# Patient Record
Sex: FEMALE | Race: WHITE | Employment: OTHER | URBAN - NONMETROPOLITAN AREA
[De-identification: names, ages, dates, MRNs, and addresses within clinical notes are randomized per-mention and may not be internally consistent; named-entity substitution may affect disease eponyms.]

---

## 2018-03-21 ENCOUNTER — HOSPITAL ENCOUNTER (OUTPATIENT)
Dept: GENERAL RADIOLOGY | Age: 68
Discharge: HOME | End: 2018-03-23
Payer: MEDICARE

## 2018-03-21 ENCOUNTER — HOSPITAL ENCOUNTER (OUTPATIENT)
Dept: NON INVASIVE DIAGNOSTICS | Age: 68
Discharge: HOME | End: 2018-03-21
Payer: MEDICARE

## 2018-03-21 ENCOUNTER — OFFICE VISIT (OUTPATIENT)
Dept: FAMILY MEDICINE CLINIC | Age: 68
End: 2018-03-21
Payer: MEDICARE

## 2018-03-21 VITALS
WEIGHT: 229 LBS | HEART RATE: 79 BPM | BODY MASS INDEX: 34.71 KG/M2 | SYSTOLIC BLOOD PRESSURE: 138 MMHG | DIASTOLIC BLOOD PRESSURE: 80 MMHG | OXYGEN SATURATION: 96 % | HEIGHT: 68 IN

## 2018-03-21 DIAGNOSIS — Z01.811 PRE-OP CHEST EXAM: ICD-10-CM

## 2018-03-21 DIAGNOSIS — Z01.818 PRE-OP TESTING: ICD-10-CM

## 2018-03-21 DIAGNOSIS — Z00.00 ROUTINE HEALTH MAINTENANCE: Primary | ICD-10-CM

## 2018-03-21 DIAGNOSIS — Z72.0 TOBACCO ABUSE: ICD-10-CM

## 2018-03-21 DIAGNOSIS — R53.83 FATIGUE, UNSPECIFIED TYPE: ICD-10-CM

## 2018-03-21 LAB
EKG ATRIAL RATE: 74 BPM
EKG P AXIS: 77 DEGREES
EKG P-R INTERVAL: 128 MS
EKG Q-T INTERVAL: 386 MS
EKG QRS DURATION: 90 MS
EKG QTC CALCULATION (BAZETT): 428 MS
EKG R AXIS: 68 DEGREES
EKG T AXIS: 69 DEGREES
EKG VENTRICULAR RATE: 74 BPM

## 2018-03-21 PROCEDURE — 99204 OFFICE O/P NEW MOD 45 MIN: CPT | Performed by: NURSE PRACTITIONER

## 2018-03-21 PROCEDURE — 93005 ELECTROCARDIOGRAM TRACING: CPT

## 2018-03-21 PROCEDURE — 71046 X-RAY EXAM CHEST 2 VIEWS: CPT

## 2018-03-21 ASSESSMENT — ENCOUNTER SYMPTOMS
CONSTIPATION: 0
ALLERGIC/IMMUNOLOGIC NEGATIVE: 1
ABDOMINAL PAIN: 0
SINUS PRESSURE: 0
SHORTNESS OF BREATH: 0
EYES NEGATIVE: 1
VOMITING: 0
DIARRHEA: 0
TROUBLE SWALLOWING: 0
NAUSEA: 0
CHEST TIGHTNESS: 0
COUGH: 0
GASTROINTESTINAL NEGATIVE: 1

## 2018-03-21 ASSESSMENT — PATIENT HEALTH QUESTIONNAIRE - PHQ9
SUM OF ALL RESPONSES TO PHQ QUESTIONS 1-9: 1
2. FEELING DOWN, DEPRESSED OR HOPELESS: 1
1. LITTLE INTEREST OR PLEASURE IN DOING THINGS: 0
SUM OF ALL RESPONSES TO PHQ9 QUESTIONS 1 & 2: 1

## 2018-03-23 ENCOUNTER — HOSPITAL ENCOUNTER (OUTPATIENT)
Dept: LAB | Age: 68
Setting detail: SPECIMEN
Discharge: HOME | End: 2018-03-23
Payer: MEDICARE

## 2018-03-23 DIAGNOSIS — Z01.818 PRE-OP TESTING: ICD-10-CM

## 2018-03-23 DIAGNOSIS — Z00.00 ROUTINE HEALTH MAINTENANCE: ICD-10-CM

## 2018-03-23 DIAGNOSIS — R53.83 FATIGUE, UNSPECIFIED TYPE: ICD-10-CM

## 2018-03-23 LAB
-: ABNORMAL
ALBUMIN SERPL-MCNC: 3.8 G/DL (ref 3.5–5.2)
ALBUMIN/GLOBULIN RATIO: 1.2 (ref 1–2.5)
ALP BLD-CCNC: 104 U/L (ref 35–104)
ALT SERPL-CCNC: 11 U/L (ref 5–33)
AMORPHOUS: ABNORMAL
ANION GAP SERPL CALCULATED.3IONS-SCNC: 9 MMOL/L (ref 9–17)
AST SERPL-CCNC: 13 U/L
BACTERIA: ABNORMAL
BILIRUB SERPL-MCNC: 0.56 MG/DL (ref 0.3–1.2)
BILIRUBIN URINE: NEGATIVE
BUN BLDV-MCNC: 21 MG/DL (ref 8–23)
BUN/CREAT BLD: 34 (ref 9–20)
CALCIUM SERPL-MCNC: 9.3 MG/DL (ref 8.6–10.4)
CASTS UA: ABNORMAL /LPF (ref 0–2)
CHLORIDE BLD-SCNC: 100 MMOL/L (ref 98–107)
CHOLESTEROL/HDL RATIO: 2.9
CHOLESTEROL: 177 MG/DL
CO2: 31 MMOL/L (ref 20–31)
COLOR: NORMAL
COMMENT UA: NORMAL
CREAT SERPL-MCNC: 0.61 MG/DL (ref 0.5–0.9)
CRYSTALS, UA: ABNORMAL /HPF
EPITHELIAL CELLS UA: ABNORMAL /HPF (ref 0–5)
GFR AFRICAN AMERICAN: >60 ML/MIN
GFR NON-AFRICAN AMERICAN: >60 ML/MIN
GFR SERPL CREATININE-BSD FRML MDRD: ABNORMAL ML/MIN/{1.73_M2}
GFR SERPL CREATININE-BSD FRML MDRD: ABNORMAL ML/MIN/{1.73_M2}
GLUCOSE BLD-MCNC: 96 MG/DL (ref 70–99)
GLUCOSE URINE: NEGATIVE
HCT VFR BLD CALC: 45.7 % (ref 36–46)
HDLC SERPL-MCNC: 61 MG/DL
HEMOGLOBIN: 15.2 G/DL (ref 12–16)
KETONES, URINE: NEGATIVE
LDL CHOLESTEROL: 103 MG/DL (ref 0–130)
LEUKOCYTE ESTERASE, URINE: NEGATIVE
MCH RBC QN AUTO: 30.6 PG (ref 26–34)
MCHC RBC AUTO-ENTMCNC: 33.3 G/DL (ref 31–37)
MCV RBC AUTO: 91.8 FL (ref 80–100)
MUCUS: ABNORMAL
NITRITE, URINE: NEGATIVE
NRBC AUTOMATED: NORMAL PER 100 WBC
OTHER OBSERVATIONS UA: ABNORMAL
PDW BLD-RTO: 13.6 % (ref 11–14.5)
PH UA: 6 (ref 5–6)
PLATELET # BLD: 151 K/UL (ref 140–450)
PMV BLD AUTO: 10.3 FL (ref 6–12)
POTASSIUM SERPL-SCNC: 4 MMOL/L (ref 3.7–5.3)
PROTEIN UA: NEGATIVE
RBC # BLD: 4.98 M/UL (ref 4–5.2)
RBC UA: ABNORMAL /HPF (ref 0–4)
RENAL EPITHELIAL, UA: ABNORMAL /HPF
SODIUM BLD-SCNC: 140 MMOL/L (ref 135–144)
SPECIFIC GRAVITY UA: 1.02 (ref 1.01–1.02)
THYROXINE, FREE: 1.24 NG/DL (ref 0.93–1.7)
TOTAL PROTEIN: 6.9 G/DL (ref 6.4–8.3)
TRICHOMONAS: ABNORMAL
TRIGL SERPL-MCNC: 67 MG/DL
TSH SERPL DL<=0.05 MIU/L-ACNC: 3.9 MIU/L (ref 0.3–5)
TURBIDITY: NORMAL
URINE HGB: NEGATIVE
UROBILINOGEN, URINE: NORMAL
VLDLC SERPL CALC-MCNC: NORMAL MG/DL (ref 1–30)
WBC # BLD: 5.6 K/UL (ref 3.5–11)
WBC UA: ABNORMAL /HPF (ref 0–4)
YEAST: ABNORMAL

## 2018-03-23 PROCEDURE — 84443 ASSAY THYROID STIM HORMONE: CPT

## 2018-03-23 PROCEDURE — 80053 COMPREHEN METABOLIC PANEL: CPT

## 2018-03-23 PROCEDURE — 85027 COMPLETE CBC AUTOMATED: CPT

## 2018-03-23 PROCEDURE — 81001 URINALYSIS AUTO W/SCOPE: CPT

## 2018-03-23 PROCEDURE — 80061 LIPID PANEL: CPT

## 2018-03-23 PROCEDURE — 36415 COLL VENOUS BLD VENIPUNCTURE: CPT

## 2018-03-23 PROCEDURE — 84439 ASSAY OF FREE THYROXINE: CPT

## 2018-03-28 ENCOUNTER — OFFICE VISIT (OUTPATIENT)
Dept: FAMILY MEDICINE CLINIC | Age: 68
End: 2018-03-28
Payer: MEDICARE

## 2018-03-28 VITALS
OXYGEN SATURATION: 99 % | DIASTOLIC BLOOD PRESSURE: 79 MMHG | HEART RATE: 93 BPM | SYSTOLIC BLOOD PRESSURE: 122 MMHG | BODY MASS INDEX: 34.72 KG/M2 | TEMPERATURE: 98.3 F | HEIGHT: 68 IN | RESPIRATION RATE: 12 BRPM | WEIGHT: 229.06 LBS

## 2018-03-28 DIAGNOSIS — Z91.09 ENVIRONMENTAL ALLERGIES: ICD-10-CM

## 2018-03-28 DIAGNOSIS — H25.9 AGE-RELATED CATARACT OF BOTH EYES, UNSPECIFIED AGE-RELATED CATARACT TYPE: Primary | ICD-10-CM

## 2018-03-28 DIAGNOSIS — Z01.818 PRE-OPERATIVE EXAM: ICD-10-CM

## 2018-03-28 PROCEDURE — 4040F PNEUMOC VAC/ADMIN/RCVD: CPT | Performed by: NURSE PRACTITIONER

## 2018-03-28 PROCEDURE — 99214 OFFICE O/P EST MOD 30 MIN: CPT | Performed by: NURSE PRACTITIONER

## 2018-03-28 PROCEDURE — G8484 FLU IMMUNIZE NO ADMIN: HCPCS | Performed by: NURSE PRACTITIONER

## 2018-03-28 PROCEDURE — 3017F COLORECTAL CA SCREEN DOC REV: CPT | Performed by: NURSE PRACTITIONER

## 2018-03-28 PROCEDURE — G8417 CALC BMI ABV UP PARAM F/U: HCPCS | Performed by: NURSE PRACTITIONER

## 2018-03-28 PROCEDURE — G8427 DOCREV CUR MEDS BY ELIG CLIN: HCPCS | Performed by: NURSE PRACTITIONER

## 2018-03-28 PROCEDURE — 3014F SCREEN MAMMO DOC REV: CPT | Performed by: NURSE PRACTITIONER

## 2018-03-28 PROCEDURE — 1090F PRES/ABSN URINE INCON ASSESS: CPT | Performed by: NURSE PRACTITIONER

## 2018-03-28 RX ORDER — LOTEPREDNOL ETABONATE 5 MG/G
GEL OPHTHALMIC
COMMUNITY
Start: 2018-04-10 | End: 2018-05-08

## 2018-03-28 RX ORDER — FEXOFENADINE HCL 180 MG/1
180 TABLET ORAL DAILY
Qty: 3030 TABLET | Refills: 1 | Status: SHIPPED | OUTPATIENT
Start: 2018-03-28 | End: 2018-04-27

## 2018-03-28 RX ORDER — LORATADINE 10 MG/1
10 TABLET ORAL DAILY
COMMUNITY

## 2018-03-28 ASSESSMENT — ENCOUNTER SYMPTOMS
GASTROINTESTINAL NEGATIVE: 1
EYE DISCHARGE: 0
SHORTNESS OF BREATH: 0
SINUS PRESSURE: 0
ABDOMINAL PAIN: 0
ALLERGIC/IMMUNOLOGIC NEGATIVE: 1
DIARRHEA: 0
NAUSEA: 0
TROUBLE SWALLOWING: 0
CONSTIPATION: 0
RESPIRATORY NEGATIVE: 1
CHEST TIGHTNESS: 0
COUGH: 0
EYE PAIN: 0
VOMITING: 0
EYE ITCHING: 0

## 2018-03-28 NOTE — PROGRESS NOTES
The lungs are without acute focal process. Mild emphysematous changes. There is no effusion or pneumothorax. The cardiomediastinal silhouette is stable. The osseous structures are stable. No acute process. Mild emphysematous changes     EKG completed-3/21/2018  Normal sinus rhythm  Normal ECG       Assessment & Plan:          1. Age-related cataract of both eyes, unspecified age-related cataract type     2. Environmental allergies  fexofenadine (ALLEGRA) 180 MG tablet   3. Pre-operative exam         Claire Vasquez was evaluated in my office for pre-operative evaluation. Based on review of information available to me at this time, the patient appears to be at no increased risk for the planned procedure. Will clear pending ordered test results at this visit. The Patient  is at low cardiac risk. Patient is advised to avoid aspirin, antiplatelet, and NSAID therapy one week prior to surgical intervention.    Patient is to continue the recommendations given to them from their surgeon regarding their medical regimen. No additional changes are made at this time. Patient can follow-up with PCP in office postoperatively for continued medical management or sooner if any further problems or symptoms arise. --She is medically cleared for proposed procedure. A letter is sent to the requesting provider.          Reina Orellana NP on [unfilled] at 10:16 AM

## 2019-03-08 ENCOUNTER — OFFICE VISIT (OUTPATIENT)
Dept: PRIMARY CARE CLINIC | Age: 69
End: 2019-03-08
Payer: MEDICARE

## 2019-03-08 VITALS
SYSTOLIC BLOOD PRESSURE: 118 MMHG | HEIGHT: 68 IN | OXYGEN SATURATION: 92 % | BODY MASS INDEX: 35.4 KG/M2 | DIASTOLIC BLOOD PRESSURE: 72 MMHG | TEMPERATURE: 99.2 F | HEART RATE: 99 BPM | RESPIRATION RATE: 20 BRPM | WEIGHT: 233.6 LBS

## 2019-03-08 DIAGNOSIS — Z23 NEED FOR PNEUMOCOCCAL VACCINATION: Primary | ICD-10-CM

## 2019-03-08 DIAGNOSIS — R05.9 COUGH: ICD-10-CM

## 2019-03-08 DIAGNOSIS — J11.1 INFLUENZA-LIKE ILLNESS: ICD-10-CM

## 2019-03-08 DIAGNOSIS — J44.9 CHRONIC OBSTRUCTIVE PULMONARY DISEASE, UNSPECIFIED COPD TYPE (HCC): ICD-10-CM

## 2019-03-08 PROCEDURE — 1123F ACP DISCUSS/DSCN MKR DOCD: CPT | Performed by: PHYSICIAN ASSISTANT

## 2019-03-08 PROCEDURE — 4004F PT TOBACCO SCREEN RCVD TLK: CPT | Performed by: PHYSICIAN ASSISTANT

## 2019-03-08 PROCEDURE — 4040F PNEUMOC VAC/ADMIN/RCVD: CPT | Performed by: PHYSICIAN ASSISTANT

## 2019-03-08 PROCEDURE — 1090F PRES/ABSN URINE INCON ASSESS: CPT | Performed by: PHYSICIAN ASSISTANT

## 2019-03-08 PROCEDURE — G8926 SPIRO NO PERF OR DOC: HCPCS | Performed by: PHYSICIAN ASSISTANT

## 2019-03-08 PROCEDURE — G8427 DOCREV CUR MEDS BY ELIG CLIN: HCPCS | Performed by: PHYSICIAN ASSISTANT

## 2019-03-08 PROCEDURE — G8599 NO ASA/ANTIPLAT THER USE RNG: HCPCS | Performed by: PHYSICIAN ASSISTANT

## 2019-03-08 PROCEDURE — 3017F COLORECTAL CA SCREEN DOC REV: CPT | Performed by: PHYSICIAN ASSISTANT

## 2019-03-08 PROCEDURE — G8484 FLU IMMUNIZE NO ADMIN: HCPCS | Performed by: PHYSICIAN ASSISTANT

## 2019-03-08 PROCEDURE — G8400 PT W/DXA NO RESULTS DOC: HCPCS | Performed by: PHYSICIAN ASSISTANT

## 2019-03-08 PROCEDURE — 99213 OFFICE O/P EST LOW 20 MIN: CPT | Performed by: PHYSICIAN ASSISTANT

## 2019-03-08 PROCEDURE — G8417 CALC BMI ABV UP PARAM F/U: HCPCS | Performed by: PHYSICIAN ASSISTANT

## 2019-03-08 PROCEDURE — 1101F PT FALLS ASSESS-DOCD LE1/YR: CPT | Performed by: PHYSICIAN ASSISTANT

## 2019-03-08 PROCEDURE — 3023F SPIROM DOC REV: CPT | Performed by: PHYSICIAN ASSISTANT

## 2019-03-08 RX ORDER — PREDNISONE 10 MG/1
10 TABLET ORAL 2 TIMES DAILY
Qty: 10 TABLET | Refills: 0 | Status: SHIPPED | OUTPATIENT
Start: 2019-03-08 | End: 2019-03-13

## 2019-03-08 RX ORDER — OSELTAMIVIR PHOSPHATE 75 MG/1
75 CAPSULE ORAL 2 TIMES DAILY
Qty: 10 CAPSULE | Refills: 0 | Status: SHIPPED | OUTPATIENT
Start: 2019-03-08 | End: 2019-03-13

## 2019-03-08 RX ORDER — AZITHROMYCIN 250 MG/1
250 TABLET, FILM COATED ORAL SEE ADMIN INSTRUCTIONS
Qty: 6 TABLET | Refills: 0 | Status: SHIPPED | OUTPATIENT
Start: 2019-03-08 | End: 2019-03-13

## 2019-03-08 ASSESSMENT — ENCOUNTER SYMPTOMS
NAUSEA: 1
SORE THROAT: 0
WHEEZING: 1
RHINORRHEA: 1
BACK PAIN: 1
SHORTNESS OF BREATH: 1
DIARRHEA: 0
VOMITING: 0
COUGH: 1
CHEST TIGHTNESS: 1

## 2019-03-08 ASSESSMENT — PATIENT HEALTH QUESTIONNAIRE - PHQ9
2. FEELING DOWN, DEPRESSED OR HOPELESS: 0
SUM OF ALL RESPONSES TO PHQ QUESTIONS 1-9: 0
1. LITTLE INTEREST OR PLEASURE IN DOING THINGS: 0
SUM OF ALL RESPONSES TO PHQ QUESTIONS 1-9: 0
SUM OF ALL RESPONSES TO PHQ9 QUESTIONS 1 & 2: 0

## 2019-03-22 ENCOUNTER — HOSPITAL ENCOUNTER (OUTPATIENT)
Dept: GENERAL RADIOLOGY | Age: 69
Discharge: HOME | End: 2019-03-24
Payer: MEDICARE

## 2019-03-22 ENCOUNTER — OFFICE VISIT (OUTPATIENT)
Dept: FAMILY MEDICINE CLINIC | Age: 69
End: 2019-03-22
Payer: MEDICARE

## 2019-03-22 VITALS
DIASTOLIC BLOOD PRESSURE: 72 MMHG | HEIGHT: 68 IN | WEIGHT: 233 LBS | SYSTOLIC BLOOD PRESSURE: 110 MMHG | HEART RATE: 56 BPM | RESPIRATION RATE: 16 BRPM | BODY MASS INDEX: 35.31 KG/M2

## 2019-03-22 DIAGNOSIS — Z12.11 SCREENING FOR COLON CANCER: ICD-10-CM

## 2019-03-22 DIAGNOSIS — B96.89 ACUTE BACTERIAL SINUSITIS: ICD-10-CM

## 2019-03-22 DIAGNOSIS — R06.2 WHEEZING: ICD-10-CM

## 2019-03-22 DIAGNOSIS — Z23 NEED FOR SHINGLES VACCINE: ICD-10-CM

## 2019-03-22 DIAGNOSIS — Z12.31 VISIT FOR SCREENING MAMMOGRAM: ICD-10-CM

## 2019-03-22 DIAGNOSIS — Z00.00 ROUTINE HEALTH MAINTENANCE: ICD-10-CM

## 2019-03-22 DIAGNOSIS — R06.02 SOB (SHORTNESS OF BREATH): ICD-10-CM

## 2019-03-22 DIAGNOSIS — J44.9 CHRONIC OBSTRUCTIVE PULMONARY DISEASE, UNSPECIFIED COPD TYPE (HCC): ICD-10-CM

## 2019-03-22 DIAGNOSIS — Z11.59 NEED FOR HEPATITIS C SCREENING TEST: ICD-10-CM

## 2019-03-22 DIAGNOSIS — Z72.0 TOBACCO ABUSE: Primary | ICD-10-CM

## 2019-03-22 DIAGNOSIS — J01.90 ACUTE BACTERIAL SINUSITIS: ICD-10-CM

## 2019-03-22 PROCEDURE — G8926 SPIRO NO PERF OR DOC: HCPCS | Performed by: NURSE PRACTITIONER

## 2019-03-22 PROCEDURE — G8417 CALC BMI ABV UP PARAM F/U: HCPCS | Performed by: NURSE PRACTITIONER

## 2019-03-22 PROCEDURE — G8599 NO ASA/ANTIPLAT THER USE RNG: HCPCS | Performed by: NURSE PRACTITIONER

## 2019-03-22 PROCEDURE — 4040F PNEUMOC VAC/ADMIN/RCVD: CPT | Performed by: NURSE PRACTITIONER

## 2019-03-22 PROCEDURE — 3023F SPIROM DOC REV: CPT | Performed by: NURSE PRACTITIONER

## 2019-03-22 PROCEDURE — G8400 PT W/DXA NO RESULTS DOC: HCPCS | Performed by: NURSE PRACTITIONER

## 2019-03-22 PROCEDURE — 1090F PRES/ABSN URINE INCON ASSESS: CPT | Performed by: NURSE PRACTITIONER

## 2019-03-22 PROCEDURE — 99215 OFFICE O/P EST HI 40 MIN: CPT | Performed by: NURSE PRACTITIONER

## 2019-03-22 PROCEDURE — 4004F PT TOBACCO SCREEN RCVD TLK: CPT | Performed by: NURSE PRACTITIONER

## 2019-03-22 PROCEDURE — G8510 SCR DEP NEG, NO PLAN REQD: HCPCS | Performed by: NURSE PRACTITIONER

## 2019-03-22 PROCEDURE — G8484 FLU IMMUNIZE NO ADMIN: HCPCS | Performed by: NURSE PRACTITIONER

## 2019-03-22 PROCEDURE — 71046 X-RAY EXAM CHEST 2 VIEWS: CPT

## 2019-03-22 PROCEDURE — G8427 DOCREV CUR MEDS BY ELIG CLIN: HCPCS | Performed by: NURSE PRACTITIONER

## 2019-03-22 PROCEDURE — 1101F PT FALLS ASSESS-DOCD LE1/YR: CPT | Performed by: NURSE PRACTITIONER

## 2019-03-22 PROCEDURE — 1123F ACP DISCUSS/DSCN MKR DOCD: CPT | Performed by: NURSE PRACTITIONER

## 2019-03-22 PROCEDURE — 3017F COLORECTAL CA SCREEN DOC REV: CPT | Performed by: NURSE PRACTITIONER

## 2019-03-22 RX ORDER — ALBUTEROL SULFATE 90 UG/1
2 AEROSOL, METERED RESPIRATORY (INHALATION) 4 TIMES DAILY PRN
Qty: 1 INHALER | Refills: 0 | Status: SHIPPED | OUTPATIENT
Start: 2019-03-22 | End: 2019-07-11

## 2019-03-22 RX ORDER — LEVOFLOXACIN 500 MG/1
500 TABLET, FILM COATED ORAL DAILY
Qty: 7 TABLET | Refills: 0 | Status: SHIPPED | OUTPATIENT
Start: 2019-03-22 | End: 2019-04-04 | Stop reason: ALTCHOICE

## 2019-03-22 ASSESSMENT — ENCOUNTER SYMPTOMS
WHEEZING: 1
NAUSEA: 0
SORE THROAT: 0
COUGH: 1
EYE ITCHING: 0
DIARRHEA: 1
CHEST TIGHTNESS: 1
SHORTNESS OF BREATH: 1
CONSTIPATION: 0
SINUS PRESSURE: 1
SINUS PAIN: 1
VOMITING: 0
RHINORRHEA: 1
EYE DISCHARGE: 0

## 2019-03-22 ASSESSMENT — PATIENT HEALTH QUESTIONNAIRE - PHQ9
SUM OF ALL RESPONSES TO PHQ QUESTIONS 1-9: 0
1. LITTLE INTEREST OR PLEASURE IN DOING THINGS: 0
SUM OF ALL RESPONSES TO PHQ QUESTIONS 1-9: 0
2. FEELING DOWN, DEPRESSED OR HOPELESS: 0
SUM OF ALL RESPONSES TO PHQ9 QUESTIONS 1 & 2: 0

## 2019-04-04 ENCOUNTER — OFFICE VISIT (OUTPATIENT)
Dept: PRIMARY CARE CLINIC | Age: 69
End: 2019-04-04
Payer: MEDICARE

## 2019-04-04 VITALS
DIASTOLIC BLOOD PRESSURE: 80 MMHG | OXYGEN SATURATION: 94 % | WEIGHT: 225 LBS | BODY MASS INDEX: 34.21 KG/M2 | HEART RATE: 92 BPM | TEMPERATURE: 98.6 F | SYSTOLIC BLOOD PRESSURE: 120 MMHG

## 2019-04-04 DIAGNOSIS — J01.40 ACUTE PANSINUSITIS, RECURRENCE NOT SPECIFIED: Primary | ICD-10-CM

## 2019-04-04 PROCEDURE — G8599 NO ASA/ANTIPLAT THER USE RNG: HCPCS | Performed by: FAMILY MEDICINE

## 2019-04-04 PROCEDURE — 1090F PRES/ABSN URINE INCON ASSESS: CPT | Performed by: FAMILY MEDICINE

## 2019-04-04 PROCEDURE — 4040F PNEUMOC VAC/ADMIN/RCVD: CPT | Performed by: FAMILY MEDICINE

## 2019-04-04 PROCEDURE — 3017F COLORECTAL CA SCREEN DOC REV: CPT | Performed by: FAMILY MEDICINE

## 2019-04-04 PROCEDURE — G8417 CALC BMI ABV UP PARAM F/U: HCPCS | Performed by: FAMILY MEDICINE

## 2019-04-04 PROCEDURE — 99214 OFFICE O/P EST MOD 30 MIN: CPT | Performed by: FAMILY MEDICINE

## 2019-04-04 PROCEDURE — G8400 PT W/DXA NO RESULTS DOC: HCPCS | Performed by: FAMILY MEDICINE

## 2019-04-04 PROCEDURE — 1123F ACP DISCUSS/DSCN MKR DOCD: CPT | Performed by: FAMILY MEDICINE

## 2019-04-04 PROCEDURE — 4004F PT TOBACCO SCREEN RCVD TLK: CPT | Performed by: FAMILY MEDICINE

## 2019-04-04 PROCEDURE — G8427 DOCREV CUR MEDS BY ELIG CLIN: HCPCS | Performed by: FAMILY MEDICINE

## 2019-04-04 RX ORDER — FLUTICASONE PROPIONATE 50 MCG
1 SPRAY, SUSPENSION (ML) NASAL DAILY
Qty: 1 BOTTLE | Refills: 0 | Status: SHIPPED | OUTPATIENT
Start: 2019-04-04 | End: 2019-10-08 | Stop reason: ALTCHOICE

## 2019-04-04 RX ORDER — CEFUROXIME AXETIL 500 MG/1
500 TABLET ORAL 2 TIMES DAILY
Qty: 20 TABLET | Refills: 0 | Status: SHIPPED | OUTPATIENT
Start: 2019-04-04 | End: 2019-04-14

## 2019-04-08 ENCOUNTER — TELEPHONE (OUTPATIENT)
Dept: PRIMARY CARE CLINIC | Age: 69
End: 2019-04-08

## 2019-04-11 ENCOUNTER — HOSPITAL ENCOUNTER (OUTPATIENT)
Dept: MAMMOGRAPHY | Age: 69
Discharge: HOME | End: 2019-04-13
Payer: MEDICARE

## 2019-04-11 DIAGNOSIS — Z12.31 VISIT FOR SCREENING MAMMOGRAM: ICD-10-CM

## 2019-04-11 PROCEDURE — 77063 BREAST TOMOSYNTHESIS BI: CPT

## 2019-04-24 ENCOUNTER — OFFICE VISIT (OUTPATIENT)
Dept: PRIMARY CARE CLINIC | Age: 69
End: 2019-04-24
Payer: MEDICARE

## 2019-04-24 VITALS
BODY MASS INDEX: 33.45 KG/M2 | HEART RATE: 92 BPM | DIASTOLIC BLOOD PRESSURE: 76 MMHG | TEMPERATURE: 99.2 F | OXYGEN SATURATION: 98 % | SYSTOLIC BLOOD PRESSURE: 120 MMHG | WEIGHT: 220 LBS

## 2019-04-24 DIAGNOSIS — R51.9 NONINTRACTABLE HEADACHE, UNSPECIFIED CHRONICITY PATTERN, UNSPECIFIED HEADACHE TYPE: ICD-10-CM

## 2019-04-24 DIAGNOSIS — J01.40 ACUTE PANSINUSITIS, RECURRENCE NOT SPECIFIED: Primary | ICD-10-CM

## 2019-04-24 PROCEDURE — G8599 NO ASA/ANTIPLAT THER USE RNG: HCPCS | Performed by: FAMILY MEDICINE

## 2019-04-24 PROCEDURE — 4004F PT TOBACCO SCREEN RCVD TLK: CPT | Performed by: FAMILY MEDICINE

## 2019-04-24 PROCEDURE — G8400 PT W/DXA NO RESULTS DOC: HCPCS | Performed by: FAMILY MEDICINE

## 2019-04-24 PROCEDURE — G8417 CALC BMI ABV UP PARAM F/U: HCPCS | Performed by: FAMILY MEDICINE

## 2019-04-24 PROCEDURE — 4040F PNEUMOC VAC/ADMIN/RCVD: CPT | Performed by: FAMILY MEDICINE

## 2019-04-24 PROCEDURE — 1123F ACP DISCUSS/DSCN MKR DOCD: CPT | Performed by: FAMILY MEDICINE

## 2019-04-24 PROCEDURE — 3017F COLORECTAL CA SCREEN DOC REV: CPT | Performed by: FAMILY MEDICINE

## 2019-04-24 PROCEDURE — 1090F PRES/ABSN URINE INCON ASSESS: CPT | Performed by: FAMILY MEDICINE

## 2019-04-24 PROCEDURE — G8427 DOCREV CUR MEDS BY ELIG CLIN: HCPCS | Performed by: FAMILY MEDICINE

## 2019-04-24 PROCEDURE — 99213 OFFICE O/P EST LOW 20 MIN: CPT | Performed by: FAMILY MEDICINE

## 2019-04-24 RX ORDER — TIZANIDINE 2 MG/1
2 TABLET ORAL 3 TIMES DAILY PRN
Qty: 30 TABLET | Refills: 0 | Status: SHIPPED | OUTPATIENT
Start: 2019-04-24 | End: 2020-05-14

## 2019-04-24 RX ORDER — CYCLOBENZAPRINE HCL 5 MG
5 TABLET ORAL 3 TIMES DAILY PRN
Qty: 30 TABLET | Refills: 0 | Status: SHIPPED | OUTPATIENT
Start: 2019-04-24 | End: 2019-04-24 | Stop reason: CLARIF

## 2019-04-24 RX ORDER — PREDNISONE 20 MG/1
20 TABLET ORAL 2 TIMES DAILY
Qty: 10 TABLET | Refills: 0 | Status: SHIPPED | OUTPATIENT
Start: 2019-04-24 | End: 2019-04-29

## 2019-04-24 RX ORDER — LEVOFLOXACIN 500 MG/1
500 TABLET, FILM COATED ORAL DAILY
Qty: 10 TABLET | Refills: 0 | Status: SHIPPED | OUTPATIENT
Start: 2019-04-24 | End: 2019-05-04

## 2019-04-24 NOTE — PROGRESS NOTES
[facility]             [address] [facility], [address]                        Telephone [phone]             Fax [phone]       Bernardino Bo  1950  MRN:  [id number]  Date of visit:  4/24/2019     Subjective:    Bernardino Bo is a 79 y.o.  female who presents to [facility] today (4/24/2019) for evaluation of:  Sinus Problem (ongoing despite tx. HA. dizziness. N/V)      She was seen at Urgent Care on 4/4/2019. She was treated for sinusitis with Ceftin and Flonase. She felt that she was improving for a few days, but then her symptoms worsened. She states that she has had a headache that is worse with coughing, sneezing, or bending over. She states that she has not had a fever. She admits to nausea. Current medications are:  Current Outpatient Medications   Medication Sig Dispense Refill    fluticasone (FLONASE) 50 MCG/ACT nasal spray 1 spray by Nasal route daily 1 Bottle 0    Pseudoephedrine HCl (SUDAFED 12 HOUR PO) Take 1 tablet by mouth 2 times daily      albuterol sulfate  (90 Base) MCG/ACT inhaler Inhale 2 puffs into the lungs 4 times daily as needed for Wheezing 1 Inhaler 0    Pseudoephedrine-APAP-DM (DAYQUIL PO) Take by mouth      loratadine (CLARITIN) 10 MG tablet Take 10 mg by mouth daily       No current facility-administered medications for this visit. She is allergic to vicodin [hydrocodone-acetaminophen]. She has the following problem list:  Patient Active Problem List   Diagnosis    CAD (coronary artery disease)    Tobacco abuse    Environmental allergies        She  reports that she has been smoking. She started smoking about 49 years ago. She has a 50.00 pack-year smoking history.  She has never used smokeless tobacco.      Objective:    Vitals:    04/24/19 1843   BP: 120/76   Site: Left Upper Arm   Position: Sitting   Cuff Size: Medium Adult   Pulse: 92   Temp: 99.2 °F (37.3 °C)   TempSrc: Tympanic   SpO2: 98%   Weight: 220 lb (99.8 kg)      SpO2: 98 %       Body mass index is 33.45 kg/m². Obese  female alert and cooperative. Cranial nerves II-XII are intact. The tympanic membranes are clear bilaterally. Oropharynx has no erythema. There is no exudate. There is moderate tenderness over the frontal and maxillary sinuses bilaterally. Neck is supple, with no lymphadenopathy. Chest:  Normal expansion. Clear to auscultation. No rales, rhonchi, or wheezing. Respirations are not labored. Heart sounds are normal.  Regular rate and rhythm without murmur, gallop or rub. Assessment & Plan:    Acute pansinusitis, recurrence not specified  - levofloxacin (LEVAQUIN) 500 MG tablet; Take 1 tablet by mouth daily for 10 days  Dispense: 10 tablet; Refill: 0  - predniSONE (DELTASONE) 20 MG tablet; Take 1 tablet by mouth 2 times daily for 5 days Take with food. Dispense: 10 tablet; Refill: 0    She was advised to follow up if symptoms worsen or do not resolve. 2. Nonintractable headache, unspecified chronicity pattern, unspecified headache type  - tiZANidine (ZANAFLEX) 2 MG tablet; Take 1 tablet by mouth 3 times daily as needed (muscle spasm or headach)  Dispense: 30 tablet; Refill: 0    She was advised not to drive or operate machinery while taking Tizanidine. She was also advised not to take this medication in combination with alcohol.        (Please note that portions of this note were completed with a voice-recognition program. Efforts were made to edit the dictation but occasionally words are mis-transcribed.)

## 2019-04-24 NOTE — PATIENT INSTRUCTIONS
Warning:  One or more of the medications that you have been prescribed may cause drowsiness and impair your ability to operate vehicles or machinery. Do not drive or operate machinery while taking Tizanidine. Do not use in combination with alcohol.

## 2019-05-02 ENCOUNTER — HOSPITAL ENCOUNTER (EMERGENCY)
Age: 69
Discharge: HOME | End: 2019-05-02
Attending: EMERGENCY MEDICINE
Payer: MEDICARE

## 2019-05-02 ENCOUNTER — APPOINTMENT (OUTPATIENT)
Dept: CT IMAGING | Age: 69
End: 2019-05-02
Payer: MEDICARE

## 2019-05-02 VITALS
WEIGHT: 220 LBS | TEMPERATURE: 98.2 F | BODY MASS INDEX: 33.34 KG/M2 | SYSTOLIC BLOOD PRESSURE: 146 MMHG | OXYGEN SATURATION: 95 % | DIASTOLIC BLOOD PRESSURE: 71 MMHG | HEART RATE: 85 BPM | HEIGHT: 68 IN | RESPIRATION RATE: 14 BRPM

## 2019-05-02 DIAGNOSIS — G89.29 CHRONIC NONINTRACTABLE HEADACHE, UNSPECIFIED HEADACHE TYPE: ICD-10-CM

## 2019-05-02 DIAGNOSIS — R51.9 CHRONIC NONINTRACTABLE HEADACHE, UNSPECIFIED HEADACHE TYPE: ICD-10-CM

## 2019-05-02 DIAGNOSIS — R11.2 NAUSEA AND VOMITING, INTRACTABILITY OF VOMITING NOT SPECIFIED, UNSPECIFIED VOMITING TYPE: Primary | ICD-10-CM

## 2019-05-02 LAB
ABSOLUTE EOS #: 0.1 K/UL (ref 0–0.4)
ABSOLUTE IMMATURE GRANULOCYTE: ABNORMAL K/UL (ref 0–0.3)
ABSOLUTE LYMPH #: 0.9 K/UL (ref 1–4.8)
ABSOLUTE MONO #: 0.6 K/UL (ref 0.1–1.2)
ANION GAP SERPL CALCULATED.3IONS-SCNC: 12 MMOL/L (ref 9–17)
BASOPHILS # BLD: 0 % (ref 0–1)
BASOPHILS ABSOLUTE: 0 K/UL (ref 0–0.2)
BUN BLDV-MCNC: 17 MG/DL (ref 8–23)
BUN/CREAT BLD: 25 (ref 9–20)
CALCIUM SERPL-MCNC: 9.6 MG/DL (ref 8.6–10.4)
CHLORIDE BLD-SCNC: 99 MMOL/L (ref 98–107)
CO2: 28 MMOL/L (ref 20–31)
CREAT SERPL-MCNC: 0.68 MG/DL (ref 0.5–0.9)
DIFFERENTIAL TYPE: ABNORMAL
EOSINOPHILS RELATIVE PERCENT: 2 % (ref 1–7)
GFR AFRICAN AMERICAN: >60 ML/MIN
GFR NON-AFRICAN AMERICAN: >60 ML/MIN
GFR SERPL CREATININE-BSD FRML MDRD: ABNORMAL ML/MIN/{1.73_M2}
GFR SERPL CREATININE-BSD FRML MDRD: ABNORMAL ML/MIN/{1.73_M2}
GLUCOSE BLD-MCNC: 122 MG/DL (ref 70–99)
HCT VFR BLD CALC: 49 % (ref 36–46)
HEMOGLOBIN: 16 G/DL (ref 12–16)
IMMATURE GRANULOCYTES: ABNORMAL %
LYMPHOCYTES # BLD: 12 % (ref 16–46)
MCH RBC QN AUTO: 30.3 PG (ref 26–34)
MCHC RBC AUTO-ENTMCNC: 32.6 G/DL (ref 31–37)
MCV RBC AUTO: 93.1 FL (ref 80–100)
MONOCYTES # BLD: 8 % (ref 4–11)
NRBC AUTOMATED: ABNORMAL PER 100 WBC
PDW BLD-RTO: 16 % (ref 11–14.5)
PLATELET # BLD: 232 K/UL (ref 140–450)
PLATELET ESTIMATE: ABNORMAL
PMV BLD AUTO: 8.9 FL (ref 6–12)
POTASSIUM SERPL-SCNC: 3.8 MMOL/L (ref 3.7–5.3)
RBC # BLD: 5.27 M/UL (ref 4–5.2)
RBC # BLD: ABNORMAL 10*6/UL
SEG NEUTROPHILS: 78 % (ref 43–77)
SEGMENTED NEUTROPHILS ABSOLUTE COUNT: 5.8 K/UL (ref 1.8–7.7)
SODIUM BLD-SCNC: 139 MMOL/L (ref 135–144)
WBC # BLD: 7.4 K/UL (ref 3.5–11)
WBC # BLD: ABNORMAL 10*3/UL

## 2019-05-02 PROCEDURE — 6360000002 HC RX W HCPCS: Performed by: EMERGENCY MEDICINE

## 2019-05-02 PROCEDURE — 96374 THER/PROPH/DIAG INJ IV PUSH: CPT

## 2019-05-02 PROCEDURE — 70450 CT HEAD/BRAIN W/O DYE: CPT

## 2019-05-02 PROCEDURE — 99284 EMERGENCY DEPT VISIT MOD MDM: CPT

## 2019-05-02 PROCEDURE — 85025 COMPLETE CBC W/AUTO DIFF WBC: CPT

## 2019-05-02 PROCEDURE — 96375 TX/PRO/DX INJ NEW DRUG ADDON: CPT

## 2019-05-02 PROCEDURE — 80048 BASIC METABOLIC PNL TOTAL CA: CPT

## 2019-05-02 PROCEDURE — 2580000003 HC RX 258: Performed by: EMERGENCY MEDICINE

## 2019-05-02 RX ORDER — ONDANSETRON 4 MG/1
4 TABLET, FILM COATED ORAL EVERY 8 HOURS PRN
Qty: 20 TABLET | Refills: 0 | Status: SHIPPED | OUTPATIENT
Start: 2019-05-02 | End: 2019-06-18

## 2019-05-02 RX ORDER — KETOROLAC TROMETHAMINE 30 MG/ML
15 INJECTION, SOLUTION INTRAMUSCULAR; INTRAVENOUS ONCE
Status: COMPLETED | OUTPATIENT
Start: 2019-05-02 | End: 2019-05-02

## 2019-05-02 RX ORDER — DEXAMETHASONE SODIUM PHOSPHATE 10 MG/ML
10 INJECTION INTRAMUSCULAR; INTRAVENOUS ONCE
Status: COMPLETED | OUTPATIENT
Start: 2019-05-02 | End: 2019-05-02

## 2019-05-02 RX ORDER — ONDANSETRON 2 MG/ML
4 INJECTION INTRAMUSCULAR; INTRAVENOUS ONCE
Status: COMPLETED | OUTPATIENT
Start: 2019-05-02 | End: 2019-05-02

## 2019-05-02 RX ORDER — 0.9 % SODIUM CHLORIDE 0.9 %
1000 INTRAVENOUS SOLUTION INTRAVENOUS ONCE
Status: COMPLETED | OUTPATIENT
Start: 2019-05-02 | End: 2019-05-02

## 2019-05-02 RX ADMIN — SODIUM CHLORIDE 1000 ML: 9 INJECTION, SOLUTION INTRAVENOUS at 14:27

## 2019-05-02 RX ADMIN — KETOROLAC TROMETHAMINE 15 MG: 30 INJECTION, SOLUTION INTRAMUSCULAR at 14:33

## 2019-05-02 RX ADMIN — ONDANSETRON 4 MG: 2 INJECTION INTRAMUSCULAR; INTRAVENOUS at 14:28

## 2019-05-02 RX ADMIN — DEXAMETHASONE SODIUM PHOSPHATE 10 MG: 10 INJECTION INTRAMUSCULAR; INTRAVENOUS at 14:30

## 2019-05-02 ASSESSMENT — PAIN SCALES - GENERAL
PAINLEVEL_OUTOF10: 5
PAINLEVEL_OUTOF10: 8
PAINLEVEL_OUTOF10: 3
PAINLEVEL_OUTOF10: 8

## 2019-05-02 ASSESSMENT — PAIN DESCRIPTION - ONSET: ONSET: ON-GOING

## 2019-05-02 ASSESSMENT — PAIN DESCRIPTION - DIRECTION: RADIATING_TOWARDS: TOP

## 2019-05-02 ASSESSMENT — PAIN DESCRIPTION - PAIN TYPE: TYPE: ACUTE PAIN

## 2019-05-02 ASSESSMENT — PAIN - FUNCTIONAL ASSESSMENT: PAIN_FUNCTIONAL_ASSESSMENT: 0-10

## 2019-05-02 ASSESSMENT — PAIN DESCRIPTION - LOCATION: LOCATION: HEAD

## 2019-05-02 ASSESSMENT — PAIN DESCRIPTION - FREQUENCY: FREQUENCY: CONTINUOUS

## 2019-05-02 ASSESSMENT — PAIN DESCRIPTION - DESCRIPTORS: DESCRIPTORS: SHARP;PATIENT UNABLE TO DESCRIBE

## 2019-05-02 ASSESSMENT — PAIN DESCRIPTION - PROGRESSION: CLINICAL_PROGRESSION: NOT CHANGED

## 2019-05-02 ASSESSMENT — PAIN DESCRIPTION - ORIENTATION: ORIENTATION: POSTERIOR

## 2019-05-02 NOTE — ED PROVIDER NOTES
[address] [facility] ED  [facility]  Phone: [phone]  eMERGENCY dEPARTMENT eNCOUnter      Pt Name: Candice Ureña  MRN: [id number]  [place] 1950  Date of evaluation: 5/2/2019    CHIEF COMPLAINT       Chief Complaint   Patient presents with    Headache     \"been going on for about 6 weeks they keep telling me it is sinusitis\"    Emesis     onset \"not until today\"       [address] Raul Fish is a 79 y.o. female who presents to the emergency department complaining of a 6 week history of frontal headache. She's been seen by urgent care and her doctor and has been currently treated for sinusitis on Levaquin. She has had headaches in the past the only thing that was different today was the nausea and vomiting which started when she woke up this morning 3 episodes so far. Denies blurry vision or double vision. No paresthesias or weakness. No chest pain or shortness of breath. No other symptoms. She rates her pain 8/10 describes it as sharp. Worse when she coughs. REVIEW OF SYSTEMS       Constitutional: No fevers or chills   HEENT: No sore throat, rhinorrhea, or earache   Eyes: No blurry vision or double vision no drainage   Cardiovascular: No chest pain or tachycardia   Respiratory: No wheezing or shortness of breath positive cough  Gastrointestinal: Positive nausea and vomiting no diarrhea, constipation, or abdominal pain   : No hematuria or dysuria   Musculoskeletal: No swelling or pain   Skin: No rash   Neurological: No focal neurologic complaints, paresthesias, weakness, positive headache    PAST MEDICAL HISTORY    has a past medical history of Chronic obstructive pulmonary disease ([address] Coronary artery disease, Dyslipidemia, and Obesity. SURGICAL HISTORY      has a past surgical history that includes Cardiac catheterization.     CURRENT MEDICATIONS       Previous Medications    ALBUTEROL SULFATE  (90 BASE) MCG/ACT INHALER Inhale 2 puffs into the lungs 4 times daily as needed for Wheezing    FLUTICASONE (FLONASE) 50 MCG/ACT NASAL SPRAY    1 spray by Nasal route daily    LEVOFLOXACIN (LEVAQUIN) 500 MG TABLET    Take 1 tablet by mouth daily for 10 days    LORATADINE (CLARITIN) 10 MG TABLET    Take 10 mg by mouth daily    PSEUDOEPHEDRINE HCL (SUDAFED 12 HOUR PO)    Take 1 tablet by mouth 2 times daily    PSEUDOEPHEDRINE-APAP-DM (DAYQUIL PO)    Take by mouth    TIZANIDINE (ZANAFLEX) 2 MG TABLET    Take 1 tablet by mouth 3 times daily as needed (muscle spasm or headach)       ALLERGIES     is allergic to vicodin [hydrocodone-acetaminophen]. FAMILY HISTORY     has no family status information on file. family history is not on file. SOCIAL HISTORY      reports that she has been smoking. She started smoking about 49 years ago. She has a 50.00 pack-year smoking history. She has never used smokeless tobacco. She reports that she does not drink alcohol or use drugs. PHYSICAL EXAM       ED Triage Vitals [05/02/19 1354]   BP Temp Temp Source Pulse Resp SpO2 Height Weight   (!) 167/87 98.2 °F (36.8 °C) Tympanic 90 14 94 % 5' 8\" (1.727 m) 220 lb (99.8 kg)       Constitutional: Alert, oriented x3, nontoxic, answering questions appropriately, acting properly for age, in no acute distress   HEENT: Extraocular muscles intact, mucus membranes moist, TMs clear bilaterally, no posterior pharyngeal erythema or exudates, Pupils equal, round, reactive to light, no photophobia  Neck: Trachea midline no meningismus  Cardiovascular: Regular rhythm and rate no S3, S4, or murmurs   Respiratory: Clear to auscultation bilaterally no wheezes, rhonchi, rales, no respiratory distress no tachypnea no retractions no hypoxia  Gastrointestinal: Soft, nontender, nondistended, positive bowel sounds. No rebound, rigidity, or guarding.    Musculoskeletal: No extremity pain or swelling   Neurologic: Moving all 4 extremities without difficulty there are no gross focal neurologic deficits   Skin: Warm and dry     Physical Exam  DIFFERENTIAL DIAGNOSIS/ MDM:     No gross focal neurologic deficits. IV fluids and labs. CT head.     DIAGNOSTIC RESULTS     EKG: All EKG's are interpreted by [facility] who either signs or Co-signs this chart in the absence of a cardiologist.        Not indicated unless otherwise documented above    LABS:  Results for orders placed or performed during the hospital encounter of 05/02/19   CBC Auto Differential   Result Value Ref Range    WBC 7.4 3.5 - 11.0 k/uL    RBC 5.27 (H) 4.0 - 5.2 m/uL    Hemoglobin 16.0 12.0 - 16.0 g/dL    Hematocrit 49.0 (H) 36 - 46 %    MCV 93.1 80 - 100 fL    MCH 30.3 26 - 34 pg    MCHC 32.6 31 - 37 g/dL    RDW 16.0 (H) 11.0 - 14.5 %    Platelets 922 598 - 313 k/uL    MPV 8.9 6.0 - 12.0 fL    NRBC Automated NOT REPORTED per 100 WBC    Differential Type NOT REPORTED     Immature Granulocytes NOT REPORTED 0 %    Absolute Immature Granulocyte NOT REPORTED 0.00 - 0.30 k/uL    WBC Morphology NOT REPORTED     RBC Morphology NOT REPORTED     Platelet Estimate NOT REPORTED     Seg Neutrophils 78 (H) 43 - 77 %    Lymphocytes 12 (L) 16 - 46 %    Monocytes 8 4 - 11 %    Eosinophils % 2 1 - 7 %    Basophils 0 0 - 1 %    Segs Absolute 5.80 1.8 - 7.7 k/uL    Absolute Lymph # 0.90 (L) 1.0 - 4.8 k/uL    Absolute Mono # 0.60 0.1 - 1.2 k/uL    Absolute Eos # 0.10 0.0 - 0.4 k/uL    Basophils # 0.00 0.0 - 0.2 k/uL   Basic Metabolic Panel   Result Value Ref Range    Glucose 122 (H) 70 - 99 mg/dL    BUN 17 8 - 23 mg/dL    CREATININE 0.68 0.50 - 0.90 mg/dL    Bun/Cre Ratio 25 (H) 9 - 20    Calcium 9.6 8.6 - 10.4 mg/dL    Sodium 139 135 - 144 mmol/L    Potassium 3.8 3.7 - 5.3 mmol/L    Chloride 99 98 - 107 mmol/L    CO2 28 20 - 31 mmol/L    Anion Gap 12 9 - 17 mmol/L    GFR Non-African American >60 >60 mL/min    GFR African American >60 >60 mL/min    GFR Comment          GFR Staging NOT REPORTED        Not indicated unless otherwise documented above    RADIOLOGY:   I reviewed the radiologist interpretations:    CT Head WO Contrast   Final Result   Partially visualized Chiari malformation. Ventriculomegaly is noted as well   as cerebral and cerebellar cortical atrophy with chronic appearing white   matter changes in the brain. Not indicated unless otherwise documented above    EMERGENCY DEPARTMENT COURSE:     The patient was given the following medications:  Orders Placed This Encounter   Medications    0.9 % sodium chloride bolus    ondansetron (ZOFRAN) injection 4 mg    ketorolac (TORADOL) injection 15 mg    dexamethasone (DECADRON) injection 10 mg    ondansetron (ZOFRAN) 4 MG tablet     Sig: Take 1 tablet by mouth every 8 hours as needed for Nausea     Dispense:  20 tablet     Refill:  0        Vitals:   -------------------------  BP (!) 146/71   Pulse 85   Temp 98.2 °F (36.8 °C) (Tympanic)   Resp 14   Ht 5' 8\" (1.727 m)   Wt 220 lb (99.8 kg)   LMP 08/01/2000 (Approximate)   SpO2 95%   BMI 33.45 kg/m²     3:15 PM feeling much better. CAT scan demonstrates Arnold-Chiari malformation partially visualized as well as ventriculomegaly will discuss with neurology. 3:30 PM discussed with Azam Claudio who will see the patient in the office. 6 weeks of headache. Findings less likely acute. Headache 2/10. No focal neurologic deficits. We'll discharge with Zofran. Follow up with neurology and return if worse. I have reviewed the disposition diagnosis with the patient and or their family/guardian. I have answered their questions and given discharge instructions. They voiced understanding of these instructions and did not have any furtherquestions or complaints. CRITICAL CARE:    None    CONSULTS:    None    PROCEDURES:    None      OARRS Report if indicated             FINAL IMPRESSION      1. Nausea and vomiting, intractability of vomiting not specified, unspecified vomiting type    2.  Chronic nonintractable headache, unspecified headache type          DISPOSITION/PLAN   DISPOSITION Decision To Discharge 05/02/2019 03:42:09 PM        PATIENT REFERRED TO:  Mima Gaines MD  [address] [place] [address]  [phone]    Schedule an appointment as soon as possible for a visit in 1 day        DISCHARGE MEDICATIONS:  New Prescriptions    ONDANSETRON (ZOFRAN) 4 MG TABLET    Take 1 tablet by mouth every 8 hours as needed for Nausea       (Please note that portions of thisnote were completed with a voice recognition program.  Efforts were made to edit the dictations but occasionally words are mis-transcribed.)    Pan DO  Attending Emergency Physician       Sean Bender DO  05/02/19 154

## 2019-05-07 ENCOUNTER — OFFICE VISIT (OUTPATIENT)
Dept: NEUROLOGY | Age: 69
End: 2019-05-07
Payer: MEDICARE

## 2019-05-07 VITALS
DIASTOLIC BLOOD PRESSURE: 78 MMHG | WEIGHT: 213.8 LBS | HEART RATE: 83 BPM | HEIGHT: 68 IN | BODY MASS INDEX: 32.4 KG/M2 | SYSTOLIC BLOOD PRESSURE: 130 MMHG | OXYGEN SATURATION: 98 %

## 2019-05-07 DIAGNOSIS — G93.5 CHIARI MALFORMATION TYPE I (HCC): ICD-10-CM

## 2019-05-07 DIAGNOSIS — Z91.09 ENVIRONMENTAL ALLERGIES: ICD-10-CM

## 2019-05-07 DIAGNOSIS — J44.9 CHRONIC OBSTRUCTIVE PULMONARY DISEASE, UNSPECIFIED COPD TYPE (HCC): ICD-10-CM

## 2019-05-07 DIAGNOSIS — I63.49 CEREBRAL INFARCTION DUE TO EMBOLISM OF OTHER CEREBRAL ARTERY (HCC): ICD-10-CM

## 2019-05-07 DIAGNOSIS — I67.82 CHRONIC CEREBRAL ISCHEMIA: ICD-10-CM

## 2019-05-07 DIAGNOSIS — I25.10 CORONARY ARTERY DISEASE INVOLVING NATIVE CORONARY ARTERY OF NATIVE HEART WITHOUT ANGINA PECTORIS: ICD-10-CM

## 2019-05-07 DIAGNOSIS — I63.19 CEREBRAL INFARCTION DUE TO EMBOLISM OF OTHER PRECEREBRAL ARTERY (HCC): ICD-10-CM

## 2019-05-07 DIAGNOSIS — R26.89 BALANCE PROBLEM: ICD-10-CM

## 2019-05-07 DIAGNOSIS — R42 DIZZINESS: Primary | ICD-10-CM

## 2019-05-07 DIAGNOSIS — R93.0 ABNORMAL CT OF THE HEAD: ICD-10-CM

## 2019-05-07 DIAGNOSIS — G31.9 ACQUIRED CEREBRAL ATROPHY (HCC): ICD-10-CM

## 2019-05-07 DIAGNOSIS — G45.0 VBI (VERTEBROBASILAR INSUFFICIENCY): ICD-10-CM

## 2019-05-07 DIAGNOSIS — R51.9 OCCIPITAL HEADACHE: ICD-10-CM

## 2019-05-07 DIAGNOSIS — E78.5 DYSLIPIDEMIA: ICD-10-CM

## 2019-05-07 DIAGNOSIS — E66.09 CLASS 2 OBESITY DUE TO EXCESS CALORIES IN ADULT, UNSPECIFIED BMI, UNSPECIFIED WHETHER SERIOUS COMORBIDITY PRESENT: ICD-10-CM

## 2019-05-07 DIAGNOSIS — G93.89 CEREBRAL VENTRICULOMEGALY: ICD-10-CM

## 2019-05-07 DIAGNOSIS — Z72.0 TOBACCO ABUSE: ICD-10-CM

## 2019-05-07 PROBLEM — I63.10 CEREBRAL INFARCTION DUE TO EMBOLISM OF PRECEREBRAL ARTERY (HCC): Status: ACTIVE | Noted: 2019-05-07

## 2019-05-07 PROBLEM — E66.9 OBESITY: Status: ACTIVE | Noted: 2019-05-07

## 2019-05-07 PROCEDURE — 1090F PRES/ABSN URINE INCON ASSESS: CPT | Performed by: PSYCHIATRY & NEUROLOGY

## 2019-05-07 PROCEDURE — G8427 DOCREV CUR MEDS BY ELIG CLIN: HCPCS | Performed by: PSYCHIATRY & NEUROLOGY

## 2019-05-07 PROCEDURE — G8417 CALC BMI ABV UP PARAM F/U: HCPCS | Performed by: PSYCHIATRY & NEUROLOGY

## 2019-05-07 PROCEDURE — 3023F SPIROM DOC REV: CPT | Performed by: PSYCHIATRY & NEUROLOGY

## 2019-05-07 PROCEDURE — 99205 OFFICE O/P NEW HI 60 MIN: CPT | Performed by: PSYCHIATRY & NEUROLOGY

## 2019-05-07 PROCEDURE — G8926 SPIRO NO PERF OR DOC: HCPCS | Performed by: PSYCHIATRY & NEUROLOGY

## 2019-05-07 RX ORDER — ASPIRIN 325 MG
325 TABLET ORAL DAILY
COMMUNITY
End: 2022-09-08

## 2019-05-07 ASSESSMENT — ENCOUNTER SYMPTOMS
SHORTNESS OF BREATH: 0
SINUS PRESSURE: 0
NAUSEA: 1
TROUBLE SWALLOWING: 0
PHOTOPHOBIA: 1
COLOR CHANGE: 0
ABDOMINAL PAIN: 0
CHEST TIGHTNESS: 0
CHOKING: 0
WHEEZING: 0
FACIAL SWEATING: 0
SWOLLEN GLANDS: 0
VOMITING: 0
EYE WATERING: 0
EYE REDNESS: 0
SCALP TENDERNESS: 0
BACK PAIN: 0
SORE THROAT: 0
DIARRHEA: 0
CONSTIPATION: 0
APNEA: 0
VISUAL CHANGE: 0
FACIAL SWELLING: 0
EYE PAIN: 0
COUGH: 0
CHANGE IN BOWEL HABIT: 0
VOICE CHANGE: 0
BLURRED VISION: 0
EYE ITCHING: 0
ABDOMINAL DISTENTION: 0
BLOOD IN STOOL: 0
EYE DISCHARGE: 0

## 2019-05-07 NOTE — PROGRESS NOTES
[facility]  Neurology  [address]:[phone]   Fax: [phone]    SUBJECTIVE:     PATIENT ID:  Jossue Coto is a  RIGHT   HANDED 79 y.o. female. Dizziness   This is a new problem. Episode onset: SINCE   EARLY  APRIL 2019. The problem occurs intermittently. The problem has been waxing and waning. Associated symptoms include headaches, nausea, numbness and vertigo. Pertinent negatives include no abdominal pain, anorexia, arthralgias, change in bowel habit, chest pain, chills, congestion, coughing, diaphoresis, fatigue, fever, joint swelling, myalgias, neck pain, rash, sore throat, swollen glands, urinary symptoms, visual change, vomiting or weakness. The symptoms are aggravated by bending and standing. She has tried rest and sleep for the symptoms. The treatment provided no relief. Headache    This is a new problem. Episode onset: SINCE  APRIL 2019. The problem occurs intermittently. The problem has been waxing and waning. The pain is located in the occipital and frontal region. The pain does not radiate. The pain quality is not similar to prior headaches. The quality of the pain is described as aching and throbbing. The pain is at a severity of 3/10. The pain is mild. Associated symptoms include dizziness, a loss of balance, nausea, numbness, phonophobia and photophobia. Pertinent negatives include no abdominal pain, abnormal behavior, anorexia, back pain, blurred vision, coughing, drainage, ear pain, eye pain, eye redness, eye watering, facial sweating, fever, hearing loss, insomnia, muscle aches, neck pain, scalp tenderness, seizures, sinus pressure, sore throat, swollen glands, tingling, tinnitus, visual change, vomiting, weakness or weight loss. The symptoms are aggravated by coughing, bright light and noise. She has tried acetaminophen for the symptoms. The treatment provided no relief.  Her past medical history is significant for obesity ISCHEMIA,     VERTBRO BASILAR  INSUFFICIENCY                                          CARDIAC  PATHOLOGY. 9)     IN  VIEW  OF  THE  PATIENT'S    MULTIPLE   NEUROLOGICAL                           SYMPTOMS  AND  CONCERNS    FOR  PROLONGED   DURATION,                           AND    MULTIPLE   CO MORBID  MEDICAL  CONDITIONS,                           PATIENT    NEEDS  NEURO  DIAGNOSTIC  EVALUATIONS                      FOR   ANY   NEUROLOGICAL  PATHOLOGIES    AND  OTHER                        CORRECTABLE   ETIOLOGIES;     AND                              PATIENT  REQUESTS THE  SAME.                           PRECIPITATING  FACTORS: including  fever/infection, exertion/relaxation, position change, stress, weather change, medications/alcohol, time of day/darkness/light  Are                     present                                                            MODIFYING  FACTORS:  fever/infection, exertion/relaxation, position change, stress, weather change, medications/alcohol, time of day/darkness/light                    Are  present           Patient   Indicates   The  Presence   And  The  Absence  Of  The  FollowingAssociated  And   Additional  Neurological    Symptoms:                                Balance  And coordination problems  present           Gait problems     present            Headaches      present              Migraines           present           Memory problemsabsent           Confusion        absent            Paresthesia numbness          absent           SeizuresAnd  Starring  Episodes           absent           Syncope,  Near  syncopal episodes         absent           Speech problems           absent             Swallowing  Problems      absent            Dizziness,  Light headedness           present              Vertigo        present             Generalized   Weakness    absent              focal  Weakness     absent             Tremors absent              Sleep  Problems     absent             History  Of   RecentHead  Injury     absent             History  Of   Recent  TIA     absent             History  Of   Recent    Stroke     absent             Neck  Pain and  Neck muscle  Spasms  absent             Radiating  down   And   Weakness           absent            Lower back   Pain  And     Spasms  absent            Radiating    Down   And   Weakness          absent                H/OFALLS        absent               History  Of   Visual  Symptoms    absent                Associated   Diplopia       absent                                           Also   Additional   Symptoms   Present    As  Documented    In   The detailed                  Review  Of  Systems   And    Please   Refer   To    Them for   Additional  Information. Any components  That are either  Unobtainable  Or  Limited  In   HPI, ROS  And/or PFSH   Are   Due   ToPatient's  Medical  Problems,  Clinical  Condition and/or lack of                                other    Alternate resources.           RECORDS   REVIEWED:    historical medical records     INFORMATION   REVIEWED:       MEDICAL   HISTORY,SURGICAL   HISTORY,   MEDICATIONS   LIST,   ALLERGIES AND  DRUG  INTOLERANCES,     FAMILY   HISTORY,  SOCIAL  HISTORY,    PROBLEM  LIST   FOR  PATIENT  CARE   COORDINATION    Past Medical History:   Diagnosis Date    Chronic obstructive pulmonary disease ([facility] [address]     Coronary artery disease     Dyslipidemia     Obesity          Past Surgical History:   Procedure Laterality Date    CARDIAC CATHETERIZATION           Current Outpatient Medications   Medication Sig Dispense Refill    aspirin 325 MG tablet Take 325 mg by mouth daily      ondansetron (ZOFRAN) 4 MG tablet Take 1 tablet by mouth every 8 hours as needed for Nausea 20 tablet 0    tiZANidine (ZANAFLEX) 2 MG tablet Take 1 tablet by mouth 3 times daily as needed (muscle spasm or headach) 30 tablet 0    fluticasone (FLONASE) 50 MCG/ACT nasal spray 1 spray by Nasal route daily 1 Bottle 0    Pseudoephedrine HCl (SUDAFED 12 HOUR PO) Take 1 tablet by mouth 2 times daily      albuterol sulfate  (90 Base) MCG/ACT inhaler Inhale 2 puffs into the lungs 4 times daily as needed for Wheezing 1 Inhaler 0    Pseudoephedrine-APAP-DM (DAYQUIL PO) Take by mouth      loratadine (CLARITIN) 10 MG tablet Take 10 mg by mouth daily       No current facility-administered medications for this visit. Allergies   Allergen Reactions    Vicodin [Hydrocodone-Acetaminophen]          History reviewed. No pertinent family history.       Social History     Socioeconomic History    Marital status:      Spouse name: Not on file    Number of children: Not on file    Years of education: Not on file    Highest education level: Not on file   Occupational History    Not on file   Social Needs    Financial resource strain: Not on file    Food insecurity:     Worry: Not on file     Inability: Not on file    Transportation needs:     Medical: Not on file     Non-medical: Not on file   Tobacco Use    Smoking status: Current Every Day Smoker     Packs/day: 1.00     Years: 50.00     Pack years: 50.00     Start date: 1/1/1970    Smokeless tobacco: Never Used   Substance and Sexual Activity    Alcohol use: No    Drug use: No    Sexual activity: Not on file   Lifestyle    Physical activity:     Days per week: Not on file     Minutes per session: Not on file    Stress: Not on file   Relationships    Social connections:     Talks on phone: Not on file     Gets together: Not on file     Attends [employer] service: Not on file     Active member of club or organization: Not on file     Attends meetings of clubs or organizations: Not on file     Relationship status: Not on file    Intimate partner violence:     Fear of current or ex partner: Not on file     Emotionally abused: Not on file     Physically abused: Not on file     Forced sexual activity: Not on file   Other Topics Concern    Not on file   Social History Narrative    Not on file       Vitals:    05/07/19 1126   BP: 130/78   Pulse: 83   SpO2: 98%         Wt Readings from Last 3 Encounters:   05/07/19 213 lb 12.8 oz (97 kg)   05/02/19 220 lb (99.8 kg)   04/24/19 220 lb (99.8 kg)         BP Readings from Last 3 Encounters:   05/07/19 130/78   05/02/19 (!) 146/71   04/24/19 120/76       Hematology and Coagulation  Lab Results   Component Value Date    WBC 7.4 05/02/2019    RBC 5.27 05/02/2019    HGB 16.0 05/02/2019    HCT 49.0 05/02/2019    MCV 93.1 05/02/2019    MCH 30.3 05/02/2019    MCHC 32.6 05/02/2019    RDW 16.0 05/02/2019     05/02/2019    MPV 8.9 05/02/2019       Chemistries  Lab Results   Component Value Date     05/02/2019    K 3.8 05/02/2019    CL 99 05/02/2019    CO2 28 05/02/2019    BUN 17 05/02/2019    CREATININE 0.68 05/02/2019    CALCIUM 9.6 05/02/2019    PROT 6.9 03/23/2018    LABALBU 3.8 03/23/2018    BILITOT 0.56 03/23/2018    ALKPHOS 104 03/23/2018    AST 13 03/23/2018    ALT 11 03/23/2018     Lab Results   Component Value Date    ALKPHOS 104 03/23/2018    ALT 11 03/23/2018    AST 13 03/23/2018    PROT 6.9 03/23/2018    BILITOT 0.56 03/23/2018    BILIDIR 0.12 07/02/2012    LABALBU 3.8 03/23/2018     Lab Results   Component Value Date    BUN 17 05/02/2019    CREATININE 0.68 05/02/2019     Lab Results   Component Value Date    CALCIUM 9.6 05/02/2019    MG 2.2 07/02/2012     Lab Results   Component Value Date    AST 13 03/23/2018    ALT 11 03/23/2018       Lab Results   Component Value Date    CKTOTAL 28 07/02/2012         Review of Systems   Constitutional: Negative for appetite change, chills, diaphoresis, fatigue, fever, unexpected weight change and weight loss.    HENT: Negative for congestion, dental problem, drooling, ear discharge, ear pain, facial swelling, hearing loss, mouth sores, nosebleeds, postnasal drip, sinus pressure, sore throat, tinnitus, trouble swallowing and voice change. Eyes: Positive for photophobia. Negative for blurred vision, pain, discharge, redness, itching and visual disturbance. Respiratory: Negative for apnea, cough, choking, chest tightness, shortness of breath and wheezing. Cardiovascular: Negative for chest pain, palpitations and leg swelling. Gastrointestinal: Positive for nausea. Negative for abdominal distention, abdominal pain, anorexia, blood in stool, change in bowel habit, constipation, diarrhea and vomiting. Endocrine: Negative for cold intolerance, heat intolerance, polydipsia, polyphagia and polyuria. Musculoskeletal: Positive for gait problem. Negative for arthralgias, back pain, joint swelling, myalgias, neck pain and neck stiffness. Skin: Negative for color change, pallor, rash and wound. Allergic/Immunologic: Negative for environmental allergies, food allergies and immunocompromised state. Neurological: Positive for dizziness, vertigo, numbness, headaches and loss of balance. Negative for tingling, tremors, seizures, syncope, facial asymmetry, speech difficulty, weakness and light-headedness. Hematological: Negative for adenopathy. Does not bruise/bleed easily. Psychiatric/Behavioral: Negative for agitation, behavioral problems, confusion, decreased concentration, dysphoric mood, hallucinations, self-injury, sleep disturbance and suicidal ideas. The patient is not nervous/anxious, does not have insomnia and is not hyperactive. OBJECTIVE:    Physical Exam   Constitutional: She appears well-developed and well-nourished. She is cooperative. HENT:   Head: Normocephalic and atraumatic. Head is without raccoon's eyes and without Holland's sign. Right Ear: External ear normal.   Left Ear: External ear normal.   Nose: Nose normal.   Mouth/Throat: Oropharynx is clear and moist.   Eyes: Conjunctivae are normal.   Neck: Normal range of motion. Neck supple. No muscular tenderness present.  Carotid bruit is not present. No neck rigidity. No tracheal deviation and normal range of motion present. No Brudzinski's sign and no Kernig's sign noted. No thyroid mass and no thyromegaly present. Cardiovascular: Normal rate and regular rhythm. Pulmonary/Chest: Effort normal.   Musculoskeletal: She exhibits no edema or tenderness. Skin: Skin is warm. No rash noted. No cyanosis or erythema. No pallor. Nails show no clubbing. Psychiatric: Her mood appears not anxious. Her affect is not blunt, not labile and not inappropriate. She is slowed. She is not agitated, not aggressive, not hyperactive, not withdrawn, not actively hallucinating and not combative. Thought content is not paranoid and not delusional. Cognition and memory are impaired. She does not express impulsivity or inappropriate judgment. She does not exhibit a depressed mood. She expresses no homicidal and no suicidal ideation. She expresses no suicidal plans and no homicidal plans. She exhibits normal recent memory and normal remote memory. She is attentive. Neurologic Exam      NEUROLOGICALEXAMINATION :      A) MENTAL STATUS:                   Alert and  oriented  To time, place  And  Person. No Aphasia. No  Dysarthria. Able   To  Follow   SIMPLE    Stepcommands without   Any  Difficulty. No right  To left confusion. SLOW   Speech  And language function. Insight and  Judgment ,Fund  Of  Knowledge   within normal limits                Recent  And  Remote memory    DECREASED                Attention &Concentration are    DECREASED                                                B) CRANIAL NERVES :      CN : Visual  Acuity  And  Visual fields  within normal limits               Fundi  Could  Not  Be  Could  Not  Be  Evaluated. 3,4,6 CN : Both  Pupils are   Reactive and  Equal.  Movements  Are  Intact. No  Nystagmus. No  RHONDA. No  Afferent  Pupillary  Defect noted. 5 CN :  Normal  Facial sensations and Corneal  Reflexes           7 CN:  Normal  Facial  Symmetry  And  Strength. No facial  Weakness. 8 CN :  Hearing  Appears within normal limits          9, 10 CN: Normal spontaneous, reflex palate movements         11 CN:   Normal  Shouldershrug and  strength         12 CN :   Normal  Tongue movements and  Tongue  In midline                        No tongue   Fasciculations or atrophy       C) MOTOR  EXAM:                 Strength  In upper  AndLower extremities   within normal limits               No  Drift. No  Atrophy               Rapid alternating  And  repetitions  Movements  within normal limits               Muscle  Tone  In upper  And  LowerExtremities  normal                No rigidity. No  Spasticity. Bradykinesia   absent               No  Asterixis. Sustention  Tremor , Resting  Tremor   absent                    No other  Abnormal  Movements noted           D) SENSORY :               light touch, pinprick, position  And  Vibration  within normal limits        E) REFLEXES:                   Deep  Tendon  Reflexes normal                  No  pathological  Reflexes  Bilaterally.                                   F) COORDINATION  AND  GAIT :                                Station and  Gait    SLOW  UNSTEADY                             Romberg 's test   POSITIVE                             Ataxia negative      ASSESSMENT:    Patient Active Problem List   Diagnosis    CAD (coronary artery disease)    Tobacco abuse    Environmental allergies    Obesity    Dyslipidemia    Coronary artery disease    Chronic obstructive pulmonary disease (HCC)    Occipital headache    Dizziness    Cerebral ventriculomegaly    Acquired cerebral atrophy    Chronic cerebral ischemia    Chiari malformation type I ([address]    Balance problem    VBI (vertebrobasilar insufficiency)    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 Dose modulation, iterative reconstruction, and/or weight based   adjustment of the mA/kV was utilized to reduce the radiation dose to as low   as reasonably achievable.       COMPARISON:   None.       HISTORY:   ORDERING SYSTEM PROVIDED HISTORY: headache   TECHNOLOGIST PROVIDED HISTORY:       Ordering Physician Provided Reason for Exam: C/o headache/dizziness   Acuity: Acute   Type of Exam: Initial       FINDINGS:   BRAIN/VENTRICLES: There is no acute intracranial hemorrhage, mass effect or   midline shift.  No abnormal extra-axial fluid collection.  Ventriculomegaly   is noted and cerebellar tonsillar displacement into the foramen magnum,   extending off the field of view, is noted.  There is also mild cerebral and   cerebellar atrophy and scattered white matter changes in the brain.       ORBITS: The visualized portion of the orbits demonstrate no acute abnormality.       SINUSES: The visualized paranasal sinuses and mastoid air cells demonstrate   no acute abnormality.       SOFT TISSUES/SKULL:  No acute abnormality of the visualized skull or soft   tissues.           Impression   Partially visualized Chiari malformation.  Ventriculomegaly is noted as well   as cerebral and cerebellar cortical atrophy with chronic appearing white   matter changes in the brain.                 VISITING DIAGNOSIS:      ICD-10-CM    1. Dizziness R42 EEG     MRI Brain W WO Contrast     US Transcranial Doppler Complete     US Carotid Artery Bilateral     ECHO Complete 2D W Doppler W Color     EKG 12 Lead     Holter Monitor 24 Hour     Silvano Platt MD, Cardiology, [place]   2. Tobacco abuse Z72.0    3.  Class 2 obesity due to excess calories in adult, unspecified BMI, unspecified whether serious comorbidity present E66.09    4. Environmental allergies Z91.09    5. Dyslipidemia E78.5    6. Coronary artery disease involving native coronary artery of native heart without angina pectoris I25.10    7. Chronic obstructive pulmonary disease, unspecified COPD type ([facility] [address] J44.9    8. Occipital headache R51 EEG     MRI Brain W WO Contrast     US Transcranial Doppler Complete     US Carotid Artery Bilateral     ECHO Complete 2D W Doppler W Color     EKG 12 Lead     Holter Monitor 24 Hour     Daniela Murguia MD, Cardiology, [place]   9. Cerebral ventriculomegaly G93.89    10. Acquired cerebral atrophy G31.9    11. Chronic cerebral ischemia I67.82 EEG     MRI Brain W WO Contrast     US Transcranial Doppler Complete     US Carotid Artery Bilateral     ECHO Complete 2D W Doppler W Color     EKG 12 Lead     Holter Monitor 24 Hour   12. Chiari malformation type I ([facility] [address] G93.5    13. Balance problem R26.89 EEG     MRI Brain W WO Contrast     US Transcranial Doppler Complete     US Carotid Artery Bilateral     ECHO Complete 2D W Doppler W Color     EKG 12 Lead     Holter Monitor 24 Hour   14. Cerebral infarction due to embolism of other precerebral artery (HCC)  I63.19 US Transcranial Doppler Complete   15. Cerebral infarction due to embolism of other cerebral artery (HCC)  I63.49 ECHO Complete 2D W Doppler W Color   16. VBI (vertebrobasilar insufficiency) G45.0    17. Abnormal CT of the head R93.0               CONCERNS   &   INCREASED   RISK   FOR         * TIA,  CEREBRO  VASCULAR  ISCHEMIA,STROKE     *   DIZZINESS,   VERTEBROBASILAR  INSUFFICIENCY ,         *  COMPLICATED  MIGRAINES      *     TENSION  HEADACHES        *  GAIT  DIFFICULTIES  &   BALANCE PROBLMES   AND  FALL                  VARIOUS  RISK   FACTORS   WERE  REVIEWED   AND   DISCUSSED. *  PATIENT   HAS  MULTIPLE   MEDICAL, NEUROLOGICAL                         PROBLEMS . PATIENT'S   MANAGEMENT  IS  CHALLENGING.             PLAN: Poncho Raphael  Of  The  Diagnoses,  The  Management & Treatment  Options            AND    Care  plan  Were          Reviewed and   Discussed   With  patient. * Goals  And  Expectations  Of  The  Therapy  Discussed   And  Reviewed. *   Benefits   And   Side  Effect  Profile  Of  Medication/s   Were   Discussed                * Need   For  Further   Follow up For  The  Various  Problems Were  discussed. * Results  Of  The  Previous  Diagnostic tests were reviewed and questions answered. patient  understand the same. Medical  Decision  Making  Was  Made  Based on the   Complexity  Of  Above  Mentioned  Diagnoses,    Data reviewed   & diagnostic  Tests  Reviewed,             Risk  Of  Significant   Co morbidities and   complicating   Factors. Medical  Decision  Was   High    Complexity   Due   To  The  Patient's  Multiple  Symptoms,  Advancing   Disease,  Complex  Treatment  Regimen,  Multiple medications           and   Risk  Of   Side  Effects,  Difficulty  In  Medication  Management  And  Diagnostic  Challenges   In  View  Of  The  Associated   Co  Morbid  Conditions   And  Problems. * FALL   PRECAUTIONS. THESE  REVIEWED   AND  DISCUSSED      *    SUPERVISED   CARE    *   ABSOLUTELY   NO  DRIVING      *   BE  CAREFUL  WITH  ACTIVITIES        *   ADEQUATE   FLUIDINTAKE   AND  ELECTROLYTE  BALANCE         * KEEP  DAIRY  OF   THE  NEUROLOGICAL  SYMPTOMS        RECORDING THE    DURATION  AND  FREQUENCY. *  AVOID    CONDITIONS  AND  FACTORS   THAT  MAKE                  NEUROLOGICAL  SYMPTOMS  WORSE.           *TO  MAINTAIN  REGULAR  SLEEP  WAKE  CYCLES.      *   TO  HAVE  ADEQUATE  REST  AND   SLEEP    HOURS.          *    AVOID  ANY USAGE OF                   TOBACCO,EXCESSIVE  ALCOHOL  AND   ILLEGAL   SUBSTANCES    *   Compliance   With  Medications   And  Instructions      *    MIGRAINE/ HEADACHE and have plenty of energy for both work and play. A healthy lifestyle is something you can share with your whole family.  A healthy lifestyle also can lower your risk for serious health problems, such ashigh blood pressure, heart disease, and diabetes.  You can follow a few steps listed below to improve your health and the health of your family.  Follow-up careis a key part of your treatment and safety. Be sure to make and go to all appointments, and call your doctor if you are having problems. Its also a good idea to know your test results and keep a list of the medicines you take.  How can you care for yourself at home?  Do not eat too much sugar, fat, or fast foods. You can still have dessert and treats nowand then. The goal is moderation.  Start small to improve your eating habits. Pay attention to portion sizes, drink less juice and soda pop, and eat more fruits and vegetables.  Eat a healthy amount of food. A 3-ounce serving of meat, for example, is about the size of a deck of cards. Fill the rest of your plate with vegetables and whole grains.  Limit theamount of soda and sports drinks you have every day. Drink more water when you are thirsty.  Eat at least 5 servings of fruits and vegetables every day. It may seem like a lot, but it is not hard to reach this goal. Aserving or helping is 1 piece of fruit, 1 cup of vegetables, or 2 cups of leafy, raw vegetables. Have an apple or some carrot sticks as an afternoon snack instead of a candy bar. Try to have fruits and/or vegetables at everymeal.   Make exercise part of your daily routine. You may want to start with simple activities, such as walking, bicycling, or slow swimming. Try akua active 30 to 60 minutes every day. You do not need to do all 30 to 60 minutes all at once. For example, you can exercise 3 times a day for 10 or 20 minutes.  Moderate exercise is safe for most people, but it is always agood idea to talk to your doctor before starting an exercise program.   Keep moving. [place] Palm the lawn, work in the garden, or [employer]. Take the stairs instead of the elevator at work.  If you smoke, quit. Peoplewho smoke have an increased risk for heart attack, stroke, cancer, and other lung illnesses. Quitting is hard, but there are ways to boost your chance of quitting tobacco for good.  Use nicotine gum, patches, or lozenges.  Ask your doctor about stop-smoking programs and medicines.  Keep trying.  In addition to reducing your risk of diseases in the future, you will notice some benefits soon after you stop using tobacco. If you have shortness of breath or asthma symptoms, they will likely getbetter within a few weeks after you quit.  Limit how much alcohol you drink. Moderate amounts of alcohol (up to 2 drinks a day for men, 1drink a day for women) are okay. But drinking too much can lead to liver problems, high blood pressure, and other health problems.  health   If you have a family, there are many things you can do together to improve your health.  Eat meals together as a family as often as possible.  Eat healthy foods. This includes fruits, vegetables, lean meats and dairy, and whole grains.  Include your family in your fitness plan. Most peoplethink of activities such as jogging or tennis as the way to fitness, but there are many ways you and your family can be more active. Anything that makes you breathe hard and gets your heart pumping is exercise. Here are sometips:   Walk to do errands or to take your child to school or the bus.  Go for a family bike ride after dinner instead of watching TV.  Where can you learn more?  Go [email or web address] org and sign in to your [employer] account. Enter T066 in the Search HealthInformation box to learn more about \"A Healthy Lifestyle: Care Instructions. \"     If you do not have anaccount, please click on the \"Sign Up Now\" link.    Current as of: July 26, 2016   Content Version: 11.2   © 2036-8411 [employer] Care instructions adapted under license by [facility] ([facility]). If you have questions about a medical condition or this instruction, always ask your healthcare professional. [employer] disclaims any warranty or liability for your use of this information.

## 2019-05-07 NOTE — PATIENT INSTRUCTIONS
[facility] NEUROLOGY    Due to the complex nature of our neurological testing, It is the policy of the Neurology Department not to release the results of your testing over the phone. Once all testing is completed and we have all the results back, Dr. Ang May will then personally go over all the results with you and answer any questions that you may have during a follow up appointment. If you are unable to keep this appointment, please notify the [employer] @ [phone], as soon as possible. Please bring your prescription bottles to all appointments. * FALL   PRECAUTIONS. *  USE   WALKING  ASSISTANCE  DEVICES     QUAD  CANE     *      WEIGHT   LOSS. *   ADEQUATE   FLUID  INTAKE   AND  ELECTROLYTE  BALANCE           * KEEP  DAIRY  OF   THE  NEUROLOGICAL  SYMPTOMS        *  TO  MAINTAIN  REGULAR  SLEEP  WAKE  CYCLES. *   TO  HAVE  ADEQUATE  REST  AND   SLEEP    HOURS.    *    AVOID  USAGE OF            TOBACCO,  EXCESSIVE  ALCOHOL  AND   ILLEGAL   SUBSTANCES,  IF  ANY        *  Maintain   Healthy  Life Style    With   Periodic  Monitoring  Of    Any  Medical  Conditions  Including   Elevated  Blood  Pressure,  Lipid  Profile,   Blood  Sugar levels  And   Heart  Disease. *   Period   Screening  For  Cancers  Involving  Breast,  Colon,   lungs  And  Other  Organs  As  Applicable,  In consultation   With  Your  Primary Care Providers. *  If   There is  Any  Significant  Worsening   Of  Current  Symptoms  And  Or  If    Any additional  New  Neurological  Symptoms  Or  Significant  Concerns   Should  Call  911 or  Go  To  Emergency  Department  For  Further  Immediate  Evaluation. Patient Education        Transthoracic Echocardiogram: About This Test  What is it? An echocardiogram (also called an echo) uses sound waves to make an image of your heart.  A device called a transducer sends sound waves that echo off your heart and back to the transducer. These echoes are turned into moving pictures of your heart that can be seen on a video screen. In a transthoracic echocardiogram (TTE), the transducer is moved across your chest or belly. A TTE is the most common type of echocardiogram.  Why is this test done? This test is done to check your heart health. It's used for many reasons. Your doctor may do an echocardiogram to:  · Check a heart murmur. · Look for the cause of shortness of breath or unexplained chest pain or pressure. · Check how well your heart is pumping blood. · Check to see how well your heart valves are working. · Look for blood clots inside your heart. What happens during the test?  · You will remove your clothes above your waist. You may be given a cloth or paper covering to use during the test.  · You will lie on your back or on your left side on a bed or table. · You may receive medicine through a vein (intravenously, or IV). The IV can be used to give you a contrast material, which helps your doctor get good views of your heart. · Small pads or patches (electrodes) will be taped to your arms and legs to record your heart rate during the test.  · A small amount of gel will be rubbed on the side of your chest to help  the sound waves. · The transducer will be pressed firmly against your chest and moved slowly back and forth. It is usually moved to different areas on your chest or belly to get specific views of your heart. · You will be asked to do several things, such as hold very still, breathe in and out very slowly, hold your breath, or lie on your left side. This test usually takes 30 to 60 minutes. What else should you know about the test?  · You will not have any pain from an echocardiogram. You may have a brief, sharp pain if an intravenous (IV) needle is placed in a vein in your arm. · No electricity passes through your body during the test. There is no danger of getting an electrical shock.   · You taking certain medicines before the test. These include sedatives and tranquilizers, muscle relaxants, sleeping aids, and seizure medicines. · Do not eat or drink anything with caffeine in it for 12 hours before the test. This includes cola, energy drinks, and chocolate. · Shampoo your hair and rinse with clear water the evening before or the morning of the test. Do not put any hair conditioner or oil on after you wash your hair. · Your doctor may ask you not to sleep the night before the test or to sleep for only about 4 or 5 hours. This is because some types of brain activity can only be seen if you are asleep. If your doctor asks you to get less sleep than normal, plan to have someone drive you to and from the test.  What happens during the test?  · You will lie on your back on a bed or table. Or you might relax in a chair with your eyes closed. · A technologist will attach the electrodes to different places on your head. Or you might get a cap with fixed electrodes on it. · You will lie still with your eyes closed. The technologist will tell you not to talk unless you need to. · The technologist may ask you to:  ? Breathe deeply and rapidly. This is called hyperventilating. ? Look at a bright, flashing light called a strobe. ? Go to sleep. If you can't fall asleep, you may get medicine to help you. What else should you know about the test?  · There is no pain. No electrical current goes through your body. · If you have a seizure disorder such as epilepsy, the flashing lights or hyperventilation may cause a seizure. The technologist is trained to take care of you if this happens. · If electrodes are put in your nose, they may tickle. In rare cases, they can also cause some soreness or a small amount of bleeding for 1 to 2 days after the test.  How long does the test take? · The test will take about 1 to 2 hours. What happens after the test?  · You will probably be able to go home right away.  But if

## 2019-05-10 ENCOUNTER — HOSPITAL ENCOUNTER (OUTPATIENT)
Dept: INTERVENTIONAL RADIOLOGY/VASCULAR | Age: 69
Discharge: HOME | End: 2019-05-12
Payer: MEDICARE

## 2019-05-10 ENCOUNTER — HOSPITAL ENCOUNTER (OUTPATIENT)
Dept: NON INVASIVE DIAGNOSTICS | Age: 69
Discharge: HOME | End: 2019-05-10
Payer: MEDICARE

## 2019-05-10 DIAGNOSIS — R51.9 OCCIPITAL HEADACHE: ICD-10-CM

## 2019-05-10 DIAGNOSIS — I67.82 CHRONIC CEREBRAL ISCHEMIA: ICD-10-CM

## 2019-05-10 DIAGNOSIS — R42 DIZZINESS: ICD-10-CM

## 2019-05-10 DIAGNOSIS — R26.89 BALANCE PROBLEM: ICD-10-CM

## 2019-05-10 DIAGNOSIS — I63.49 CEREBRAL INFARCTION DUE TO EMBOLISM OF OTHER CEREBRAL ARTERY (HCC): ICD-10-CM

## 2019-05-10 LAB
LV EF: 60 %
LVEF MODALITY: NORMAL

## 2019-05-10 PROCEDURE — 93880 EXTRACRANIAL BILAT STUDY: CPT

## 2019-05-10 PROCEDURE — 93306 TTE W/DOPPLER COMPLETE: CPT

## 2019-05-12 ENCOUNTER — HOSPITAL ENCOUNTER (EMERGENCY)
Age: 69
Discharge: HOME | End: 2019-05-12
Attending: EMERGENCY MEDICINE
Payer: MEDICARE

## 2019-05-12 ENCOUNTER — APPOINTMENT (OUTPATIENT)
Dept: GENERAL RADIOLOGY | Age: 69
End: 2019-05-12
Payer: MEDICARE

## 2019-05-12 VITALS
HEIGHT: 68 IN | RESPIRATION RATE: 20 BRPM | HEART RATE: 78 BPM | OXYGEN SATURATION: 96 % | SYSTOLIC BLOOD PRESSURE: 160 MMHG | BODY MASS INDEX: 33.34 KG/M2 | DIASTOLIC BLOOD PRESSURE: 70 MMHG | WEIGHT: 220 LBS | TEMPERATURE: 97.4 F

## 2019-05-12 DIAGNOSIS — G89.29 CHRONIC NONINTRACTABLE HEADACHE, UNSPECIFIED HEADACHE TYPE: ICD-10-CM

## 2019-05-12 DIAGNOSIS — R42 DIZZINESS: Primary | ICD-10-CM

## 2019-05-12 DIAGNOSIS — R51.9 CHRONIC NONINTRACTABLE HEADACHE, UNSPECIFIED HEADACHE TYPE: ICD-10-CM

## 2019-05-12 LAB
ABSOLUTE EOS #: 0.2 K/UL (ref 0–0.4)
ABSOLUTE IMMATURE GRANULOCYTE: ABNORMAL K/UL (ref 0–0.3)
ABSOLUTE LYMPH #: 1.7 K/UL (ref 1–4.8)
ABSOLUTE MONO #: 0.6 K/UL (ref 0.1–1.2)
ALBUMIN SERPL-MCNC: 4.3 G/DL (ref 3.5–5.2)
ALBUMIN/GLOBULIN RATIO: 1.4 (ref 1–2.5)
ALP BLD-CCNC: 77 U/L (ref 35–104)
ALT SERPL-CCNC: 9 U/L (ref 5–33)
ANION GAP SERPL CALCULATED.3IONS-SCNC: 13 MMOL/L (ref 9–17)
AST SERPL-CCNC: 9 U/L
BASOPHILS # BLD: 1 % (ref 0–1)
BASOPHILS ABSOLUTE: 0 K/UL (ref 0–0.2)
BILIRUB SERPL-MCNC: 0.65 MG/DL (ref 0.3–1.2)
BUN BLDV-MCNC: 17 MG/DL (ref 8–23)
BUN/CREAT BLD: 30 (ref 9–20)
CALCIUM SERPL-MCNC: 9.3 MG/DL (ref 8.6–10.4)
CHLORIDE BLD-SCNC: 102 MMOL/L (ref 98–107)
CO2: 25 MMOL/L (ref 20–31)
CREAT SERPL-MCNC: 0.57 MG/DL (ref 0.5–0.9)
DIFFERENTIAL TYPE: ABNORMAL
EOSINOPHILS RELATIVE PERCENT: 3 % (ref 1–7)
GFR AFRICAN AMERICAN: >60 ML/MIN
GFR NON-AFRICAN AMERICAN: >60 ML/MIN
GFR SERPL CREATININE-BSD FRML MDRD: ABNORMAL ML/MIN/{1.73_M2}
GFR SERPL CREATININE-BSD FRML MDRD: ABNORMAL ML/MIN/{1.73_M2}
GLUCOSE BLD-MCNC: 121 MG/DL (ref 70–99)
HCT VFR BLD CALC: 47.7 % (ref 36–46)
HEMOGLOBIN: 15.9 G/DL (ref 12–16)
IMMATURE GRANULOCYTES: ABNORMAL %
LYMPHOCYTES # BLD: 27 % (ref 16–46)
MCH RBC QN AUTO: 31 PG (ref 26–34)
MCHC RBC AUTO-ENTMCNC: 33.3 G/DL (ref 31–37)
MCV RBC AUTO: 92.9 FL (ref 80–100)
MONOCYTES # BLD: 10 % (ref 4–11)
NRBC AUTOMATED: ABNORMAL PER 100 WBC
PDW BLD-RTO: 16.8 % (ref 11–14.5)
PLATELET # BLD: 161 K/UL (ref 140–450)
PLATELET ESTIMATE: ABNORMAL
PMV BLD AUTO: 8.8 FL (ref 6–12)
POTASSIUM SERPL-SCNC: 3.6 MMOL/L (ref 3.7–5.3)
RBC # BLD: 5.13 M/UL (ref 4–5.2)
RBC # BLD: ABNORMAL 10*6/UL
SEG NEUTROPHILS: 59 % (ref 43–77)
SEGMENTED NEUTROPHILS ABSOLUTE COUNT: 3.7 K/UL (ref 1.8–7.7)
SODIUM BLD-SCNC: 140 MMOL/L (ref 135–144)
TOTAL PROTEIN: 7.3 G/DL (ref 6.4–8.3)
WBC # BLD: 6.3 K/UL (ref 3.5–11)
WBC # BLD: ABNORMAL 10*3/UL

## 2019-05-12 PROCEDURE — 6370000000 HC RX 637 (ALT 250 FOR IP)

## 2019-05-12 PROCEDURE — 96374 THER/PROPH/DIAG INJ IV PUSH: CPT

## 2019-05-12 PROCEDURE — 6360000002 HC RX W HCPCS: Performed by: EMERGENCY MEDICINE

## 2019-05-12 PROCEDURE — 2580000003 HC RX 258: Performed by: EMERGENCY MEDICINE

## 2019-05-12 PROCEDURE — 80053 COMPREHEN METABOLIC PANEL: CPT

## 2019-05-12 PROCEDURE — 85025 COMPLETE CBC W/AUTO DIFF WBC: CPT

## 2019-05-12 PROCEDURE — 36415 COLL VENOUS BLD VENIPUNCTURE: CPT

## 2019-05-12 PROCEDURE — 71046 X-RAY EXAM CHEST 2 VIEWS: CPT

## 2019-05-12 PROCEDURE — 96375 TX/PRO/DX INJ NEW DRUG ADDON: CPT

## 2019-05-12 PROCEDURE — 99284 EMERGENCY DEPT VISIT MOD MDM: CPT

## 2019-05-12 RX ORDER — ONDANSETRON 4 MG/1
4 TABLET, ORALLY DISINTEGRATING ORAL EVERY 8 HOURS PRN
Qty: 20 TABLET | Refills: 0 | Status: SHIPPED | OUTPATIENT
Start: 2019-05-12 | End: 2019-07-11 | Stop reason: SDUPTHER

## 2019-05-12 RX ORDER — 0.9 % SODIUM CHLORIDE 0.9 %
1000 INTRAVENOUS SOLUTION INTRAVENOUS ONCE
Status: COMPLETED | OUTPATIENT
Start: 2019-05-12 | End: 2019-05-12

## 2019-05-12 RX ORDER — ONDANSETRON 4 MG/1
TABLET, ORALLY DISINTEGRATING ORAL
Status: DISCONTINUED
Start: 2019-05-12 | End: 2019-05-12 | Stop reason: HOSPADM

## 2019-05-12 RX ORDER — ONDANSETRON 4 MG/1
4 TABLET, ORALLY DISINTEGRATING ORAL ONCE
Status: DISCONTINUED | OUTPATIENT
Start: 2019-05-12 | End: 2019-05-12 | Stop reason: HOSPADM

## 2019-05-12 RX ORDER — KETOROLAC TROMETHAMINE 30 MG/ML
15 INJECTION, SOLUTION INTRAMUSCULAR; INTRAVENOUS ONCE
Status: COMPLETED | OUTPATIENT
Start: 2019-05-12 | End: 2019-05-12

## 2019-05-12 RX ORDER — IBUPROFEN 600 MG/1
600 TABLET ORAL EVERY 8 HOURS PRN
Qty: 20 TABLET | Refills: 0 | Status: SHIPPED | OUTPATIENT
Start: 2019-05-12 | End: 2019-07-11 | Stop reason: SDUPTHER

## 2019-05-12 RX ORDER — ONDANSETRON 2 MG/ML
4 INJECTION INTRAMUSCULAR; INTRAVENOUS ONCE
Status: COMPLETED | OUTPATIENT
Start: 2019-05-12 | End: 2019-05-12

## 2019-05-12 RX ADMIN — KETOROLAC TROMETHAMINE 15 MG: 30 INJECTION, SOLUTION INTRAMUSCULAR at 18:23

## 2019-05-12 RX ADMIN — SODIUM CHLORIDE 1000 ML: 9 INJECTION, SOLUTION INTRAVENOUS at 17:58

## 2019-05-12 RX ADMIN — ONDANSETRON 4 MG: 2 INJECTION INTRAMUSCULAR; INTRAVENOUS at 17:59

## 2019-05-12 ASSESSMENT — ENCOUNTER SYMPTOMS
DIARRHEA: 0
COUGH: 0
EYE PAIN: 0
BLOOD IN STOOL: 0
ABDOMINAL PAIN: 0
CONSTIPATION: 0
SHORTNESS OF BREATH: 0
BACK PAIN: 0
VOMITING: 1
NAUSEA: 1
CHOKING: 1

## 2019-05-12 ASSESSMENT — PAIN SCALES - GENERAL
PAINLEVEL_OUTOF10: 3
PAINLEVEL_OUTOF10: 4
PAINLEVEL_OUTOF10: 7
PAINLEVEL_OUTOF10: 7

## 2019-05-12 ASSESSMENT — PAIN DESCRIPTION - LOCATION: LOCATION: HEAD

## 2019-05-12 ASSESSMENT — PAIN DESCRIPTION - PAIN TYPE: TYPE: ACUTE PAIN

## 2019-05-12 NOTE — ED PROVIDER NOTES
[facility]  eMERGENCY dEPARTMENT eNCOUnter      Pt Name: Luis Guevara  MRN: [id number]  [place] 1950  Date of evaluation: 5/12/2019      CHIEF COMPLAINT       Chief Complaint   Patient presents with    Dizziness     couple weeks    Headache     couple weeks         HISTORY OF PRESENT ILLNESS    Luis Guevara is a 79 y.o. 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 HENT: Negative for congestion and ear pain. Eyes: Negative for pain and visual disturbance. Respiratory: Positive for choking. Negative for cough and shortness of breath. Cardiovascular: Negative for chest pain, palpitations and leg swelling. Gastrointestinal: Positive for nausea and vomiting. Negative for abdominal pain, blood in stool, constipation and diarrhea. Endocrine: Negative for polydipsia and polyuria. Genitourinary: Negative for difficulty urinating, dysuria, frequency, vaginal bleeding and vaginal discharge.    Musculoskeletal: Negative for back pain, joint swelling, myalgias, neck Constitutional: She is oriented to person, place, and time. She appears well-developed and well-nourished. Palpitations actively retching   HENT:   Head: Normocephalic and atraumatic. Right Ear: External ear normal.   Left Ear: External ear normal.   Nose: Nose normal.   Mouth/Throat: Oropharynx is clear and moist.   Eyes: Pupils are equal, round, and reactive to light. Conjunctivae and EOM are normal.   Neck: Normal range of motion. No thyromegaly present. Cardiovascular: Normal rate and regular rhythm. Pulmonary/Chest: Effort normal and breath sounds normal.   Abdominal: Soft. Bowel sounds are normal.   Musculoskeletal: Normal range of motion. She exhibits no edema or tenderness. Neurological: She is alert and oriented to person, place, and time. No cranial nerve deficit. Coordination normal.   Finger to nose is really performed bilaterally   Skin: Skin is warm and dry. Psychiatric: She has a normal mood and affect. Her behavior is normal.   Vitals reviewed.         DIFFERENTIAL DIAGNOSIS/ MDM:     Dizziness posterior headache rule out problem from Arnold-Chiari malformation or an posterior fossa stroke or insufficiency, with a cough we will do a chest x-ray    DIAGNOSTIC RESULTS     EKG: All EKG's are interpreted by the Emergency Department Physician who either signs or Co-signs this chart in the absence of a cardiologist.        Muriel Bravo:   I directly visualized the following  images and reviewed the radiologist interpretations:     EXAMINATION:   TWO XRAY VIEWS OF THE CHEST       5/12/2019 6:33 pm       COMPARISON:   03/22/2019       HISTORY:   ORDERING SYSTEM PROVIDED HISTORY: Cough   TECHNOLOGIST PROVIDED HISTORY:   Cough   Ordering Physician Provided Reason for Exam: dizziness headache and cough she   said the problem began almost 3 months ago with sinus problems that she   started developing intermittent headaches she was treated with 4 different   about her sinuses and thinks that cleared up she has dark has congestion   she's been having problems with headaches dizziness nausea vomiting feeling   unsteady on her feet walking patient stated she was too weak and unsteady to   stand for her xrays, smoker since 1970       FINDINGS:   The lungs are clear.  The cardiac and mediastinal contours are normal.  There   is no pleural effusion or pneumothorax. No acute osseous abnormality is   identified.           Impression   No acute cardiopulmonary abnormality.                 ED BEDSIDE ULTRASOUND:       LABS:  Labs Reviewed   CBC WITH AUTO DIFFERENTIAL - Abnormal; Notable for the following components:       Result Value    Hematocrit 47.7 (*)     RDW 16.8 (*)     All other components within normal limits   COMPREHENSIVE METABOLIC PANEL - Abnormal; Notable for the following components:    Glucose 121 (*)     Bun/Cre Ratio 30 (*)     Potassium 3.6 (*)     All other components within normal limits           EMERGENCY DEPARTMENT COURSE:   Vitals:    Vitals:    05/12/19 1742 05/12/19 1856   BP: (!) 141/65 (!) 160/70   Pulse: 88 78   Resp: 18 20   Temp: 97.4 °F (36.3 °C)    TempSrc: Tympanic    SpO2: 97% 96%   Weight: 220 lb (99.8 kg)    Height: 5' 8\" (1.727 m)      -------------------------  BP: (!) 160/70, Temp: 97.4 °F (36.3 °C), Pulse: 78, Resp: 20        Re-evaluation Notes  Since feeling much better headache is almost gone dizziness is better I offered her admission as she would like to continue the outpatient workup with her MRI Wednesday and follow up with a neurologist here      CRITICAL CARE:   None        CONSULTS:      PROCEDURES:  None    FINAL IMPRESSION      1. Dizziness    2.  Chronic nonintractable headache, unspecified headache type          DISPOSITION/PLAN   DISPOSITION discharged    Condition on Disposition    Stable    PATIENT REFERRED TO:  Brandon Malloy MD  [address] [place] [address]  [phone]            DISCHARGE MEDICATIONS:  New Prescriptions    IBUPROFEN

## 2019-05-12 NOTE — ED TRIAGE NOTES
Patient with continued dizzy ness and headache to base of skull. Patient is to have MRI this week and has followed up with Neurology as directed.

## 2019-05-13 DIAGNOSIS — J44.9 CHRONIC OBSTRUCTIVE PULMONARY DISEASE, UNSPECIFIED COPD TYPE (HCC): Primary | ICD-10-CM

## 2019-05-15 ENCOUNTER — HOSPITAL ENCOUNTER (INPATIENT)
Age: 69
LOS: 5 days | Discharge: NURSING, REHAB OR LONG-TERM CARE | DRG: 033 | End: 2019-05-20
Attending: INTERNAL MEDICINE | Admitting: INTERNAL MEDICINE
Payer: MEDICARE

## 2019-05-15 ENCOUNTER — HOSPITAL ENCOUNTER (EMERGENCY)
Age: 69
Discharge: ANOTHER HOSPITAL | End: 2019-05-15
Attending: EMERGENCY MEDICINE
Payer: MEDICARE

## 2019-05-15 ENCOUNTER — TELEPHONE (OUTPATIENT)
Dept: NEUROLOGY | Age: 69
End: 2019-05-15

## 2019-05-15 VITALS
HEIGHT: 65 IN | TEMPERATURE: 97.9 F | WEIGHT: 220 LBS | BODY MASS INDEX: 36.65 KG/M2 | DIASTOLIC BLOOD PRESSURE: 90 MMHG | HEART RATE: 86 BPM | OXYGEN SATURATION: 98 % | RESPIRATION RATE: 14 BRPM | SYSTOLIC BLOOD PRESSURE: 141 MMHG

## 2019-05-15 DIAGNOSIS — G91.9 HYDROCEPHALUS (HCC): Primary | ICD-10-CM

## 2019-05-15 PROCEDURE — 2060000000 HC ICU INTERMEDIATE R&B

## 2019-05-15 PROCEDURE — 82947 ASSAY GLUCOSE BLOOD QUANT: CPT

## 2019-05-15 PROCEDURE — 99284 EMERGENCY DEPT VISIT MOD MDM: CPT

## 2019-05-15 RX ORDER — ALBUTEROL SULFATE 90 UG/1
2 AEROSOL, METERED RESPIRATORY (INHALATION) 4 TIMES DAILY PRN
Status: DISCONTINUED | OUTPATIENT
Start: 2019-05-15 | End: 2019-05-20 | Stop reason: HOSPADM

## 2019-05-15 RX ORDER — ONDANSETRON 2 MG/ML
4 INJECTION INTRAMUSCULAR; INTRAVENOUS EVERY 6 HOURS PRN
Status: DISCONTINUED | OUTPATIENT
Start: 2019-05-15 | End: 2019-05-20 | Stop reason: HOSPADM

## 2019-05-15 RX ORDER — FLUTICASONE PROPIONATE 50 MCG
1 SPRAY, SUSPENSION (ML) NASAL DAILY
Status: DISCONTINUED | OUTPATIENT
Start: 2019-05-16 | End: 2019-05-20 | Stop reason: HOSPADM

## 2019-05-15 RX ORDER — NICOTINE 21 MG/24HR
1 PATCH, TRANSDERMAL 24 HOURS TRANSDERMAL DAILY
Status: DISCONTINUED | OUTPATIENT
Start: 2019-05-16 | End: 2019-05-20 | Stop reason: HOSPADM

## 2019-05-15 RX ORDER — SODIUM CHLORIDE 9 MG/ML
INJECTION, SOLUTION INTRAVENOUS CONTINUOUS
Status: DISCONTINUED | OUTPATIENT
Start: 2019-05-15 | End: 2019-05-20 | Stop reason: HOSPADM

## 2019-05-15 RX ORDER — SODIUM CHLORIDE 0.9 % (FLUSH) 0.9 %
10 SYRINGE (ML) INJECTION EVERY 12 HOURS SCHEDULED
Status: DISCONTINUED | OUTPATIENT
Start: 2019-05-15 | End: 2019-05-20 | Stop reason: HOSPADM

## 2019-05-15 RX ORDER — SODIUM CHLORIDE 0.9 % (FLUSH) 0.9 %
10 SYRINGE (ML) INJECTION PRN
Status: DISCONTINUED | OUTPATIENT
Start: 2019-05-15 | End: 2019-05-20 | Stop reason: HOSPADM

## 2019-05-15 RX ORDER — CETIRIZINE HYDROCHLORIDE 10 MG/1
5 TABLET ORAL DAILY
Status: DISCONTINUED | OUTPATIENT
Start: 2019-05-16 | End: 2019-05-20 | Stop reason: HOSPADM

## 2019-05-15 ASSESSMENT — PAIN DESCRIPTION - PAIN TYPE: TYPE: CHRONIC PAIN

## 2019-05-15 ASSESSMENT — PAIN DESCRIPTION - ORIENTATION: ORIENTATION: POSTERIOR

## 2019-05-15 ASSESSMENT — PAIN SCALES - GENERAL
PAINLEVEL_OUTOF10: 0
PAINLEVEL_OUTOF10: 0
PAINLEVEL_OUTOF10: 6

## 2019-05-15 ASSESSMENT — PAIN DESCRIPTION - LOCATION: LOCATION: HEAD

## 2019-05-15 NOTE — TELEPHONE ENCOUNTER
CHART  REVIEWED. MRI  BRAIN   WITH  AND  W/O  CONTRAST   DONE  AT  [facility]         FAX    REPORT   DATED   5 /15/ 2019  REVIEWED. RADIOLOGY  REPORT   INDICATES    HYDROCEPHALUS   OF   RECENT  TO  SUBACUTE.      ASSOCIATED   TONSILLAR  HERNIATION  AT  [address] [facility]. PATIENT    NEEDS  EMERGENCY    NEURO  SURGICAL EVALUATION      FOR    POSSIBLE       VENTRICULAR  DRAIN     AND   RECOMMENDATIONS. AVOID    LUMBAR  PUNCTURE . PLEASE  NOTIFY  PATIENT  AND  FAMILY       ;TO  GO   TO  ER     FOR    TRANSFER   TO       [facility]   FOR    EMERGENCY  NEURO  SURGICAL  EVALUATION. THANK   YOU.

## 2019-05-15 NOTE — TELEPHONE ENCOUNTER
Patient notified of results and advised to go the the nearest ER and await transfer to [facility] for Neurosurgery Evaluation . Patient states she \"will have her  take her to the [facility] Writer spoke with Zafar Meade at the [facility] to make them aware patient will be coming in.

## 2019-05-15 NOTE — TELEPHONE ENCOUNTER
Una Aguilar with [facility] called in a critical lab for patient. MRI showed acute hydrocephalus, Radiologist is recommending the patient be hospitalized to have an external ventricular drain placed.

## 2019-05-15 NOTE — ED PROVIDER NOTES
[facility]  eMERGENCY dEPARTMENT eNCOUnter      Pt Name: Rosenda Stratton  MRN: [id number]  [place] 1950  Date of evaluation: 5/15/2019      CHIEF COMPLAINT       Chief Complaint   Patient presents with    Other     pt had MRI today for recent headaches and dizzyness intermittently- results of MRI showed hydrocephalus and was sent here by ordering doctor for transfer to [facility]      The patient was sent into the ED by her neurologist to address the findings on her MRI. The patient had an MRI performed today at a different hospital.  The scanned document that is available on the chart indicates that she has subacute hydrocephalus. The radiologist recommended that a ventricular drain be placed. The patient states that her workup started with sinus congestion and headaches. She reports headaches in the back of her head. She denies fever or acute neurologic deficit. REVIEW OF SYSTEMS       All systems reviewed and negative unless noted in HPI. The patient denies fever or constitutional symptoms. Denies vision change. Denies any sore throat or rhinorrhea. Denies any neck pain or stiffness. Denies chest pain or shortness of breath. No nausea,  vomiting or diarrhea. Denies any dysuria. Denies urinary frequency or hematuria. Denies musculoskeletal injury or pain. Denies any weakness, numbness or focal neurologic deficit. Headaches as noted in HPI. Denies any skin rash or edema. No recent psychiatric issues. No easy bruising or bleeding. Denies any polyuria, polydypsia or history of immunocompromise. PAST MEDICAL HISTORY    has a past medical history of Chronic obstructive pulmonary disease ([address] Coronary artery disease, Dyslipidemia, and Obesity. SURGICAL HISTORY      has a past surgical history that includes Cardiac catheterization.     CURRENT MEDICATIONS       Previous Medications    ALBUTEROL SULFATE  (90 rebound or guarding. Musculoskeletal exam shows no evidence of trauma. Skin: no rash or edema. Neurological exam reveals cranial nerves 2 through 12 grossly intact. Patient has equal  and normal deep tendon reflexes. No pronator drift. Psychiatric: no hallucinations or suicidal ideation. Lymphatics.:  No lymphadenopathy. DIFFERENTIAL DIAGNOSIS/ MDM:     hydrocephalus    DIAGNOSTIC RESULTS       EMERGENCY DEPARTMENT COURSE:   Vitals:    Vitals:    05/15/19 1726   BP: (!) 138/113   Pulse: 100   Resp: 14   Temp: 97.9 °F (36.6 °C)   TempSrc: Tympanic   SpO2: 99%   Weight: 220 lb (99.8 kg)   Height: 5' 5\" (1.651 m)     -------------------------  BP: (!) 138/113, Temp: 97.9 °F (36.6 °C), Pulse: 100, Resp: 14      Re-evaluation Notes    The patient has been hemodynamically stable without any acute neurologic findings. I discussed the situation with the neurosurgeon and with the hospitalist.  The patient will be transferred to [facility] for further evaluation. She is transferred in stable condition. CONSULTS:    Nitin Doherty contacted. 18  Discussed with Dr. Maty Grajeda. Admit to hospitalist.  Will consult. 1842  Discussed with Dr. Flor Soria. Admit to neurostepdown unit, in-pt. FINAL IMPRESSION      1. Hydrocephalus          DISPOSITION/PLAN   DISPOSITION Decision To Transfer 05/15/2019 05:08:30 PM      Condition on Disposition    stable    PATIENT REFERRED TO:  No follow-up provider specified.     DISCHARGE MEDICATIONS:  New Prescriptions    No medications on file       (Please note that portions of this note were completed with a voice recognition program.  Efforts were made to edit the dictations but occasionally words are mis-transcribed.)    Granados MD   Attending Emergency Physician         Tiago Hanna MD  05/15/19 4660

## 2019-05-16 ENCOUNTER — ANESTHESIA EVENT (OUTPATIENT)
Dept: OPERATING ROOM | Age: 69
DRG: 033 | End: 2019-05-16
Payer: MEDICARE

## 2019-05-16 ENCOUNTER — ANESTHESIA (OUTPATIENT)
Dept: OPERATING ROOM | Age: 69
DRG: 033 | End: 2019-05-16
Payer: MEDICARE

## 2019-05-16 VITALS — SYSTOLIC BLOOD PRESSURE: 152 MMHG | OXYGEN SATURATION: 100 % | TEMPERATURE: 94.4 F | DIASTOLIC BLOOD PRESSURE: 88 MMHG

## 2019-05-16 LAB
GLUCOSE BLD-MCNC: 111 MG/DL (ref 65–105)
INR BLD: 0.9
PARTIAL THROMBOPLASTIN TIME: 21.7 SEC (ref 20.5–30.5)
PROTHROMBIN TIME: 10.1 SEC (ref 9–12)

## 2019-05-16 PROCEDURE — C1894 INTRO/SHEATH, NON-LASER: HCPCS | Performed by: NEUROLOGICAL SURGERY

## 2019-05-16 PROCEDURE — 85610 PROTHROMBIN TIME: CPT

## 2019-05-16 PROCEDURE — 2580000003 HC RX 258: Performed by: INTERNAL MEDICINE

## 2019-05-16 PROCEDURE — 7100000000 HC PACU RECOVERY - FIRST 15 MIN: Performed by: NEUROLOGICAL SURGERY

## 2019-05-16 PROCEDURE — 2500000003 HC RX 250 WO HCPCS: Performed by: NEUROLOGICAL SURGERY

## 2019-05-16 PROCEDURE — 2580000003 HC RX 258: Performed by: NEUROLOGICAL SURGERY

## 2019-05-16 PROCEDURE — 2709999900 HC NON-CHARGEABLE SUPPLY: Performed by: NEUROLOGICAL SURGERY

## 2019-05-16 PROCEDURE — 3700000000 HC ANESTHESIA ATTENDED CARE: Performed by: NEUROLOGICAL SURGERY

## 2019-05-16 PROCEDURE — 2580000003 HC RX 258: Performed by: NURSE ANESTHETIST, CERTIFIED REGISTERED

## 2019-05-16 PROCEDURE — 00160J6 BYPASS CEREBRAL VENTRICLE TO PERITONEAL CAVITY WITH SYNTHETIC SUBSTITUTE, OPEN APPROACH: ICD-10-PCS | Performed by: NEUROLOGICAL SURGERY

## 2019-05-16 PROCEDURE — 3600000014 HC SURGERY LEVEL 4 ADDTL 15MIN: Performed by: NEUROLOGICAL SURGERY

## 2019-05-16 PROCEDURE — 2500000003 HC RX 250 WO HCPCS: Performed by: NURSE ANESTHETIST, CERTIFIED REGISTERED

## 2019-05-16 PROCEDURE — 6370000000 HC RX 637 (ALT 250 FOR IP): Performed by: INTERNAL MEDICINE

## 2019-05-16 PROCEDURE — 6370000000 HC RX 637 (ALT 250 FOR IP): Performed by: NURSE PRACTITIONER

## 2019-05-16 PROCEDURE — 2000000003 HC NEURO ICU R&B

## 2019-05-16 PROCEDURE — 6370000000 HC RX 637 (ALT 250 FOR IP): Performed by: NURSE ANESTHETIST, CERTIFIED REGISTERED

## 2019-05-16 PROCEDURE — 6360000002 HC RX W HCPCS: Performed by: NEUROLOGICAL SURGERY

## 2019-05-16 PROCEDURE — 6360000002 HC RX W HCPCS: Performed by: NURSE ANESTHETIST, CERTIFIED REGISTERED

## 2019-05-16 PROCEDURE — 36415 COLL VENOUS BLD VENIPUNCTURE: CPT

## 2019-05-16 PROCEDURE — 99222 1ST HOSP IP/OBS MODERATE 55: CPT | Performed by: INTERNAL MEDICINE

## 2019-05-16 PROCEDURE — 87206 SMEAR FLUORESCENT/ACID STAI: CPT

## 2019-05-16 PROCEDURE — 2720000010 HC SURG SUPPLY STERILE: Performed by: NEUROLOGICAL SURGERY

## 2019-05-16 PROCEDURE — 6370000000 HC RX 637 (ALT 250 FOR IP): Performed by: NEUROLOGICAL SURGERY

## 2019-05-16 PROCEDURE — 6360000002 HC RX W HCPCS: Performed by: INTERNAL MEDICINE

## 2019-05-16 PROCEDURE — 7100000001 HC PACU RECOVERY - ADDTL 15 MIN: Performed by: NEUROLOGICAL SURGERY

## 2019-05-16 PROCEDURE — 87102 FUNGUS ISOLATION CULTURE: CPT

## 2019-05-16 PROCEDURE — 3600000004 HC SURGERY LEVEL 4 BASE: Performed by: NEUROLOGICAL SURGERY

## 2019-05-16 PROCEDURE — 3700000001 HC ADD 15 MINUTES (ANESTHESIA): Performed by: NEUROLOGICAL SURGERY

## 2019-05-16 PROCEDURE — 85730 THROMBOPLASTIN TIME PARTIAL: CPT

## 2019-05-16 PROCEDURE — 2780000010 HC IMPLANT OTHER: Performed by: NEUROLOGICAL SURGERY

## 2019-05-16 DEVICE — CODMAN® CERTAS® PLUS PROGRAMMABLE VALVE INLINE VALVE WITH ACCESSORIES AND UNITIZED BACTISEAL® CATHETER
Type: IMPLANTABLE DEVICE | Site: BRAIN | Status: FUNCTIONAL
Brand: CODMAN CERTAS® PLUS

## 2019-05-16 RX ORDER — SODIUM CHLORIDE, SODIUM LACTATE, POTASSIUM CHLORIDE, CALCIUM CHLORIDE 600; 310; 30; 20 MG/100ML; MG/100ML; MG/100ML; MG/100ML
INJECTION, SOLUTION INTRAVENOUS CONTINUOUS PRN
Status: DISCONTINUED | OUTPATIENT
Start: 2019-05-16 | End: 2019-05-16 | Stop reason: SDUPTHER

## 2019-05-16 RX ORDER — NEOSTIGMINE METHYLSULFATE 5 MG/5 ML
SYRINGE (ML) INTRAVENOUS PRN
Status: DISCONTINUED | OUTPATIENT
Start: 2019-05-16 | End: 2019-05-16 | Stop reason: SDUPTHER

## 2019-05-16 RX ORDER — HYDROCODONE BITARTRATE AND ACETAMINOPHEN 5; 325 MG/1; MG/1
2 TABLET ORAL EVERY 4 HOURS PRN
Status: DISCONTINUED | OUTPATIENT
Start: 2019-05-16 | End: 2019-05-17

## 2019-05-16 RX ORDER — ALBUTEROL SULFATE 90 UG/1
AEROSOL, METERED RESPIRATORY (INHALATION) PRN
Status: DISCONTINUED | OUTPATIENT
Start: 2019-05-16 | End: 2019-05-16 | Stop reason: SDUPTHER

## 2019-05-16 RX ORDER — LIDOCAINE HYDROCHLORIDE 10 MG/ML
INJECTION, SOLUTION EPIDURAL; INFILTRATION; INTRACAUDAL; PERINEURAL PRN
Status: DISCONTINUED | OUTPATIENT
Start: 2019-05-16 | End: 2019-05-16 | Stop reason: SDUPTHER

## 2019-05-16 RX ORDER — MORPHINE SULFATE 2 MG/ML
2 INJECTION, SOLUTION INTRAMUSCULAR; INTRAVENOUS EVERY 5 MIN PRN
Status: DISCONTINUED | OUTPATIENT
Start: 2019-05-16 | End: 2019-05-16 | Stop reason: HOSPADM

## 2019-05-16 RX ORDER — GLYCOPYRROLATE 1 MG/5 ML
SYRINGE (ML) INTRAVENOUS PRN
Status: DISCONTINUED | OUTPATIENT
Start: 2019-05-16 | End: 2019-05-16 | Stop reason: SDUPTHER

## 2019-05-16 RX ORDER — SODIUM CHLORIDE 0.9 % (FLUSH) 0.9 %
10 SYRINGE (ML) INJECTION PRN
Status: DISCONTINUED | OUTPATIENT
Start: 2019-05-16 | End: 2019-05-20 | Stop reason: HOSPADM

## 2019-05-16 RX ORDER — ROCURONIUM BROMIDE 10 MG/ML
INJECTION, SOLUTION INTRAVENOUS PRN
Status: DISCONTINUED | OUTPATIENT
Start: 2019-05-16 | End: 2019-05-16 | Stop reason: SDUPTHER

## 2019-05-16 RX ORDER — DIPHENHYDRAMINE HYDROCHLORIDE 50 MG/ML
12.5 INJECTION INTRAMUSCULAR; INTRAVENOUS
Status: DISCONTINUED | OUTPATIENT
Start: 2019-05-16 | End: 2019-05-16 | Stop reason: HOSPADM

## 2019-05-16 RX ORDER — ONDANSETRON 2 MG/ML
4 INJECTION INTRAMUSCULAR; INTRAVENOUS
Status: DISCONTINUED | OUTPATIENT
Start: 2019-05-16 | End: 2019-05-16 | Stop reason: HOSPADM

## 2019-05-16 RX ORDER — MORPHINE SULFATE 2 MG/ML
2 INJECTION, SOLUTION INTRAMUSCULAR; INTRAVENOUS
Status: DISCONTINUED | OUTPATIENT
Start: 2019-05-16 | End: 2019-05-17

## 2019-05-16 RX ORDER — DEXAMETHASONE SODIUM PHOSPHATE 10 MG/ML
INJECTION INTRAMUSCULAR; INTRAVENOUS PRN
Status: DISCONTINUED | OUTPATIENT
Start: 2019-05-16 | End: 2019-05-16 | Stop reason: SDUPTHER

## 2019-05-16 RX ORDER — ONDANSETRON 2 MG/ML
4 INJECTION INTRAMUSCULAR; INTRAVENOUS EVERY 6 HOURS PRN
Status: DISCONTINUED | OUTPATIENT
Start: 2019-05-16 | End: 2019-05-20 | Stop reason: HOSPADM

## 2019-05-16 RX ORDER — SODIUM CHLORIDE 0.9 % (FLUSH) 0.9 %
10 SYRINGE (ML) INJECTION EVERY 12 HOURS SCHEDULED
Status: DISCONTINUED | OUTPATIENT
Start: 2019-05-16 | End: 2019-05-20 | Stop reason: HOSPADM

## 2019-05-16 RX ORDER — HYDROCODONE BITARTRATE AND ACETAMINOPHEN 5; 325 MG/1; MG/1
1 TABLET ORAL EVERY 4 HOURS PRN
Status: DISCONTINUED | OUTPATIENT
Start: 2019-05-16 | End: 2019-05-17

## 2019-05-16 RX ORDER — HYDRALAZINE HYDROCHLORIDE 20 MG/ML
5 INJECTION INTRAMUSCULAR; INTRAVENOUS EVERY 10 MIN PRN
Status: DISCONTINUED | OUTPATIENT
Start: 2019-05-16 | End: 2019-05-16 | Stop reason: HOSPADM

## 2019-05-16 RX ORDER — LABETALOL HYDROCHLORIDE 5 MG/ML
5 INJECTION, SOLUTION INTRAVENOUS EVERY 10 MIN PRN
Status: DISCONTINUED | OUTPATIENT
Start: 2019-05-16 | End: 2019-05-16 | Stop reason: HOSPADM

## 2019-05-16 RX ORDER — FENTANYL CITRATE 50 UG/ML
50 INJECTION, SOLUTION INTRAMUSCULAR; INTRAVENOUS EVERY 5 MIN PRN
Status: DISCONTINUED | OUTPATIENT
Start: 2019-05-16 | End: 2019-05-16 | Stop reason: HOSPADM

## 2019-05-16 RX ORDER — ONDANSETRON 2 MG/ML
INJECTION INTRAMUSCULAR; INTRAVENOUS PRN
Status: DISCONTINUED | OUTPATIENT
Start: 2019-05-16 | End: 2019-05-16 | Stop reason: SDUPTHER

## 2019-05-16 RX ORDER — SODIUM CHLORIDE 450 MG/100ML
INJECTION, SOLUTION INTRAVENOUS CONTINUOUS
Status: DISCONTINUED | OUTPATIENT
Start: 2019-05-16 | End: 2019-05-19

## 2019-05-16 RX ORDER — FENTANYL CITRATE 50 UG/ML
INJECTION, SOLUTION INTRAMUSCULAR; INTRAVENOUS PRN
Status: DISCONTINUED | OUTPATIENT
Start: 2019-05-16 | End: 2019-05-16 | Stop reason: SDUPTHER

## 2019-05-16 RX ORDER — MORPHINE SULFATE 4 MG/ML
4 INJECTION, SOLUTION INTRAMUSCULAR; INTRAVENOUS
Status: DISCONTINUED | OUTPATIENT
Start: 2019-05-16 | End: 2019-05-17

## 2019-05-16 RX ORDER — MEPERIDINE HYDROCHLORIDE 50 MG/ML
12.5 INJECTION INTRAMUSCULAR; INTRAVENOUS; SUBCUTANEOUS EVERY 5 MIN PRN
Status: DISCONTINUED | OUTPATIENT
Start: 2019-05-16 | End: 2019-05-16 | Stop reason: HOSPADM

## 2019-05-16 RX ORDER — PROPOFOL 10 MG/ML
INJECTION, EMULSION INTRAVENOUS PRN
Status: DISCONTINUED | OUTPATIENT
Start: 2019-05-16 | End: 2019-05-16 | Stop reason: SDUPTHER

## 2019-05-16 RX ORDER — FENTANYL CITRATE 50 UG/ML
25 INJECTION, SOLUTION INTRAMUSCULAR; INTRAVENOUS EVERY 5 MIN PRN
Status: DISCONTINUED | OUTPATIENT
Start: 2019-05-16 | End: 2019-05-16 | Stop reason: HOSPADM

## 2019-05-16 RX ADMIN — LIDOCAINE HYDROCHLORIDE 50 MG: 10 INJECTION, SOLUTION EPIDURAL; INFILTRATION; INTRACAUDAL; PERINEURAL at 13:15

## 2019-05-16 RX ADMIN — MORPHINE SULFATE 1 MG: 2 INJECTION, SOLUTION INTRAMUSCULAR; INTRAVENOUS at 08:29

## 2019-05-16 RX ADMIN — Medication 0.6 MG: at 14:52

## 2019-05-16 RX ADMIN — Medication 3 MG: at 14:52

## 2019-05-16 RX ADMIN — FENTANYL CITRATE 50 MCG: 50 INJECTION INTRAMUSCULAR; INTRAVENOUS at 13:15

## 2019-05-16 RX ADMIN — ROCURONIUM BROMIDE 50 MG: 10 INJECTION INTRAVENOUS at 13:15

## 2019-05-16 RX ADMIN — HYDROCODONE BITARTRATE AND ACETAMINOPHEN 1 TABLET: 5; 325 TABLET ORAL at 16:44

## 2019-05-16 RX ADMIN — Medication 0.2 MG: at 13:41

## 2019-05-16 RX ADMIN — FENTANYL CITRATE 50 MCG: 50 INJECTION INTRAMUSCULAR; INTRAVENOUS at 14:15

## 2019-05-16 RX ADMIN — HYDROCODONE BITARTRATE AND ACETAMINOPHEN 2 TABLET: 5; 325 TABLET ORAL at 21:18

## 2019-05-16 RX ADMIN — FENTANYL CITRATE 50 MCG: 50 INJECTION INTRAMUSCULAR; INTRAVENOUS at 14:53

## 2019-05-16 RX ADMIN — SODIUM CHLORIDE: 4.5 INJECTION, SOLUTION INTRAVENOUS at 17:05

## 2019-05-16 RX ADMIN — ONDANSETRON 4 MG: 2 INJECTION, SOLUTION INTRAMUSCULAR; INTRAVENOUS at 14:44

## 2019-05-16 RX ADMIN — SODIUM CHLORIDE, POTASSIUM CHLORIDE, SODIUM LACTATE AND CALCIUM CHLORIDE: 600; 310; 30; 20 INJECTION, SOLUTION INTRAVENOUS at 13:18

## 2019-05-16 RX ADMIN — FENTANYL CITRATE 50 MCG: 50 INJECTION INTRAMUSCULAR; INTRAVENOUS at 13:22

## 2019-05-16 RX ADMIN — SODIUM CHLORIDE: 9 INJECTION, SOLUTION INTRAVENOUS at 00:03

## 2019-05-16 RX ADMIN — ALBUTEROL SULFATE 2 PUFF: 90 AEROSOL, METERED RESPIRATORY (INHALATION) at 13:19

## 2019-05-16 RX ADMIN — ROCURONIUM BROMIDE 20 MG: 10 INJECTION INTRAVENOUS at 13:41

## 2019-05-16 RX ADMIN — PROPOFOL 150 MG: 10 INJECTION, EMULSION INTRAVENOUS at 13:15

## 2019-05-16 RX ADMIN — Medication 2 G: at 17:05

## 2019-05-16 RX ADMIN — Medication 10 ML: at 21:18

## 2019-05-16 RX ADMIN — ALBUTEROL SULFATE 2 PUFF: 90 AEROSOL, METERED RESPIRATORY (INHALATION) at 14:57

## 2019-05-16 RX ADMIN — DEXAMETHASONE SODIUM PHOSPHATE 10 MG: 10 INJECTION INTRAMUSCULAR; INTRAVENOUS at 13:18

## 2019-05-16 RX ADMIN — Medication 2 G: at 13:45

## 2019-05-16 RX ADMIN — FLUTICASONE PROPIONATE 1 SPRAY: 50 SPRAY, METERED NASAL at 07:41

## 2019-05-16 ASSESSMENT — PAIN SCALES - GENERAL
PAINLEVEL_OUTOF10: 7
PAINLEVEL_OUTOF10: 7
PAINLEVEL_OUTOF10: 0
PAINLEVEL_OUTOF10: 6
PAINLEVEL_OUTOF10: 7
PAINLEVEL_OUTOF10: 0

## 2019-05-16 ASSESSMENT — PULMONARY FUNCTION TESTS
PIF_VALUE: 15
PIF_VALUE: 16
PIF_VALUE: 15
PIF_VALUE: 17
PIF_VALUE: 18
PIF_VALUE: 17
PIF_VALUE: 1
PIF_VALUE: 29
PIF_VALUE: 17
PIF_VALUE: 15
PIF_VALUE: 17
PIF_VALUE: 18
PIF_VALUE: 17
PIF_VALUE: 14
PIF_VALUE: 18
PIF_VALUE: 21
PIF_VALUE: 16
PIF_VALUE: 16
PIF_VALUE: 17
PIF_VALUE: 17
PIF_VALUE: 1
PIF_VALUE: 25
PIF_VALUE: 17
PIF_VALUE: 17
PIF_VALUE: 18
PIF_VALUE: 17
PIF_VALUE: 18
PIF_VALUE: 17
PIF_VALUE: 17
PIF_VALUE: 16
PIF_VALUE: 18
PIF_VALUE: 13
PIF_VALUE: 2
PIF_VALUE: 18
PIF_VALUE: 18
PIF_VALUE: 19
PIF_VALUE: 17
PIF_VALUE: 16
PIF_VALUE: 17
PIF_VALUE: 16
PIF_VALUE: 18
PIF_VALUE: 16
PIF_VALUE: 17
PIF_VALUE: 1
PIF_VALUE: 19
PIF_VALUE: 17
PIF_VALUE: 16
PIF_VALUE: 16
PIF_VALUE: 17
PIF_VALUE: 17
PIF_VALUE: 21
PIF_VALUE: 16
PIF_VALUE: 16
PIF_VALUE: 18
PIF_VALUE: 15
PIF_VALUE: 17
PIF_VALUE: 17
PIF_VALUE: 30
PIF_VALUE: 16
PIF_VALUE: 18
PIF_VALUE: 17
PIF_VALUE: 19
PIF_VALUE: 18
PIF_VALUE: 16
PIF_VALUE: 17
PIF_VALUE: 18
PIF_VALUE: 17
PIF_VALUE: 17
PIF_VALUE: 18
PIF_VALUE: 16
PIF_VALUE: 7
PIF_VALUE: 16
PIF_VALUE: 17
PIF_VALUE: 1
PIF_VALUE: 19
PIF_VALUE: 23
PIF_VALUE: 16
PIF_VALUE: 17
PIF_VALUE: 14
PIF_VALUE: 16
PIF_VALUE: 14
PIF_VALUE: 17
PIF_VALUE: 2
PIF_VALUE: 18
PIF_VALUE: 15
PIF_VALUE: 14
PIF_VALUE: 17
PIF_VALUE: 18
PIF_VALUE: 1
PIF_VALUE: 17
PIF_VALUE: 16
PIF_VALUE: 19
PIF_VALUE: 15
PIF_VALUE: 15
PIF_VALUE: 0
PIF_VALUE: 17
PIF_VALUE: 18
PIF_VALUE: 17
PIF_VALUE: 16
PIF_VALUE: 14
PIF_VALUE: 18
PIF_VALUE: 1
PIF_VALUE: 16
PIF_VALUE: 17
PIF_VALUE: 16
PIF_VALUE: 17
PIF_VALUE: 16
PIF_VALUE: 0
PIF_VALUE: 1
PIF_VALUE: 21
PIF_VALUE: 16
PIF_VALUE: 17
PIF_VALUE: 19
PIF_VALUE: 16
PIF_VALUE: 15
PIF_VALUE: 16
PIF_VALUE: 19
PIF_VALUE: 19
PIF_VALUE: 6
PIF_VALUE: 1
PIF_VALUE: 16
PIF_VALUE: 18

## 2019-05-16 ASSESSMENT — PAIN - FUNCTIONAL ASSESSMENT
PAIN_FUNCTIONAL_ASSESSMENT: ACTIVITIES ARE NOT PREVENTED
PAIN_FUNCTIONAL_ASSESSMENT: 0-10
PAIN_FUNCTIONAL_ASSESSMENT: ACTIVITIES ARE NOT PREVENTED

## 2019-05-16 ASSESSMENT — PAIN DESCRIPTION - DESCRIPTORS
DESCRIPTORS: ACHING
DESCRIPTORS: ACHING
DESCRIPTORS: ACHING;DULL

## 2019-05-16 ASSESSMENT — PAIN DESCRIPTION - LOCATION: LOCATION: HEAD

## 2019-05-16 ASSESSMENT — PAIN DESCRIPTION - FREQUENCY: FREQUENCY: CONTINUOUS

## 2019-05-16 NOTE — H&P
[facility]    HISTORY AND PHYSICAL EXAMINATION            Date:   5/16/2019  Patient name:  Rachid Green  Date of admission:  5/15/2019 10:41 PM  MRN:   [id number]  Account:  [de-identified]  YOB: 1950  PCP:    JOHNATHAN Sandoval CNP  Room:   4432/2530-15  Code Status:    Full Code    Chief Complaint:     Dizziness     History Obtained From:     patient    History of Present Illness:     Miss Bridget Caldwell is a nice 79year old lady with history of hypertension, extensive tobacco use, CAD status post stent 8 years back. Admitted from outlying facility. She has sinus like infections over past few weeks needing 3 courses of antibiotics. She developed posterior severe headache and dizziness for past week with difficulty walking. MRI showing hydrocephalus and was admitted overnight. Continued headache at my visit. No fevers, chills overnight    Past Medical History:     Past Medical History:   Diagnosis Date    Chronic obstructive pulmonary disease ([address]     Coronary artery disease     Dyslipidemia     Obesity       Past Surgical History:     Past Surgical History:   Procedure Laterality Date    CARDIAC CATHETERIZATION        Medications Prior to Admission:     Prior to Admission medications    Medication Sig Start Date End Date Taking?  Authorizing Provider   ondansetron (ZOFRAN ODT) 4 MG disintegrating tablet Take 1 tablet by mouth every 8 hours as needed for Nausea 5/12/19  Yes Britni Carroll MD   ibuprofen (ADVIL;MOTRIN) 600 MG tablet Take 1 tablet by mouth every 8 hours as needed for Pain 5/12/19  Yes Britni Carroll MD   aspirin 325 MG tablet Take 325 mg by mouth daily   Yes Historical Provider, MD   tiZANidine (ZANAFLEX) 2 MG tablet Take 1 tablet by mouth 3 times daily as needed (muscle spasm or headach) 4/24/19  Yes Emre Spaulding MD   fluticasone (FLONASE) 50 MCG/ACT nasal spray 1 spray by Nasal route daily 4/4/19  Yes Yolande Arvizu MD   Pseudoephedrine HCl (SUDAFED 12 HOUR PO) Take 1 tablet by mouth 2 times daily   Yes Historical Provider, MD   albuterol sulfate  (90 Base) MCG/ACT inhaler Inhale 2 puffs into the lungs 4 times daily as needed for Wheezing 3/22/19  Yes Radha Lane, APRN - CNP   loratadine (CLARITIN) 10 MG tablet Take 10 mg by mouth daily   Yes Historical Provider, MD   ondansetron (ZOFRAN) 4 MG tablet Take 1 tablet by mouth every 8 hours as needed for Nausea 5/2/19   Doni Tucker,    Pseudoephedrine-APAP-DM (DAYQUIL PO) Take by mouth    Historical Provider, MD      Allergies:     Vicodin [hydrocodone-acetaminophen]    Social History:     Tobacco:    reports that she has been smoking. She started smoking about 49 years ago. She has a 50.00 pack-year smoking history. She has never used smokeless tobacco.  Alcohol:      reports that she does not drink alcohol. Drug Use:  reports that she does not use drugs. Family History:     History reviewed. No pertinent family history. Review of Systems:     Positive and Negative as described in HPI.     CONSTITUTIONAL:  negative for fevers, chills, sweats, fatigue, weight loss  HEENT:  negative for vision, hearing changes, runny nose, throat pain  RESPIRATORY: chronic exertional SOB  CARDIOVASCULAR:  negative for chest pain, palpitations  GASTROINTESTINAL:  negative for nausea, vomiting, diarrhea, constipation, change in bowel habits, abdominal pain   GENITOURINARY:  negative for difficulty of urination, burning with urination, frequency   INTEGUMENT:  negative for rash, skin lesions, easy bruising   HEMATOLOGIC/LYMPHATIC:  negative for swelling/edema   ALLERGIC/IMMUNOLOGIC:  negative for urticaria , itching  ENDOCRINE:  negative increase in drinking, increase in urination, hot or cold intolerance  MUSCULOSKELETAL:  negative joint pains, muscle aches, swelling of joints  NEUROLOGICAL:  negative for headaches, dizziness, lightheadedness, numbness, pain, tingling extremities  BEHAVIOR/PSYCH:  negative for depression, anxiety    Physical Exam:   BP (!) 143/79   Pulse 79   Temp 97.9 °F (36.6 °C) (Oral)   Resp 13   Ht 5' 8\" (1.727 m)   Wt 218 lb 4.1 oz (99 kg)   LMP 2000 (Approximate)   SpO2 92%   BMI 33.19 kg/m²   Temp (24hrs), Av.1 °F (36.7 °C), Min:97.9 °F (36.6 °C), Max:98.5 °F (36.9 °C)    Recent Labs     05/15/19  2316   POCGLU 111*     No intake or output data in the 24 hours ending 19 1031    General Appearance:  alert, well appearing, and in no acute distress  Mental status: oriented to person, place, and time with normal affect  Head:  normocephalic, atraumatic  Eye: no icterus, redness, pupils equal and reactive, extraocular eye movements intact, conjunctiva clear  Ear: normal external ear, no discharge, hearing intact  Nose:  no drainage noted  Mouth: mucous membranes moist  Neck: supple, no carotid bruits, thyroid not palpable  Lungs: Bilateral equal air entry, clear to ausculation, no wheezing, rales or rhonchi, normal effort  Cardiovascular: normal rate, regular rhythm, no murmur, gallop, rub.   Abdomen: Soft, nontender, nondistended, normal bowel sounds, no hepatomegaly or splenomegaly  Neurologic: There are no new focal motor or sensory deficits, normal muscle tone and bulk, no abnormal sensation, normal speech, cranial nerves II through XII grossly intact  Skin: No gross lesions, rashes, bruising or bleeding on exposed skin area  Extremities:  peripheral pulses palpable, no pedal edema or calf pain with palpation  Psych: normal affect    Investigations:      Laboratory Testing:  Recent Results (from the past 24 hour(s))   POC Glucose Fingerstick    Collection Time: 05/15/19 11:16 PM   Result Value Ref Range    POC Glucose 111 (H) 65 - 105 mg/dL     Assessment :      Principal Problem:    Hydrocephalus  Active Problems:    CAD (coronary artery disease)    Tobacco abuse    Obesity    Chronic obstructive pulmonary disease [facility])    Plan:     Principal Problem:    Hydrocephalus, possible stenting this admission    Pre operative clearance: Has history of CAD and hypertension. No reported chest pain. Medically low risk for complications. Can proceed with out further testing  Active Problems:    CAD (coronary artery disease), statin continued    Tobacco abuse    Obesity    Chronic obstructive pulmonary disease ([address] home inhalers continued    Discussed with family at bedside    Consultations:   [address] [place] [address] TO CASE MANAGEMENT    Patient is admitted as inpatient status because of co-morbidities listed above, severity of signs and symptoms as outlined, requirement for current medical therapies and most importantly because of direct risk to patient if care not provided in a hospital setting.     Montana Valentino MD  5/16/2019  10:31 AM    Copy sent to Dr. Marni Parra, JOHNATHAN - CNP

## 2019-05-16 NOTE — PROGRESS NOTES
1615- Patient arrived on unit from PACU. Patient AAOX4 and follow commands. Patient noted with dressing to head, and abd. Dressing clean, dry, and intact to head and abd. Patient placed on portable cardiac monitor. Patient resting in bed. Will continue to monitor patient. Ever Alvarez    6004- Report given to oncoming nurse. Orders and labs reviewed. Focus assessment done. Ever Alvarez

## 2019-05-16 NOTE — ANESTHESIA POSTPROCEDURE EVALUATION
Department of Anesthesiology  Postprocedure Note    Patient: Consuelo Zavaleta  MRN: [id number]  YOB: 1950  Date of evaluation: 5/16/2019  Time:  5:14 PM     Procedure Summary     Date:  05/16/19 Room / Location:  [address] OR    Anesthesia Start:  1309 Anesthesia Stop:  2808    Procedure:  VENTRICULAR PERITONEAL SHUNT INSERTION ENDOSCOPIC (Right Head) Diagnosis:  (HYDROCEPHULUS)    Surgeon:  Alethea Leach MD Responsible Provider:  Danielle Amador MD    Anesthesia Type:  general ASA Status:  2          Anesthesia Type: general    Brooke Phase I: Brooke Score: 10    Brooke Phase II:      Last vitals: Reviewed and per EMR flowsheets.        Anesthesia Post Evaluation    Patient location during evaluation: PACU  Patient participation: complete - patient participated  Level of consciousness: awake and alert  Pain score: 2  Airway patency: patent  Nausea & Vomiting: no nausea and no vomiting  Complications: no  Cardiovascular status: hemodynamically stable  Respiratory status: acceptable  Hydration status: euvolemic

## 2019-05-16 NOTE — PROGRESS NOTES
Report received from [facility] sitting on a chair mobilized back to bed I/s started. C/o headache pain 4/10 refused morphine will wait for next dose of norco. On oxygen 2L SATS 90% up  To 94% post I/S. Call light at easy reach. SCD applied.

## 2019-05-16 NOTE — CARE COORDINATION
Case Management Initial Discharge Plan  Mario Dow,             Met with:patient daughters @ bedside to discuss discharge plans. Information verified: address, contacts, phone number, , insurance Yes  PCP: JOHNATHAN Faye CNP  Date of last visit: 1 month    Insurance Provider: medicare/elías    Discharge Planning    Living Arrangements:  Family Members daughter  Support Systems:  Family Members has two daughters lives with one other lives close    Home has 2 stories  Location of bedroom/bathroom in home 1st    Patient able to perform ADL's:Independent    Current Services (outpatient & in home) none  DME equipment: none      Pharmacy: [facility]   Potential Assistance Purchasing Medications:  No  Does patient want to participate in local refill/ meds to beds program?  Yes    Potential Assistance Needed:  F/u appts      Prior SNF/Rehab Placement and Facility: none  Agreeable to SNF/Rehab: plan is to return home  Freedom of choice provided: n/a   Evaluation: no    Expected Discharge date:  19  Patient expects to be discharged to:  home  Follow Up Appointment: Best Day/ Time:      Transportation provider: family  Transportation arrangements needed for discharge: No    Readmission Risk              Risk of Unplanned Readmission:        8             Does patient have a readmission risk score greater than 14?: No  If yes, follow-up appointment must be made within 7 days of discharge.      Discharge Plan: return home with daughter          Electronically signed by Gris Solomon RN on 19 at 10:10 AM

## 2019-05-16 NOTE — ANESTHESIA PRE PROCEDURE
Department of Anesthesiology  Preprocedure Note       Name:  Bernardino Bo   Age:  79 y.o.  :  1950                                          MRN:  [id number]         Date:  2019      Surgeon: Ed Hernandez):  Gely Valiente MD    Procedure: VENTRICULAR PERITONEAL SHUNT INSERTION ENDOSCOPIC (N/A )    Medications prior to admission:   Prior to Admission medications    Medication Sig Start Date End Date Taking?  Authorizing Provider   ondansetron (ZOFRAN ODT) 4 MG disintegrating tablet Take 1 tablet by mouth every 8 hours as needed for Nausea 19  Yes Kaley Lane MD   ibuprofen (ADVIL;MOTRIN) 600 MG tablet Take 1 tablet by mouth every 8 hours as needed for Pain 19  Yes Kaley Lane MD   aspirin 325 MG tablet Take 325 mg by mouth daily   Yes Historical Provider, MD   tiZANidine (ZANAFLEX) 2 MG tablet Take 1 tablet by mouth 3 times daily as needed (muscle spasm or headach) 19  Yes Tammy Conway MD   fluticasone (FLONASE) 50 MCG/ACT nasal spray 1 spray by Nasal route daily 19  Yes Tammy Conway MD   Pseudoephedrine HCl (SUDAFED 12 HOUR PO) Take 1 tablet by mouth 2 times daily   Yes Historical Provider, MD   albuterol sulfate  (90 Base) MCG/ACT inhaler Inhale 2 puffs into the lungs 4 times daily as needed for Wheezing 3/22/19  Yes Radha Lane, APRN - CNP   loratadine (CLARITIN) 10 MG tablet Take 10 mg by mouth daily   Yes Historical Provider, MD   ondansetron (ZOFRAN) 4 MG tablet Take 1 tablet by mouth every 8 hours as needed for Nausea 19   Doni Tucker DO   Pseudoephedrine-APAP-DM (DAYQUIL PO) Take by mouth    Historical Provider, MD       Current medications:    Current Facility-Administered Medications   Medication Dose Route Frequency Provider Last Rate Last Dose    [MAR Hold] pneumococcal 13-valent conjugate (PREVNAR) injection 0.5 mL  0.5 mL Intramuscular Once Ellie MD Bryant        [facility] Hold] morphine (PF) injection 2 mg  2 mg Intravenous Q3H PRN Adireddy Romayne Codding, MD   1 mg at 05/16/19 0829    [MAR Hold] albuterol sulfate  (90 Base) MCG/ACT inhaler 2 puff  2 puff Inhalation 4x Daily PRN Romelia Jones MD        [facility] Hold] fluticasone (FLONASE) 50 MCG/ACT nasal spray 1 spray  1 spray Nasal Daily Adireddy Romayne Codding, MD   1 spray at 05/16/19 0741    [MAR Hold] cetirizine (ZYRTEC) tablet 5 mg  5 mg Oral Daily Adireddy Romayne Codding, MD Dodie Collins [facility] Hold] sodium chloride flush 0.9 % injection 10 mL  10 mL Intravenous 2 times per day MD Evelyn Garcia [facility] Hold] sodium chloride flush 0.9 % injection 10 mL  10 mL Intravenous PRN MD Evelyn Garcia [facility] Hold] magnesium hydroxide (MILK OF MAGNESIA) 400 MG/5ML suspension 30 mL  30 mL Oral Daily PRN Adireddy Romayne Codding, MD        [facility] Hold] ondansetron [facility]) injection 4 mg  4 mg Intravenous Q6H PRN Adireddy Romayne Codding, MD        [facility] Hold] 0.9 % sodium chloride infusion   Intravenous Continuous Adireddy Romayne Codding, MD 75 mL/hr at 05/16/19 0003      [MAR Hold] nicotine (NICODERM CQ) 21 MG/24HR 1 patch  1 patch Transdermal Daily JOHNATHAN Lee CNP   1 patch at 05/16/19 4185       Allergies:     Allergies   Allergen Reactions    Vicodin [Hydrocodone-Acetaminophen]        Problem List:    Patient Active Problem List   Diagnosis Code    CAD (coronary artery disease) I25.10    Tobacco abuse Z72.0    Environmental allergies Z91.09    Obesity E66.9    Dyslipidemia E78.5    Coronary artery disease I25.10    Chronic obstructive pulmonary disease (HCC) J44.9    Occipital headache R51    Dizziness R42    Cerebral ventriculomegaly G93.89    Acquired cerebral atrophy G31.9    Chronic cerebral ischemia I67.82    Chiari malformation type I ([address] G93.5    Balance problem R26.89    VBI (vertebrobasilar 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) I63.10    Abnormal CT of the head R93.0    Hydrocephalus G91.9       Past Medical History:        Diagnosis Date    [facility] obstructive [facility] disease [facility]     Coronary artery disease     Dyslipidemia     Obesity        Past Surgical History:        Procedure Laterality Date    CARDIAC CATHETERIZATION         Social History:    Social History     Tobacco Use    Smoking status: Current Every Day Smoker     Packs/day: 1.00     Years: 50.00     Pack years: 50.00     Start date: 1/1/1970    Smokeless tobacco: Never Used   Substance Use Topics    Alcohol use: No                                Ready to quit: Not Answered  Counseling given: Not Answered      Vital Signs (Current):   Vitals:    05/16/19 0900 05/16/19 1003 05/16/19 1100 05/16/19 1211   BP: (!) 140/84 136/85 121/67 (!) 142/80   Pulse: 70 72 82 82   Resp: 14 14 12    Temp:   97.2 °F (36.2 °C)    TempSrc:   Oral Temporal   SpO2: 95% 95%  100%   Weight:       Height:                                                  BP Readings from Last 3 Encounters:   05/16/19 (!) 142/80   05/15/19 (!) 141/90   05/12/19 (!) 160/70       NPO Status: Time of last liquid consumption: 1700                        Time of last solid consumption: 1700                        Date of last liquid consumption: 05/16/19                        Date of last solid food consumption: 05/16/19    BMI:   Wt Readings from Last 3 Encounters:   05/16/19 218 lb 4.1 oz (99 kg)   05/15/19 220 lb (99.8 kg)   05/12/19 220 lb (99.8 kg)     Body mass index is 33.19 kg/m².     CBC:   Lab Results   Component Value Date    WBC 6.3 05/12/2019    RBC 5.13 05/12/2019    HGB 15.9 05/12/2019    HCT 47.7 05/12/2019    MCV 92.9 05/12/2019    RDW 16.8 05/12/2019     05/12/2019       CMP:   Lab Results   Component Value Date     05/12/2019    K 3.6 05/12/2019     05/12/2019    CO2 25 05/12/2019    BUN 17 05/12/2019    CREATININE 0.57 05/12/2019    GFRAA >60 05/12/2019    LABGLOM >60 05/12/2019    GLUCOSE 121 05/12/2019    PROT 7.3 05/12/2019 CALCIUM 9.3 05/12/2019    BILITOT 0.65 05/12/2019    ALKPHOS 77 05/12/2019    AST 9 05/12/2019    ALT 9 05/12/2019       POC Tests:   Recent Labs     05/15/19  2316   POCGLU 111*       Coags:   Lab Results   Component Value Date    PROTIME 10.1 05/16/2019    INR 0.9 05/16/2019    APTT 21.7 05/16/2019       HCG (If Applicable): No results found for: PREGTESTUR, PREGSERUM, HCG, HCGQUANT     ABGs: No results found for: PHART, PO2ART, YDD9XLP, ERZ4UPC, BEART, E5EROKPY     Type & Screen (If Applicable):  No results found for: LABABO, LABRH    Anesthesia Evaluation    Airway: Mallampati: II  TM distance: >3 FB     Mouth opening: > = 3 FB Dental:          Pulmonary: breath sounds clear to auscultation  (+) COPD:                            ROS comment: smoker   Cardiovascular:    (+) CAD:,         Rhythm: regular  Rate: normal                 ROS comment: None acute     Neuro/Psych:   (+) neuromuscular disease:, headaches:,             GI/Hepatic/Renal: Neg GI/Hepatic/Renal ROS            Endo/Other: Negative Endo/Other ROS                    Abdominal:           Vascular: negative vascular ROS. Anesthesia Plan      general     ASA 2     (Smoker)  Induction: intravenous. MIPS: Prophylactic antiemetics administered. Anesthetic plan and risks discussed with patient. Plan discussed with CRNA.                   Steve Muñoz MD   5/16/2019

## 2019-05-16 NOTE — PROGRESS NOTES
Physical Therapy  DATE: 2019    NAME: Kathe Carlson  MRN: [id number]   : 1950    Patient not seen this date for Physical Therapy due to:  [] Blood transfusion in progress  [] Hemodialysis  []  Patient Declined  [] Spine Precautions   [] Strict Bedrest  [x] Surgery/ Procedure- OR for  shunt. PT will check back 19. [] Testing      [] Other        [] PT being discontinued at this time. Patient independent. No further needs. [] PT being discontinued at this time as the patient has been transferred to palliative care. No further needs.     Adali Sams, PT

## 2019-05-16 NOTE — PLAN OF CARE
Oriented to room, call bell within reach. Demonstrated appropriate use of call bell. Will continue to monitor.

## 2019-05-16 NOTE — CONSULTS
Stress: Not on file   Relationships    Social connections:     Talks on phone: Not on file     Gets together: Not on file     Attends Jehovah's witness service: Not on file     Active member of club or organization: Not on file     Attends meetings of clubs or organizations: Not on file     Relationship status: Not on file    Intimate partner violence:     Fear of current or ex partner: Not on file     Emotionally abused: Not on file     Physically abused: Not on file     Forced sexual activity: Not on file   Other Topics Concern    Not on file   Social History Narrative    Not on file       Family History:   History reviewed. No pertinent family history. Allergies:  Vicodin [hydrocodone-acetaminophen]    Home Medications:  Prior to Admission medications    Medication Sig Start Date End Date Taking?  Authorizing Provider   ondansetron (ZOFRAN ODT) 4 MG disintegrating tablet Take 1 tablet by mouth every 8 hours as needed for Nausea 5/12/19  Yes Katharina Vanessa MD   ibuprofen (ADVIL;MOTRIN) 600 MG tablet Take 1 tablet by mouth every 8 hours as needed for Pain 5/12/19  Yes Katharina Vanessa MD   aspirin 325 MG tablet Take 325 mg by mouth daily   Yes Historical Provider, MD   tiZANidine (ZANAFLEX) 2 MG tablet Take 1 tablet by mouth 3 times daily as needed (muscle spasm or headach) 4/24/19  Yes Alexia Ahumada, MD   fluticasone (FLONASE) 50 MCG/ACT nasal spray 1 spray by Nasal route daily 4/4/19  Yes Alexia Ahumada, MD   Pseudoephedrine HCl (SUDAFED 12 HOUR PO) Take 1 tablet by mouth 2 times daily   Yes Historical Provider, MD   albuterol sulfate  (90 Base) MCG/ACT inhaler Inhale 2 puffs into the lungs 4 times daily as needed for Wheezing 3/22/19  Yes Radha Lane, APRN - CNP   loratadine (CLARITIN) 10 MG tablet Take 10 mg by mouth daily   Yes Historical Provider, MD   ondansetron (ZOFRAN) 4 MG tablet Take 1 tablet by mouth every 8 hours as needed for Nausea 5/2/19   David Tucker DO Pseudoephedrine-APAP-DM (DAYQUIL PO) Take by mouth    Historical Provider, MD       Current Medications:   Current Facility-Administered Medications: albuterol sulfate  (90 Base) MCG/ACT inhaler 2 puff, 2 puff, Inhalation, 4x Daily PRN  fluticasone (FLONASE) 50 MCG/ACT nasal spray 1 spray, 1 spray, Nasal, Daily  cetirizine (ZYRTEC) tablet 5 mg, 5 mg, Oral, Daily  sodium chloride flush 0.9 % injection 10 mL, 10 mL, Intravenous, 2 times per day  sodium chloride flush 0.9 % injection 10 mL, 10 mL, Intravenous, PRN  magnesium hydroxide (MILK OF MAGNESIA) 400 MG/5ML suspension 30 mL, 30 mL, Oral, Daily PRN  ondansetron (ZOFRAN) injection 4 mg, 4 mg, Intravenous, Q6H PRN  enoxaparin (LOVENOX) injection 40 mg, 40 mg, Subcutaneous, Daily  0.9 % sodium chloride infusion, , Intravenous, Continuous  nicotine (NICODERM CQ) 21 MG/24HR 1 patch, 1 patch, Transdermal, Daily    REVIEW OF SYSTEMS:       CONSTITUTIONAL: negative for fatigue and malaise   EYES: negative for double vision and photophobia    HEENT: negative for tinnitus and sore throat   RESPIRATORY: negative for cough, shortness of breath   CARDIOVASCULAR: negative for chest pain, palpitations   GASTROINTESTINAL: negative for nausea, vomiting   GENITOURINARY: negative for incontinence   MUSCULOSKELETAL: negative for neck or back pain   NEUROLOGICAL: negative for seizures   PSYCHIATRIC: negative for agitated     Review of systems otherwise negative. PHYSICAL EXAM:       BP (!) 144/83   Pulse 82   Temp 98.5 °F (36.9 °C) (Oral)   Resp 13   Ht 5' 8\" (1.727 m)   Wt 217 lb 2.5 oz (98.5 kg)   LMP 08/01/2000 (Approximate)   SpO2 95%   BMI 33.02 kg/m²     CONSTITUTIONAL:  Well developed, well nourished, alert and oriented x 3, in no acute distress. GCS 15, nontoxic. No dysarthria, no aphasia. EOMI.     HEAD:  normocephalic, atraumatic    EYES:  PERRLA, EOMI.   ENT:  moist mucous membranes   NECK:  supple, symmetric, no midline tenderness to palpation    BACK: without midline tenderness, step-offs or deformities    LUNGS:  Equal air entry bilaterally   CARDIOVASCULAR:  normal s1 / s2   ABDOMEN:  Soft, no rigidity   NEUROLOGIC:  EYE OPENING     Spontaneous - 4 [x]       To voice - 3 []       To pain - 2 []       None - 1 []    VERBAL RESPONSE     Appropriate, oriented - 5 [x]       Dazed or confused - 4 []       Syllables, expletives - 3 []       Grunts - 2 []       None - 1 []    MOTOR RESPONSE     Spontaneous, command - 6 [x]       Localizes pain - 5 []       Withdraws pain - 4 []       Abnormal flexion - 3 []       Abnormal extension - 2 []       None - 1 []            Total GCS: 15    Mental Status:  A & O x3, awake             Cranial Nerves:    cranial nerves II-XII are grossly intact    Motor Exam:    Drift:  absent  Tone:  normal    Motor exam is symmetrical 5 out of 5 all extremities bilaterally    Sensory:    Touch:    Right Upper Extremity:  normal  Left Upper Extremity:  normal  Right Lower Extremity:  normal  Left Lower Extremity:  normal    Deep Tendon Reflexes:    Right Bicep:  1+  Left Bicep:  1+  Right Knee:  1+  Left Knee:  1+    Plantar Response:    Right:  downgoing  Left:  downgoing    Clonus:  absent    Coordination/Dysmetria:  Heel to Shin:  Right:  normal  Left:  normal  Finger to Nose:   Right:  normal  Left:  normal     Gait:  Not tested   SKIN:  no rash      LABS AND IMAGING:     CBC with Differential:    Lab Results   Component Value Date    WBC 6.3 05/12/2019    RBC 5.13 05/12/2019    HGB 15.9 05/12/2019    HCT 47.7 05/12/2019     05/12/2019    MCV 92.9 05/12/2019    MCH 31.0 05/12/2019    MCHC 33.3 05/12/2019    RDW 16.8 05/12/2019    LYMPHOPCT 27 05/12/2019    MONOPCT 10 05/12/2019    BASOPCT 1 05/12/2019    MONOSABS 0.60 05/12/2019    LYMPHSABS 1.70 05/12/2019    EOSABS 0.20 05/12/2019    BASOSABS 0.00 05/12/2019    DIFFTYPE NOT REPORTED 05/12/2019     BMP:    Lab Results   Component Value Date     05/12/2019    K 3.6 05/12/2019

## 2019-05-17 LAB
DIRECT EXAM: NORMAL
Lab: NORMAL
SPECIMEN DESCRIPTION: NORMAL

## 2019-05-17 PROCEDURE — 6370000000 HC RX 637 (ALT 250 FOR IP): Performed by: NEUROLOGICAL SURGERY

## 2019-05-17 PROCEDURE — 97530 THERAPEUTIC ACTIVITIES: CPT

## 2019-05-17 PROCEDURE — 2580000003 HC RX 258: Performed by: NEUROLOGICAL SURGERY

## 2019-05-17 PROCEDURE — 97166 OT EVAL MOD COMPLEX 45 MIN: CPT

## 2019-05-17 PROCEDURE — 97535 SELF CARE MNGMENT TRAINING: CPT

## 2019-05-17 PROCEDURE — 94761 N-INVAS EAR/PLS OXIMETRY MLT: CPT

## 2019-05-17 PROCEDURE — G0009 ADMIN PNEUMOCOCCAL VACCINE: HCPCS | Performed by: NEUROLOGICAL SURGERY

## 2019-05-17 PROCEDURE — APPNB30 APP NON BILLABLE TIME 0-30 MINS: Performed by: REGISTERED NURSE

## 2019-05-17 PROCEDURE — 97162 PT EVAL MOD COMPLEX 30 MIN: CPT

## 2019-05-17 PROCEDURE — 99232 SBSQ HOSP IP/OBS MODERATE 35: CPT | Performed by: INTERNAL MEDICINE

## 2019-05-17 PROCEDURE — 1200000000 HC SEMI PRIVATE

## 2019-05-17 PROCEDURE — 6360000002 HC RX W HCPCS: Performed by: NEUROLOGICAL SURGERY

## 2019-05-17 PROCEDURE — 6370000000 HC RX 637 (ALT 250 FOR IP): Performed by: INTERNAL MEDICINE

## 2019-05-17 PROCEDURE — 90670 PCV13 VACCINE IM: CPT | Performed by: NEUROLOGICAL SURGERY

## 2019-05-17 PROCEDURE — 2700000000 HC OXYGEN THERAPY PER DAY

## 2019-05-17 RX ORDER — ACETAMINOPHEN 325 MG/1
650 TABLET ORAL EVERY 4 HOURS PRN
Status: DISCONTINUED | OUTPATIENT
Start: 2019-05-17 | End: 2019-05-20 | Stop reason: HOSPADM

## 2019-05-17 RX ADMIN — SODIUM CHLORIDE: 4.5 INJECTION, SOLUTION INTRAVENOUS at 19:33

## 2019-05-17 RX ADMIN — Medication 2 G: at 02:14

## 2019-05-17 RX ADMIN — CETIRIZINE HYDROCHLORIDE 5 MG: 10 TABLET ORAL at 08:03

## 2019-05-17 RX ADMIN — ACETAMINOPHEN 650 MG: 325 TABLET ORAL at 21:23

## 2019-05-17 RX ADMIN — MAGNESIUM HYDROXIDE 30 ML: 400 SUSPENSION ORAL at 16:11

## 2019-05-17 RX ADMIN — PNEUMOCOCCAL 13-VALENT CONJUGATE VACCINE 0.5 ML: 2.2; 2.2; 2.2; 2.2; 2.2; 4.4; 2.2; 2.2; 2.2; 2.2; 2.2; 2.2; 2.2 INJECTION, SUSPENSION INTRAMUSCULAR at 10:00

## 2019-05-17 RX ADMIN — Medication 10 ML: at 09:50

## 2019-05-17 RX ADMIN — ONDANSETRON 4 MG: 2 INJECTION INTRAMUSCULAR; INTRAVENOUS at 17:11

## 2019-05-17 RX ADMIN — FLUTICASONE PROPIONATE 1 SPRAY: 50 SPRAY, METERED NASAL at 09:06

## 2019-05-17 RX ADMIN — ACETAMINOPHEN 650 MG: 325 TABLET ORAL at 13:46

## 2019-05-17 RX ADMIN — HYDROCODONE BITARTRATE AND ACETAMINOPHEN 2 TABLET: 5; 325 TABLET ORAL at 00:57

## 2019-05-17 RX ADMIN — ONDANSETRON 4 MG: 2 INJECTION INTRAMUSCULAR; INTRAVENOUS at 10:03

## 2019-05-17 RX ADMIN — HYDROCODONE BITARTRATE AND ACETAMINOPHEN 2 TABLET: 5; 325 TABLET ORAL at 05:29

## 2019-05-17 ASSESSMENT — PAIN DESCRIPTION - DESCRIPTORS
DESCRIPTORS: ACHING
DESCRIPTORS: ACHING
DESCRIPTORS: SORE
DESCRIPTORS: ACHING

## 2019-05-17 ASSESSMENT — PAIN - FUNCTIONAL ASSESSMENT
PAIN_FUNCTIONAL_ASSESSMENT: 0-10
PAIN_FUNCTIONAL_ASSESSMENT: ACTIVITIES ARE NOT PREVENTED

## 2019-05-17 ASSESSMENT — PAIN SCALES - GENERAL
PAINLEVEL_OUTOF10: 2
PAINLEVEL_OUTOF10: 0
PAINLEVEL_OUTOF10: 6
PAINLEVEL_OUTOF10: 8
PAINLEVEL_OUTOF10: 0
PAINLEVEL_OUTOF10: 0
PAINLEVEL_OUTOF10: 7
PAINLEVEL_OUTOF10: 0
PAINLEVEL_OUTOF10: 7
PAINLEVEL_OUTOF10: 4

## 2019-05-17 ASSESSMENT — PAIN DESCRIPTION - LOCATION
LOCATION: HEAD
LOCATION: HEAD

## 2019-05-17 ASSESSMENT — PAIN DESCRIPTION - FREQUENCY: FREQUENCY: INTERMITTENT

## 2019-05-17 ASSESSMENT — PAIN DESCRIPTION - PAIN TYPE
TYPE: ACUTE PAIN
TYPE: ACUTE PAIN

## 2019-05-17 ASSESSMENT — PAIN DESCRIPTION - ORIENTATION: ORIENTATION: POSTERIOR;LOWER

## 2019-05-17 NOTE — PROGRESS NOTES
[facility]    Progress Note    5/17/2019    11:09 AM    Name:   Joseph Dickinson  MRN:     [id number]     Acct:      [de-identified]   Room:   [address] [place] [address] Day:  2  Admit Date:  5/15/2019 10:41 PM    PCP:   JOHNATHAN Reich CNP  Code Status:  Full Code    Subjective:     C/C: Headache    Interval History Status: not changed. Initially post operatively she felt very good, able to eat and drink well overnight  Complains of continued headache but also significant nausea and in ability to eat this morning  No fevers, chills reported  Multiple family members at bedside  No worsened SOB, coughing reported    Brief History:     Miss Mendez is a nice 79year old lady with history of hypertension, extensive tobacco use, CAD status post stent 8 years back. Admitted from outlying facility. She has sinus like infections over past few weeks needing 3 courses of antibiotics. She developed posterior severe headache and dizziness for past week with difficulty walking. MRI showing hydrocephalus and was admitted overnight. Continued headache at my visit. No fevers, chills overnight    Review of Systems:     Constitutional:  negative for chills, fevers, sweats  Respiratory:  negative for cough, dyspnea on exertion  Cardiovascular:  negative for chest pain, chest pressure/discomfort, lower extremity edema, palpitations  Gastrointestinal:  negative for abdominal pain, constipation, significant nausea reported today  Neurological:  negative for dizziness, right sided headache still present    Medications: Allergies:     Allergies   Allergen Reactions    Vicodin [Hydrocodone-Acetaminophen]        Current Meds:   Scheduled Meds:    sodium chloride flush  10 mL Intravenous 2 times per day    fluticasone  1 spray Nasal Daily    cetirizine  5 mg Oral Daily    sodium chloride flush  10 mL Intravenous 2 times per day    nicotine  1 patch Transdermal Daily     Continuous Infusions:    sodium chloride 75 mL/hr at 19 0900    sodium chloride Stopped (19 1318)     PRN Meds: sodium chloride flush, ondansetron, albuterol sulfate HFA, sodium chloride flush, magnesium hydroxide, ondansetron    Data:     Past Medical History:   has a past medical history of Chronic obstructive pulmonary disease ([address] Coronary artery disease, Dyslipidemia, and Obesity. Social History:   reports that she has been smoking. She started smoking about 49 years ago. She has a 50.00 pack-year smoking history. She has never used smokeless tobacco. She reports that she does not drink alcohol or use drugs. Family History: History reviewed. No pertinent family history. Vitals:  BP (!) 146/75   Pulse 89   Temp 98.6 °F (37 °C) (Oral)   Resp 16   Ht 5' 8\" (1.727 m)   Wt 218 lb 4.1 oz (99 kg)   LMP 2000 (Approximate)   SpO2 92%   BMI 33.19 kg/m²   Temp (24hrs), Av.5 °F (34.7 °C), Min:93.8 °F (34.3 °C), Max:98.6 °F (37 °C)    Recent Labs     05/15/19  2316   POCGLU 111*       I/O (24Hr): Intake/Output Summary (Last 24 hours) at 2019 1109  Last data filed at 2019 0900  Gross per 24 hour   Intake 3143.75 ml   Output 925 ml   Net 2218.75 ml       Labs:    [unfilled]    Lab Results   Component Value Date/Time    SPECIAL NOT REPORTED 2019 02:53 PM     No results found for: CULTURE    No results found for: POCPH, PHART, PH, POCPCO2, RJD9JKI, PCO2, POCPO2, PO2ART, PO2, POCHCO3, ZBV3TCC, HCO3, NBEA, PBEA, BEART, BE, THGBART, THB, MGK9PIZ, NFLY4YQO, A4LECAGY, O2SAT, FIO2    Radiology:    Xr Chest Standard (2 Vw)    Result Date: 2019  No acute cardiopulmonary abnormality.        Physical Examination:        General appearance:  alert, cooperative and no distress  Mental Status:  oriented to person, place and time and normal affect  Lungs:  clear to auscultation bilaterally, normal effort  Heart:  regular rate and rhythm, no murmur  Abdomen:  soft, nontender, nondistended, normal bowel sounds, no masses, hepatomegaly, splenomegaly  Extremities:  no edema, redness, tenderness in the calves  Skin:  no gross lesions, rashes, induration    Assessment:        Primary Problem  Hydrocephalus    Active Hospital Problems    Diagnosis Date Noted    Hydrocephalus [G91.9] 05/15/2019    Obesity [E66.9] 05/07/2019    Chronic obstructive pulmonary disease ([facility] [address] [J44.9] 05/07/2019    Tobacco abuse [Z72.0] 03/21/2018    CAD (coronary artery disease) [I25.10] 08/18/2013     Plan:        - Hydrocephalus, status post stent placement  - nausea and vomiting, likely related to medication, opioids. Hold all opioid medications.  Tylenol PRN if needed, she did very well immediate post op  - Tobacco use    Transfer to step down      Justa Garner MD  5/17/2019  11:09 AM

## 2019-05-17 NOTE — OP NOTE
peritoneal cavity. The pursestring suture was tied. All  incisions were then thoroughly irrigated and closed in layers. Sterile  dressing was applied. The patient was awakened in the operating room  and taken to the recovery room in good condition.     EBL less than 50cc    Adamaris Arroyo    D: 05/16/2019 16:21:55       T: 05/17/2019 1:53:41     [email or web address]  Job#: [id number]     Doc#: [id number]    CC:

## 2019-05-17 NOTE — PROGRESS NOTES
Neurosurgery JEANIE/Resident    Daily Progress Note     5/17/2019  10:09 AM    Chart reviewed. No acute events overnight. Complaining of headache over incision site and nausea. Denies vomiting. Vitals:    05/17/19 0600 05/17/19 0700 05/17/19 0800 05/17/19 0900   BP: (!) 164/84 (!) 158/83 (!) 151/111 (!) 146/75   Pulse:  84 85 89   Resp:  17 15 16   Temp:   98.6 °F (37 °C)    TempSrc:   Oral    SpO2:  93% 93% 92%   Weight:       Height:           PE:   AOx3   Abd: soft  Motor moving all extremities equally  Incision right cranial and abdominal incision sites intact      Lab Results   Component Value Date    WBC 6.3 05/12/2019    HGB 15.9 05/12/2019    HCT 47.7 (H) 05/12/2019     05/12/2019    CHOL 177 03/23/2018    TRIG 67 03/23/2018    HDL 61 03/23/2018    ALT 9 05/12/2019    AST 9 05/12/2019     05/12/2019    K 3.6 (L) 05/12/2019     05/12/2019    CREATININE 0.57 05/12/2019    BUN 17 05/12/2019    CO2 25 05/12/2019    TSH 3.90 03/23/2018    INR 0.9 05/16/2019       A/P  79 y.o. female who presents with hydrocephalus. POD#1  shunt placement, codman certas set at 7    - possible discharge later today  - transfer out of ICU      Please contact neurosurgery with any changes in patients neurologic status.        Shi Negron, CNP  NS pager [phone]  5/17/19  10:09 AM

## 2019-05-17 NOTE — PROGRESS NOTES
Physical Therapy    Facility/Department: [address]  Initial Assessment    NAME: Donald Benjamin  : 1950  MRN: [id number]    Date of Service: 2019    Discharge Recommendations:Further therapy recommended at discharge. Miss Marley Rbolero is a nice 79year old lady with history of hypertension, extensive tobacco use, CAD status post stent 8 years back. Admitted from outlying facility. She has sinus like infections over past few weeks needing 3 courses of antibiotics. She developed posterior severe headache and dizziness for past week with difficulty walking. MRI showing hydrocephalus and was admitted overnight. Underwent  shunt 19. PT Equipment Recommendations  Equipment Needed: Yes  Mobility Devices: Melda Slack: Rolling  Other: Pt would benefit from RW as well as shower chair and grab bars in shower as well as handrails on stairs. Assessment   Body structures, Functions, Activity limitations: Decreased functional mobility ; Decreased high-level IADLs;Decreased endurance;Decreased ADL status; Decreased strength;Decreased balance;Decreased safe awareness  Assessment: Pt ambulates 50ft with CGA and RW. Pt displays decreased steadiness on feet and increased nausea and dizziness with ambulation. Requires VC and tactile cues for safe and proper use of RW. Pt requires assistance to complete functional tasks and thus would benefit from continued skilled physical therapy after discharge to increase ability to independently complete functional task, increase ambulation ability, and increase balance reactions especially high level balance reactions in order to increase patient safety and decrease riskf for falls. Decision Making: Medium Complexity  Patient Education: POC; PT evaluation; Importance of mobiltity; safe use of AD.    REQUIRES PT FOLLOW UP: Yes  Activity Tolerance  Activity Tolerance: Patient Tolerated treatment well  Activity Tolerance: Pt reports increased nausea and increased dizziness with ambulation and functional mobility tasks. Pt vomited after ambulation. Patient Diagnosis(es): There were no encounter diagnoses. has a past medical history of Chronic obstructive pulmonary disease ([address] Coronary artery disease, Dyslipidemia, and Obesity. has a past surgical history that includes Cardiac catheterization. Restrictions  Restrictions/Precautions  Restrictions/Precautions: Up as Tolerated  Required Braces or Orthoses?: No  Position Activity Restriction  Other position/activity restrictions: s/p  shunt POD#1; Ambulate patient. Vision/Hearing  Vision: Within Functional Limits(Wears glasses. )  Hearing: Within functional limits     Subjective  General  Patient assessed for rehabilitation services?: Yes  Family / Caregiver Present: Yes(Pt's two daughters present during evaluation. )  Follows Commands: Within Functional Limits  Subjective  Subjective: Pt sitting in bedside chair upon PT arrival. Pt and RN agreeable to PT. Pt reports being nauseous throughout entire session and prior. Pt very cooperative throughout session. Pain Screening  Patient Currently in Pain: No  Pain Assessment  Pain Assessment: 0-10  Pain Level: 0  Vital Signs  Patient Currently in Pain: Denies  Pre Treatment Pain Screening  Comments / Details: Pt reports having no pain but reports she is nauseous. Orientation  Orientation  Overall Orientation Status: Within Normal Limits  Social/Functional History  Social/Functional History  Lives With: Daughter(Reports she lives with her daughter and 23year old granddaughter. )  Type of Home: House  Home Layout: Two level, Able to Live on Main level with bedroom/bathroom, Performs ADL's on one level  Home Access: Stairs to enter without rails  Entrance Stairs - Number of Steps: Reports having 3 or 5 steps to enter the house depending on which entry she uses.    Bathroom Shower/Tub: Tub/Shower unit(Reports no grab bars or shower chair but has been talking about getting these. )  Bathroom Toilet: Handicap height  Home Equipment: (Reports she does not have any equipment currently. )  Receives Help From: Family  ADL Assistance: [address]: Independent  Homemaking Responsibilities: Yes  Ambulation Assistance: Independent  Transfer Assistance: Independent  Active : Yes  Mode of Transportation: Car  Occupation: Part time employment(Reports working part time at [employer]. )  [address]: Reports she enjoys reading and playing on her computer. Additional Comments: Reports that her daughters both work but she has a sister that is retired that she could call to help if needed. Cognition   Cognition  Overall Cognitive Status: WFL    Objective     Observation/Palpation  Posture: Good(Pt displays slightly bilaterally protracted shoulders. )    Joint Mobility  Spine: WFL  ROM RLE: WFL  ROM LLE: WFL  ROM RUE: WFL  ROM LUE: WFL  Strength RLE  Strength RLE: WFL  Comment: Knees bilaterally 4+/5; hips bilaterally 4-/5. Strength LLE  Strength LLE: WFL  Comment: Knees bilaterally 4+/5; hips bilaterally 4-/5. Strength RUE  Comment: See OT documentation. Strength LUE  Comment: See OT documentation. Tone RLE  RLE Tone: Normotonic  Tone LLE  LLE Tone: Normotonic  Motor Control  Gross Motor?: WNL  Sensation  Overall Sensation Status: WNL  Bed mobility  Comment: Did not assess bed mobility this date secondary to pt sitting in bedside chair upon arrival and returning to bedside chair. Transfers  Sit to Stand: Stand by assistance  Stand to sit: Contact guard assistance(Pt requires VC for slow descent and to reach back for the chair prior to sitting down. )  Ambulation  Ambulation?: Yes  Ambulation 1  Surface: level tile  Device: Rolling Walker  Assistance: Contact guard assistance  Quality of Gait: Pt displays decreased romero, decreased step length bilatearlly and decreased steadiness during ambulation.    Distance: 50ft  Comments: Pt reports increased nausea and dizziness with ambulation. Pt requires VC and tactile cues to keep walker close to her with ambulation especially when making turns. Stairs/Curb  Stairs?: No  Gait Deviations  Gait Deviations: Decreased step length; Slow Misa     Balance  Posture: Good  Sitting - Static: Good  Sitting - Dynamic: Good  Standing - Static: Fair  Standing - Dynamic: Fair;-  Comments: Balance assessed with RW. Plan   Plan  Times per week: 5-6x  Current Treatment Recommendations: Strengthening, ADL/Self-care Training, Gait Training, Patient/Caregiver Education & Training, Stair training, Equipment Evaluation, Education, & procurement, Balance Training, Neuromuscular Re-education, Functional Mobility Training, Endurance Training, Home Exercise Program, Transfer Training, Safety Education & Training  Safety Devices  Type of devices: All fall risk precautions in place, Call light within reach, Nurse notified, Gait belt  Restraints  Initially in place: No    AM-PAC Score  AM-PAC Inpatient Mobility Raw Score : 15  AM-PAC Inpatient T-Scale Score : 39.45  Mobility Inpatient CMS 0-100% Score: 57.7  Mobility Inpatient CMS G-Code Modifier : CK        Goals  Short term goals  Time Frame for Short term goals: 14 visits  Short term goal 1: Pt to complete bed mobility tasks independently in order to increase pt independence with ADL's. Short term goal 2: Pt to ambulate 200ft independently with least resistive AD to allow patient to complete functional tasks with decreased difficulty. Short term goal 3: Pt to negotiate stairs with no handrails and supervision in order to allow patient to complete functional tasks with decreased difficulty. Short term goal 4: Pt to display Fair+ dynamic balance in order to increase patient safety and decrease risk for falls.         Therapy Time   Individual Concurrent Group Co-treatment   Time In 8126         Time Out 1032         Minutes 33         Timed Code Treatment Minutes: 12 Minutes       Miya Blevins, PT

## 2019-05-17 NOTE — PROGRESS NOTES
Acute pain  Pain Location: Head  Pain Orientation: Posterior; Lower  Pain Descriptors: Aching  Oxygen Therapy  SpO2: 95 %  O2 Device: Nasal cannula  O2 Flow Rate (L/min): 2 L/min     Social/Functional History  Social/Functional History  Lives With: Daughter(Reports she lives with her daughter and 23year old granddaughter. )  Type of Home: House  Home Layout: Two level, Able to Live on Main level with bedroom/bathroom, Performs ADL's on one level  Home Access: Stairs to enter without rails  Entrance Stairs - Number of Steps: Reports having 3 or 5 steps to enter the house depending on which entry she uses. Bathroom Shower/Tub: Tub/Shower unit(Reports no grab bars or shower chair but has been talking about getting these. )  Bathroom Toilet: Handicap height  Home Equipment: (Reports she does not have any equipment currently. )  Receives Help From: Family  ADL Assistance: [facility]: Independent  Homemaking Responsibilities: Yes  Ambulation Assistance: Independent  Transfer Assistance: Independent  Active : Yes  Mode of Transportation: Car  Occupation: Part time employment(Reports working part time at [employer]. )  [address]: Reports she enjoys reading and playing on her computer. Additional Comments: Reports that her daughters both work but she has a sister that is retired that she could call to help if needed.         Objective    Vision: Wears glasses at all times  Hearing: [facility]       Orientation  Overall Orientation Status: Within Functional Limits    Balance  Sitting Balance: Stand by assistance(seated at edge of recliner chair)  Standing Balance: Contact guard assistance  Standing Balance  Time: ~4 minutes  Activity: functional mobility within hospital room  Comment: CGA with use of RW; pt mildly unsteady however with no LOB, pt with complaint of increased dizziness/nausea with activity and returned to chair, pt provided basin and vomitted     ADL  Feeding: Independent  Grooming: Setup; Increased time to complete  UE Bathing: Increased time to complete;Minimal assistance  LE Bathing: Increased time to complete; Moderate assistance  UE Dressing: Increased time to complete;Minimal assistance  LE Dressing: Increased time to complete; Moderate assistance  Toileting: Increased time to complete; Moderate assistance     Tone RUE  RUE Tone: Normotonic  Tone LUE  LUE Tone: Normotonic  Coordination  Movements Are Fluid And Coordinated: Yes        Transfers  Stand Step Transfers: Contact guard assistance  Sit to stand: Contact guard assistance  Stand to sit: Contact guard assistance  Transfer Comments: with use of RW; pt required min verbal cues for RW mngt/safety awareness        Cognition  Overall Cognitive Status: WFL        Sensation  Overall Sensation Status: WFL        LUE AROM : WFL  RUE AROM : WFL  Gross LUE Strength: WFL(grossly 4/5)  Gross RUE Strength: WFL(grossly 4/5)         Plan   Plan  Times per week: 4-5x/wk  Current Treatment Recommendations: Balance Training, Functional Mobility Training, Endurance Training, Safety Education & Training, Patient/Caregiver Education & Training, Equipment Evaluation, Education, & procurement, Self-Care / ADL, Home Management Training      AM-PAC Score  [place] AM-PAC \"6-Clicks\" ADL Inpatient  Score: 17/24     Goals  Short term goals  Time Frame for Short term goals: Pt will, by discharge:  Short term goal 1: perform functional transfers/functional mobility with mod IND using least restrictive AD  Short term goal 2: perform ADL tasks with mod IND using AE/DME PRN  Short term goal 3: demo 10+ minutes standing tolerance with use of least restrictive AD for increased participation in ADLs  Short term goal 4: independently demo safety awareness during ADLs and functional transfers/functional mobility       Therapy Time   Individual Concurrent Group Co-treatment   Time In 1000         Time Out 1035         Minutes 35         Timed Code Treatment Minutes: 15 Minutes       Yelena Addison

## 2019-05-18 PROCEDURE — 2580000003 HC RX 258: Performed by: NEUROLOGICAL SURGERY

## 2019-05-18 PROCEDURE — 6360000002 HC RX W HCPCS: Performed by: NURSE PRACTITIONER

## 2019-05-18 PROCEDURE — 1200000000 HC SEMI PRIVATE

## 2019-05-18 PROCEDURE — 6370000000 HC RX 637 (ALT 250 FOR IP): Performed by: INTERNAL MEDICINE

## 2019-05-18 PROCEDURE — 6360000002 HC RX W HCPCS: Performed by: NEUROLOGICAL SURGERY

## 2019-05-18 PROCEDURE — 6370000000 HC RX 637 (ALT 250 FOR IP): Performed by: NEUROLOGICAL SURGERY

## 2019-05-18 PROCEDURE — 97110 THERAPEUTIC EXERCISES: CPT

## 2019-05-18 PROCEDURE — 99232 SBSQ HOSP IP/OBS MODERATE 35: CPT | Performed by: INTERNAL MEDICINE

## 2019-05-18 PROCEDURE — 97535 SELF CARE MNGMENT TRAINING: CPT

## 2019-05-18 RX ORDER — METOPROLOL TARTRATE 5 MG/5ML
5 INJECTION INTRAVENOUS EVERY 4 HOURS PRN
Status: DISCONTINUED | OUTPATIENT
Start: 2019-05-18 | End: 2019-05-20 | Stop reason: HOSPADM

## 2019-05-18 RX ORDER — BUTALBITAL, ACETAMINOPHEN AND CAFFEINE 50; 325; 40 MG/1; MG/1; MG/1
1 TABLET ORAL EVERY 4 HOURS PRN
Status: DISCONTINUED | OUTPATIENT
Start: 2019-05-18 | End: 2019-05-20 | Stop reason: HOSPADM

## 2019-05-18 RX ORDER — HYDRALAZINE HYDROCHLORIDE 20 MG/ML
20 INJECTION INTRAMUSCULAR; INTRAVENOUS EVERY 6 HOURS PRN
Status: DISCONTINUED | OUTPATIENT
Start: 2019-05-18 | End: 2019-05-20 | Stop reason: HOSPADM

## 2019-05-18 RX ORDER — MORPHINE SULFATE 4 MG/ML
4 INJECTION, SOLUTION INTRAMUSCULAR; INTRAVENOUS ONCE
Status: COMPLETED | OUTPATIENT
Start: 2019-05-18 | End: 2019-05-18

## 2019-05-18 RX ADMIN — SODIUM CHLORIDE: 4.5 INJECTION, SOLUTION INTRAVENOUS at 10:29

## 2019-05-18 RX ADMIN — ACETAMINOPHEN 650 MG: 325 TABLET ORAL at 02:45

## 2019-05-18 RX ADMIN — BUTALBITAL, ACETAMINOPHEN, AND CAFFEINE 1 TABLET: 50; 325; 40 TABLET ORAL at 19:38

## 2019-05-18 RX ADMIN — ACETAMINOPHEN 650 MG: 325 TABLET ORAL at 06:30

## 2019-05-18 RX ADMIN — CETIRIZINE HYDROCHLORIDE 5 MG: 10 TABLET ORAL at 10:29

## 2019-05-18 RX ADMIN — ONDANSETRON 4 MG: 2 INJECTION INTRAMUSCULAR; INTRAVENOUS at 07:51

## 2019-05-18 RX ADMIN — BUTALBITAL, ACETAMINOPHEN, AND CAFFEINE 1 TABLET: 50; 325; 40 TABLET ORAL at 13:50

## 2019-05-18 RX ADMIN — ACETAMINOPHEN 650 MG: 325 TABLET ORAL at 10:30

## 2019-05-18 RX ADMIN — MORPHINE SULFATE 4 MG: 4 INJECTION INTRAVENOUS at 23:11

## 2019-05-18 RX ADMIN — SODIUM CHLORIDE: 4.5 INJECTION, SOLUTION INTRAVENOUS at 23:12

## 2019-05-18 RX ADMIN — HYDRALAZINE HYDROCHLORIDE 20 MG: 20 INJECTION INTRAMUSCULAR; INTRAVENOUS at 20:47

## 2019-05-18 RX ADMIN — ONDANSETRON 4 MG: 2 INJECTION INTRAMUSCULAR; INTRAVENOUS at 19:44

## 2019-05-18 ASSESSMENT — PAIN SCALES - GENERAL
PAINLEVEL_OUTOF10: 0
PAINLEVEL_OUTOF10: 8
PAINLEVEL_OUTOF10: 3
PAINLEVEL_OUTOF10: 2
PAINLEVEL_OUTOF10: 4
PAINLEVEL_OUTOF10: 7
PAINLEVEL_OUTOF10: 3
PAINLEVEL_OUTOF10: 4
PAINLEVEL_OUTOF10: 6
PAINLEVEL_OUTOF10: 9
PAINLEVEL_OUTOF10: 3
PAINLEVEL_OUTOF10: 2
PAINLEVEL_OUTOF10: 9

## 2019-05-18 ASSESSMENT — PAIN DESCRIPTION - PAIN TYPE
TYPE: ACUTE PAIN

## 2019-05-18 ASSESSMENT — PAIN DESCRIPTION - LOCATION
LOCATION: HEAD

## 2019-05-18 ASSESSMENT — PAIN DESCRIPTION - FREQUENCY
FREQUENCY: CONTINUOUS

## 2019-05-18 ASSESSMENT — PAIN DESCRIPTION - DESCRIPTORS
DESCRIPTORS: HEADACHE;ACHING;SHARP
DESCRIPTORS: ACHING;HEADACHE

## 2019-05-18 ASSESSMENT — PAIN - FUNCTIONAL ASSESSMENT
PAIN_FUNCTIONAL_ASSESSMENT: ACTIVITIES ARE NOT PREVENTED
PAIN_FUNCTIONAL_ASSESSMENT: PREVENTS OR INTERFERES SOME ACTIVE ACTIVITIES AND ADLS

## 2019-05-18 ASSESSMENT — PAIN DESCRIPTION - ORIENTATION: ORIENTATION: POSTERIOR;LOWER

## 2019-05-18 NOTE — PROGRESS NOTES
Physical Therapy  DATE: 2019  NAME: Bernardino Bo  MRN: [id number]   : 1950    Discharge Recommendations: Continue to Assess (pending progress)     Subjective: Pt supine upon PT entry reports she was just up to the restroom which made her significantly dizzy and her headache increase, reports she cannot do much. Pt and RN agreeable to PT as able. Pain: 3/10 headache  Patient follows: All Commands  Is patient on ventilator: No  Is patient on sedation: No  Precautions: Up as tolerated; fall risk     Therapeutic exercises:  LE(s)  Completed bilateral ankle pumps x15 reps, bilateral ankle circles x15 reps, bilateral knee flexion x15 reps, bilateral hip abduction x15 reps and bilateral quad sets 5ct x15 reps. Goals  Short Term Goals  Short term goal 1: Pt to complete bed mobility tasks independently in order to increase pt independence with ADL's. Short term goal 2: Pt to ambulate 200ft independently with least resistive AD to allow patient to complete functional tasks with decreased difficulty. Short term goal 3: Pt to negotiate stairs with no handrails and supervision in order to allow patient to complete functional tasks with decreased difficulty. Short term goal 4: Pt to display Fair+ dynamic balance in order to increase patient safety and decrease risk for falls. Plan: Progress functional mobility as medically appropriate.    Time In: 0675  Time Out: 1500  Time Coded Minutes (treatment minutes): 13  Rehab Potential: Good  Treatments/week: 5-6x    Keena Sierra PT

## 2019-05-18 NOTE — PROGRESS NOTES
Neurosurgery JEANIE/Resident    Daily Progress Note     5/18/2019  12:20 PM    Chart reviewed. No acute events overnight. Not eating well. Denies vomiting but does have some nausea at times. Continues to have headache. Using walker to ambulate. Family concerned about patient safety after returning home. Vitals:    05/18/19 0004 05/18/19 0407 05/18/19 0800 05/18/19 1145   BP: (!) 144/75 130/67 135/72 (!) 180/98   Pulse: 82 70 72 81   Resp: 18 16 16   Temp: 98.2 °F (36.8 °C) 98.3 °F (36.8 °C) 98.4 °F (36.9 °C) 97.6 °F (36.4 °C)   TempSrc: Oral Oral Oral Oral   SpO2: 92%      Weight:       Height:         PE:   AOx3   Abd: soft  Motor moving all extremities equally  Incision right cranial and abdominal incision sites intact       Lab Results   Component Value Date    WBC 6.3 05/12/2019    HGB 15.9 05/12/2019    HCT 47.7 (H) 05/12/2019     05/12/2019    CHOL 177 03/23/2018    TRIG 67 03/23/2018    HDL 61 03/23/2018    ALT 9 05/12/2019    AST 9 05/12/2019     05/12/2019    K 3.6 (L) 05/12/2019     05/12/2019    CREATININE 0.57 05/12/2019    BUN 17 05/12/2019    CO2 25 05/12/2019    TSH 3.90 03/23/2018    INR 0.9 05/16/2019         A/P  79 y.o. female who presents with with hydrocephalus. POD#2  shunt placement, codman certas set at 7     - possible discharge later today vs rehab      Please contact neurosurgery with any changes in patients neurologic status.        Murtaza Naqvi CNP  NS pager [phone]  5/18/19  12:20 PM

## 2019-05-18 NOTE — PROGRESS NOTES
Occupational Therapy  Facility/Department: [facility] NEURO  Daily Treatment Note  NAME: Silvano Arshad  : 1950  MRN: [id number]    Date of Service: 2019    Discharge Recommendations:    Further therapy recommended at discharge. Assessment   Performance deficits / Impairments: Decreased functional mobility ; Decreased ADL status; Decreased endurance;Decreased balance;Decreased high-level IADLs  Assessment: pt and pt daughters verbalized concerns with pt being home alone due to dizziness and some unsteadiness on feet. Pt required assist/supervision for all functional transfers/functional mobility due to dizziness for safety. Pt required increased time to complete functional mobility, as well as ADLs due to dizziness/headache and would benefit from continued therapy at discharge  Treatment Diagnosis: hydrocephalus   Prognosis: Good  Decision Making: Medium Complexity  Patient Education: pt ed on POC, pursed lip breathing tech, safety during functional transfers/functional mobility. good return   REQUIRES OT FOLLOW UP: Yes  Activity Tolerance  Activity Tolerance: Patient limited by fatigue;Patient limited by pain  Activity Tolerance: dizziness/headache   Safety Devices  Safety Devices in place: Yes  Type of devices: Gait belt;Call light within reach; Left in bed;Patient at risk for falls(pt with PT upon exit )  Restraints  Initially in place: No       Patient Diagnosis(es): There were no encounter diagnoses. has a past medical history of Chronic obstructive pulmonary disease ([facility] [address] Coronary artery disease, Dyslipidemia, and Obesity. has a past surgical history that includes Cardiac catheterization and Ventriculoperitoneal shunt (Right, 2019). Restrictions  Restrictions/Precautions  Restrictions/Precautions: Fall Risk  Required Braces or Orthoses?: No  Position Activity Restriction  Other position/activity restrictions: s/p  shunt POD#1; Ambulate patient.       Subjective   General  Chart Reviewed: No  Patient assessed for rehabilitation services?: Yes  Family / Caregiver Present: Yes(pt 2 daughters present for second half of session)  Diagnosis: hydrocephalus   General Comment  Comments: RN ok'd for therapy this afternoon. Pt agreeable to participate in session and cooperative throughout   Pain Assessment  Pain Assessment: 0-10  Pain Level: 3  Pain Type: Acute pain  Pain Location: Head  Non-Pharmaceutical Pain Intervention(s): Therapeutic presence;Distraction; Emotional support; Ambulation/Increased Activity  Response to Pain Intervention: Patient Satisfied    Oxygen Therapy  O2 Device: None (Room air)     Orientation  Orientation  Overall Orientation Status: Within Functional Limits     Objective    ADL  Grooming: Supervision; Increased time to complete(pt washed face while sitting up in bed with increased time to complete )  Toileting: Minimal assistance(pt used restroom during session. Pt required min A for toilet transfer but was able to complete abrahan-care with SBA )     Balance  Sitting Balance: Stand by assistance(~10 minutes on EOB and on toilet )  Standing Balance: Contact guard assistance(with RW)  Standing Balance  Time: ~2 minutes  Activity: pt completed functional mobility to/from bathroom  Comment: pt reported increase dizziness upon standing and during functional mobility. Pt did not have any LOB but reported feeling more unsteady than baseline.  Pt verbalized concerns with dizziness/headache and not having 24hr assist at home   Toilet Transfers  Toilet - Technique: Ambulating  Equipment Used: Standard toilet  Toilet Transfer: Minimal assistance  Bed mobility  Supine to Sit: (pt sitting on EOB upon arrival )  Sit to Supine: Contact guard assistance  Scooting: Minimal assistance  Transfers  Sit to stand: Contact guard assistance  Stand to sit: Contact guard assistance  Transfer Comments: with RW       Cognition  Overall Cognitive Status: [facility]      Plan   Plan  Times per week: 4-5x/wk  Current Treatment Recommendations: Balance Training, Functional Mobility Training, Endurance Training, Safety Education & Training, Patient/Caregiver Education & Training, Equipment Evaluation, Education, & procurement, Self-Care / ADL, Home Management Training    Goals  Short term goals  Time Frame for Short term goals: Pt will, by discharge:  Short term goal 1: perform functional transfers/functional mobility with mod IND using least restrictive AD  Short term goal 2: perform ADL tasks with mod IND using AE/DME PRN  Short term goal 3: demo 10+ minutes standing tolerance with use of least restrictive AD for increased participation in ADLs  Short term goal 4: independently demo safety awareness during ADLs and functional transfers/functional mobility     Therapy Time   Individual Concurrent Group Co-treatment   Time In 1427         Time Out 1447         Minutes [address] E, OTR/L

## 2019-05-18 NOTE — PROGRESS NOTES
Physical Therapy  Facility/Department: [facility] NEURO  Daily Treatment Note  NAME: Wale Gardner  : 1950  MRN: [id number]    Date of Service: 2019    Discharge Recommendations: Additional therapy recommended at d/c.        has a past medical history of Chronic obstructive pulmonary disease ([address] Coronary artery disease, Dyslipidemia, and Obesity. has a past surgical history that includes Cardiac catheterization and Ventriculoperitoneal shunt (Right, 2019). Restrictions  Restrictions/Precautions  Restrictions/Precautions: Fall Risk  Required Braces or Orthoses?: No  Position Activity Restriction  Other position/activity restrictions: s/p  shunt POD#1; Ambulate patient. Subjective   General  Chart Reviewed: Yes  Family / Caregiver Present: No  Subjective  Subjective: RN unable to get patient off toilet - > PT called to assist.  For patient safety - seen patient 2x/day. Pain Screening  Patient Currently in Pain: Yes  Pain Assessment  Pain Level: 3  Functional Pain Assessment: Activities are not prevented  Vital Signs  Patient Currently in Pain: Yes            Objective   Bed mobility  Sit to Supine: Contact guard assistance  Transfers  Sit to Stand: Minimal Assistance  Stand to sit: Minimal Assistance  Bed to Chair: Minimal assistance  Ambulation  Ambulation?: Yes  Ambulation 1  Surface: level tile  Device: Rolling Walker  Assistance: Contact guard assistance  Quality of Gait: Pt requiring manual assist w/ r.walker this date. Pt w/ asymmetrical step length and stop and go mobility. Distance: 10 ft from toilet to bed. Comments: toilet transfer     Assessment   Body structures, Functions, Activity limitations: Decreased functional mobility ; Decreased high-level IADLs;Decreased endurance;Decreased ADL status; Decreased strength;Decreased balance;Decreased safe awareness  Assessment: Pt w/ limited tolerance to mobility this date - progress as tolerated. Headache limiting function.

## 2019-05-18 NOTE — PLAN OF CARE
PT ASSISTED UP WITH WALKER TO BATHROOM. PT DIZZY AND UNSTEADY WHEN SHE FIRST STANDS AND STARTS WALKING BUT UNSTEADINESS IMPROVES THE LONGER PT IS OOB.

## 2019-05-18 NOTE — PROGRESS NOTES
[facility]    Progress Note    5/18/2019    11:16 AM    Name:   Consuelo Zavaleta  MRN:     [id number]     Acct:      [de-identified]   Room:   [address] [place] [address] Day:  3  Admit Date:  5/15/2019 10:41 PM    PCP:   JOHNATHAN Bautista CNP  Code Status:  Full Code    Subjective:     C/C: Headache    Interval History Status: not changed  Overall improved sleepiness from yesterday  Continued to have headache complaint at my visit, feels similar to one she has before surgery. Intermittent nausea continued. Also positional dizziness and ooziness when she walks, improved since yesterday but still worried about falling. Daughters at bedside, do not feel safe enough to discharge today given difficulty walking  No fevers, chills reported    Brief History:     Miss Dee Green is a nice 79year old lady with history of hypertension, extensive tobacco use, CAD status post stent 8 years back. Admitted from outlying facility. She has sinus like infections over past few weeks needing 3 courses of antibiotics. She developed posterior severe headache and dizziness for past week with difficulty walking. MRI showing hydrocephalus and was admitted overnight. Continued headache at my visit. No fevers, chills overnight    Review of Systems:     Constitutional:  negative for chills, fevers, sweats  Respiratory:  negative for cough, dyspnea on exertion  Cardiovascular:  negative for chest pain, chest pressure/discomfort, lower extremity edema, palpitations  Gastrointestinal:  negative for abdominal pain, constipation, significant nausea reported today  Neurological:  negative for dizziness, right sided headache still present    Medications: Allergies:     Allergies   Allergen Reactions    Vicodin [Hydrocodone-Acetaminophen]        Current Meds:   Scheduled Meds:    sodium chloride flush  10 mL Intravenous 2 times per day    fluticasone  1 spray Nasal Daily    cetirizine  5 mg Oral Daily    sodium chloride flush  10 mL Intravenous 2 times per day    nicotine  1 patch Transdermal Daily     Continuous Infusions:    sodium chloride 75 mL/hr at 19 1029    sodium chloride Stopped (19 1318)     PRN Meds: acetaminophen, sodium chloride flush, ondansetron, albuterol sulfate HFA, sodium chloride flush, magnesium hydroxide, ondansetron    Data:     Past Medical History:   has a past medical history of Chronic obstructive pulmonary disease ([address] Coronary artery disease, Dyslipidemia, and Obesity. Social History:   reports that she has been smoking. She started smoking about 49 years ago. She has a 50.00 pack-year smoking history. She has never used smokeless tobacco. She reports that she does not drink alcohol or use drugs. Family History: History reviewed. No pertinent family history. Vitals:  /72   Pulse 72   Temp 98.4 °F (36.9 °C) (Oral)   Resp 16   Ht 5' 8\" (1.727 m)   Wt 218 lb 4.1 oz (99 kg)   LMP 2000 (Approximate)   SpO2 92%   BMI 33.19 kg/m²   Temp (24hrs), Av.1 °F (36.7 °C), Min:97.1 °F (36.2 °C), Max:98.6 °F (37 °C)    Recent Labs     05/15/19  2316   POCGLU 111*       I/O (24Hr): Intake/Output Summary (Last 24 hours) at 2019 1116  Last data filed at 2019 0600  Gross per 24 hour   Intake 1085 ml   Output --   Net 1085 ml     Labs:    Lab Results   Component Value Date/Time    SPECIAL NOT REPORTED 2019 02:53 PM   Radiology:    Montez Nunez Chest Standard (2 Vw)    Result Date: 2019  No acute cardiopulmonary abnormality.      Physical Examination:        General appearance:  alert, cooperative and no distress  Mental Status:  oriented to person, place and time and normal affect  Lungs:  clear to auscultation bilaterally, normal effort  Heart:  regular rate and rhythm, no murmur  Abdomen:  soft, nontender, nondistended, normal bowel sounds, no masses, hepatomegaly, splenomegaly  Extremities:  no edema, redness, tenderness in the calves  Skin:  no gross lesions, rashes, induration    Assessment:        Primary Problem  Hydrocephalus    Active Hospital Problems    Diagnosis Date Noted    Hydrocephalus [G91.9] 05/15/2019    Obesity [E66.9] 05/07/2019    [facility] disease ([facility] [J44.9] 05/07/2019    Tobacco abuse [Z72.0] 03/21/2018    CAD (coronary artery disease) [I25.10] 08/18/2013     Plan:        - Hydrocephalus, status post shunt placement. Continued headaches, possibly expected. - nausea and vomiting, likely related to medication, opioids. Improving now  - Ataxia;  From hydrocephalus, might need rehab admission if continued to have difficulty walking  - Tobacco use        Eduardo Whitley MD  5/18/2019  11:16 AM

## 2019-05-19 LAB
ABSOLUTE EOS #: 0.08 K/UL (ref 0–0.44)
ABSOLUTE IMMATURE GRANULOCYTE: 0.03 K/UL (ref 0–0.3)
ABSOLUTE LYMPH #: 0.89 K/UL (ref 1.1–3.7)
ABSOLUTE MONO #: 0.95 K/UL (ref 0.1–1.2)
ANION GAP SERPL CALCULATED.3IONS-SCNC: 15 MMOL/L (ref 9–17)
BASOPHILS # BLD: 0 % (ref 0–2)
BASOPHILS ABSOLUTE: 0.03 K/UL (ref 0–0.2)
BUN BLDV-MCNC: 12 MG/DL (ref 8–23)
BUN/CREAT BLD: ABNORMAL (ref 9–20)
CALCIUM SERPL-MCNC: 9.2 MG/DL (ref 8.6–10.4)
CHLORIDE BLD-SCNC: 102 MMOL/L (ref 98–107)
CO2: 24 MMOL/L (ref 20–31)
CREAT SERPL-MCNC: 0.51 MG/DL (ref 0.5–0.9)
DIFFERENTIAL TYPE: ABNORMAL
EOSINOPHILS RELATIVE PERCENT: 1 % (ref 1–4)
GFR AFRICAN AMERICAN: >60 ML/MIN
GFR NON-AFRICAN AMERICAN: >60 ML/MIN
GFR SERPL CREATININE-BSD FRML MDRD: ABNORMAL ML/MIN/{1.73_M2}
GFR SERPL CREATININE-BSD FRML MDRD: ABNORMAL ML/MIN/{1.73_M2}
GLUCOSE BLD-MCNC: 105 MG/DL (ref 70–99)
HCT VFR BLD CALC: 47.8 % (ref 36.3–47.1)
HEMOGLOBIN: 15.2 G/DL (ref 11.9–15.1)
IMMATURE GRANULOCYTES: 0 %
LYMPHOCYTES # BLD: 10 % (ref 24–43)
MCH RBC QN AUTO: 30.3 PG (ref 25.2–33.5)
MCHC RBC AUTO-ENTMCNC: 31.8 G/DL (ref 28.4–34.8)
MCV RBC AUTO: 95.4 FL (ref 82.6–102.9)
MONOCYTES # BLD: 10 % (ref 3–12)
NRBC AUTOMATED: 0 PER 100 WBC
PDW BLD-RTO: 15.2 % (ref 11.8–14.4)
PLATELET # BLD: 148 K/UL (ref 138–453)
PLATELET ESTIMATE: ABNORMAL
PMV BLD AUTO: 11.4 FL (ref 8.1–13.5)
POTASSIUM SERPL-SCNC: 3.7 MMOL/L (ref 3.7–5.3)
RBC # BLD: 5.01 M/UL (ref 3.95–5.11)
RBC # BLD: ABNORMAL 10*6/UL
SEG NEUTROPHILS: 79 % (ref 36–65)
SEGMENTED NEUTROPHILS ABSOLUTE COUNT: 7.3 K/UL (ref 1.5–8.1)
SODIUM BLD-SCNC: 141 MMOL/L (ref 135–144)
WBC # BLD: 9.3 K/UL (ref 3.5–11.3)
WBC # BLD: ABNORMAL 10*3/UL

## 2019-05-19 PROCEDURE — 6360000002 HC RX W HCPCS: Performed by: NURSE PRACTITIONER

## 2019-05-19 PROCEDURE — 85025 COMPLETE CBC W/AUTO DIFF WBC: CPT

## 2019-05-19 PROCEDURE — 2500000003 HC RX 250 WO HCPCS: Performed by: NURSE PRACTITIONER

## 2019-05-19 PROCEDURE — 6370000000 HC RX 637 (ALT 250 FOR IP): Performed by: INTERNAL MEDICINE

## 2019-05-19 PROCEDURE — 80048 BASIC METABOLIC PNL TOTAL CA: CPT

## 2019-05-19 PROCEDURE — 1200000000 HC SEMI PRIVATE

## 2019-05-19 PROCEDURE — 6370000000 HC RX 637 (ALT 250 FOR IP): Performed by: NEUROLOGICAL SURGERY

## 2019-05-19 PROCEDURE — 99232 SBSQ HOSP IP/OBS MODERATE 35: CPT | Performed by: INTERNAL MEDICINE

## 2019-05-19 PROCEDURE — 6360000002 HC RX W HCPCS: Performed by: NEUROLOGICAL SURGERY

## 2019-05-19 PROCEDURE — 36415 COLL VENOUS BLD VENIPUNCTURE: CPT

## 2019-05-19 PROCEDURE — 2580000003 HC RX 258: Performed by: NEUROLOGICAL SURGERY

## 2019-05-19 RX ORDER — MORPHINE SULFATE 4 MG/ML
4 INJECTION, SOLUTION INTRAMUSCULAR; INTRAVENOUS EVERY 4 HOURS PRN
Status: DISCONTINUED | OUTPATIENT
Start: 2019-05-19 | End: 2019-05-19

## 2019-05-19 RX ORDER — CARVEDILOL 3.12 MG/1
3.12 TABLET ORAL 2 TIMES DAILY WITH MEALS
Status: DISCONTINUED | OUTPATIENT
Start: 2019-05-19 | End: 2019-05-20 | Stop reason: HOSPADM

## 2019-05-19 RX ADMIN — BUTALBITAL, ACETAMINOPHEN, AND CAFFEINE 1 TABLET: 50; 325; 40 TABLET ORAL at 05:38

## 2019-05-19 RX ADMIN — BUTALBITAL, ACETAMINOPHEN, AND CAFFEINE 1 TABLET: 50; 325; 40 TABLET ORAL at 21:18

## 2019-05-19 RX ADMIN — Medication 10 ML: at 21:20

## 2019-05-19 RX ADMIN — CETIRIZINE HYDROCHLORIDE 5 MG: 10 TABLET ORAL at 08:44

## 2019-05-19 RX ADMIN — MORPHINE SULFATE 4 MG: 4 INJECTION INTRAVENOUS at 03:38

## 2019-05-19 RX ADMIN — SODIUM CHLORIDE: 4.5 INJECTION, SOLUTION INTRAVENOUS at 00:50

## 2019-05-19 RX ADMIN — BUTALBITAL, ACETAMINOPHEN, AND CAFFEINE 1 TABLET: 50; 325; 40 TABLET ORAL at 16:05

## 2019-05-19 RX ADMIN — Medication 10 ML: at 09:00

## 2019-05-19 RX ADMIN — BUTALBITAL, ACETAMINOPHEN, AND CAFFEINE 1 TABLET: 50; 325; 40 TABLET ORAL at 00:51

## 2019-05-19 RX ADMIN — METOPROLOL TARTRATE 5 MG: 5 INJECTION, SOLUTION INTRAVENOUS at 18:20

## 2019-05-19 RX ADMIN — HYDRALAZINE HYDROCHLORIDE 20 MG: 20 INJECTION INTRAMUSCULAR; INTRAVENOUS at 21:18

## 2019-05-19 RX ADMIN — CARVEDILOL 3.12 MG: 3.12 TABLET, FILM COATED ORAL at 16:07

## 2019-05-19 RX ADMIN — ONDANSETRON 4 MG: 2 INJECTION INTRAMUSCULAR; INTRAVENOUS at 08:31

## 2019-05-19 RX ADMIN — CARVEDILOL 3.12 MG: 3.12 TABLET, FILM COATED ORAL at 08:45

## 2019-05-19 ASSESSMENT — PAIN SCALES - GENERAL
PAINLEVEL_OUTOF10: 6
PAINLEVEL_OUTOF10: 2
PAINLEVEL_OUTOF10: 8
PAINLEVEL_OUTOF10: 8
PAINLEVEL_OUTOF10: 5
PAINLEVEL_OUTOF10: 3
PAINLEVEL_OUTOF10: 0
PAINLEVEL_OUTOF10: 3
PAINLEVEL_OUTOF10: 5

## 2019-05-19 ASSESSMENT — PAIN DESCRIPTION - PAIN TYPE
TYPE: ACUTE PAIN

## 2019-05-19 ASSESSMENT — PAIN DESCRIPTION - PROGRESSION
CLINICAL_PROGRESSION: GRADUALLY IMPROVING
CLINICAL_PROGRESSION: GRADUALLY IMPROVING

## 2019-05-19 ASSESSMENT — PAIN DESCRIPTION - DESCRIPTORS
DESCRIPTORS: ACHING;HEADACHE;SHARP
DESCRIPTORS: ACHING;HEADACHE

## 2019-05-19 ASSESSMENT — PAIN DESCRIPTION - ORIENTATION: ORIENTATION: POSTERIOR

## 2019-05-19 ASSESSMENT — PAIN DESCRIPTION - LOCATION
LOCATION: HEAD
LOCATION: HAND
LOCATION: HEAD

## 2019-05-19 ASSESSMENT — PAIN DESCRIPTION - FREQUENCY
FREQUENCY: CONTINUOUS
FREQUENCY: CONTINUOUS

## 2019-05-19 NOTE — CARE COORDINATION
Transition planning; spoke with pt about plan, she wants to go for rehab to a SNF, gave choice, would like referral to [address] [place] [address] Drive, efaxed referral, need to call tomorrow.

## 2019-05-19 NOTE — PROGRESS NOTES
[facility]    Progress Note    5/19/2019    9:34 AM    Name:   Christy Daniels  MRN:     [id number]     Acct:      [de-identified]   Room:   [address] [place] [address] Day:  4  Admit Date:  5/15/2019 10:41 PM    PCP:   Faye Kayser Hermiller, APRN - CNP  Code Status:  Full Code    Subjective:     C/C: Headache    Interval History Status: improved  Overall feels better today  Continued to have occipital headache with some help from fioricet  Overall dizziness improved while walking  No fevers, chills reported    Brief History:     Miss Eduarda Bauer is a nice 79year old lady with history of hypertension, extensive tobacco use, CAD status post stent 8 years back. Admitted from outlying facility. She has sinus like infections over past few weeks needing 3 courses of antibiotics. She developed posterior severe headache and dizziness for past week with difficulty walking. MRI showing hydrocephalus and was admitted overnight. Had shunt placement on 5/17    Review of Systems:     Constitutional:  negative for chills, fevers, sweats  Respiratory:  negative for cough, dyspnea on exertion  Cardiovascular:  negative for chest pain, chest pressure/discomfort, lower extremity edema, palpitations  Gastrointestinal:  negative for abdominal pain, constipation, significant nausea reported today  Neurological:  negative for dizziness, occipital headache present    Medications: Allergies:     Allergies   Allergen Reactions    Vicodin [Hydrocodone-Acetaminophen]        Current Meds:   Scheduled Meds:    carvedilol  3.125 mg Oral BID WC    sodium chloride flush  10 mL Intravenous 2 times per day    fluticasone  1 spray Nasal Daily    cetirizine  5 mg Oral Daily    sodium chloride flush  10 mL Intravenous 2 times per day    nicotine  1 patch Transdermal Daily     Continuous Infusions:    sodium chloride Stopped (05/16/19 6838)     PRN Meds: butalbital-acetaminophen-caffeine,

## 2019-05-19 NOTE — PROGRESS NOTES
Neurosurgery JEANIE/Resident    Daily Progress Note     5/19/2019  11:36 AM    Chart reviewed. SBP elevated overnight. Complaining of headache and neck pain. Nausea but no vomiting. States she does not have an appetite. She had BM yesterday and is passing flatus. Vitals:    05/19/19 0400 05/19/19 0427 05/19/19 0500 05/19/19 0830   BP: (!) 151/76 (!) 151/76 (!) 145/74    Pulse: 82 81 79    Resp:    16   Temp:  97.8 °F (36.6 °C)  98.2 °F (36.8 °C)   TempSrc:  Oral  Oral   SpO2: 92% 93% 92%    Weight:       Height:           PE:   AOx3   Abd: soft  Motor moving all extremities equally  Incision right cranial and abdominal incision sites dressings changed      Lab Results   Component Value Date    WBC 9.3 05/19/2019    HGB 15.2 (H) 05/19/2019    HCT 47.8 (H) 05/19/2019     05/19/2019    CHOL 177 03/23/2018    TRIG 67 03/23/2018    HDL 61 03/23/2018    ALT 9 05/12/2019    AST 9 05/12/2019     05/19/2019    K 3.7 05/19/2019     05/19/2019    CREATININE 0.51 05/19/2019    BUN 12 05/19/2019    CO2 24 05/19/2019    TSH 3.90 03/23/2018    INR 0.9 05/16/2019       A/P  79 y.o. female who presents with hydrocephalus.   POD#3  shunt placement, codman certas set at 7     - discharge planning - home with home care vs rehab  - ok to resume aspirin 1 week post op    Please contact neurosurgery with any changes in patients neurologic status.        Karla Fay CNP  NS pager [phone]  5/19/19  11:36 AM

## 2019-05-20 ENCOUNTER — TELEPHONE (OUTPATIENT)
Dept: INTERNAL MEDICINE | Age: 69
End: 2019-05-20

## 2019-05-20 VITALS
BODY MASS INDEX: 33.08 KG/M2 | RESPIRATION RATE: 17 BRPM | WEIGHT: 218.26 LBS | HEIGHT: 68 IN | SYSTOLIC BLOOD PRESSURE: 135 MMHG | TEMPERATURE: 97.2 F | DIASTOLIC BLOOD PRESSURE: 88 MMHG | OXYGEN SATURATION: 94 % | HEART RATE: 96 BPM

## 2019-05-20 PROCEDURE — 97535 SELF CARE MNGMENT TRAINING: CPT

## 2019-05-20 PROCEDURE — 6370000000 HC RX 637 (ALT 250 FOR IP): Performed by: NEUROLOGICAL SURGERY

## 2019-05-20 PROCEDURE — 99222 1ST HOSP IP/OBS MODERATE 55: CPT | Performed by: PHYSICAL MEDICINE & REHABILITATION

## 2019-05-20 PROCEDURE — 6370000000 HC RX 637 (ALT 250 FOR IP): Performed by: INTERNAL MEDICINE

## 2019-05-20 PROCEDURE — 99239 HOSP IP/OBS DSCHRG MGMT >30: CPT | Performed by: INTERNAL MEDICINE

## 2019-05-20 PROCEDURE — APPNB15 APP NON BILLABLE TIME 0-15 MINS: Performed by: REGISTERED NURSE

## 2019-05-20 RX ORDER — BUTALBITAL, ACETAMINOPHEN AND CAFFEINE 50; 325; 40 MG/1; MG/1; MG/1
1 TABLET ORAL EVERY 4 HOURS PRN
Qty: 30 TABLET | Refills: 0 | Status: ON HOLD | OUTPATIENT
Start: 2019-05-20 | End: 2019-05-24 | Stop reason: SDUPTHER

## 2019-05-20 RX ORDER — CARVEDILOL 3.12 MG/1
3.12 TABLET ORAL 2 TIMES DAILY WITH MEALS
Qty: 60 TABLET | Refills: 3 | Status: SHIPPED | OUTPATIENT
Start: 2019-05-20 | End: 2019-07-11 | Stop reason: SDUPTHER

## 2019-05-20 RX ADMIN — BUTALBITAL, ACETAMINOPHEN, AND CAFFEINE 1 TABLET: 50; 325; 40 TABLET ORAL at 09:37

## 2019-05-20 RX ADMIN — CETIRIZINE HYDROCHLORIDE 5 MG: 10 TABLET ORAL at 05:00

## 2019-05-20 RX ADMIN — CARVEDILOL 3.12 MG: 3.12 TABLET, FILM COATED ORAL at 09:38

## 2019-05-20 ASSESSMENT — PAIN SCALES - GENERAL
PAINLEVEL_OUTOF10: 5
PAINLEVEL_OUTOF10: 0
PAINLEVEL_OUTOF10: 0

## 2019-05-20 NOTE — PROGRESS NOTES
Neurosurgery JEANIE/Resident    Daily Progress Note     5/20/2019  8:54 AM    Chart reviewed. No acute events overnight. Continues to have some nausea, no vomiting. Poor appetite. Ambulating short distances with the walker. SBP better overnight. Vitals:    05/20/19 0000 05/20/19 0400 05/20/19 0500 05/20/19 0700   BP: (!) 142/73 (!) 130/56  (!) 122/90   Pulse: 77 72  86   Resp: 18 18  18   Temp: 97.9 °F (36.6 °C) 98.1 °F (36.7 °C)  97.6 °F (36.4 °C)   TempSrc: Oral Oral  Oral   SpO2: 94% 94% 94%    Weight:       Height:           PE:   AOx3   Abd: soft  Motor moving all extremities equally  Incision right cranial and abdominal incision sites dressings changed      Lab Results   Component Value Date    WBC 9.3 05/19/2019    HGB 15.2 (H) 05/19/2019    HCT 47.8 (H) 05/19/2019     05/19/2019    CHOL 177 03/23/2018    TRIG 67 03/23/2018    HDL 61 03/23/2018    ALT 9 05/12/2019    AST 9 05/12/2019     05/19/2019    K 3.7 05/19/2019     05/19/2019    CREATININE 0.51 05/19/2019    BUN 12 05/19/2019    CO2 24 05/19/2019    TSH 3.90 03/23/2018    INR 0.9 05/16/2019         A/P  79 y.o. female who presents with hydrocephalus.   POD#4  shunt placement, marisol certas set at 7     - discharge planning - home with home care vs rehab  - ok to resume aspirin 1 week post op      Please contact neurosurgery with any changes in patients neurologic status.        Kyle Fontenot CNP  NS pager [phone]  5/20/19  8:54 AM

## 2019-05-20 NOTE — PROGRESS NOTES
Occupational Therapy   Occupational Therapy Treatment  Date: 2019   Patient Name: Cameron Zapata  MRN: [id number]     : 1950    Date of Service: 2019    Discharge Recommendations: Further therapy recommended at discharge. The patient should be able to tolerate at least three hours of therapy per day over 5 days or 15 hours over 7 days. Assessment   Performance deficits / Impairments: Decreased functional mobility ; Decreased ADL status; Decreased endurance;Decreased balance;Decreased high-level IADLs  Treatment Diagnosis: hydrocephalus   Prognosis: Good  Patient Education: Safety awareness, OTPOC, transfer tech's, tips on smoking cessation-good return  REQUIRES OT FOLLOW UP: Yes  Activity Tolerance  Activity Tolerance: Patient limited by fatigue;Patient Tolerated treatment well  Activity Tolerance: dizziness  Safety Devices  Safety Devices in place: Yes  Type of devices: Gait belt;Call light within reach; Left in chair;Chair alarm in place;Nurse notified; All fall risk precautions in place  Restraints  Initially in place: No        Patient Diagnosis(es): There were no encounter diagnoses. has a past medical history of Chronic obstructive pulmonary disease ([address] Coronary artery disease, Dyslipidemia, and Obesity. has a past surgical history that includes Cardiac catheterization and Ventriculoperitoneal shunt (Right, 2019). Treatment Diagnosis: hydrocephalus       Restrictions  Restrictions/Precautions  Restrictions/Precautions: Fall Risk, General Precautions  Required Braces or Orthoses?: No  Position Activity Restriction  Other position/activity restrictions: s/p  shunt, Ambulate patient    Subjective   General  Chart Reviewed: No  Patient assessed for rehabilitation services?: Yes  Family / Caregiver Present: No  Diagnosis: hydrocephalus   General Comment  Comments: RN ok'd for therapy this afternoon.  Pt agreeable to participate in session and cooperative throughout Social/Functional History  Social/Functional History  Lives With: Daughter(Reports she lives with her daughter and 23year old granddaughter. )  Type of Home: House  Home Layout: Two level, Able to Live on Main level with bedroom/bathroom, Performs ADL's on one level  Home Access: Stairs to enter without rails  Entrance Stairs - Number of Steps: Reports having 3 or 5 steps to enter the house depending on which entry she uses. Bathroom Shower/Tub: Tub/Shower unit(Reports no grab bars or shower chair but has been talking about getting these. )  Bathroom Toilet: Handicap height  Home Equipment: (Reports she does not have any equipment currently. )  Receives Help From: Family  ADL Assistance: [address]: Independent  Homemaking Responsibilities: Yes  Ambulation Assistance: Independent  Transfer Assistance: Independent  Active : Yes  Mode of Transportation: Car  Occupation: Part time employment(Reports working part time at [employer]. )  [address]: Reports she enjoys reading and playing on her computer. Additional Comments: Reports that her daughters both work but she has a sister that is retired that she could call to help if needed. Objective        Orientation  Overall Orientation Status: Within Functional Limits     Balance  Sitting Balance: Stand by assistance  Standing Balance: Contact guard assistance  Standing Balance  Time: 8 min  Activity: Pt stood bedside, at toilet, func mob to/from bathroom  Functional Mobility  Functional - Mobility Device: Rolling Walker  Activity: To/from bathroom  Assist Level: Minimal assistance  Functional Mobility Comments: Pt unsteady d/t dizziness  Toilet Transfers  Toilet - Technique: Ambulating  Equipment Used: Grab bars  Toilet Transfer: Minimal assistance  Shower Transfers  Shower - Transfer Type: To and From  Shower - Transfer To:  Shower seat with back  Shower - Technique: Ambulating  Shower Transfers: Minimal

## 2019-05-20 NOTE — DISCHARGE SUMMARY
[facility]    Discharge Summary     Patient ID: Jose Matute  :  1950   MRN: [id number]     ACCOUNT:  [de-identified]   Patient's PCP: JOHNATHAN Machado CNP  Admit Date: 5/15/2019   Discharge Date: 2019  Length of Stay: 5  Code Status:  Prior  Admitting Physician: Lisandro Rodriguez MD  Discharge Physician: Juliane España MD     Active Discharge Diagnoses:     Hospital Problem Lists:  Principal Problem:    Hydrocephalus  Active Problems:    CAD (coronary artery disease)    Tobacco abuse    Obesity    Chronic obstructive pulmonary disease ([address]  Resolved Problems:    * No resolved hospital problems. *      Admission Condition:  fair     Discharged Condition: stable    Hospital Stay:     Hospital Course: This is a 79year old lady with history of hypertension, extensive tobacco use, CAD status post stent 8 years back. Admitted from outlying facility. States she has had sinus like infections over past few weeks needing 3 courses of antibiotics. She developed posterior severe headache and dizziness for past week with difficulty walking. MRI showing hydrocephalus and was admitted for further neurosurgery evaluation. Underwent shunt placement on  by neurosurgery. DC to rehab post op.        Significant therapeutic interventions: as above    Significant Diagnostic Studies:   Labs / Micro:  CBC:   Lab Results   Component Value Date    WBC 9.3 2019    RBC 5.01 2019    HGB 15.2 2019    HCT 47.8 2019    MCV 95.4 2019    MCH 30.3 2019    MCHC 31.8 2019    RDW 15.2 2019     2019     BMP:    Lab Results   Component Value Date    GLUCOSE 105 2019     2019    K 3.7 2019     2019    CO2 24 2019    ANIONGAP 15 2019    BUN 12 2019    CREATININE 0.51 2019    BUNCRER NOT REPORTED 2019    CALCIUM 9.2 2019 107 Ho Ramirez MD  [address] [place] [address] [address]  [phone]             Requiring Further Evaluation/Follow Up POST HOSPITALIZATION/Incidental Findings:     Diet: regular diet    Activity: As tolerated    Instructions to Patient: follow up with neurosurgery     Discharge Medications:      Medication List      START taking these medications    butalbital-acetaminophen-caffeine -40 MG per tablet  Commonly known as:  FIORICET, ESGIC  Take 1 tablet by mouth every 4 hours as needed for Headaches     carvedilol 3.125 MG tablet  Commonly known as:  COREG  Take 1 tablet by mouth 2 times daily (with meals)        CONTINUE taking these medications    albuterol sulfate  (90 Base) MCG/ACT inhaler  Inhale 2 puffs into the lungs 4 times daily as needed for Wheezing     aspirin 325 MG tablet     DAYQUIL PO     fluticasone 50 MCG/ACT nasal spray  Commonly known as:  FLONASE  1 spray by Nasal route daily     ibuprofen 600 MG tablet  Commonly known as:  ADVIL;MOTRIN  Take 1 tablet by mouth every 8 hours as needed for Pain     loratadine 10 MG tablet  Commonly known as:  CLARITIN     ondansetron 4 MG disintegrating tablet  Commonly known as:  ZOFRAN ODT  Take 1 tablet by mouth every 8 hours as needed for Nausea     ondansetron 4 MG tablet  Commonly known as:  ZOFRAN  Take 1 tablet by mouth every 8 hours as needed for Nausea     SUDAFED 12 HOUR PO     tiZANidine 2 MG tablet  Commonly known as:  ZANAFLEX  Take 1 tablet by mouth 3 times daily as needed (muscle spasm or headach)           Where to Get Your Medications      These medications were sent to [facility] [address] [place] [address]    Phone:  [phone]   · butalbital-acetaminophen-caffeine -40 MG per tablet  · carvedilol 3.125 MG tablet         Time Spent on discharge is  35 mins in patient examination, evaluation, counseling as well as medication reconciliation, prescriptions for required medications, discharge plan and follow up. Electronically signed by   Elizabet Barney MD  5/20/2019  3:48 PM      Thank you Dr. Merline Cluster, APRN - LUC[facility] for the opportunity to be involved in this patient's care.

## 2019-05-20 NOTE — CARE COORDINATION
Care Transition  Received call from [facility] requesting notes. Faxed updated clinical notes progress, h&P, PT/OT notes and eval, and MAR to [facility] [phone]. Faxed transportation request to [facility]. Bell called and confirmed 12 noon. Faxed BRETT and HENS to [facility]. Called [place] to inform 12 noon . Attempted to call mom and line is busy. Patient said she will let family know of transfer.

## 2019-05-20 NOTE — PROGRESS NOTES
Pt transport here to take pt to rehab. Pt belongings gathered and sent with pt including tablet, phone and 2 chargers. Pt assisted in putting on robe and getting into wc. Pt tolerated well.

## 2019-05-20 NOTE — DISCHARGE INSTR - COC
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) I63.10    Abnormal CT of the head R93.0    Hydrocephalus G91.9       Isolation/Infection:   Isolation          No Isolation            Nurse Assessment:  Last Vital Signs: BP (!) 122/90   Pulse 86   Temp 97.6 °F (36.4 °C) (Oral)   Resp 18   Ht 5' 8\" (1.727 m)   Wt 218 lb 4.1 oz (99 kg)   LMP 08/01/2000 (Approximate)   SpO2 94%   BMI 33.19 kg/m²     Last documented pain score (0-10 scale): Pain Level: 5  Last Weight:   Wt Readings from Last 1 Encounters:   05/16/19 218 lb 4.1 oz (99 kg)     Mental Status:  oriented    IV Access:  - None    Nursing Mobility/ADLs:  Walking   Assisted  Transfer  Assisted  Bathing  Assisted  Dressing  Assisted  Toileting  Assisted  Feeding  Assisted  Med Admin  Dependent  Med Delivery   whole    Wound Care Documentation and Therapy:        Elimination:  Continence:   · Bowel: Yes  · Bladder: Yes  Urinary Catheter: None   Colostomy/Ileostomy/Ileal Conduit: No       Date of Last BM: 5/15/19    Intake/Output Summary (Last 24 hours) at 5/20/2019 0953  Last data filed at 5/19/2019 1751  Gross per 24 hour   Intake 600 ml   Output --   Net 600 ml     I/O last 3 completed shifts: In: 600 [P.O.:600]  Out: -     Safety Concerns: At Risk for Falls    Impairments/Disabilities:      None    Nutrition Therapy:  Current Nutrition Therapy:   - Oral Diet:  General    Routes of Feeding: Oral  Liquids: Thin Liquids  Daily Fluid Restriction: no  Last Modified Barium Swallow with Video (Video Swallowing Test): not done    Treatments at the Time of Hospital Discharge:   Respiratory Treatments: No  Oxygen Therapy:  is not on home oxygen therapy.   Ventilator:    - No ventilator support    Rehab Therapies: Physical Therapy and Occupational therapy  Weight Bearing Status/Restrictions: No weight bearing restirctions  Other Medical Equipment (for information only, NOT a DME order):  walker  Other Treatments:

## 2019-05-20 NOTE — CONSULTS
Inpatient consult to PM&R - Physiatry  Consult performed by: Sly Lyman MD  Consult ordered by: Beryle Larger, APRN - CNP        Physical Medicine & Rehabilitation  Consult Note      Admitting Physician:   Michela San MD    Primary Care Provider:   JOHNATHAN Partida CNP     Reason for Consult:  Acute Inpatient Rehabilitation    Chief Complaint: Severe headache and dizziness    History of Present Illness:  Referring Provider is requesting an evaluation for appropriate placement upon discharge from acute care. History from chart review and patient. Manda Jara is a 79 y.o. RHD female admitted to [place] [address] on 5/15/2019. Patient admitted from outside hospital with headache and dizziness with noted history sinus infections and 3 courses of antibiotics. CT and MRI showed hydrocephalus with ventriculomegaly. Dr. Camden Negron performed  shunt on 5/16/19. Patient reports continued aching bifrontal headache, not assosicaed with light sensitivity or nausea/vomiting. Headache is mild-moderate and responds to medications. She notes some vision changes with blurred letters when she's trying to read.       Review of Systems:  Constitutional: negative for anorexia, chills, fatigue, fevers, sweats and weight loss  Eyes: negative for redness and visual disturbance  Ears, nose, mouth, throat, and face: negative for earaches, sore throat and tinnitus  Respiratory: negative for cough and shortness of breath  Cardiovascular: negative for chest pain, dyspnea and palpitations  Gastrointestinal: negative for abdominal pain, change in bowel habits, constipation, nausea and vomiting  Genitourinary:negative for dysuria, frequency,   Integument/breast: negative for pruritus and rash  Musculoskeletal:negative for stiff joints  Neurological: negative for dizziness, headaches   Behavioral/Psych: negative for decreased appetite, depression and fatigue       Premorbid function:  Independent    Current function:    PT:  Restrictions/Precautions: Fall Risk  Other position/activity restrictions: s/p  shunt POD#1; Ambulate patient. Transfers  Sit to Stand: Minimal Assistance  Stand to sit: Minimal Assistance  Bed to Chair: Minimal assistance  Ambulation 1  Surface: level tile  Device: Rolling Walker  Assistance: Contact guard assistance  Quality of Gait: Pt requiring manual assist w/ r.walker this date. Pt w/ asymmetrical step length and stop and go mobility. Distance: 10 ft from toilet to bed. Comments: toilet transfer->     Transfers  Sit to Stand: Minimal Assistance  Stand to sit: Minimal Assistance  Bed to Chair: Minimal assistance  Ambulation  Ambulation?: Yes  Ambulation 1  Surface: level tile  Device: Rolling Walker  Assistance: Contact guard assistance  Quality of Gait: Pt requiring manual assist w/ r.walker this date. Pt w/ asymmetrical step length and stop and go mobility. Distance: 10 ft from toilet to bed. Comments: toilet transfer->     Surface: level tile  Ambulation 1  Surface: level tile  Device: Rolling Walker  Assistance: Contact guard assistance  Quality of Gait: Pt requiring manual assist w/ r.walker this date. Pt w/ asymmetrical step length and stop and go mobility. Distance: 10 ft from toilet to bed. Comments: toilet transfer->       OT:   ADL  Feeding: Independent  Grooming: Supervision, Increased time to complete(pt washed face while sitting up in bed with increased time to complete )  UE Bathing: Increased time to complete, Minimal assistance  LE Bathing: Increased time to complete, Moderate assistance  UE Dressing: Increased time to complete, Minimal assistance  LE Dressing: Increased time to complete, Moderate assistance  Toileting: Minimal assistance(pt used restroom during session.  Pt required min A for toilet transfer but was able to complete abrahan-care with SBA )         Balance  Sitting Balance: Stand by assistance(~10 minutes on EOB and on toilet )  Standing Balance: Contact guard assistance(with RW)   Standing Balance  Time: ~2 minutes  Activity: pt completed functional mobility to/from bathroom  Comment: pt reported increase dizziness upon standing and during functional mobility. Pt did not have any LOB but reported feeling more unsteady than baseline. Pt verbalized concerns with dizziness/headache and not having 24hr assist at home         Bed mobility  Supine to Sit: (pt sitting on EOB upon arrival )  Sit to Supine: Contact guard assistance  Scooting: Minimal assistance  Comment: Did not assess bed mobility this date secondary to pt sitting in bedside chair upon arrival and returning to bedside chair. Transfers  Stand Step Transfers: Contact guard assistance  Sit to stand: Contact guard assistance  Stand to sit: Contact guard assistance  Transfer Comments: with RW    Toilet Transfers  Toilet - Technique: Ambulating  Equipment Used: Standard toilet  Toilet Transfer: Minimal assistance               Past Medical History:        Diagnosis Date    Chronic obstructive [address] disease ([facility] [address] [facility]     Coronary artery disease     Dyslipidemia     Obesity        Past Surgical History:        Procedure Laterality Date    CARDIAC CATHETERIZATION      VENTRICULOPERITONEAL SHUNT Right 5/16/2019    VENTRICULAR PERITONEAL SHUNT INSERTION ENDOSCOPIC performed by Crystal Doran MD at [address] [place] [address] Rd Ne:     Allergies   Allergen Reactions    Vicodin [Hydrocodone-Acetaminophen]         Current Medications:   Current Facility-Administered Medications: carvedilol (COREG) tablet 3.125 mg, 3.125 mg, Oral, BID WC  butalbital-acetaminophen-caffeine (FIORICET, ESGIC) per tablet 1 tablet, 1 tablet, Oral, Q4H PRN  hydrALAZINE (APRESOLINE) injection 20 mg, 20 mg, Intravenous, Q6H PRN  metoprolol (LOPRESSOR) injection 5 mg, 5 mg, Intravenous, Q4H PRN  acetaminophen (TYLENOL) tablet 650 mg, 650 mg, Oral, Q4H PRN  sodium chloride flush 0.9 % injection 10 mL, 10 mL, Intravenous, 2 times per day  sodium chloride flush 0.9 % injection 10 mL, 10 mL, Intravenous, PRN  ondansetron (ZOFRAN) injection 4 mg, 4 mg, Intravenous, Q6H PRN  albuterol sulfate  (90 Base) MCG/ACT inhaler 2 puff, 2 puff, Inhalation, 4x Daily PRN  fluticasone (FLONASE) 50 MCG/ACT nasal spray 1 spray, 1 spray, Nasal, Daily  cetirizine (ZYRTEC) tablet 5 mg, 5 mg, Oral, Daily  sodium chloride flush 0.9 % injection 10 mL, 10 mL, Intravenous, 2 times per day  sodium chloride flush 0.9 % injection 10 mL, 10 mL, Intravenous, PRN  magnesium hydroxide (MILK OF MAGNESIA) 400 MG/5ML suspension 30 mL, 30 mL, Oral, Daily PRN  ondansetron (ZOFRAN) injection 4 mg, 4 mg, Intravenous, Q6H PRN  0.9 % sodium chloride infusion, , Intravenous, Continuous  nicotine (NICODERM CQ) 21 MG/24HR 1 patch, 1 patch, Transdermal, Daily    Social History:  Lives with:   Daughter and 23year-old granddaughter  Home setup:   Steps into home 3-5 . Floors in home:  #2. Bed/bathroom on floor  Main floor bedroom/bathroom.   Device  None  Works part time at [employer]  Social History     Socioeconomic History    Marital status:      Spouse name: None    Number of children: None    Years of education: None    Highest education level: None   Occupational History    None   Social Needs    Financial resource strain: None    Food insecurity:     Worry: None     Inability: None    Transportation needs:     Medical: None     Non-medical: None   Tobacco Use    Smoking status: Current Every Day Smoker     Packs/day: 1.00     Years: 50.00     Pack years: 50.00     Start date: 1/1/1970    Smokeless tobacco: Never Used   Substance and Sexual Activity    Alcohol use: No    Drug use: No    Sexual activity: None   Lifestyle    Physical activity:     Days per week: None     Minutes per session: None    Stress: None   Relationships    Social connections:     Talks on phone: None     Gets together: None     Attends Voodoo service: None     Active member of club or organization: None     Attends meetings of clubs or organizations: None     Relationship status: None    Intimate partner violence:     Fear of current or ex partner: None     Emotionally abused: None     Physically abused: None     Forced sexual activity: None   Other Topics Concern    None   Social History Narrative    None       Family History:   History reviewed. No pertinent family history. Radiology:  CT OF THE HEAD WITHOUT CONTRAST  5/2/2019 2:52 pm       TECHNIQUE:   CT of the head was performed without the administration of intravenous   contrast. Dose modulation, iterative reconstruction, and/or weight based   adjustment of the mA/kV was utilized to reduce the radiation dose to as low   as reasonably achievable.       COMPARISON:   None.       HISTORY:   ORDERING SYSTEM PROVIDED HISTORY: headache   TECHNOLOGIST PROVIDED HISTORY:       Ordering Physician Provided Reason for Exam: C/o headache/dizziness   Acuity: Acute   Type of Exam: Initial       FINDINGS:   BRAIN/VENTRICLES: There is no acute intracranial hemorrhage, mass effect or   midline shift.  No abnormal extra-axial fluid collection.  Ventriculomegaly   is noted and cerebellar tonsillar displacement into the foramen magnum,   extending off the field of view, is noted. Oh Racer is also mild cerebral and   cerebellar atrophy and scattered white matter changes in the brain.       ORBITS: The visualized portion of the orbits demonstrate no acute abnormality.       SINUSES: The visualized paranasal sinuses and mastoid air cells demonstrate   no acute abnormality.       SOFT TISSUES/SKULL:  No acute abnormality of the visualized skull or soft   tissues.           Impression   Partially visualized Chiari malformation.  Ventriculomegaly is noted as well   as cerebral and cerebellar cortical atrophy with chronic appearing white   matter changes in the brain.          Diagnostics:    CBC:   Recent Labs 05/19/19  0942   WBC 9.3   RBC 5.01   HGB 15.2*   HCT 47.8*   MCV 95.4   RDW 15.2*        BMP:   Recent Labs     05/19/19  0942      K 3.7      CO2 24   BUN 12   CREATININE 0.51   GLUCOSE 105*      HbA1c: No results found for: LABA1C  BNP: No results for input(s): BNP in the last 72 hours. PT/INR: No results for input(s): PROTIME, INR in the last 72 hours. APTT: No results for input(s): APTT in the last 72 hours. CARDIAC ENZYMES: No results for input(s): CKMB, CKMBINDEX, TROPONINT in the last 72 hours. Invalid input(s): CKTOTAL;3  FASTING LIPID PANEL:  Lab Results   Component Value Date    CHOL 177 03/23/2018    HDL 61 03/23/2018    TRIG 67 03/23/2018     LIVER PROFILE: No results for input(s): AST, ALT, ALB, BILIDIR, BILITOT, ALKPHOS in the last 72 hours. Physical Exam:  BP (!) 122/90   Pulse 86   Temp 97.6 °F (36.4 °C) (Oral)   Resp 18   Ht 5' 8\" (1.727 m)   Wt 218 lb 4.1 oz (99 kg)   LMP 08/01/2000 (Approximate)   SpO2 94%   BMI 33.19 kg/m²   GEN: Well developed, well nourished, no acute distress. HEENT: Healing R parietal incision with dressing CDI, PERRL, EOMI, mucous membranes pink and moist.  RESP: Lungs clear to auscultation. No rales or rhonchi. Respirations WNL and unlabored. CV: Heart regular rate rhythm. No murmurs. ABD: soft, non-distended, non-tender. BS+ and equal.  EXT: No calf tenderness or edema BLEs. SKIN: Warm dry and intact with good turgor. NEURO: A&O x 3. Sensation intact to light touch. DTRs 2+  Motor: Functional ROM. Muscle tone and bulk are normal bilaterally. Strength 4/5 key muscles BUE and BLEs. PSYCH: Mood WNL. Affect WNL. Appropriately interactive. Impression:  1. Hydrocephalus: Tylenol prn headache, Fioricet. Notes impaired balance and vision. 2. CAD/HTN: on hydralazine prn, Lopressor prn, on Coreg  3. Obesity  4. COPD  5. Nicotine dependence: on Nicoderm patch  6.  Allergic rhinitis: on flonase and cetirizine    Recommendations:    1. Diagnosis:  Hydrocephalus  2. Therapy: Has PT/OT needs  3. Medical Necessity: As above  4. Support: lives with daughter and granddaughter  11. Rehab Recommendation: Patient would likely benefit from acute inpatient rehabilitation, however she has chosen subacute/SNF level rehabilitation closer to her home ([place]) at [facility]. Please notify if she changes her mind and would like acute rehabilitation. 6. DVT Prophylaxis: No current medical prophylaxis. Would recommend BLE doppler 24-48 hours prior to admission to acute inpatient rehab if not on medical prophylaxis. If SNF, would recommend checking their protocol. It was my pleasure to evaluate Monie Friend today. Please call with questions. Casandra James MD        This note is created with the assistance of a speech recognition program.  While intending to generate a document that actually reflects the content of the visit, the document can still have some errors including those of syntax and sound a like substitutions which may escape proof reading.   In such instances, actual meaning can be extrapolated by contextual diversion.

## 2019-05-20 NOTE — PROGRESS NOTES
Smoking Cessation - topics covered   []  Health Risks  []  Benefits of Quitting   []  Smoking Cessation  []  Patient has no history of tobacco use  []  Patient is former smoker. []  No need for tobacco cessation education. []  Booklet given  []  Patient verbalizes understanding. []  Patient denies need for tobacco cessation education. [x]  Unable to meet with patient today. Will follow up as able.   Stephanie Quarles  3:30 PM

## 2019-05-21 ENCOUNTER — HOSPITAL ENCOUNTER (INPATIENT)
Age: 69
LOS: 2 days | Discharge: NURSING, REHAB OR LONG-TERM CARE | DRG: 056 | End: 2019-05-24
Attending: EMERGENCY MEDICINE | Admitting: INTERNAL MEDICINE
Payer: MEDICARE

## 2019-05-21 ENCOUNTER — TELEPHONE (OUTPATIENT)
Dept: NEUROLOGY | Age: 69
End: 2019-05-21

## 2019-05-21 ENCOUNTER — HOSPITAL ENCOUNTER (EMERGENCY)
Age: 69
Discharge: ANOTHER HOSPITAL | End: 2019-05-21
Attending: EMERGENCY MEDICINE
Payer: MEDICARE

## 2019-05-21 ENCOUNTER — APPOINTMENT (OUTPATIENT)
Dept: CT IMAGING | Age: 69
End: 2019-05-21
Payer: MEDICARE

## 2019-05-21 ENCOUNTER — APPOINTMENT (OUTPATIENT)
Dept: GENERAL RADIOLOGY | Age: 69
End: 2019-05-21
Payer: MEDICARE

## 2019-05-21 VITALS
TEMPERATURE: 98.6 F | BODY MASS INDEX: 32.69 KG/M2 | SYSTOLIC BLOOD PRESSURE: 150 MMHG | HEART RATE: 89 BPM | OXYGEN SATURATION: 93 % | DIASTOLIC BLOOD PRESSURE: 75 MMHG | WEIGHT: 215 LBS

## 2019-05-21 DIAGNOSIS — G91.9 HYDROCEPHALUS (HCC): ICD-10-CM

## 2019-05-21 DIAGNOSIS — R51.9 ACUTE INTRACTABLE HEADACHE, UNSPECIFIED HEADACHE TYPE: ICD-10-CM

## 2019-05-21 DIAGNOSIS — Z98.2 S/P VP SHUNT: Primary | ICD-10-CM

## 2019-05-21 DIAGNOSIS — R53.1 GENERAL WEAKNESS: Primary | ICD-10-CM

## 2019-05-21 LAB
ABSOLUTE EOS #: 0.1 K/UL (ref 0–0.4)
ABSOLUTE IMMATURE GRANULOCYTE: ABNORMAL K/UL (ref 0–0.3)
ABSOLUTE LYMPH #: 0.9 K/UL (ref 1–4.8)
ABSOLUTE MONO #: 0.8 K/UL (ref 0.1–1.2)
ANION GAP SERPL CALCULATED.3IONS-SCNC: 12 MMOL/L (ref 9–17)
BASOPHILS # BLD: 0 % (ref 0–1)
BASOPHILS ABSOLUTE: 0 K/UL (ref 0–0.2)
BUN BLDV-MCNC: 27 MG/DL (ref 8–23)
BUN/CREAT BLD: 44 (ref 9–20)
CALCIUM SERPL-MCNC: 9.5 MG/DL (ref 8.6–10.4)
CHLORIDE BLD-SCNC: 104 MMOL/L (ref 98–107)
CO2: 26 MMOL/L (ref 20–31)
CREAT SERPL-MCNC: 0.62 MG/DL (ref 0.5–0.9)
DIFFERENTIAL TYPE: ABNORMAL
EKG ATRIAL RATE: 83 BPM
EKG P AXIS: 57 DEGREES
EKG P-R INTERVAL: 126 MS
EKG Q-T INTERVAL: 384 MS
EKG QRS DURATION: 84 MS
EKG QTC CALCULATION (BAZETT): 451 MS
EKG R AXIS: 50 DEGREES
EKG T AXIS: 38 DEGREES
EKG VENTRICULAR RATE: 83 BPM
EOSINOPHILS RELATIVE PERCENT: 2 % (ref 1–7)
GFR AFRICAN AMERICAN: >60 ML/MIN
GFR NON-AFRICAN AMERICAN: >60 ML/MIN
GFR SERPL CREATININE-BSD FRML MDRD: ABNORMAL ML/MIN/{1.73_M2}
GFR SERPL CREATININE-BSD FRML MDRD: ABNORMAL ML/MIN/{1.73_M2}
GLUCOSE BLD-MCNC: 110 MG/DL (ref 70–99)
HCT VFR BLD CALC: 45.3 % (ref 36–46)
HEMOGLOBIN: 15 G/DL (ref 12–16)
IMMATURE GRANULOCYTES: ABNORMAL %
LYMPHOCYTES # BLD: 14 % (ref 16–46)
MCH RBC QN AUTO: 31.2 PG (ref 26–34)
MCHC RBC AUTO-ENTMCNC: 33.1 G/DL (ref 31–37)
MCV RBC AUTO: 94.2 FL (ref 80–100)
MONOCYTES # BLD: 12 % (ref 4–11)
NRBC AUTOMATED: ABNORMAL PER 100 WBC
PDW BLD-RTO: 16.8 % (ref 11–14.5)
PLATELET # BLD: 201 K/UL (ref 140–450)
PLATELET ESTIMATE: ABNORMAL
PMV BLD AUTO: 9.7 FL (ref 6–12)
POTASSIUM SERPL-SCNC: 3.8 MMOL/L (ref 3.7–5.3)
RBC # BLD: 4.81 M/UL (ref 4–5.2)
RBC # BLD: ABNORMAL 10*6/UL
SEG NEUTROPHILS: 72 % (ref 43–77)
SEGMENTED NEUTROPHILS ABSOLUTE COUNT: 4.7 K/UL (ref 1.8–7.7)
SODIUM BLD-SCNC: 142 MMOL/L (ref 135–144)
TROPONIN INTERP: NORMAL
TROPONIN T: NORMAL NG/ML
TROPONIN, HIGH SENSITIVITY: 12 NG/L (ref 0–14)
WBC # BLD: 6.5 K/UL (ref 3.5–11)
WBC # BLD: ABNORMAL 10*3/UL

## 2019-05-21 PROCEDURE — 70450 CT HEAD/BRAIN W/O DYE: CPT

## 2019-05-21 PROCEDURE — 99285 EMERGENCY DEPT VISIT HI MDM: CPT

## 2019-05-21 PROCEDURE — 93005 ELECTROCARDIOGRAM TRACING: CPT

## 2019-05-21 PROCEDURE — 6360000002 HC RX W HCPCS

## 2019-05-21 PROCEDURE — 96374 THER/PROPH/DIAG INJ IV PUSH: CPT

## 2019-05-21 PROCEDURE — 85025 COMPLETE CBC W/AUTO DIFF WBC: CPT

## 2019-05-21 PROCEDURE — 99284 EMERGENCY DEPT VISIT MOD MDM: CPT

## 2019-05-21 PROCEDURE — 80048 BASIC METABOLIC PNL TOTAL CA: CPT

## 2019-05-21 PROCEDURE — 2580000003 HC RX 258: Performed by: EMERGENCY MEDICINE

## 2019-05-21 PROCEDURE — 6360000002 HC RX W HCPCS: Performed by: STUDENT IN AN ORGANIZED HEALTH CARE EDUCATION/TRAINING PROGRAM

## 2019-05-21 PROCEDURE — 84484 ASSAY OF TROPONIN QUANT: CPT

## 2019-05-21 PROCEDURE — 93005 ELECTROCARDIOGRAM TRACING: CPT | Performed by: STUDENT IN AN ORGANIZED HEALTH CARE EDUCATION/TRAINING PROGRAM

## 2019-05-21 PROCEDURE — 85027 COMPLETE CBC AUTOMATED: CPT

## 2019-05-21 PROCEDURE — 71045 X-RAY EXAM CHEST 1 VIEW: CPT

## 2019-05-21 PROCEDURE — 96375 TX/PRO/DX INJ NEW DRUG ADDON: CPT

## 2019-05-21 RX ORDER — ONDANSETRON 2 MG/ML
INJECTION INTRAMUSCULAR; INTRAVENOUS
Status: COMPLETED
Start: 2019-05-21 | End: 2019-05-21

## 2019-05-21 RX ORDER — 0.9 % SODIUM CHLORIDE 0.9 %
500 INTRAVENOUS SOLUTION INTRAVENOUS ONCE
Status: COMPLETED | OUTPATIENT
Start: 2019-05-21 | End: 2019-05-21

## 2019-05-21 RX ORDER — FENTANYL CITRATE 50 UG/ML
25 INJECTION, SOLUTION INTRAMUSCULAR; INTRAVENOUS ONCE
Status: COMPLETED | OUTPATIENT
Start: 2019-05-21 | End: 2019-05-21

## 2019-05-21 RX ORDER — PROMETHAZINE HYDROCHLORIDE 25 MG/ML
12.5 INJECTION, SOLUTION INTRAMUSCULAR; INTRAVENOUS ONCE
Status: COMPLETED | OUTPATIENT
Start: 2019-05-21 | End: 2019-05-21

## 2019-05-21 RX ADMIN — PROMETHAZINE HYDROCHLORIDE 12.5 MG: 25 INJECTION INTRAMUSCULAR; INTRAVENOUS at 23:40

## 2019-05-21 RX ADMIN — SODIUM CHLORIDE 500 ML: 0.9 INJECTION, SOLUTION INTRAVENOUS at 18:30

## 2019-05-21 RX ADMIN — ONDANSETRON 4 MG: 2 INJECTION INTRAMUSCULAR; INTRAVENOUS at 20:40

## 2019-05-21 RX ADMIN — FENTANYL CITRATE 25 MCG: 50 INJECTION, SOLUTION INTRAMUSCULAR; INTRAVENOUS at 23:41

## 2019-05-21 ASSESSMENT — PAIN SCALES - GENERAL
PAINLEVEL_OUTOF10: 7
PAINLEVEL_OUTOF10: 5
PAINLEVEL_OUTOF10: 3

## 2019-05-21 ASSESSMENT — PAIN DESCRIPTION - PROGRESSION: CLINICAL_PROGRESSION: NOT CHANGED

## 2019-05-21 ASSESSMENT — PAIN DESCRIPTION - ONSET
ONSET: GRADUAL
ONSET: ON-GOING

## 2019-05-21 ASSESSMENT — PAIN DESCRIPTION - FREQUENCY
FREQUENCY: CONTINUOUS
FREQUENCY: CONTINUOUS

## 2019-05-21 ASSESSMENT — PAIN DESCRIPTION - ORIENTATION: ORIENTATION: ANTERIOR

## 2019-05-21 ASSESSMENT — PAIN - FUNCTIONAL ASSESSMENT: PAIN_FUNCTIONAL_ASSESSMENT: ACTIVITIES ARE NOT PREVENTED

## 2019-05-21 ASSESSMENT — PAIN DESCRIPTION - LOCATION
LOCATION: HEAD
LOCATION: HEAD

## 2019-05-21 ASSESSMENT — PAIN DESCRIPTION - PAIN TYPE: TYPE: ACUTE PAIN

## 2019-05-21 ASSESSMENT — PAIN DESCRIPTION - DESCRIPTORS
DESCRIPTORS: ACHING
DESCRIPTORS: THROBBING

## 2019-05-21 NOTE — ED PROVIDER NOTES
[address] [facility] ED  [facility]  Phone: [phone]    Pt Name: Jossue Coto  MRN: [id number]  [place] 1950  Date of evaluation: 5/21/2019      CHIEF COMPLAINT       Chief Complaint   Patient presents with    Nausea    Fatigue    Headache         HISTORY OF PRESENT ILLNESS     Jossue Coto is a 79 y.o. female who presents nausea fatigue and headache. She had a shunt placed about 5 days ago healing wound in the right parietal scalp. It was placed due to hydrocephalus and the etiology of the hydrocephalus was unknown. Pain levels of 5 out of a 10. She's been having headaches for the past 2 months which were somewhat continuous. Her headache is slightly better over the past 2. She is lacking energy and doesn't want to get up and walk or do things when she is at the rehab center. Vital signs are normal with exception of a 609 systolic blood pressure. She's had no cough shortness breath wheezing. No abdominal symptoms other than nausea. No abdominal pain and no diarrhea. No pedal edema or skin rash. SHe has been taking her routine medication but no other new medication. The stapled wound in the right parietal scalp appears to be healing well without evidence of infection there and there is no discharge. REVIEW OF SYSTEMS         REVIEW OF SYSTEMS    Constitutional:  As above  Eyes:  Denies vision changes  HEENT:  Denies sore throat or nasal congestion  Respiratory:  Denies cough or shortness of breath   Cardiovascular:  Denies chest pain  GI:  As above  Musculoskeletal:  Denies back pain   Skin:  No rash on exposed surfaces   Neurologic:  Normal baseline mentation. No new deficits. Lymphatic:   No nodes or infection  Psychiatric:  No psychosis. Alert and interacting normally. Other ROS negative except as noted above.     PAST MEDICAL HISTORY    has a past medical history of Chronic obstructive pulmonary disease ([facility] [address] Coronary artery disease, Dyslipidemia, and Obesity. SURGICAL HISTORY      has a past surgical history that includes Cardiac catheterization and Ventriculoperitoneal shunt (Right, 5/16/2019). CURRENT MEDICATIONS       Previous Medications    ALBUTEROL SULFATE  (90 BASE) MCG/ACT INHALER    Inhale 2 puffs into the lungs 4 times daily as needed for Wheezing    ASPIRIN 325 MG TABLET    Take 325 mg by mouth daily    BUTALBITAL-ACETAMINOPHEN-CAFFEINE (FIORICET, ESGIC) -40 MG PER TABLET    Take 1 tablet by mouth every 4 hours as needed for Headaches    CARVEDILOL (COREG) 3.125 MG TABLET    Take 1 tablet by mouth 2 times daily (with meals)    FLUTICASONE (FLONASE) 50 MCG/ACT NASAL SPRAY    1 spray by Nasal route daily    IBUPROFEN (ADVIL;MOTRIN) 600 MG TABLET    Take 1 tablet by mouth every 8 hours as needed for Pain    LORATADINE (CLARITIN) 10 MG TABLET    Take 10 mg by mouth daily    ONDANSETRON (ZOFRAN ODT) 4 MG DISINTEGRATING TABLET    Take 1 tablet by mouth every 8 hours as needed for Nausea    ONDANSETRON (ZOFRAN) 4 MG TABLET    Take 1 tablet by mouth every 8 hours as needed for Nausea    PSEUDOEPHEDRINE HCL (SUDAFED 12 HOUR PO)    Take 1 tablet by mouth 2 times daily    PSEUDOEPHEDRINE-APAP-DM (DAYQUIL PO)    Take by mouth    TIZANIDINE (ZANAFLEX) 2 MG TABLET    Take 1 tablet by mouth 3 times daily as needed (muscle spasm or headach)       ALLERGIES     is allergic to vicodin [hydrocodone-acetaminophen]. FAMILY HISTORY     has no family status information on file. family history is not on file. SOCIAL HISTORY      reports that she has been smoking. She started smoking about 49 years ago. She has a 50.00 pack-year smoking history. She has never used smokeless tobacco. She reports that she does not drink alcohol or use drugs. PHYSICAL EXAM     INITIAL VITALS:  weight is 215 lb (97.5 kg). Her tympanic temperature is 98.6 °F (37 °C). Her blood pressure is 146/65 (abnormal) and her pulse is 88.  Her oxygen saturation is 89% (abnormal). Constitutional: The patient is alert and well-developed, vital signs as noted. Psychiatric: Oriented to time, place and person, affect is appropriate for age. Eyes: Pupils equal and reactive to light. EOMI. Ears, nose, and throat: Oropharynx clear  Neck: No masses, trachea midline, and no thyromegaly. Respiratory: Clear to auscultation, full aeration throughout all fields. Cardiovascular: No murmurs, heart sounds normal, no thrills. Gastrointestinal: No masses, no hepatosplenomegaly, bowel sounds positive. No tenderness. Skin: No rashes or lesions on exposed surfaces, good skin turgor. Neurological: Deep tendon reflexes 2+, sensation intact bilaterally, no focal neurological findings. Extremities: Good range of motion, no edema. Appears dehydrated with dry mucous membranes. DIFFERENTIAL DIAGNOSIS/ MEDICAL DECISION MAKING:     IV fluids are administered    Discussed with the ED physician on call and agreed to admission. Ambulance certification and transfer certification are completed. Left the department in stable condition. DIAGNOSTIC RESULTS     RADIOLOGY:   Non-plain film images such as CT, Ultrasound and MRI are read by the radiologist. Plain radiographic images are visualized and preliminarily interpreted by the emergency physician with the below findings:   [address] (<4 VIEWS)   Final Result   Shunt catheter as described         CT Head WO Contrast   Final Result   Increasing size of the 3rd and lateral ventricles with decreasing size of the   4th ventricle. There is corresponding increased extra-axial fluid along the   tentorium with associated mass effect on the cerebellar hemispheres and   subsequent decreased size of the 4th ventricle. This likely gives rise to   mass-effect at the aqueduct level which most likely corresponds to the   ventricular enlargement above the tentorium.       There is also a small amount of developing transependymal CSF flow   marginating the ventricles.       Post shunt catheter placement as described      A small amount of hemorrhage is noted in the ventricle      Continued follow-up recommended             LABS:  Results for orders placed or performed during the hospital encounter of 94/19/56   Basic Metabolic Panel   Result Value Ref Range    Glucose 110 (H) 70 - 99 mg/dL    BUN 27 (H) 8 - 23 mg/dL    CREATININE 0.62 0.50 - 0.90 mg/dL    Bun/Cre Ratio 44 (H) 9 - 20    Calcium 9.5 8.6 - 10.4 mg/dL    Sodium 142 135 - 144 mmol/L    Potassium 3.8 3.7 - 5.3 mmol/L    Chloride 104 98 - 107 mmol/L    CO2 26 20 - 31 mmol/L    Anion Gap 12 9 - 17 mmol/L    GFR Non-African American >60 >60 mL/min    GFR African American >60 >60 mL/min    GFR Comment          GFR Staging NOT REPORTED    Troponin   Result Value Ref Range    Troponin, High Sensitivity 12 0 - 14 ng/L    Troponin T NOT REPORTED <0.03 ng/mL    Troponin Interp NOT REPORTED    CBC Auto Differential   Result Value Ref Range    WBC 6.5 3.5 - 11.0 k/uL    RBC 4.81 4.0 - 5.2 m/uL    Hemoglobin 15.0 12.0 - 16.0 g/dL    Hematocrit 45.3 36 - 46 %    MCV 94.2 80 - 100 fL    MCH 31.2 26 - 34 pg    MCHC 33.1 31 - 37 g/dL    RDW 16.8 (H) 11.0 - 14.5 %    Platelets 170 025 - 044 k/uL    MPV 9.7 6.0 - 12.0 fL    NRBC Automated NOT REPORTED per 100 WBC    Differential Type NOT REPORTED     Immature Granulocytes NOT REPORTED 0 %    Absolute Immature Granulocyte NOT REPORTED 0.00 - 0.30 k/uL    WBC Morphology NOT REPORTED     RBC Morphology NOT REPORTED     Platelet Estimate NOT REPORTED     Seg Neutrophils 72 43 - 77 %    Lymphocytes 14 (L) 16 - 46 %    Monocytes 12 (H) 4 - 11 %    Eosinophils % 2 1 - 7 %    Basophils 0 0 - 1 %    Segs Absolute 4.70 1.8 - 7.7 k/uL    Absolute Lymph # 0.90 (L) 1.0 - 4.8 k/uL    Absolute Mono # 0.80 0.1 - 1.2 k/uL    Absolute Eos # 0.10 0.0 - 0.4 k/uL    Basophils # 0.00 0.0 - 0.2 k/uL   EKG 12 Lead   Result Value Ref Range    Ventricular Rate 83 BPM    Atrial Rate 83 BPM    P-R Interval 126 ms    QRS Duration 84 ms    Q-T Interval 384 ms    QTc Calculation (Bazett) 451 ms    P Axis 57 degrees    R Axis 50 degrees    T Axis 38 degrees       ABNORMAL LABS:  Labs Reviewed   BASIC METABOLIC PANEL - Abnormal; Notable for the following components:       Result Value    Glucose 110 (*)     BUN 27 (*)     Bun/Cre Ratio 44 (*)     All other components within normal limits   CBC WITH AUTO DIFFERENTIAL - Abnormal; Notable for the following components:    RDW 16.8 (*)     Lymphocytes 14 (*)     Monocytes 12 (*)     Absolute Lymph # 0.90 (*)     All other components within normal limits   TROPONIN   URINE RT REFLEX TO CULTURE        EKG: All EKG's are interpreted by the Emergency Department Physician who either signs or Co-signs this chart in the absence of a cardiologist.    EKG shows a normal sinus rhythm with normal intervals normal axis and no evidence of acute MI or ischemia. I interpreted. EMERGENCY DEPARTMENT COURSE:   Vitals:    Vitals:    05/21/19 1747   BP: (!) 146/65   Pulse: 88   Temp: 98.6 °F (37 °C)   TempSrc: Tympanic   SpO2: (!) 89%   Weight: 215 lb (97.5 kg)     -------------------------  BP: (!) 146/65, Temp: 98.6 °F (37 °C), Pulse: 88,      See DDX/MDM (Differential Diagnosis/Medical Decision Making) above      FINAL IMPRESSION      1. General weakness    2. Acute intractable headache, unspecified headache type    3. Hydrocephalus          DISPOSITION/PLAN   DISPOSITION Decision To Transfer 05/21/2019 07:35:19 PM      Condition on Disposition    Stable    PATIENT REFERRED TO:  No follow-up provider specified.     DISCHARGE MEDICATIONS:  New Prescriptions    No medications on file       (Please note that portions of this note were completed with a voice recognition program.  Efforts were made to edit the dictations but occasionally words are mis-transcribed.)    Zulma Pierce,, DO  Attending Emergency Physician       Zulma Pierce, DO  05/21/19 1936

## 2019-05-21 NOTE — TELEPHONE ENCOUNTER
Daughter Catalina Rojas called and is very concerned with patient. Patient had a shunt placed in on 5-16-19 at [facility]. Patient is now at [facility] ([facility] She is experiencing more headaches, not eating, dizziness, nausea, lethargic, is not acting normal, and more confused. Next appt in 7-11-19.  Laurie Landry that patient needs to go to ER for eval.

## 2019-05-22 ENCOUNTER — APPOINTMENT (OUTPATIENT)
Dept: GENERAL RADIOLOGY | Age: 69
DRG: 056 | End: 2019-05-22
Payer: MEDICARE

## 2019-05-22 PROBLEM — Z98.2 S/P VP SHUNT: Status: ACTIVE | Noted: 2019-05-22

## 2019-05-22 PROBLEM — R51.9 HEADACHE: Status: ACTIVE | Noted: 2019-05-22

## 2019-05-22 PROBLEM — R41.82 ALTERED MENTAL STATUS: Status: ACTIVE | Noted: 2019-05-22

## 2019-05-22 LAB
ANION GAP SERPL CALCULATED.3IONS-SCNC: 14 MMOL/L (ref 9–17)
BUN BLDV-MCNC: 28 MG/DL (ref 8–23)
BUN/CREAT BLD: ABNORMAL (ref 9–20)
CALCIUM SERPL-MCNC: 9.3 MG/DL (ref 8.6–10.4)
CHLORIDE BLD-SCNC: 104 MMOL/L (ref 98–107)
CO2: 22 MMOL/L (ref 20–31)
CREAT SERPL-MCNC: 0.66 MG/DL (ref 0.5–0.9)
EKG ATRIAL RATE: 63 BPM
EKG P AXIS: 57 DEGREES
EKG P-R INTERVAL: 122 MS
EKG Q-T INTERVAL: 422 MS
EKG QRS DURATION: 90 MS
EKG QTC CALCULATION (BAZETT): 431 MS
EKG R AXIS: 51 DEGREES
EKG T AXIS: 38 DEGREES
EKG VENTRICULAR RATE: 63 BPM
GFR AFRICAN AMERICAN: >60 ML/MIN
GFR NON-AFRICAN AMERICAN: >60 ML/MIN
GFR SERPL CREATININE-BSD FRML MDRD: ABNORMAL ML/MIN/{1.73_M2}
GFR SERPL CREATININE-BSD FRML MDRD: ABNORMAL ML/MIN/{1.73_M2}
GLUCOSE BLD-MCNC: 97 MG/DL (ref 70–99)
HCT VFR BLD CALC: 47.7 % (ref 36.3–47.1)
HEMOGLOBIN: 14.8 G/DL (ref 11.9–15.1)
MCH RBC QN AUTO: 30.1 PG (ref 25.2–33.5)
MCHC RBC AUTO-ENTMCNC: 31 G/DL (ref 28.4–34.8)
MCV RBC AUTO: 97.1 FL (ref 82.6–102.9)
NRBC AUTOMATED: 0 PER 100 WBC
PDW BLD-RTO: 15 % (ref 11.8–14.4)
PLATELET # BLD: 184 K/UL (ref 138–453)
PMV BLD AUTO: 12.8 FL (ref 8.1–13.5)
POTASSIUM SERPL-SCNC: 4 MMOL/L (ref 3.7–5.3)
RBC # BLD: 4.91 M/UL (ref 3.95–5.11)
SODIUM BLD-SCNC: 140 MMOL/L (ref 135–144)
TROPONIN INTERP: NORMAL
TROPONIN T: NORMAL NG/ML
TROPONIN, HIGH SENSITIVITY: 10 NG/L (ref 0–14)
WBC # BLD: 7.3 K/UL (ref 3.5–11.3)

## 2019-05-22 PROCEDURE — 6360000002 HC RX W HCPCS: Performed by: NURSE PRACTITIONER

## 2019-05-22 PROCEDURE — 92610 EVALUATE SWALLOWING FUNCTION: CPT

## 2019-05-22 PROCEDURE — 97535 SELF CARE MNGMENT TRAINING: CPT

## 2019-05-22 PROCEDURE — 97162 PT EVAL MOD COMPLEX 30 MIN: CPT

## 2019-05-22 PROCEDURE — 6370000000 HC RX 637 (ALT 250 FOR IP): Performed by: NURSE PRACTITIONER

## 2019-05-22 PROCEDURE — 92523 SPEECH SOUND LANG COMPREHEN: CPT

## 2019-05-22 PROCEDURE — 2580000003 HC RX 258: Performed by: NURSE PRACTITIONER

## 2019-05-22 PROCEDURE — 71045 X-RAY EXAM CHEST 1 VIEW: CPT

## 2019-05-22 PROCEDURE — 1200000000 HC SEMI PRIVATE

## 2019-05-22 PROCEDURE — 99222 1ST HOSP IP/OBS MODERATE 55: CPT | Performed by: INTERNAL MEDICINE

## 2019-05-22 PROCEDURE — 93010 ELECTROCARDIOGRAM REPORT: CPT | Performed by: INTERNAL MEDICINE

## 2019-05-22 PROCEDURE — 94640 AIRWAY INHALATION TREATMENT: CPT

## 2019-05-22 PROCEDURE — 97530 THERAPEUTIC ACTIVITIES: CPT

## 2019-05-22 PROCEDURE — 97166 OT EVAL MOD COMPLEX 45 MIN: CPT

## 2019-05-22 RX ORDER — ACETAMINOPHEN 325 MG/1
650 TABLET ORAL EVERY 4 HOURS PRN
Status: DISCONTINUED | OUTPATIENT
Start: 2019-05-22 | End: 2019-05-24 | Stop reason: HOSPADM

## 2019-05-22 RX ORDER — CARVEDILOL 3.12 MG/1
3.12 TABLET ORAL 2 TIMES DAILY WITH MEALS
Status: DISCONTINUED | OUTPATIENT
Start: 2019-05-22 | End: 2019-05-24 | Stop reason: HOSPADM

## 2019-05-22 RX ORDER — ASPIRIN 325 MG
325 TABLET ORAL DAILY
Status: DISCONTINUED | OUTPATIENT
Start: 2019-05-22 | End: 2019-05-24 | Stop reason: HOSPADM

## 2019-05-22 RX ORDER — ONDANSETRON 2 MG/ML
4 INJECTION INTRAMUSCULAR; INTRAVENOUS EVERY 6 HOURS PRN
Status: DISCONTINUED | OUTPATIENT
Start: 2019-05-22 | End: 2019-05-24 | Stop reason: HOSPADM

## 2019-05-22 RX ORDER — SODIUM CHLORIDE 0.9 % (FLUSH) 0.9 %
10 SYRINGE (ML) INJECTION PRN
Status: DISCONTINUED | OUTPATIENT
Start: 2019-05-22 | End: 2019-05-24 | Stop reason: HOSPADM

## 2019-05-22 RX ORDER — POTASSIUM CHLORIDE 20 MEQ/1
40 TABLET, EXTENDED RELEASE ORAL PRN
Status: DISCONTINUED | OUTPATIENT
Start: 2019-05-22 | End: 2019-05-24 | Stop reason: HOSPADM

## 2019-05-22 RX ORDER — MAGNESIUM SULFATE 1 G/100ML
1 INJECTION INTRAVENOUS PRN
Status: DISCONTINUED | OUTPATIENT
Start: 2019-05-22 | End: 2019-05-24 | Stop reason: HOSPADM

## 2019-05-22 RX ORDER — NICOTINE 21 MG/24HR
1 PATCH, TRANSDERMAL 24 HOURS TRANSDERMAL DAILY PRN
Status: DISCONTINUED | OUTPATIENT
Start: 2019-05-22 | End: 2019-05-24 | Stop reason: HOSPADM

## 2019-05-22 RX ORDER — SODIUM CHLORIDE 9 MG/ML
INJECTION, SOLUTION INTRAVENOUS CONTINUOUS
Status: DISCONTINUED | OUTPATIENT
Start: 2019-05-22 | End: 2019-05-24

## 2019-05-22 RX ORDER — ALBUTEROL SULFATE 2.5 MG/3ML
2.5 SOLUTION RESPIRATORY (INHALATION)
Status: DISCONTINUED | OUTPATIENT
Start: 2019-05-22 | End: 2019-05-24 | Stop reason: HOSPADM

## 2019-05-22 RX ORDER — POTASSIUM CHLORIDE 7.45 MG/ML
10 INJECTION INTRAVENOUS PRN
Status: DISCONTINUED | OUTPATIENT
Start: 2019-05-22 | End: 2019-05-24 | Stop reason: HOSPADM

## 2019-05-22 RX ORDER — FLUTICASONE PROPIONATE 50 MCG
1 SPRAY, SUSPENSION (ML) NASAL DAILY
Status: DISCONTINUED | OUTPATIENT
Start: 2019-05-22 | End: 2019-05-24 | Stop reason: HOSPADM

## 2019-05-22 RX ORDER — BUTALBITAL, ACETAMINOPHEN AND CAFFEINE 50; 325; 40 MG/1; MG/1; MG/1
2 TABLET ORAL EVERY 4 HOURS PRN
Status: DISCONTINUED | OUTPATIENT
Start: 2019-05-22 | End: 2019-05-24 | Stop reason: HOSPADM

## 2019-05-22 RX ORDER — SODIUM CHLORIDE 0.9 % (FLUSH) 0.9 %
10 SYRINGE (ML) INJECTION EVERY 12 HOURS SCHEDULED
Status: DISCONTINUED | OUTPATIENT
Start: 2019-05-22 | End: 2019-05-24 | Stop reason: HOSPADM

## 2019-05-22 RX ADMIN — CARVEDILOL 3.12 MG: 3.12 TABLET, FILM COATED ORAL at 09:20

## 2019-05-22 RX ADMIN — SODIUM CHLORIDE: 9 INJECTION, SOLUTION INTRAVENOUS at 14:18

## 2019-05-22 RX ADMIN — ACETAMINOPHEN 650 MG: 325 TABLET ORAL at 09:25

## 2019-05-22 RX ADMIN — FLUTICASONE PROPIONATE 1 SPRAY: 50 SPRAY, METERED NASAL at 09:21

## 2019-05-22 RX ADMIN — SODIUM CHLORIDE, PRESERVATIVE FREE 10 ML: 5 INJECTION INTRAVENOUS at 09:28

## 2019-05-22 RX ADMIN — ALBUTEROL SULFATE 2.5 MG: 2.5 SOLUTION RESPIRATORY (INHALATION) at 05:55

## 2019-05-22 RX ADMIN — ENOXAPARIN SODIUM 40 MG: 40 INJECTION SUBCUTANEOUS at 09:21

## 2019-05-22 RX ADMIN — ONDANSETRON 4 MG: 2 INJECTION INTRAMUSCULAR; INTRAVENOUS at 16:43

## 2019-05-22 RX ADMIN — BUTALBITAL, ACETAMINOPHEN, AND CAFFEINE 2 TABLET: 50; 325; 40 TABLET ORAL at 16:41

## 2019-05-22 RX ADMIN — ASPIRIN 325 MG: 325 TABLET, COATED ORAL at 09:21

## 2019-05-22 RX ADMIN — CARVEDILOL 3.12 MG: 3.12 TABLET, FILM COATED ORAL at 16:46

## 2019-05-22 RX ADMIN — ALBUTEROL SULFATE 2.5 MG: 2.5 SOLUTION RESPIRATORY (INHALATION) at 12:12

## 2019-05-22 RX ADMIN — SODIUM CHLORIDE: 9 INJECTION, SOLUTION INTRAVENOUS at 02:15

## 2019-05-22 RX ADMIN — ACETAMINOPHEN 650 MG: 325 TABLET ORAL at 05:03

## 2019-05-22 RX ADMIN — ACETAMINOPHEN 650 MG: 325 TABLET ORAL at 13:48

## 2019-05-22 ASSESSMENT — PAIN SCALES - GENERAL
PAINLEVEL_OUTOF10: 2
PAINLEVEL_OUTOF10: 8
PAINLEVEL_OUTOF10: 4
PAINLEVEL_OUTOF10: 8
PAINLEVEL_OUTOF10: 5
PAINLEVEL_OUTOF10: 4
PAINLEVEL_OUTOF10: 7
PAINLEVEL_OUTOF10: 0
PAINLEVEL_OUTOF10: 0
PAINLEVEL_OUTOF10: 8
PAINLEVEL_OUTOF10: 5
PAINLEVEL_OUTOF10: 5
PAINLEVEL_OUTOF10: 2
PAINLEVEL_OUTOF10: 8

## 2019-05-22 ASSESSMENT — PAIN DESCRIPTION - DESCRIPTORS
DESCRIPTORS: DULL
DESCRIPTORS: ACHING;DULL
DESCRIPTORS: ACHING;HEADACHE
DESCRIPTORS: DULL
DESCRIPTORS: DULL
DESCRIPTORS: DULL;ACHING

## 2019-05-22 ASSESSMENT — PAIN DESCRIPTION - PAIN TYPE
TYPE: ACUTE PAIN

## 2019-05-22 ASSESSMENT — PAIN - FUNCTIONAL ASSESSMENT
PAIN_FUNCTIONAL_ASSESSMENT: PREVENTS OR INTERFERES SOME ACTIVE ACTIVITIES AND ADLS

## 2019-05-22 ASSESSMENT — PAIN DESCRIPTION - PROGRESSION
CLINICAL_PROGRESSION: NOT CHANGED
CLINICAL_PROGRESSION: NOT CHANGED

## 2019-05-22 ASSESSMENT — PAIN DESCRIPTION - LOCATION
LOCATION: HEAD

## 2019-05-22 ASSESSMENT — PAIN DESCRIPTION - FREQUENCY
FREQUENCY: INTERMITTENT
FREQUENCY: CONTINUOUS
FREQUENCY: INTERMITTENT

## 2019-05-22 ASSESSMENT — PAIN DESCRIPTION - ONSET
ONSET: ON-GOING
ONSET: ON-GOING

## 2019-05-22 ASSESSMENT — PAIN DESCRIPTION - ORIENTATION: ORIENTATION: ANTERIOR

## 2019-05-22 NOTE — ED PROVIDER NOTES
Interpretation    Interpreted by me, normal sinus rhythm heart rate 63, normal axis, no ST elevation QT corrected 431      CRITICAL CARE: There was a high probability of clinically significant/life threatening deterioration in this patient's condition which required my urgent intervention. Total critical care time was 0  minutes. This excludes any time for separately reportable procedures.        [facility] DO  05/21/19 [facility], DO  05/22/19 0019       [facility],   05/22/19 5743

## 2019-05-22 NOTE — PROGRESS NOTES
Smoking Cessation - topics covered   [x]  Health Risks  [x]  Benefits of Quitting   [x]  Smoking Cessation  []  Patient has no history of tobacco use  []  Patient is former smoker. []  No need for tobacco cessation education. [x]  Booklet given  [x]  Patient verbalizes understanding. []  Patient denies need for tobacco cessation education. []  Unable to meet with patient today. Will follow up as able.   Aziza Meek  2:24 PM

## 2019-05-22 NOTE — PROGRESS NOTES
environment. Impression  Pt. Presents with + s/s of aspiration 1/2 trials with soft solid. No other immediate s/s of aspiration noted with all other consistencies tested.  + latent cough noted after all trials were given and testing was completed. RN present to give medication. No s/s of aspiration with medication in puree. Occasional swallow delay noted, otherwise oral phase is functional.  Pt. With c/o discomfort when swallowing. ST to recommend level III soft solid diet with thin liquid and video swallow study. Results and recommendations reported to RN. Treatment Plan  Requires SLP Intervention: Yes  Duration/Frequency of Treatment: 3-5 x week   D/C Recommendations:  No therapy recommended at discharge. Recommended Diet and Intervention  Diet Solids Recommendation: Dysphagia III Advanced  Liquid Consistency Recommendation: Thin  Recommended Form of Meds: Meds in puree  Recommendations: Modified barium swallow study  Therapeutic Interventions: Diet tolerance monitoring;Patient/Family education    Compensatory Swallowing Strategies  Compensatory Swallowing Strategies: Small bites/sips;Eat/Feed slowly;Upright as possible for all oral intake    Treatment/Goals  Dysphagia Goals: The patient will tolerate recommended diet without observed clinical signs of aspiration; The patient will tolerate instrumental swallowing procedure    General  Chart Reviewed: Yes  Behavior/Cognition: Alert; Cooperative  Temperature Spikes Noted: No  Respiratory Status: O2 via nasual cannula  O2 Device: Nasal cannula  Communication Observation: Functional  Follows Directions: Simple  Dentition: Adequate  Patient Positioning: Upright in bed  Consistencies Administered: Reg solid; Soft solid;Puree; Thin - straw;Nectar - straw    Vision/Hearing  Vision  Vision: Impaired(wears glasses)  Hearing  Hearing: Within functional limits    Oral Motor Deficits  Oral/Motor  Oral Motor:  Within functional limits    Oral Phase Dysfunction  Oral Phase  Oral Phase: WFL  Oral Phase  Oral Phase - Comment: Occasional swallow delay noted throughout evaluation, however oral phase is within functional limits. Indicators of Pharyngeal Phase Dysfunction   Pharyngeal Phase   Pharyngeal: + s/s of aspiratin x 1 with soft solids. No immediate s/s of aspiration with all other consistencies tested.  + latent cough noted after all consistencies given. RN present, gave medication. No s/s of aspiration. Prognosis  Prognosis  Prognosis for safe diet advancement: fair  Individuals consulted  Consulted and agree with results and recommendations: Patient; Family member  Family member consulted: daughters     Education  Patient Education: yes  Patient Education Response: Verbalizes understanding          Therapy Time  SLP Individual Minutes  Time In: 7248  Time Out: 2254  Minutes: Mari York M.A.  CCC-SLP  5/22/2019 2:30 PM

## 2019-05-22 NOTE — ED NOTES
Bed: 28  Expected date:   Expected time:   Means of arrival:   Comments:  held     Germán Bone RN  05/21/19 6906

## 2019-05-22 NOTE — CARE COORDINATION
Case Management Initial Discharge Plan  Cherelle Waller,             Met with:patient or family member patient and daughter to discuss discharge plans. Information verified: address, contacts, phone number, , insurance Yes  PCP: JOHNATHAN Lester CNP  Date of last visit: 2019    Insurance Provider: Medicare primary, [facility] secondary    Discharge Planning    Living Arrangements:  Family Members, Children   Support Systems:  Children, Family Members    Home has 2 stories  3 stairs to climb to get into front door, one flight of stairs to climb to reach second floor  Location of bedroom/bathroom in home is on the main level    Patient able to perform ADL's:Assisted    Current Services (outpatient & in home) none  DME equipment: none  DME provider: n/a    Pharmacy: [facility] in [address] [place] [address] Medications:  No  Does patient want to participate in local refill/ meds to beds program?  No    Potential Assistance Needed:  [facility]    Patient agreeable to home care: No  Queen of choice provided:  n/a    Prior SNF/Rehab Placement and Facility: [facility]. Pt plans on returning when discharged. Agreeable to SNF/Rehab: Yes  Queen of choice provided: yes   Evaluation: n/a    Expected Discharge date:  19  Patient expects to be discharged to:  SNF  Follow Up Appointment: Best Day/ Time:      Transportation provider: [employer]  Transportation arrangements needed for discharge: Yes, [facility]    Readmission Risk              Risk of Unplanned Readmission:        12       Does patient have a readmission risk score greater than 14?: No  If yes, follow-up appointment must be made within 7 days of discharge. Discharge Plan: return to [facility] in [facility] [place] with [facility] [phone]- pt is a bed hold at [facility].  No HENS needed, complete BRETT/AVS, They will gather all needed information in [facility]. Call [employer] [facility] [address] [phone] when pt is discharged.      Dee Dee Martinez to request DME walker order and face to face      Electronically signed by Len Borrego RN on 5/22/19 at 3:31 PM

## 2019-05-22 NOTE — H&P
[facility]    HISTORY AND PHYSICAL EXAMINATION            Date:   5/22/2019  Patient name:  Deb Flynn  Date of admission:  5/21/2019 10:01 PM  MRN:   [id number]  Account:  [de-identified]  YOB: 1950  PCP:    JOHNATHAN Gaitan CNP  Room:   0208/4014-61  Code Status:    Full Code    Chief Complaint:     Chief Complaint   Patient presents with    Nausea    Dizziness    Headache       History Obtained From:     patient, electronic medical record    History of Present Illness: The patient is a 79 y.o. F with PMH significant for HTN, CAD, tobacco use who presented with headache. Patient was recently d/c from hospital to SNF. Admitted at that time for headache and dizziness. Underwent shunt placement by neurosurgery for hydrocephalus on 5/16. Remained stable post op, reported some improvement in headache. At SNF states headache remained persistent. Per family was having difficulty tolerating therapy. Presented to [facility]. CT head done demonstrated increasing size of 3rd and lateral ventricles, decreasing size of 4th ventricle. Transferred to [facility] for neurosurgery re-evaluation. Past Medical History:     Past Medical History:   Diagnosis Date    Chronic obstructive pulmonary disease ([address]     Coronary artery disease     Dyslipidemia     Obesity     Pneumonia         Past Surgical History:     Past Surgical History:   Procedure Laterality Date    CARDIAC CATHETERIZATION      CARDIAC CATHETERIZATION  2012    cath with x1 stent    EYE SURGERY      shar cataract surg x2 y ago    VENTRICULOPERITONEAL SHUNT Right 5/16/2019    VENTRICULAR PERITONEAL SHUNT INSERTION ENDOSCOPIC performed by Evelia Webster MD at [place] [address]        Medications Prior to Admission:     Prior to Admission medications    Medication Sig Start Date End Date Taking?  Authorizing Provider   butalbital-acetaminophen-caffeine (FIORICET, ESGIC) -40 MG per tablet Take 1 tablet by mouth every 4 hours as needed for Headaches 5/20/19   Jes Davsi MD   carvedilol (COREG) 3.125 MG tablet Take 1 tablet by mouth 2 times daily (with meals) 5/20/19   Jes Davis MD   ondansetron (ZOFRAN ODT) 4 MG disintegrating tablet Take 1 tablet by mouth every 8 hours as needed for Nausea 5/12/19   Evangelina Rico MD   ibuprofen (ADVIL;MOTRIN) 600 MG tablet Take 1 tablet by mouth every 8 hours as needed for Pain 5/12/19   Evangelina Rico MD   aspirin 325 MG tablet Take 325 mg by mouth daily    Historical Provider, MD   ondansetron (ZOFRAN) 4 MG tablet Take 1 tablet by mouth every 8 hours as needed for Nausea 5/2/19   Doni Tucker DO   tiZANidine (ZANAFLEX) 2 MG tablet Take 1 tablet by mouth 3 times daily as needed (muscle spasm or headach) 4/24/19   Ayanna Mckeon MD   fluticasone [place]) 50 MCG/ACT nasal spray 1 spray by Nasal route daily 4/4/19   Ayanna Mckeon MD   Pseudoephedrine HCl (SUDAFED 12 HOUR PO) Take 1 tablet by mouth 2 times daily    Historical Provider, MD   albuterol sulfate  (90 Base) MCG/ACT inhaler Inhale 2 puffs into the lungs 4 times daily as needed for Wheezing 3/22/19   Radha Lane, JOHNATHAN - CNP   Pseudoephedrine-APAP-DM (DAYQUIL PO) Take by mouth    Historical Provider, MD   loratadine (CLARITIN) 10 MG tablet Take 10 mg by mouth daily    Historical Provider, MD        Allergies:     Vicodin [hydrocodone-acetaminophen]    Social History:     Tobacco:    reports that she has been smoking. She started smoking about 49 years ago. She has a 50.00 pack-year smoking history. She has never used smokeless tobacco.  Alcohol:      reports that she does not drink alcohol. Drug Use:  reports that she does not use drugs. Family History:     Family history significant for diabetes in mother     Review of Systems:     Positive and Negative as described in HPI.     CONSTITUTIONAL:  negative for fevers, chills  HEENT: negative for hearing changes, runny nose, throat pain  RESPIRATORY:  negative for shortness of breath, cough, congestion, wheezing. CARDIOVASCULAR:  negative for chest pain, palpitations  GASTROINTESTINAL:  negative for nausea, vomiting, change in bowel habits  GENITOURINARY:  negative for difficulty of urination, burning with urination, frequency   INTEGUMENT:  negative for rash, skin lesions, easy bruising   HEMATOLOGIC/LYMPHATIC:  negative for swelling/edema   ALLERGIC/IMMUNOLOGIC:  negative for urticaria , itching  ENDOCRINE:  negative increase in drinking, increase in urination, hot or cold intolerance  MUSCULOSKELETAL:  negative joint pains, muscle aches, swelling of joints  NEUROLOGICAL:  Positive for headache  BEHAVIOR/PSYCH:  negative for depression, anxiety    Physical Exam:   /89   Pulse 74   Temp 97.4 °F (36.3 °C) (Oral)   Resp 14   Ht 5' 8\" (1.727 m)   Wt 220 lb (99.8 kg)   LMP 2000 (Approximate)   SpO2 92%   BMI 33.45 kg/m²   Temp (24hrs), Av.2 °F (36.8 °C), Min:97.4 °F (36.3 °C), Max:98.6 °F (37 °C)    No results for input(s): POCGLU in the last 72 hours.     Intake/Output Summary (Last 24 hours) at 2019 2537  Last data filed at 2019 4845  Gross per 24 hour   Intake 775 ml   Output 225 ml   Net 550 ml       General Appearance:  alert, no acute distress  Mental status: oriented to person, place, and time  Head:  normocephalic, atraumatic, staples in place  Eye: no icterus, redness, pupils equal and reactive, extraocular eye movements intact, conjunctiva clear  Ear: normal external ear, no discharge, hearing intact  Nose:  no drainage noted  Mouth: mucous membranes moist  Neck: supple, no carotid bruits  Lungs: Bilateral equal air entry, clear to ausculation, no wheezing  Cardiovascular: normal rate, regular rhythm  Abdomen: Soft, nontender, nondistended, normal bowel sounds  Neurologic: There are no new focal motor or sensory deficits, normal speech, cranial nerves II through XII grossly intact  Skin: No gross lesions, rashes, bruising or bleeding on exposed skin area  Extremities:  peripheral pulses palpable, no edema or calf pain with palpation  Psych: normal affect    Investigations:      Laboratory Testing:  Recent Results (from the past 24 hour(s))   EKG 12 Lead    Collection Time: 05/21/19  6:31 PM   Result Value Ref Range    Ventricular Rate 83 BPM    Atrial Rate 83 BPM    P-R Interval 126 ms    QRS Duration 84 ms    Q-T Interval 384 ms    QTc Calculation (Bazett) 451 ms    P Axis 57 degrees    R Axis 50 degrees    T Axis 38 degrees   Basic Metabolic Panel    Collection Time: 05/21/19  6:33 PM   Result Value Ref Range    Glucose 110 (H) 70 - 99 mg/dL    BUN 27 (H) 8 - 23 mg/dL    CREATININE 0.62 0.50 - 0.90 mg/dL    Bun/Cre Ratio 44 (H) 9 - 20    Calcium 9.5 8.6 - 10.4 mg/dL    Sodium 142 135 - 144 mmol/L    Potassium 3.8 3.7 - 5.3 mmol/L    Chloride 104 98 - 107 mmol/L    CO2 26 20 - 31 mmol/L    Anion Gap 12 9 - 17 mmol/L    GFR Non-African American >60 >60 mL/min    GFR African American >60 >60 mL/min    GFR Comment          GFR Staging NOT REPORTED    Troponin    Collection Time: 05/21/19  6:33 PM   Result Value Ref Range    Troponin, High Sensitivity 12 0 - 14 ng/L    Troponin T NOT REPORTED <0.03 ng/mL    Troponin Interp NOT REPORTED    CBC Auto Differential    Collection Time: 05/21/19  6:33 PM   Result Value Ref Range    WBC 6.5 3.5 - 11.0 k/uL    RBC 4.81 4.0 - 5.2 m/uL    Hemoglobin 15.0 12.0 - 16.0 g/dL    Hematocrit 45.3 36 - 46 %    MCV 94.2 80 - 100 fL    MCH 31.2 26 - 34 pg    MCHC 33.1 31 - 37 g/dL    RDW 16.8 (H) 11.0 - 14.5 %    Platelets 119 243 - 748 k/uL    MPV 9.7 6.0 - 12.0 fL    NRBC Automated NOT REPORTED per 100 WBC    Differential Type NOT REPORTED     Immature Granulocytes NOT REPORTED 0 %    Absolute Immature Granulocyte NOT REPORTED 0.00 - 0.30 k/uL    WBC Morphology NOT REPORTED     RBC Morphology NOT REPORTED     Platelet Estimate NOT REPORTED     Seg Neutrophils 72 43 - 77 %    Lymphocytes 14 (L) 16 - 46 %    Monocytes 12 (H) 4 - 11 %    Eosinophils % 2 1 - 7 %    Basophils 0 0 - 1 %    Segs Absolute 4.70 1.8 - 7.7 k/uL    Absolute Lymph # 0.90 (L) 1.0 - 4.8 k/uL    Absolute Mono # 0.80 0.1 - 1.2 k/uL    Absolute Eos # 0.10 0.0 - 0.4 k/uL    Basophils # 0.00 0.0 - 0.2 k/uL   EKG 12 Lead    Collection Time: 05/21/19 11:44 PM   Result Value Ref Range    Ventricular Rate 63 BPM    Atrial Rate 63 BPM    P-R Interval 122 ms    QRS Duration 90 ms    Q-T Interval 422 ms    QTc Calculation (Bazett) 431 ms    P Axis 57 degrees    R Axis 51 degrees    T Axis 38 degrees   Basic Metabolic Panel    Collection Time: 05/21/19 11:58 PM   Result Value Ref Range    Glucose 97 70 - 99 mg/dL    BUN 28 (H) 8 - 23 mg/dL    CREATININE 0.66 0.50 - 0.90 mg/dL    Bun/Cre Ratio NOT REPORTED 9 - 20    Calcium 9.3 8.6 - 10.4 mg/dL    Sodium 140 135 - 144 mmol/L    Potassium 4.0 3.7 - 5.3 mmol/L    Chloride 104 98 - 107 mmol/L    CO2 22 20 - 31 mmol/L    Anion Gap 14 9 - 17 mmol/L    GFR Non-African American >60 >60 mL/min    GFR African American >60 >60 mL/min    GFR Comment          GFR Staging NOT REPORTED    CBC    Collection Time: 05/21/19 11:58 PM   Result Value Ref Range    WBC 7.3 3.5 - 11.3 k/uL    RBC 4.91 3.95 - 5.11 m/uL    Hemoglobin 14.8 11.9 - 15.1 g/dL    Hematocrit 47.7 (H) 36.3 - 47.1 %    MCV 97.1 82.6 - 102.9 fL    MCH 30.1 25.2 - 33.5 pg    MCHC 31.0 28.4 - 34.8 g/dL    RDW 15.0 (H) 11.8 - 14.4 %    Platelets 572 920 - 906 k/uL    MPV 12.8 8.1 - 13.5 fL    NRBC Automated 0.0 0.0 per 100 WBC   Troponin    Collection Time: 05/21/19 11:58 PM   Result Value Ref Range    Troponin, High Sensitivity 10 0 - 14 ng/L    Troponin T NOT REPORTED <0.03 ng/mL    Troponin Interp NOT REPORTED        Imaging/Diagnostics:    Ct Head Wo Contrast    Result Date: 5/21/2019  Increasing size of the 3rd and lateral ventricles with decreasing size of the 4th ventricle.   There is corresponding increased extra-axial fluid along the tentorium with associated mass effect on the cerebellar hemispheres and subsequent decreased size of the 4th ventricle. This likely gives rise to mass-effect at the aqueduct level which most likely corresponds to the ventricular enlargement above the tentorium. There is also a small amount of developing transependymal CSF flow marginating the ventricles. Post shunt catheter placement as described A small amount of hemorrhage is noted in the ventricle Continued follow-up recommended     Xr Chest Portable    Result Date: 5/22/2019  Stable chest x-ray. No acute disease.      Xr Shunt Series Placement (<4 Views)    Result Date: 5/21/2019  Shunt catheter as described       Assessment :      Primary Problem  Headache    Active Hospital Problems    Diagnosis Date Noted    S/P  shunt [Z98.2] 05/22/2019    Headache [R51] 05/22/2019    Hydrocephalus [G91.9] 05/15/2019    Chronic obstructive pulmonary disease ([facility] [J44.9] 05/07/2019    Tobacco abuse [Z72.0] 03/21/2018    CAD (coronary artery disease) [I25.10] 08/18/2013       Plan:     Patient status Admit in the  Progressive Unit/Step down    - Labs and CT scan reviewed  - Neurosurgery consult  - Resume home medications  - Pain control  - PT/OT      Consultations:   IP CONSULT TO NEUROSURGERY  IP CONSULT TO HOSPITALIST  IP CONSULT TO Sara Duran MD  5/22/2019  8:33 AM    Copy sent to Dr. Rocio Ramires, APRN - CNP

## 2019-05-22 NOTE — PROGRESS NOTES
Physical Therapy    Facility/Department: [facility] NEURO  Initial Assessment    NAME: Joseph Dickinson  : 1950  MRN: [id number]    Date of Service: 2019    Discharge Recommendations: Further therapy recommended at discharge. PT Equipment Recommendations  Equipment Needed: (CTA)    Assessment   Body structures, Functions, Activity limitations: Decreased functional mobility ; Decreased endurance;Decreased ADL status; Decreased balance;Decreased strength  Assessment: The pt is grossly Min A for functional transfers and ambulation and is unsafe to perform mobility independently due to risk of falls. Recommend additional therapy to progress toward prior level of independence  Prognosis: Good  Decision Making: Medium Complexity  Patient Education: POC, importance of mobility, purpose of eval, completing supine ther ex to tolerance  REQUIRES PT FOLLOW UP: Yes  Activity Tolerance  Activity Tolerance: Other;Patient limited by pain(dizziness and nausea)       Patient Diagnosis(es): The encounter diagnosis was S/P  shunt.     has a past medical history of Chronic obstructive pulmonary disease ([facility] Coronary artery disease, Dyslipidemia, Obesity, and Pneumonia. has a past surgical history that includes Cardiac catheterization; Ventriculoperitoneal shunt (Right, 2019); eye surgery; and Cardiac catheterization ().     Restrictions  Restrictions/Precautions  Restrictions/Precautions: General Precautions, Fall Risk  Required Braces or Orthoses?: No  Position Activity Restriction  Other position/activity restrictions: up with assist, dizziness and nausea with mobility  Vision/Hearing  Vision: Within Functional Limits(However, pt dizzy, having difficulty focusing)  Hearing: Within functional limits     Subjective  General  Patient assessed for rehabilitation services?: Yes  Response To Previous Treatment: Not applicable  Family / Caregiver Present: Yes(dtrs arriving following evaluation)  Follows Commands: Within Functional Limits  Subjective  Subjective: RN and pt agreeable to PT. Pt sitting on bedside commode upon arrival, very pleasant and cooperative throughout. Pain Screening  Patient Currently in Pain: Yes  Pain Assessment  Pain Assessment: 0-10  Pain Level: 8  Pain Type: Acute pain  Pain Location: Head  Pain Descriptors: Dull;Aching  Non-Pharmaceutical Pain Intervention(s): Ambulation/Increased Activity  Response to Pain Intervention: Patient Satisfied  Vital Signs  Patient Currently in Pain: Yes       Orientation  Orientation  Overall Orientation Status: Within Functional Limits  Social/Functional History  Social/Functional History  Lives With: Daughter(and 22 yo granddtr)  Type of Home: House  Home Layout: Two level, Performs ADL's on one level, Able to Live on Main level with bedroom/bathroom  Home Access: Stairs to enter without rails  Entrance Stairs - Number of Steps: 3 or 5  Bathroom Shower/Tub: Tub/Shower unit  Bathroom Toilet: Handicap height  Bathroom Accessibility: Accessible  ADL Assistance: Independent  Homemaking Assistance: Independent  Homemaking Responsibilities: Yes  Ambulation Assistance: Independent  Transfer Assistance: Independent  Active : Yes  Mode of Transportation: Car  Occupation: Part time employment  Type of occupation: [employer]  Leisure & Hobbies: reading/computer  Additional Comments: Above social hx represents pt prior to  shunt placement, pt went to [facility] for 1 day prior to returning to hospital  Cognition   Cognition  Overall Cognitive Status: WFL    Objective          Joint Mobility  Spine: WFL  ROM RLE: WFL  ROM LLE: WFL  ROM RUE: WFL  ROM LUE: WFL  Strength RLE  Strength RLE: WFL  Strength LLE  Strength LLE: WFL  Strength RUE  Strength RUE: WFL  Strength LUE  Strength LUE: WFL  Tone RLE  RLE Tone: Normotonic  Tone LLE  LLE Tone: Normotonic  Motor Control  Gross Motor?: WFL  Sensation  Overall Sensation Status: WFL  Bed mobility  Sit to Supine:  Moderate assistance(for BLE progression)  Scooting: Stand by assistance  Comment: Pt sitting upon arrival, retired to supine following ambulation  Transfers  Sit to Stand: Minimal Assistance  Stand to sit: Contact guard assistance  Bed to Chair: Minimal assistance  Comment: increased time required for transfers d/t headache and dizziness  Ambulation  Ambulation?: Yes  Ambulation 1  Surface: level tile  Device: Rolling Walker  Assistance: Minimal assistance  Quality of Gait: decreased romero and step length, mildly unsteady, kyphotic posture  Distance: 10ft  Comments: VC's to maintain RW in DAVIN  Stairs/Curb  Stairs?: No     Balance  Posture: Fair  Sitting - Static: Good;-  Sitting - Dynamic: Fair;+  Standing - Static: Fair  Standing - Dynamic: Fair;-  Comments: Standing balance assessed with RW        Plan   Plan  Times per week: 5x/wk  Current Treatment Recommendations: Strengthening, Balance Training, Functional Mobility Training, Endurance Training, Transfer Training, Gait Training, Stair training, Neuromuscular Re-education, ROM, Patient/Caregiver Education & Training, Safety Education & Training, Home Exercise Program  Safety Devices  Type of devices: Left in bed, Gait belt, Call light within reach, Nurse notified, Bed alarm in place  Restraints  Initially in place: No    AM-PAC Score  AM-PAC Inpatient Mobility Raw Score : 16  AM-PAC Inpatient T-Scale Score : 40.78  Mobility Inpatient CMS 0-100% Score: 54.16  Mobility Inpatient CMS G-Code Modifier : CK          Goals  Short term goals  Time Frame for Short term goals: 14 visits  Short term goal 1: Perform bed mobility with SBA  Short term goal 2: Mod I for sit to stand  Short term goal 3: Ambulate 200ft with RW and SBA  Short term goal 4: Ascend/descend 4 steps with CGA  Short term goal 5: Participate in 30 minutes of therapy to demo increased activity tolerance       Therapy Time   Individual Concurrent Group Co-treatment   Time In 0841         Time Out 0900 Minutes 19         Timed Code Treatment Minutes: 8 Minutes       Josué Simms, PT

## 2019-05-22 NOTE — PLAN OF CARE
[facility]    Second Visit Note  For more detailed information please refer to the progress note of the day      5/22/2019    6:40 PM    Name:   Donald Benjamin  MRN:     [id number]     Acct:      [de-identified]   Room:   9863/5388-09   Day:  0  Admit Date:  5/21/2019 10:01 PM    PCP:   JOHNATHAN Stevens CNP  Code Status:  Full Code        Pt vitals were reviewed   New labs were reviewed   Patient was seen    Updated plan :     1. Seen by NS, shunt adjusted  2. States improvement in pressure headache  3.  No other complaints         Travis Correia MD  5/22/2019  6:40 PM

## 2019-05-22 NOTE — CONSULTS
Department of Neurosurgery                                       Resident Consult Note    Neurosurgeon:   []Dr. Pierce Tolbert  []Dr. Prachi Hearn  []Dr. Jossue Joiner  [x]Dr. Quinn South  []Dr. Carleton Boxer  []Dr. Emiliano Aldana    History Obtained From:  patient    CHIEF COMPLAINT:         HA    HISTORY OF PRESENT ILLNESS:       The patient is a 79 y.o. female who presents with headache, dizziness, nausea, cough. Pt w/o fevers, chills sweats. She is s/p  shunt placement 5 days ago by Dr. Carleton Boxer. Pt denies any blurry vision, leg weakness, sensory loss, bowel/bladder dysfunction.      PAST MEDICAL HISTORY :       Past Medical History:        Diagnosis Date    Chronic obstructive [facility] disease ([facility]     Coronary artery disease     Dyslipidemia     Obesity        Past Surgical History:        Procedure Laterality Date    CARDIAC CATHETERIZATION      VENTRICULOPERITONEAL SHUNT Right 5/16/2019    VENTRICULAR PERITONEAL SHUNT INSERTION ENDOSCOPIC performed by Tru Schwarz MD at [address] History:   Social History     Socioeconomic History    Marital status:      Spouse name: Not on file    Number of children: Not on file    Years of education: Not on file    Highest education level: Not on file   Occupational History    Not on file   Social Needs    Financial resource strain: Not on file    Food insecurity:     Worry: Not on file     Inability: Not on file    Transportation needs:     Medical: Not on file     Non-medical: Not on file   Tobacco Use    Smoking status: Current Every Day Smoker     Packs/day: 1.00     Years: 50.00     Pack years: 50.00     Start date: 1/1/1970    Smokeless tobacco: Never Used   Substance and Sexual Activity    Alcohol use: No    Drug use: No    Sexual activity: Not on file   Lifestyle    Physical activity:     Days per week: Not on file     Minutes per session: Not on file    Stress: Not on file   Relationships    Social connections:     Talks on phone: Not on file     Gets together: Not on file     Attends Confucianist service: Not on file     Active member of club or organization: Not on file     Attends meetings of clubs or organizations: Not on file     Relationship status: Not on file    Intimate partner violence:     Fear of current or ex partner: Not on file     Emotionally abused: Not on file     Physically abused: Not on file     Forced sexual activity: Not on file   Other Topics Concern    Not on file   Social History Narrative    Not on file       Family History:   No family history on file. Allergies:  Vicodin [hydrocodone-acetaminophen]    Home Medications:  Prior to Admission medications    Medication Sig Start Date End Date Taking?  Authorizing Provider   butalbital-acetaminophen-caffeine (FIORICET, ESGIC) -06 MG per tablet Take 1 tablet by mouth every 4 hours as needed for Headaches 5/20/19   Conchis Arcos MD   carvedilol (COREG) 3.125 MG tablet Take 1 tablet by mouth 2 times daily (with meals) 5/20/19   Conchis Arcos MD   ondansetron (ZOFRAN ODT) 4 MG disintegrating tablet Take 1 tablet by mouth every 8 hours as needed for Nausea 5/12/19   Tee Wallace MD   ibuprofen (ADVIL;MOTRIN) 600 MG tablet Take 1 tablet by mouth every 8 hours as needed for Pain 5/12/19   Tee Wallace MD   aspirin 325 MG tablet Take 325 mg by mouth daily    Historical Provider, MD   ondansetron (ZOFRAN) 4 MG tablet Take 1 tablet by mouth every 8 hours as needed for Nausea 5/2/19   Doni Tucker DO   tiZANidine (ZANAFLEX) 2 MG tablet Take 1 tablet by mouth 3 times daily as needed (muscle spasm or headach) 4/24/19   Emmanuel Martinez MD   fluticasone [facility]) 50 MCG/ACT nasal spray 1 spray by Nasal route daily 4/4/19   Emmanuel Martinez MD   Pseudoephedrine HCl (SUDAFED 12 HOUR PO) Take 1 tablet by mouth 2 times daily    Historical Provider, MD   albuterol sulfate  (90 Base) MCG/ACT inhaler Inhale 2 puffs into the lungs 4 times daily as needed for Abnormal extension - 2 []       None - 1 []            Total GCS: 15    Mental Status:  A & O x3, awake             Cranial Nerves:    cranial nerves II-XII are grossly intact    Motor Exam:    Drift:  absent  Tone:  normal    Motor exam is symmetrical 5 out of 5 all extremities bilaterally    Sensory:    Touch:    Right Upper Extremity:  normal  Left Upper Extremity:  normal  Right Lower Extremity:  normal  Left Lower Extremity:  normal    Deep Tendon Reflexes:    Right Bicep:  2+  Left Bicep:  2+  Right Knee:  2+  Left Knee:  2+    Plantar Response:    Right:  downgoing  Left:  downgoing    Clonus:  N/A  Johnston's:  N/A     SKIN:  no rash      LABS AND IMAGING:     CBC with Differential:    Lab Results   Component Value Date    WBC 6.5 05/21/2019    RBC 4.81 05/21/2019    HGB 15.0 05/21/2019    HCT 45.3 05/21/2019     05/21/2019    MCV 94.2 05/21/2019    MCH 31.2 05/21/2019    MCHC 33.1 05/21/2019    RDW 16.8 05/21/2019    LYMPHOPCT 14 05/21/2019    MONOPCT 12 05/21/2019    BASOPCT 0 05/21/2019    MONOSABS 0.80 05/21/2019    LYMPHSABS 0.90 05/21/2019    EOSABS 0.10 05/21/2019    BASOSABS 0.00 05/21/2019    DIFFTYPE NOT REPORTED 05/21/2019     BMP:    Lab Results   Component Value Date     05/21/2019    K 3.8 05/21/2019     05/21/2019    CO2 26 05/21/2019    BUN 27 05/21/2019    LABALBU 4.3 05/12/2019    CREATININE 0.62 05/21/2019    CALCIUM 9.5 05/21/2019    GFRAA >60 05/21/2019    LABGLOM >60 05/21/2019    GLUCOSE 110 05/21/2019       Radiology Review:    XR CHEST PORTABLE   Final Result   Stable chest x-ray. No acute disease.                  ASSESSMENT AND PLAN:       Patient Active Problem List   Diagnosis    CAD (coronary artery disease)    Tobacco abuse    Environmental allergies    Obesity    Dyslipidemia    Coronary artery disease    Chronic obstructive pulmonary disease (HCC)    Occipital headache    Dizziness    Cerebral ventriculomegaly    Acquired cerebral atrophy   

## 2019-05-22 NOTE — PROGRESS NOTES
discharge. Recommendations:  Requires SLP Intervention: Yes  Duration/Frequency of Treatment: 3-5 x week   D/C Recommendations: No therapy recommended at discharge. Plan:   Goals:  Short-term Goals  Goal 1: Pt. will recall 3-5 units with distractions with 90% accuracy. Goal 2: Pt. will utilize memory compensatory strategies to aid in recall. Goal 3: Pt. will complete deductive reasoning tasks with 90% accuracy. Goal 4: Pt. will complete high level word retrieval tasks with 90% accuracy. Patient/family involved in developing goals and treatment plan: yes    Subjective:  General  Family / Caregiver Present: Yes  Social/Functional History  Lives With: (daughter and granddaughter)  Active : Yes  Occupation: Part time employment  Type of occupation: Works at [employer]: Impaired(wears glasses)  Hearing  Hearing: Within functional limits           Objective:     Oral/Motor  Oral Motor: Within functional limits      Expression  Primary Mode of Expression: Verbal      Motor Speech  Motor Speech: Within Functional Limits         Cognition:      Orientation  Overall Orientation Status: Within Normal Limits  Attention  Attention: Within Functional Limits  Memory  Memory: Exceptions to [place]  Short-term Memory: Moderate(3/3, 1/3 )  Problem Solving  Problem Solving: Exceptions to [place]  Verbal Reasoning Skills: Mild(Deductive Reasoning: 3/5 )  Abstract Reasoning  Abstract Reasoning: Exceptions to [place]  Divergent Thinking: Mild (Abstract: +4 )  Safety/Judgement  Safety/Judgement: Within Functional Limits   Word Associations: Within Functional Limits  Verbal Sequencing:  Within Functional Limits    Prognosis:  Speech Therapy Prognosis  Prognosis: Fair  Individuals consulted  Consulted and agree with results and recommendations: Patient; Family member    Education:  Patient Education: yes  Patient Education Response: Verbalizes understanding             Therapy Time:   Individual Concurrent Group Co-treatment   Time In 1330         Time Out [facility], M.A.  CCC-SLP  5/22/2019 2:18 PM

## 2019-05-22 NOTE — ED PROVIDER NOTES
[facility] ED  Emergency Department Encounter  Emergency Medicine Resident     Pt Name: Kathe Carlson  MRN: [id number]  [place] 1950  Date of evaluation: 5/21/19  PCP:  Anthony Platt       Chief Complaint   Patient presents with    Nausea    Dizziness    Headache       [facility]  (Location/Symptom, Timing/Onset, Context/Setting, Quality, Duration, Modifying Factors,Severity.)      Kathe Carlson is a 79 y.o. female [facility]  shunt placed on 5/16 by Dr. Chirag Rocha. Patient initially went to [facility] for concerns of recent lethargy, decreased appetite, decreased oral intake, and feeling ill overall. Patient denies any recent fever. Patient states that her daughter's told her that she has been acting confused, however patient does not admit to this by self. Patient denies any vomiting but does admit to nausea. Patient denies any shortness of breath, chest pain, fever, chills. Patient has recent sutures placed of a right parietal scalp that do not appear to have infection. Patient had imaging studies performed a prior facility refer to  shunt series. Lynn Villalba PAST MEDICAL / SURGICAL / SOCIAL / FAMILY HISTORY      has a past medical history of Chronic obstructive pulmonary disease ([address] Coronary artery disease, Dyslipidemia, and Obesity. has a past surgical history that includes Cardiac catheterization and Ventriculoperitoneal shunt (Right, 5/16/2019).      Social History     Socioeconomic History    Marital status:      Spouse name: Not on file    Number of children: Not on file    Years of education: Not on file    Highest education level: Not on file   Occupational History    Not on file   Social Needs    Financial resource strain: Not on file    Food insecurity:     Worry: Not on file     Inability: Not on file    Transportation needs: Medical: Not on file     Non-medical: Not on file   Tobacco Use    Smoking status: Current Every Day Smoker     Packs/day: 1.00     Years: 50.00     Pack years: 50.00     Start date: 1/1/1970    Smokeless tobacco: Never Used   Substance and Sexual Activity    Alcohol use: No    Drug use: No    Sexual activity: Not on file   Lifestyle    Physical activity:     Days per week: Not on file     Minutes per session: Not on file    Stress: Not on file   Relationships    Social connections:     Talks on phone: Not on file     Gets together: Not on file     Attends Sabianist service: Not on file     Active member of club or organization: Not on file     Attends meetings of clubs or organizations: Not on file     Relationship status: Not on file    Intimate partner violence:     Fear of current or ex partner: Not on file     Emotionally abused: Not on file     Physically abused: Not on file     Forced sexual activity: Not on file   Other Topics Concern    Not on file   Social History Narrative    Not on file       History reviewed. No pertinent family history. Allergies:  Vicodin [hydrocodone-acetaminophen]    Home Medications:  Prior to Admission medications    Medication Sig Start Date End Date Taking?  Authorizing Provider   butalbital-acetaminophen-caffeine (FIORICET, ESGIC) -97 MG per tablet Take 1 tablet by mouth every 4 hours as needed for Headaches 5/20/19   Maris Ace MD   carvedilol (COREG) 3.125 MG tablet Take 1 tablet by mouth 2 times daily (with meals) 5/20/19   Maris Ace MD   ondansetron (ZOFRAN ODT) 4 MG disintegrating tablet Take 1 tablet by mouth every 8 hours as needed for Nausea 5/12/19   Heidi Arthur MD   ibuprofen (ADVIL;MOTRIN) 600 MG tablet Take 1 tablet by mouth every 8 hours as needed for Pain 5/12/19   Heidi Arthur MD   aspirin 325 MG tablet Take 325 mg by mouth daily    Historical Provider, MD   ondansetron (ZOFRAN) 4 MG tablet Take 1 tablet by mouth every 8 hours as needed for Nausea 5/2/19   Doni Tucker DO   tiZANidine (ZANAFLEX) 2 MG tablet Take 1 tablet by mouth 3 times daily as needed (muscle spasm or headach) 4/24/19   Kika Patel MD   fluticasone Karine Peer) 50 MCG/ACT nasal spray 1 spray by Nasal route daily 4/4/19   Frida García MD   Pseudoephedrine HCl (SUDAFED 12 HOUR PO) Take 1 tablet by mouth 2 times daily    Historical Provider, MD   albuterol sulfate  (90 Base) MCG/ACT inhaler Inhale 2 puffs into the lungs 4 times daily as needed for Wheezing 3/22/19   JOHNATHAN Doyle - CNP   Pseudoephedrine-APAP-DM (DAYQUIL PO) Take by mouth    Historical Provider, MD   loratadine (CLARITIN) 10 MG tablet Take 10 mg by mouth daily    Historical Provider, MD       REVIEW OFSYSTEMS    (2-9 systems for level 4, 10 or more for level 5)      Constitutional ROS - No recent fevers, No recent chills  Neurological ROS - No Headache, No Syncope  Opthalmologic ROS- No eye pain, No vision changes   ENT ROS - No sore throat, No congestion  Respiratory ROS - No cough, No shortness of breath  Cardiovascular ROS - No chest pain, No palpitations   Gastrointestinal ROS - No abdominal pain, No nausea, No vomiting  Genito-Urinary ROS - No dysuria, Nohematuria  Musculoskeletal ROS - No back pain, No neck pain  Dermatological ROS - No wound, No rash  PHYSICAL EXAM   (up to 7 for level 4, 8 or more forlevel 5)      INITIAL VITALS:   ED Triage Vitals [05/21/19 2156]   BP Temp Temp Source Pulse Resp SpO2 Height Weight   (!) 148/88 98.3 °F (36.8 °C) Oral 82 16 95 % 5' 8\" (1.727 m) 220 lb (99.8 kg)       Physical Exam   Constitutional: She is oriented to person, place, and time. She appears well-developed and well-nourished. HENT:   Mouth/Throat: Oropharynx is clear and moist.   Surgical scar intact on right parietal scalp   Eyes: Pupils are equal, round, and reactive to light. EOM are normal.   Neck: Normal range of motion. Neck supple.    Cardiovascular: Normal rate the ventricular enlargement above the tentorium. There is also a small amount of developing transependymal CSF flow marginating the ventricles. Post shunt catheter placement as described A small amount of hemorrhage is noted in the ventricle Continued follow-up recommended     Xr Chest Portable    Result Date: 5/22/2019  EXAMINATION: ONE XRAY VIEW OF THE CHEST 5/22/2019 12:55 am COMPARISON: 05/12/2019 HISTORY: ORDERING SYSTEM PROVIDED HISTORY: copd TECHNOLOGIST PROVIDED HISTORY: copd Ordering Physician Provided Reason for Exam: copd Acuity: Unknown Type of Exam: Unknown FINDINGS: Lungs are mildly hyperinflated. No acute infiltrate, pneumothorax or pleural fluid. Heart size and configuration. No acute bone finding. Stable chest x-ray. No acute disease. Xr Shunt Series Placement (<4 Views)    Result Date: 5/21/2019  EXAMINATION: SHUNT SERIES 5/21/2019 7:01 pm COMPARISON: None. HISTORY: ORDERING SYSTEM PROVIDED HISTORY: Headache and confusion after recent shunt placement TECHNOLOGIST PROVIDED HISTORY: Headache and confusion after recent shunt placement Ordering Physician Provided Reason for Exam: nausea, fatigue, headache worsening since stent plaement 5 days ago. hx of hydrocepheles Acuity: Acute Type of Exam: Initial FINDINGS: 5 total images were provided. Right frontal shunt catheter is noted. Radiopaque notch in the dial of the shunt projects toward the posterior aspect of the head at approximately the 10 o'clock position. Shunt catheter extends along the right side of the cervical tissues posteriorly and crosses into the anterior aspect of the chest wall. This extends into the right side of the abdomen ultimately terminating in the pelvis just right of midline. Heart size is normal.  There is no consolidation. No disproportionate bowel distention is noted.      Shunt catheter as described         EKG  EKG Interpretation    Interpreted by me    Rhythm: normal sinus   Rate: normal  Axis: normal  Ectopy: none  Conduction: normal  ST Segments: no acute change  T Waves: no acute change  Q Waves: none    Clinical Impression: no acute changes and normal EKG    All EKG's are interpreted by the Emergency Department Physicianwho either signs or Co-signs this chart in the absence of a cardiologist.    EMERGENCY DEPARTMENT COURSE:     nneurosurgery evaluated patient at bedside in the emergency department, there are no interventions per neurosurgery at this time. Neurosurgery recommends admission to hospital for further evaluation and follow-up. Neurosurgery evaluated patient based on CT had and  shunt series performed today they deemed no interventions necessary. Patient has been stable on the emergency department, instrument was contacted and patient admitted to hospital for further evaluation. PROCEDURES:  None    CONSULTS:  IP CONSULT TO NEUROSURGERY  IP CONSULT TO HOSPITALIST  IP CONSULT TO NEUROSURGERY    CRITICAL CARE:  Please see attending note    FINAL IMPRESSION      1.  S/P  shunt          DISPOSITION / PLAN     DISPOSITION Admitted 05/22/2019 12:45:06 AM      PATIENT REFERRED TO:  Enedina Bence, APRN [address]          Edi Amador MD  [address] [place], [place] [address]  [phone]            DISCHARGE MEDICATIONS:  New Prescriptions    No medications on file       Virginia Knapp DO  Emergency Medicine Resident    (Please note that portions of this note were completed with a voice recognition program.Efforts were made to edit the dictations but occasionally words are mis-transcribed.)       Virginia Knapp DO  Resident  05/22/19 [address],   Resident  05/22/19 [address], DO  Resident  05/25/19 [id number]

## 2019-05-22 NOTE — PROGRESS NOTES
Occupational Therapy   Occupational Therapy Initial Assessment  Date: 2019   Patient Name: Bernardino Bo  MRN: [id number]     : 1950    Date of Service: 2019    Discharge Recommendations: Further therapy recommended at discharge. Pt coming from a SNF, returned after 1 day, needs further therapy services at this d/c. Assessment   Performance deficits / Impairments: Decreased functional mobility ; Decreased high-level IADLs;Decreased ADL status; Decreased endurance;Decreased balance  Prognosis: Good  Decision Making: Medium Complexity  Patient Education: Safety awareness, OTPOC-good return  REQUIRES OT FOLLOW UP: Yes  Activity Tolerance  Activity Tolerance: Patient Tolerated treatment well;Patient limited by fatigue  Activity Tolerance: Dizzy/nausea  Safety Devices  Safety Devices in place: Yes  Type of devices: All fall risk precautions in place;Call light within reach; Left in bed;Bed alarm in place;Nurse notified;Gait belt  Restraints  Initially in place: No         Patient Diagnosis(es): The encounter diagnosis was S/P  shunt.     has a past medical history of Chronic obstructive pulmonary disease ([address] Coronary artery disease, Dyslipidemia, Obesity, and Pneumonia. has a past surgical history that includes Cardiac catheterization; Ventriculoperitoneal shunt (Right, 2019); eye surgery; and Cardiac catheterization ().          Restrictions  Restrictions/Precautions  Restrictions/Precautions: General Precautions, Fall Risk  Required Braces or Orthoses?: No  Position Activity Restriction  Other position/activity restrictions: up with A    Subjective   General  Patient assessed for rehabilitation services?: Yes  Family / Caregiver Present: Yes(2 dtr's arrived at end of session)  Diagnosis:  shunt placed 19, H/A, dizzy  Pain Assessment  Pain Assessment: 0-10  Pain Level: 8  Pain Type: Acute pain  Pain Location: Head  Pain Descriptors: Aching;Headache  Pain Frequency: Continuous  Non-Pharmaceutical Pain Intervention(s): Ambulation/Increased Activity; Distraction; Emotional support; Therapeutic presence  Response to Pain Intervention: None  Oxygen Therapy  SpO2: 92 %  Social/Functional History  Social/Functional History  Lives With: Daughter(and 22 yo granddtr)  Type of Home: House  Home Layout: Two level, Performs ADL's on one level, Able to Live on Main level with bedroom/bathroom  Home Access: Stairs to enter without rails  Entrance Stairs - Number of Steps: 3 or 5  Bathroom Shower/Tub: Tub/Shower unit  Bathroom Toilet: Handicap height  Bathroom Accessibility: Accessible  ADL Assistance: Independent  Homemaking Assistance: Independent  Homemaking Responsibilities: Yes  Ambulation Assistance: Independent  Transfer Assistance: Independent  Active : Yes  Mode of Transportation: Car  Occupation: Part time employment  Type of occupation: [employer]  Leisure & Hobbies: reading/computer  Additional Comments: Above social hx represents pt prior to  shunt placement, pt went to [facility] for 1 day prior to returning to hospital     Objective   Vision: Within Functional Limits(However, pt dizzy, having difficulty focusing)  Hearing: Within functional limits    Orientation  Overall Orientation Status: Within Functional Limits     Balance  Sitting Balance: Supervision  Standing Balance: Contact guard assistance  Standing Balance  Time: 4 min  Activity: Pt stood bedside, at [facility] around bed  Functional Mobility  Functional - Mobility Device: Rolling Walker  Activity: To/from bathroom  Assist Level: Moderate assistance  Functional Mobility Comments: Pt unsteady at times, dizzy/nausea, pt had unsteadiness during last admission as well  Toilet Transfers  Toilet - Technique: Ambulating  Equipment Used: Standard bedside commode  Toilet Transfer: 2 Person assistance;Minimal assistance  ADL  Feeding: Supervision;Setup; Increased time to complete  Grooming: Stand by assistance;Setup; Increased time to complete  UE Bathing: Contact guard assistance;Setup; Increased time to complete  LE Bathing: Minimal assistance; Increased time to complete;Setup  UE Dressing: Minimal assistance;Setup; Increased time to complete  LE Dressing: Moderate assistance; Increased time to complete;Setup  Toileting: Moderate assistance;Setup; Increased time to complete  Tone RUE  RUE Tone: Normotonic  Tone LUE  LUE Tone: Normotonic  Coordination  Movements Are Fluid And Coordinated: No  Coordination and Movement description: Decreased speed     Bed mobility  Supine to Sit: Moderate assistance  Sit to Supine:  Moderate assistance  Scooting: Stand by assistance  Transfers  Sit to stand: Minimal assistance;2 Person assistance  Stand to sit: Minimal assistance     Cognition  Overall Cognitive Status: WFL     Sensation  Overall Sensation Status: WFL      LUE AROM (degrees)  LUE AROM : WFL  RUE AROM (degrees)  RUE AROM : WFL  LUE Strength  Gross LUE Strength: WFL  L Shoulder Flex: 4+/5  L Elbow Flex: 4+/5  L Elbow Ext: 4+/5  L Hand Grasp: 4+/5  L Hand Release: 4+/5  RUE Strength  Gross RUE Strength: WFL  R Shoulder Flex: 4+/5  R Elbow Flex: 4+/5  R Elbow Ext: 4+/5  R Hand Grasp: 4+/5  R Hand Release: 4+/5      Plan   Plan  Times per week: 4x  Current Treatment Recommendations: Balance Training, Functional Mobility Training, Endurance Training, Equipment Evaluation, Education, & procurement, Home Management Training, Patient/Caregiver Education & Training, Self-Care / ADL, Safety Education & Training        AM-[facility] Inpatient Daily Activity Raw Score: 15  AM-PAC Inpatient ADL T-Scale Score : 34.69  ADL Inpatient CMS 0-100% Score: 56.46  ADL Inpatient CMS G-Code Modifier : CK    Goals  Short term goals  Time Frame for Short term goals: Pt will by discharge   Short term goal 1: demo ADL UB/LB dressing/bathing activity seated with SBA  Short term goal 2: demo good safety awareness during func mob around room with RW and SUP  Short term goal 3: demo activity tolerance for 25 min+  Short term goal 4: demo EC/WS tech's during func activity with 1 vc  Short term goal 5: demo mod I for all bed mob/transfers       Therapy Time   Individual Concurrent Group Co-treatment   Time In 0837         Time Out 0900         Minutes [address], OTR/L

## 2019-05-22 NOTE — PLAN OF CARE
Problem: Falls - Risk of:  Goal: Will remain free from falls  Description  Will remain free from falls  Outcome: Met This Shift    Problem: Activity:  Goal: Ability to tolerate increased activity will improve  Description  Ability to tolerate increased activity will improve  Outcome: Met This Shift      Patient ambulates x1 assistance to bathroom. Problem: Pain:  Goal: Pain level will decrease  Description  Pain level will decrease  Outcome: Met This Shift  New prn med added with relief noted      Problem: Nausea/Vomiting:  Goal: Able to eat  Description  Able to eat  Outcome: Met This Shift      Tolerating small bites of food.

## 2019-05-22 NOTE — PROGRESS NOTES
[address] [place]     Neurosurgery Service      Daily Progress Note           Subjective :   No major events or new complaints through the night . No changes in mental status throughout the night . She  still complains of headache, dizziness. Nausea has  Better with Zofran. She still reports chronic cough. No vision changes . No speech changes. No New Neuromuscular changes in his upper or lower extremities. A febrile . Hemodynamically stable . She has decreased appetite but   No difficulty swallowing . No problems with urination or bowel movements. Objective :     Vitals:    05/22/19 0400 05/22/19 0700 05/22/19 0745 05/22/19 1100   BP: 135/87 137/89 137/89 (!) 149/78   Pulse: 92 74 81 78   Resp: 23 14 14 16   Temp: 98.3 °F (36.8 °C) 97.4 °F (36.3 °C) 97.4 °F (36.3 °C) 98.6 °F (37 °C)   TempSrc: Oral Oral Oral Oral   SpO2:   92% 93%   Weight: 220 lb (99.8 kg)      Height: 5' 8\" (1.727 m)            Physical Examination :      Alert., Resting in bed, appears in  Mild distress. Head: normocephalic, atraumatic, facial features unremarkable. Surgical wound is clean/ staples - sutures  in place with good wound closure . No bleeding . No evidence of CSF leak or drainage  Eyes:  PERRLA +3, EOM intact. No nystagmus or ptosis. ENT: Unremarkable. Tongue midline   Neck Supple, Normal ROM. Trachea midline . No midline spine tenderness . No step off . No pain with axial loading. No meningeal signs. No Carotid Bruit   Lungs - Clear to auscultation. No crackles or wheezes. Cardiovascular - S1, S2, regular rate and rhythm. Abdomen - soft, non-distended, non-tender, no peritoneal inflammation signs  Back:  No midline spine tenderness  Skin :  PWD. No open wounds , abrasions or contusions . No rash   Neuro-Muscular exam:  Awake and AOX4. Normal speech. Comprehension was normal. Follows simple commands. Open eyes spontaneously . GCS 15/15. PERRLA +3, EOM intact. CN 2-12 Intact . Muscle strength  RUE 5 /5  RLE  5/5  LUE  5/5   LLE 5/5 . Normal range of motion of joints . Coordination is normal. No involuntary movements or tremors. DTR 2+ throughout, Plantar reflexes were downgoing bilaterally. Sensations Intact        Lab Results   Component Value Date    WBC 7.3 05/21/2019    HGB 14.8 05/21/2019    HCT 47.7 (H) 05/21/2019     05/21/2019    CHOL 177 03/23/2018    TRIG 67 03/23/2018    HDL 61 03/23/2018    ALT 9 05/12/2019    AST 9 05/12/2019     05/21/2019    K 4.0 05/21/2019     05/21/2019    CREATININE 0.66 05/21/2019    BUN 28 (H) 05/21/2019    CO2 22 05/21/2019    TSH 3.90 03/23/2018    INR 0.9 05/16/2019         ASSESSMENT & PLAN:  This is a 79 y.o. female  with intractable headache, dizziness, nausea, cough. She is status post  shunt placement on 5/17 by Dr. Rosendo Knox for  hydrocephalus    Plan :     - Labs, Radiologic studies and notes were reviewed   -Continue Neuro assessment per unit protocol  - codman certas shunt  was reprogrammed and lowered to 6   - Add Fioricet  for headaches  -Advance diet as tolerated  -Continue GI Prophylaxis and Bowel Regimen  -Encourage ambulation / up with assist/ Advance activities as tolerated   -PT/OT       Please contact neurosurgery with any changes in patient's neurological status, any questions or concerns. Rosana Kellogg .  CNP  Neurosurgery    Cell : Cell [phone]  pager [phone]

## 2019-05-23 ENCOUNTER — APPOINTMENT (OUTPATIENT)
Dept: GENERAL RADIOLOGY | Age: 69
DRG: 056 | End: 2019-05-23
Payer: MEDICARE

## 2019-05-23 LAB
ANION GAP SERPL CALCULATED.3IONS-SCNC: 11 MMOL/L (ref 9–17)
BUN BLDV-MCNC: 23 MG/DL (ref 8–23)
BUN/CREAT BLD: ABNORMAL (ref 9–20)
CALCIUM SERPL-MCNC: 9.1 MG/DL (ref 8.6–10.4)
CHLORIDE BLD-SCNC: 106 MMOL/L (ref 98–107)
CO2: 25 MMOL/L (ref 20–31)
CREAT SERPL-MCNC: 0.65 MG/DL (ref 0.5–0.9)
GFR AFRICAN AMERICAN: >60 ML/MIN
GFR NON-AFRICAN AMERICAN: >60 ML/MIN
GFR SERPL CREATININE-BSD FRML MDRD: ABNORMAL ML/MIN/{1.73_M2}
GFR SERPL CREATININE-BSD FRML MDRD: ABNORMAL ML/MIN/{1.73_M2}
GLUCOSE BLD-MCNC: 87 MG/DL (ref 70–99)
HCT VFR BLD CALC: 47.2 % (ref 36.3–47.1)
HEMOGLOBIN: 14.9 G/DL (ref 11.9–15.1)
INR BLD: 0.9
MCH RBC QN AUTO: 30.1 PG (ref 25.2–33.5)
MCHC RBC AUTO-ENTMCNC: 31.6 G/DL (ref 28.4–34.8)
MCV RBC AUTO: 95.4 FL (ref 82.6–102.9)
NRBC AUTOMATED: 0 PER 100 WBC
PDW BLD-RTO: 14.8 % (ref 11.8–14.4)
PLATELET # BLD: 181 K/UL (ref 138–453)
PMV BLD AUTO: 11.5 FL (ref 8.1–13.5)
POTASSIUM SERPL-SCNC: 3.6 MMOL/L (ref 3.7–5.3)
PROTHROMBIN TIME: 10.1 SEC (ref 9–12)
RBC # BLD: 4.95 M/UL (ref 3.95–5.11)
SODIUM BLD-SCNC: 142 MMOL/L (ref 135–144)
WBC # BLD: 7.5 K/UL (ref 3.5–11.3)

## 2019-05-23 PROCEDURE — 2580000003 HC RX 258: Performed by: NURSE PRACTITIONER

## 2019-05-23 PROCEDURE — 94640 AIRWAY INHALATION TREATMENT: CPT

## 2019-05-23 PROCEDURE — 6370000000 HC RX 637 (ALT 250 FOR IP): Performed by: NURSE PRACTITIONER

## 2019-05-23 PROCEDURE — 85027 COMPLETE CBC AUTOMATED: CPT

## 2019-05-23 PROCEDURE — 80048 BASIC METABOLIC PNL TOTAL CA: CPT

## 2019-05-23 PROCEDURE — 85610 PROTHROMBIN TIME: CPT

## 2019-05-23 PROCEDURE — 74230 X-RAY XM SWLNG FUNCJ C+: CPT

## 2019-05-23 PROCEDURE — 92611 MOTION FLUOROSCOPY/SWALLOW: CPT

## 2019-05-23 PROCEDURE — 97127 HC SP THER IVNTJ W/FOCUS COG FUNCJ: CPT

## 2019-05-23 PROCEDURE — 99232 SBSQ HOSP IP/OBS MODERATE 35: CPT | Performed by: INTERNAL MEDICINE

## 2019-05-23 PROCEDURE — 2700000000 HC OXYGEN THERAPY PER DAY

## 2019-05-23 PROCEDURE — 6360000002 HC RX W HCPCS: Performed by: NURSE PRACTITIONER

## 2019-05-23 PROCEDURE — 36415 COLL VENOUS BLD VENIPUNCTURE: CPT

## 2019-05-23 PROCEDURE — 94761 N-INVAS EAR/PLS OXIMETRY MLT: CPT

## 2019-05-23 PROCEDURE — 1200000000 HC SEMI PRIVATE

## 2019-05-23 RX ADMIN — FLUTICASONE PROPIONATE 1 SPRAY: 50 SPRAY, METERED NASAL at 08:19

## 2019-05-23 RX ADMIN — ASPIRIN 325 MG: 325 TABLET, COATED ORAL at 08:18

## 2019-05-23 RX ADMIN — SODIUM CHLORIDE, PRESERVATIVE FREE 10 ML: 5 INJECTION INTRAVENOUS at 08:11

## 2019-05-23 RX ADMIN — ALBUTEROL SULFATE 2.5 MG: 2.5 SOLUTION RESPIRATORY (INHALATION) at 05:08

## 2019-05-23 RX ADMIN — BUTALBITAL, ACETAMINOPHEN, AND CAFFEINE 2 TABLET: 50; 325; 40 TABLET ORAL at 01:38

## 2019-05-23 RX ADMIN — ALBUTEROL SULFATE 2.5 MG: 2.5 SOLUTION RESPIRATORY (INHALATION) at 18:10

## 2019-05-23 RX ADMIN — SODIUM CHLORIDE: 9 INJECTION, SOLUTION INTRAVENOUS at 17:18

## 2019-05-23 RX ADMIN — CARVEDILOL 3.12 MG: 3.12 TABLET, FILM COATED ORAL at 08:18

## 2019-05-23 RX ADMIN — ENOXAPARIN SODIUM 40 MG: 40 INJECTION SUBCUTANEOUS at 08:19

## 2019-05-23 RX ADMIN — BUTALBITAL, ACETAMINOPHEN, AND CAFFEINE 2 TABLET: 50; 325; 40 TABLET ORAL at 12:34

## 2019-05-23 RX ADMIN — CARVEDILOL 3.12 MG: 3.12 TABLET, FILM COATED ORAL at 17:19

## 2019-05-23 ASSESSMENT — PAIN SCALES - GENERAL
PAINLEVEL_OUTOF10: 0
PAINLEVEL_OUTOF10: 4
PAINLEVEL_OUTOF10: 1
PAINLEVEL_OUTOF10: 3
PAINLEVEL_OUTOF10: 0
PAINLEVEL_OUTOF10: 2
PAINLEVEL_OUTOF10: 0

## 2019-05-23 ASSESSMENT — PAIN DESCRIPTION - ONSET
ONSET: ON-GOING
ONSET: PROGRESSIVE

## 2019-05-23 ASSESSMENT — PAIN DESCRIPTION - LOCATION
LOCATION: HEAD

## 2019-05-23 ASSESSMENT — PAIN DESCRIPTION - PAIN TYPE
TYPE: ACUTE PAIN
TYPE: ACUTE PAIN
TYPE: ACUTE PAIN;SURGICAL PAIN

## 2019-05-23 ASSESSMENT — PAIN DESCRIPTION - DIRECTION: RADIATING_TOWARDS: TOP

## 2019-05-23 ASSESSMENT — PAIN - FUNCTIONAL ASSESSMENT
PAIN_FUNCTIONAL_ASSESSMENT: PREVENTS OR INTERFERES SOME ACTIVE ACTIVITIES AND ADLS
PAIN_FUNCTIONAL_ASSESSMENT: PREVENTS OR INTERFERES SOME ACTIVE ACTIVITIES AND ADLS

## 2019-05-23 ASSESSMENT — PAIN DESCRIPTION - DESCRIPTORS
DESCRIPTORS: ACHING;DULL
DESCRIPTORS: DULL;ACHING

## 2019-05-23 ASSESSMENT — PAIN DESCRIPTION - PROGRESSION
CLINICAL_PROGRESSION: GRADUALLY IMPROVING
CLINICAL_PROGRESSION: GRADUALLY IMPROVING

## 2019-05-23 ASSESSMENT — PAIN DESCRIPTION - ORIENTATION
ORIENTATION: POSTERIOR

## 2019-05-23 ASSESSMENT — PAIN DESCRIPTION - FREQUENCY
FREQUENCY: INTERMITTENT
FREQUENCY: INTERMITTENT

## 2019-05-23 NOTE — PROGRESS NOTES
Physical Therapy  DATE: 2019    NAME: Julito Lentz  MRN: [id number]   : 1950    Patient not seen this date for Physical Therapy due to:  [] Blood transfusion in progress  [] Hemodialysis  []  Patient Declined  [] Spine Precautions   [] Strict Bedrest  [] Surgery/ Procedure  [x] Testing--just leaving for swallow study; will check 19      [] Other        [] PT being discontinued at this time. Patient independent. No further needs. [] PT being discontinued at this time as the patient has been transferred to palliative care. No further needs.     Camden Sears, PT

## 2019-05-23 NOTE — PROCEDURES
INSTRUMENTAL SWALLOW REPORT  MODIFIED BARIUM SWALLOW    NAME: Julito Lentz   : 1950  MRN: [id number]       Date of Eval: 2019        Radiologist: Dr. Tiago Beth        Past Medical History:  has a past medical history of Chronic obstructive pulmonary disease ([address] Coronary artery disease, Dyslipidemia, Obesity, and Pneumonia. Past Surgical History:  has a past surgical history that includes Cardiac catheterization; Ventriculoperitoneal shunt (Right, 2019); eye surgery; and Cardiac catheterization (). Current Diet Solid Consistency: Regular  Current Diet Liquid Consistency: Thin       Type of Study: Initial MBS       Patient Complaints/Reason for Referral:  Julito Lentz was referred for a MBS to assess the efficiency of her swallow function, assess for aspiration, and to make recommendations regarding safe dietary consistencies, effective compensatory strategies, and safe eating environment. Behavior/Cognition/Vision/Hearing:  Behavior/Cognition: Alert; Cooperative  Vision: Impaired(wears glasses)  Hearing: Within functional limits    Impressions:   Patient Degrees: upright in chair   Consistencies Administered: Soft solid;Reg solid;Puree; Thin straw;Nectar straw    Recommended Diet:  Solid consistency: Dysphagia III Advanced(extra sauces and gravies )  Liquid consistency: Thin       Safe Swallow Protocol:     Compensatory Swallowing Strategies: Small bites/sips;Swallow 2 times per bite/sip;Eat/Feed slowly;Upright as possible for all oral intake    Recommendations/Treatment  Requires SLP Intervention: Yes  D/C Recommendations: Further therapy recommended at discharge. Recommended Exercises:    Therapeutic Interventions: Oral motor exercises; Poornima;Diet tolerance monitoring;Patient/Family education;Vital Stim/NMES (outpatient)    Education: Images and recommendations were reviewed with pt.  And RN following this exam.   Patient Education: yes  Patient Education Response: Jana understanding    Prognosis  Prognosis for safe diet advancement: fair  Duration/Frequency of Treatment  Duration/Frequency of Treatment: 3-5 x week       Goals:    Long Term: To Maximize safety with intake, optimize nutrition/hydration and minimize risk for aspiration. Short Term:     Goal 1: Pt. will complete OMEX for dysphagia 10-20 x per session. Dysphagia Goals: The patient will tolerate recommended diet without observed clinical signs of aspiration      Oral Preparation / Oral Phase  Oral Phase: Impaired   Oral Phase: Premature spill noted with liquids, puree and soft solids. Pharyngeal Phase  Pharyngeal Phase: Impaired   Pharyngeal:  -Thin liquid:  + penetration of residual, no aspiration. + throat clear. Min vallecula and pyriform residual noted. Decreased with double swallow.       -Nectar thick liquid:  + penetration, no aspiration. Min vallecula residual noted. -Puree:  + penetration, no aspiration. Mod vallecula and pyriform residual.  Decreased with double swallow.       -Soft solid:  + penetration of residual, no aspiration. + cough. Min-mod vallecula residual noted. -Regular solid:  no penetration, no aspiration. Mod pyriform residual noted. Unable to clear with subsequent swallows.     -No epiglottic inversion noted, no residual in laryngeal vestibule with all consistencies     Esophageal Phase  Esophageal Screen: WFL    Pain   Patient Currently in Pain: No  Pain Level: 0    Therapy Time:   Individual Concurrent Group Co-treatment   Time In 1450         Time Out 1500         Minutes El CHAUDHRY CCC-SLP, 5/23/2019, 3:36 PM

## 2019-05-23 NOTE — PROGRESS NOTES
Speech Language Pathology  Speech Language Pathology  [facility]    Cognitive Treatment Note    Date: 5/23/2019  Patients Name: Kelsi Orozco  MRN: [id number]  Diagnosis:   Patient Active Problem List   Diagnosis Code    CAD (coronary artery disease) I25.10    Tobacco abuse Z72.0    Environmental allergies Z91.09    Obesity E66.9    Dyslipidemia E78.5    Coronary artery disease I25.10    Chronic obstructive pulmonary disease ([facility] J44.9    Occipital headache R51    Dizziness R42    Cerebral ventriculomegaly G93.89    Acquired cerebral atrophy G31.9    Chronic cerebral ischemia I67.82    Chiari malformation type I ([facility] G93.5    Balance problem R26.89    VBI (vertebrobasilar 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 Problem Solving/Reasoning: Word Deductions: 15/15 with no cue  Abstract category members: named 3-6 words increased to 7-8 with min cue. Plan:  [x] Continue ST services    [] Discharge from ST:      Discharge recommendations: [] Inpatient Rehab   [] [place]   [] Outpatient Therapy  [] Follow up at trauma clinic   [x] Other: Further therapy recommended at discharge. Treatment completed by: Cornelius Cook,   Clinician    Co-signed by Dong Mckay A.CCC/SLP

## 2019-05-23 NOTE — DISCHARGE INSTR - COC
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    Abnormal CT of the head R93.0    Hydrocephalus G91.9    S/P  shunt Z98.2    Headache R51       Isolation/Infection:   Isolation          No Isolation            Nurse Assessment:  Last Vital Signs: BP (!) 169/85   Pulse 66   Temp 97.8 °F (36.6 °C) (Oral)   Resp 17   Ht 5' 8\" (1.727 m)   Wt 220 lb (99.8 kg)   LMP 08/01/2000 (Approximate)   SpO2 95%   BMI 33.45 kg/m²     Last documented pain score (0-10 scale): Pain Level: 0  Last Weight:   Wt Readings from Last 1 Encounters:   05/22/19 220 lb (99.8 kg)     Mental Status:  oriented    IV Access:  - None    Nursing Mobility/ADLs:  Walking   Assisted  Transfer  Assisted  Bathing  Assisted  Dressing  Assisted  Toileting  Assisted  Feeding  Independent  Med Admin  Dependent  Med Delivery   whole    Wound Care Documentation and Therapy:        Elimination:  Continence:   · Bowel: Yes  · Bladder: Yes  Urinary Catheter: None   Colostomy/Ileostomy/Ileal Conduit: No       Date of Last BM: 5/24/19    Intake/Output Summary (Last 24 hours) at 5/23/2019 1838  Last data filed at 5/23/2019 1234  Gross per 24 hour   Intake 1930 ml   Output --   Net 1930 ml     I/O last 3 completed shifts: In: 1930 [P.O.:150; I.V.:1780]  Out: -     Safety Concerns: At Risk for Falls    Impairments/Disabilities:      None    Nutrition Therapy:  Current Nutrition Therapy:   - Oral Diet:  Dental Soft    Routes of Feeding: Oral  Liquids: Thin Liquids  Daily Fluid Restriction: no  Last Modified Barium Swallow with Video (Video Swallowing Test): not done    Treatments at the Time of Hospital Discharge:   Respiratory Treatments: None  Oxygen Therapy:  is not on home oxygen therapy.   Ventilator:    - No ventilator support    Rehab Therapies: Physical Therapy  Weight Bearing Status/Restrictions: No weight bearing restirctions  Other Medical Equipment (for information only, NOT a DME order):  walker  Other Treatments: None    Patient's personal belongings (please select all that are sent with patient):  Glasses    RN SIGNATURE:  Electronically signed by Lilibeth Pruett RN on 5/24/19 at 9:53 AM    CASE MANAGEMENT/SOCIAL WORK SECTION    Inpatient Status Date: 5/22/19  Readmission Risk Assessment Score:  Readmission Risk              Risk of Unplanned Readmission:        11           Discharging to Facility/ Agency   · Name: [address] [place] [address]  · Address:  · Phone: [phone]  · Fax: [phone]    Dialysis Facility (if applicable)   · Name:  · Address:  · Dialysis Schedule:  · Phone:  · Fax:    / signature: Electronically signed by Te Lao RN on 5/24/19 at 9:07 AM    PHYSICIAN SECTION    Prognosis: Fair    Condition at Discharge: Stable    Rehab Potential (if transferring to Rehab): Fair    Recommended Labs or Other Treatments After Discharge:     Physician Certification: I certify the above information and transfer of Monie Friend  is necessary for the continuing treatment of the diagnosis listed and that she requires [facility] for greater 30 days.      Update Admission H&P: No change in H&P    PHYSICIAN SIGNATURE:  Electronically signed by Jazzy Ivy MD on 5/24/19 at 9:49 AM

## 2019-05-23 NOTE — CARE COORDINATION
5/23 transition planning barium swallow ordered spoke to Faraz Bliss with [facility] can accept back anytime

## 2019-05-23 NOTE — PROGRESS NOTES
[facility]    Progress Note    5/23/2019    8:21 AM    Name:   Silvano Arshad  MRN:     [id number]     Acct:      [de-identified]   Room:   [address] [place] [address] Day:  1  Admit Date:  5/21/2019 10:01 PM    PCP:   JOHNATHAN Moura CNP  Code Status:  Full Code    Subjective:     C/C:   Chief Complaint   Patient presents with    Nausea    Dizziness    Headache     Interval History Status: improved. No acute issues overnight  Shunt adjusted  Improvement in pressure headache this AM  Further plans per NS  DC back to SNF when cleared by NS  Case discussed with NS    Brief History:     The patient is a 79 y.o. F with PMH significant for HTN, CAD, tobacco use who presented with headache. Patient was recently d/c from hospital to SNF. Admitted at that time for headache and dizziness. Underwent shunt placement by neurosurgery for hydrocephalus on 5/16. Remained stable post op, reported some improvement in headache. At SNF states headache remained persistent. Per family was having difficulty tolerating therapy.      Presented to [facility]. CT head done demonstrated increasing size of 3rd and lateral ventricles, decreasing size of 4th ventricle. Transferred to [facility] for neurosurgery re-evaluation. Review of Systems:     Constitutional:  negative for chills, fevers, sweats  Respiratory:  negative for cough, dyspnea on exertion, hemoptysis, shortness of breath, wheezing  Cardiovascular:  negative for chest pain, chest pressure/discomfort, lower extremity edema, palpitations  Gastrointestinal:  negative for abdominal pain, constipation, diarrhea, nausea, vomiting  Neurological:  Positive for headache     Medications: Allergies:     Allergies   Allergen Reactions    Vicodin [Hydrocodone-Acetaminophen]        Current Meds:   Scheduled Meds:    aspirin  325 mg Oral Daily    carvedilol  3.125 mg Oral BID WC    fluticasone  1 spray Nasal Daily    sodium chloride flush  10 mL Intravenous 2 times per day    enoxaparin  40 mg Subcutaneous Daily     Continuous Infusions:    sodium chloride 100 mL/hr at 19 1418     PRN Meds: sodium chloride flush, potassium chloride **OR** potassium alternative oral replacement **OR** potassium chloride, magnesium sulfate, magnesium hydroxide, ondansetron, nicotine, acetaminophen, albuterol, butalbital-acetaminophen-caffeine    Data:     Past Medical History:   has a past medical history of Chronic obstructive pulmonary disease ([address] Coronary artery disease, Dyslipidemia, Obesity, and Pneumonia. Social History:   reports that she has been smoking. She started smoking about 49 years ago. She has a 50.00 pack-year smoking history. She has never used smokeless tobacco. She reports that she does not drink alcohol or use drugs. Family History: History reviewed. No pertinent family history. Vitals:  BP (!) 153/80   Pulse 70   Temp 96.7 °F (35.9 °C) (Oral)   Resp 12   Ht 5' 8\" (1.727 m)   Wt 220 lb (99.8 kg)   LMP 2000 (Approximate)   SpO2 96%   BMI 33.45 kg/m²   Temp (24hrs), Av.9 °F (36.6 °C), Min:96.7 °F (35.9 °C), Max:98.6 °F (37 °C)    No results for input(s): POCGLU in the last 72 hours. I/O (24Hr):     Intake/Output Summary (Last 24 hours) at 2019 0821  Last data filed at 2019 1853  Gross per 24 hour   Intake 997 ml   Output --   Net 997 ml       Labs:    Hematology:  Recent Labs     19  1833 19  2358 19  0615   WBC 6.5 7.3 7.5   RBC 4.81 4.91 4.95   HGB 15.0 14.8 14.9   HCT 45.3 47.7* 47.2*   MCV 94.2 97.1 95.4   MCH 31.2 30.1 30.1   MCHC 33.1 31.0 31.6   RDW 16.8* 15.0* 14.8*    184 181   MPV 9.7 12.8 11.5   INR  --   --  0.9     Chemistry:  Recent Labs     19  1833 19  2358 19  0615    140 142   K 3.8 4.0 3.6*    104 106   CO2 26 22 25   GLUCOSE 110* 97 87   BUN 27* 28* 23   CREATININE 0.62 0.66 0.65   ANIONGAP 12 14 11   LABGLOM >60 >60 >60   GFRAA >60 >60 >60   CALCIUM 9.5 9.3 9.1   TROPHS 12 10  --      No results for input(s): PROT, LABALBU, LABA1C, B1TYQEX, X1MKTOG, FT4, TSH, AST, ALT, LDH, GGT, ALKPHOS, LABGGT, BILITOT, BILIDIR, AMMONIA, AMYLASE, LIPASE, LACTATE, CHOL, HDL, LDLCHOLESTEROL, CHOLHDLRATIO, TRIG, VLDL, YKW77IJ, PHENYTOIN, PHENYF, URICACID, POCGLU in the last 72 hours. Lab Results   Component Value Date/Time    SPECIAL NOT REPORTED 05/16/2019 02:53 PM     Lab Results   Component Value Date/Time    CULTURE NO GROWTH 4 DAYS 05/16/2019 02:52 PM       No results found for: POCPH, PHART, PH, POCPCO2, VRI2BSS, PCO2, POCPO2, PO2ART, PO2, POCHCO3, FYX3DQW, HCO3, NBEA, PBEA, BEART, BE, THGBART, THB, UAW9EMX, MSGQ5RKB, H0OKZYEE, O2SAT, FIO2    Radiology:    Ct Head Wo Contrast    Result Date: 5/21/2019  Increasing size of the 3rd and lateral ventricles with decreasing size of the 4th ventricle. There is corresponding increased extra-axial fluid along the tentorium with associated mass effect on the cerebellar hemispheres and subsequent decreased size of the 4th ventricle. This likely gives rise to mass-effect at the aqueduct level which most likely corresponds to the ventricular enlargement above the tentorium. There is also a small amount of developing transependymal CSF flow marginating the ventricles. Post shunt catheter placement as described A small amount of hemorrhage is noted in the ventricle Continued follow-up recommended     Xr Chest Portable    Result Date: 5/22/2019  Stable chest x-ray. No acute disease.      Xr Shunt Series Placement (<4 Views)    Result Date: 5/21/2019  Shunt catheter as described       Physical Examination:        General appearance:  alert, cooperative and no distress  Mental Status:  oriented to person, place and time and normal affect  Lungs:  clear to auscultation bilaterally, normal effort  Heart:  regular rate and rhythm  Abdomen:  soft, nontender, nondistended, normal bowel sounds  Extremities:  no edema, redness, tenderness in the calves  Skin:  no gross lesions, rashes, induration    Assessment:        Primary Problem  Headache    Active Hospital Problems    Diagnosis Date Noted    S/P  shunt [Z98.2] 05/22/2019    Headache [R51] 05/22/2019    Hydrocephalus [G91.9] 05/15/2019    Chronic obstructive pulmonary disease ([facility] [J44.9] 05/07/2019    Tobacco abuse [Z72.0] 03/21/2018    CAD (coronary artery disease) [I25.10] 08/18/2013       Plan:        - Neurosurgery following - shunt adjusted  - Continue home medications  - Pain control  - PT/OT/speech therapy  - Swallow study   - Case discussed with NS - CT head stable, no need for repeat imaging       Shelby Ace MD  5/23/2019  8:21 AM

## 2019-05-23 NOTE — PROGRESS NOTES
Occupational Therapy    Occupational Therapy Not Seen Note    DATE: 2019  Name: Sharla Blanca  : 1950  MRN: [id number]    Patient not available for Occupational Therapy due to: Other: Pt being transported off unit for swallow study. Next Scheduled Treatment: Check  as appropriate.     Electronically signed by Vianney Cabral on 2019 at 1:55 PM

## 2019-05-24 VITALS
TEMPERATURE: 97.5 F | BODY MASS INDEX: 31.44 KG/M2 | HEIGHT: 68 IN | SYSTOLIC BLOOD PRESSURE: 154 MMHG | RESPIRATION RATE: 16 BRPM | OXYGEN SATURATION: 95 % | DIASTOLIC BLOOD PRESSURE: 77 MMHG | WEIGHT: 207.45 LBS | HEART RATE: 79 BPM

## 2019-05-24 PROCEDURE — 6370000000 HC RX 637 (ALT 250 FOR IP): Performed by: NURSE PRACTITIONER

## 2019-05-24 PROCEDURE — APPSS45 APP SPLIT SHARED TIME 31-45 MINUTES: Performed by: NURSE PRACTITIONER

## 2019-05-24 PROCEDURE — 2580000003 HC RX 258: Performed by: NURSE PRACTITIONER

## 2019-05-24 PROCEDURE — 97127 HC SP THER IVNTJ W/FOCUS COG FUNCJ: CPT

## 2019-05-24 PROCEDURE — 6360000002 HC RX W HCPCS: Performed by: NURSE PRACTITIONER

## 2019-05-24 PROCEDURE — 99239 HOSP IP/OBS DSCHRG MGMT >30: CPT | Performed by: INTERNAL MEDICINE

## 2019-05-24 RX ORDER — GUAIFENESIN DEXTROMETHORPHAN HYDROBROMIDE ORAL SOLUTION 10; 100 MG/5ML; MG/5ML
10 SOLUTION ORAL EVERY 4 HOURS PRN
Status: DISCONTINUED | OUTPATIENT
Start: 2019-05-24 | End: 2019-05-24 | Stop reason: HOSPADM

## 2019-05-24 RX ORDER — BUTALBITAL, ACETAMINOPHEN AND CAFFEINE 50; 325; 40 MG/1; MG/1; MG/1
1 TABLET ORAL EVERY 4 HOURS PRN
Qty: 30 TABLET | Refills: 0 | Status: SHIPPED | OUTPATIENT
Start: 2019-05-24 | End: 2019-07-11 | Stop reason: SDUPTHER

## 2019-05-24 RX ADMIN — SODIUM CHLORIDE, PRESERVATIVE FREE 10 ML: 5 INJECTION INTRAVENOUS at 08:59

## 2019-05-24 RX ADMIN — ONDANSETRON 4 MG: 2 INJECTION INTRAMUSCULAR; INTRAVENOUS at 00:42

## 2019-05-24 RX ADMIN — CARVEDILOL 3.12 MG: 3.12 TABLET, FILM COATED ORAL at 08:58

## 2019-05-24 RX ADMIN — ENOXAPARIN SODIUM 40 MG: 40 INJECTION SUBCUTANEOUS at 08:58

## 2019-05-24 RX ADMIN — SODIUM CHLORIDE: 9 INJECTION, SOLUTION INTRAVENOUS at 03:49

## 2019-05-24 RX ADMIN — ASPIRIN 325 MG: 325 TABLET, COATED ORAL at 08:58

## 2019-05-24 RX ADMIN — FLUTICASONE PROPIONATE 1 SPRAY: 50 SPRAY, METERED NASAL at 08:58

## 2019-05-24 RX ADMIN — BUTALBITAL, ACETAMINOPHEN, AND CAFFEINE 2 TABLET: 50; 325; 40 TABLET ORAL at 00:32

## 2019-05-24 ASSESSMENT — PAIN DESCRIPTION - DESCRIPTORS: DESCRIPTORS: ACHING;DISCOMFORT

## 2019-05-24 ASSESSMENT — PAIN DESCRIPTION - ONSET: ONSET: ON-GOING

## 2019-05-24 ASSESSMENT — PAIN DESCRIPTION - LOCATION: LOCATION: HEAD

## 2019-05-24 ASSESSMENT — PAIN DESCRIPTION - PROGRESSION: CLINICAL_PROGRESSION: GRADUALLY IMPROVING

## 2019-05-24 ASSESSMENT — PAIN SCALES - GENERAL
PAINLEVEL_OUTOF10: 5
PAINLEVEL_OUTOF10: 0
PAINLEVEL_OUTOF10: 0

## 2019-05-24 ASSESSMENT — PAIN DESCRIPTION - ORIENTATION: ORIENTATION: POSTERIOR;LOWER

## 2019-05-24 ASSESSMENT — PAIN DESCRIPTION - PAIN TYPE: TYPE: ACUTE PAIN

## 2019-05-24 ASSESSMENT — PAIN DESCRIPTION - FREQUENCY: FREQUENCY: INTERMITTENT

## 2019-05-24 NOTE — PROGRESS NOTES
intact. CN 2-12 Intact . Muscle strength  RUE 5 /5  RLE  5/5  LUE  5/5   LLE 5/5 . Normal range of motion of joints . Coordination is normal. No involuntary movements or tremors. DTR 2+ throughout, Plantar reflexes were downgoing bilaterally. Sensations Intact                 Lab Results   Component Value Date    WBC 7.5 05/23/2019    HGB 14.9 05/23/2019    HCT 47.2 (H) 05/23/2019     05/23/2019    CHOL 177 03/23/2018    TRIG 67 03/23/2018    HDL 61 03/23/2018    ALT 9 05/12/2019    AST 9 05/12/2019     05/23/2019    K 3.6 (L) 05/23/2019     05/23/2019    CREATININE 0.65 05/23/2019    BUN 23 05/23/2019    CO2 25 05/23/2019    TSH 3.90 03/23/2018    INR 0.9 05/23/2019           ASSESSMENT & PLAN:    This KT D 82 y.o. female  with intractable headache, dizziness, nausea, cough. She is s/p  shunt placement on 5/17 by Dr. Chin for  hydrocephalus        Plan :       - Stable from Neurosurgery standpoint. Neurologically intact. clinically  Improved. - okay from neurosurgery standpoint to be discharged back to assisted living place/rehab  - staples out in 2 weeks out ( 5/31/19)         Lajean Frankel .  CNP  Neurosurgery    Cell : Cell [phone]  pager [phone]

## 2019-05-24 NOTE — CARE COORDINATION
D/c order noted. Spoke with nurse. Call to [address]. Transportation arranged for 1100. Dr Chirssy santos served to complete BRETT.   Spoke with Ms Angel Lala , she has alerted her family

## 2019-05-24 NOTE — PROGRESS NOTES
[facility]    Progress Note    5/24/2019    8:23 AM    Name:   Jose Matute  MRN:     [id number]     Acct:      [de-identified]   Room:   [address] [place] [address]  IP Day:  2  Admit Date:  5/21/2019 10:01 PM    PCP:   JOHNATHAN Machado CNP  Code Status:  Full Code    Subjective:     C/C:   Chief Complaint   Patient presents with    Nausea    Dizziness    Headache     Interval History Status: improved. No acute issues overnight  Symptoms improved  No other complaints  DC planning in progress    Brief History:     The patient is a 79 y.o. F with PMH significant for HTN, CAD, tobacco use who presented with headache. Patient was recently d/c from hospital to [facility]. Admitted at that time for headache and dizziness. Underwent shunt placement by neurosurgery for hydrocephalus on 5/16. Remained stable post op, reported some improvement in headache. At SNF states headache remained persistent. Per family was having difficulty tolerating therapy.      Presented to [facility]. CT head done demonstrated increasing size of 3rd and lateral ventricles, decreasing size of 4th ventricle. Transferred to [facility] for neurosurgery re-evaluation. Review of Systems:     Constitutional:  negative for chills, fevers, sweats  Respiratory:  negative for cough, dyspnea on exertion, hemoptysis, shortness of breath, wheezing  Cardiovascular:  negative for chest pain, chest pressure/discomfort, lower extremity edema, palpitations  Gastrointestinal:  negative for abdominal pain, constipation, diarrhea, nausea, vomiting  Neurological:  Positive for headache     Medications: Allergies:     Allergies   Allergen Reactions    Vicodin [Hydrocodone-Acetaminophen]        Current Meds:   Scheduled Meds:    aspirin  325 mg Oral Daily    carvedilol  3.125 mg Oral BID WC    fluticasone  1 spray Nasal Daily    sodium chloride flush  10 mL Intravenous 2 times per day    9.5 9.3 9.1   TROPHS 12 10  --      No results for input(s): PROT, LABALBU, LABA1C, A0TXTXG, W5NCXHE, FT4, TSH, AST, ALT, LDH, GGT, ALKPHOS, LABGGT, BILITOT, BILIDIR, AMMONIA, AMYLASE, LIPASE, LACTATE, CHOL, HDL, LDLCHOLESTEROL, CHOLHDLRATIO, TRIG, VLDL, EQR38IA, PHENYTOIN, PHENYF, URICACID, POCGLU in the last 72 hours. Lab Results   Component Value Date/Time    SPECIAL NOT REPORTED 05/16/2019 02:53 PM     Lab Results   Component Value Date/Time    CULTURE NO GROWTH 4 DAYS 05/16/2019 02:52 PM       No results found for: POCPH, PHART, PH, POCPCO2, EBJ6BDJ, PCO2, POCPO2, PO2ART, PO2, POCHCO3, SJA6AOM, HCO3, NBEA, PBEA, BEART, BE, THGBART, THB, CAX7QCU, OALT7EZR, Q6IOJNGJ, O2SAT, FIO2    Radiology:    Ct Head Wo Contrast    Result Date: 5/21/2019  Increasing size of the 3rd and lateral ventricles with decreasing size of the 4th ventricle. There is corresponding increased extra-axial fluid along the tentorium with associated mass effect on the cerebellar hemispheres and subsequent decreased size of the 4th ventricle. This likely gives rise to mass-effect at the aqueduct level which most likely corresponds to the ventricular enlargement above the tentorium. There is also a small amount of developing transependymal CSF flow marginating the ventricles. Post shunt catheter placement as described A small amount of hemorrhage is noted in the ventricle Continued follow-up recommended     Xr Chest Portable    Result Date: 5/22/2019  Stable chest x-ray. No acute disease.      Xr Shunt Series Placement (<4 Views)    Result Date: 5/21/2019  Shunt catheter as described       Physical Examination:        General appearance:  alert, cooperative and no distress  Mental Status:  oriented to person, place and time and normal affect  Lungs:  clear to auscultation bilaterally, normal effort  Heart:  regular rate and rhythm  Abdomen:  soft, nontender, nondistended, normal bowel sounds  Extremities:  no edema, redness, tenderness in the calves  Skin:  no gross lesions, rashes, induration    Assessment:        Primary Problem  Headache    Active Hospital Problems    Diagnosis Date Noted    S/P  shunt [Z98.2] 05/22/2019    Headache [R51] 05/22/2019    Hydrocephalus [G91.9] 05/15/2019    Chronic obstructive pulmonary disease ([facility] [J44.9] 05/07/2019    Tobacco abuse [Z72.0] 03/21/2018    CAD (coronary artery disease) [I25.10] 08/18/2013       Plan:        - Neurosurgery following - shunt adjusted  - Continue home medications  - Pain control  - PT/OT/speech therapy  - Swallow study   - Case discussed with NS - CT head stable, no need for repeat imaging   - DC planning     Conchis Arcos MD  5/24/2019  8:23 AM

## 2019-05-24 NOTE — PROGRESS NOTES
Patient discharged to facility. Patient and family notified. Belongings packed. Report given to rehab facility. Tele and int removed. Vs taken. Denies pain.  Will discharge via transportation

## 2019-05-24 NOTE — CARE COORDINATION
Discharge [address] [facility] Case Management Department  Written by: Renae Quinteros RN    Patient Name: Nakia Hernandez  Attending Provider: No att. providers found  Admit Date: 2019 10:01 PM  MRN: [id number]  Account: [de-identified]                     : 1950  Discharge Date: 2019      Disposition: SNF    Renae Quinteros RN

## 2019-05-24 NOTE — PLAN OF CARE
Problem: Falls - Risk of:  Goal: Will remain free from falls  Description  Will remain free from falls  Outcome: Ongoing  Note:   Bed in lowest position and locked. Bed alarm activated. Educated on use of call light when in need of assistance- expressed understanding. Visual checks performed and charted. No falls during shift at this time. Armband and falling star in place. Call light within reach. Transfers with contact guard assist, and use of walker to the bathroom. Patient is unsteady at times. Continues to work with PT/OT. Problem: Risk for Impaired Skin Integrity  Goal: Tissue integrity - skin and mucous membranes  Description  Structural intactness and normal physiological function of skin and  mucous membranes. Outcome: Ongoing  Note:      05/24/19 0346   Skin Color/Condition   Skin Color/Condition (WDL) WDL   Skin Integrity   Location Buttocks/Bilat heels   Skin Integrity Redness  (Blanches)   Skin Integrity Site 2   Skin Integrity Location 2 Bruising   Location 2 Right neck   Skin Integrity Site 3   Skin Integrity Location 3 Bruising    Location 3 Scattered     No signs of new skin breakdown with each assessment. Skin remains warm, dry, intact. Mucous membranes pink & moist. Patient is able to turn self. Patient also has two incisions on scalp, along with multiple incisions to abdomen, from  shunt placement. Incisions are clean, dry, and intact; edges well approximated. No signs of infection noted. Problem: Pain:  Goal: Pain level will decrease  Description  Pain level will decrease  Outcome: Ongoing  Note:   Patient able to use 0-10 pain scale. Denies pain at this time. Pain complaints as documented. Agreeable to take PRN pain medications. Pain goal of 2. Patient using ice therapy. Problem: Nausea/Vomiting:  Goal: Absence of nausea/vomiting  Description  Absence of nausea/vomiting  Outcome: Ongoing  Note:   Patient experienced one episode of nausea overnight, no vomiting. Patient had just ambulated to the bathroom prior to nausea onset. Patient was administered zofran and experienced relief. Problem: Activity:  Goal: Ability to tolerate increased activity will improve  Description  Ability to tolerate increased activity will improve  Outcome: Ongoing     Care plan reviewed with patient. Patient verbalized understanding of the plan of care and contributed to goal setting.

## 2019-05-25 NOTE — DISCHARGE SUMMARY
[facility]    Discharge Summary     Patient ID: Luz Marina Barbosa  :  1950   MRN: [id number]     ACCOUNT:  [de-identified]   Patient's PCP: JOHNATHAN Maldonado CNP  Admit Date: 2019   Discharge Date: 2019  Length of Stay: 2  Code Status:  Prior  Admitting Physician: Azul Rossi MD  Discharge Physician: Azul Rossi MD     Active Discharge Diagnoses:     Hospital Problem Lists:  Principal Problem:    Headache  Active Problems:    CAD (coronary artery disease)    Tobacco abuse    Chronic obstructive pulmonary disease ([facility] [address]    Hydrocephalus    S/P  shunt  Resolved Problems:    * No resolved hospital problems. *      Admission Condition:  fair     Discharged Condition: stable    Hospital Stay:     Hospital Course: The patient is Aven.Imam y.o. F with PMH significant for HTN, CAD, tobacco use who presented with headache. Patient was recently d/c from hospital to SNF. Admitted at that time for headache and dizziness. Underwent shunt placement by neurosurgery for hydrocephalus on . Remained stable post op, reported some improvement in headache. At SNF states headache remained persistent. Per family was having difficulty tolerating therapy.      Presented to [facility]. CT head done demonstrated increasing size of 3rd and lateral ventricles, decreasing size of 4th ventricle. Transferred to [facility] for neurosurgery re-evaluation. Underwent shunt adjustment, improvement in symptoms. DC back to SNF.        Significant therapeutic interventions:   - Neurosurgery following - shunt adjusted  - Continue home medications  - Pain control  - PT/OT/speech therapy  - Swallow study   - Case discussed with NS - CT head stable, no need for repeat imaging         Significant Diagnostic Studies:   Labs / Micro:  CBC:   Lab Results   Component Value Date    WBC 7.5 2019    RBC 4.95 2019    HGB 14.9 2019    HCT 47.2 2019 MCV 95.4 05/23/2019    MCH 30.1 05/23/2019    MCHC 31.6 05/23/2019    RDW 14.8 05/23/2019     05/23/2019     BMP:    Lab Results   Component Value Date    GLUCOSE 87 05/23/2019     05/23/2019    K 3.6 05/23/2019     05/23/2019    CO2 25 05/23/2019    ANIONGAP 11 05/23/2019    BUN 23 05/23/2019    CREATININE 0.65 05/23/2019    BUNCRER NOT REPORTED 05/23/2019    CALCIUM 9.1 05/23/2019    LABGLOM >60 05/23/2019    GFRAA >60 05/23/2019    GFR      05/23/2019    GFR NOT REPORTED 05/23/2019     HFP:    Lab Results   Component Value Date    PROT 7.3 05/12/2019     CMP:    Lab Results   Component Value Date    GLUCOSE 87 05/23/2019     05/23/2019    K 3.6 05/23/2019     05/23/2019    CO2 25 05/23/2019    BUN 23 05/23/2019    CREATININE 0.65 05/23/2019    ANIONGAP 11 05/23/2019    ALKPHOS 77 05/12/2019    ALT 9 05/12/2019    AST 9 05/12/2019    BILITOT 0.65 05/12/2019    LABALBU 4.3 05/12/2019    ALBUMIN 1.4 05/12/2019    LABGLOM >60 05/23/2019    GFRAA >60 05/23/2019    GFR      05/23/2019    GFR NOT REPORTED 05/23/2019    PROT 7.3 05/12/2019    CALCIUM 9.1 05/23/2019     PT/INR:  No results found for: PTINR  PTT:   Lab Results   Component Value Date    APTT 21.7 05/16/2019     FLP:    Lab Results   Component Value Date    CHOL 177 03/23/2018    TRIG 67 03/23/2018    HDL 61 03/23/2018     U/A:    Lab Results   Component Value Date    COLORU NOT REPORTED 03/23/2018    TURBIDITY NOT REPORTED 03/23/2018    SPECGRAV 1.025 03/23/2018    HGBUR NEGATIVE 03/23/2018    PHUR 6.0 03/23/2018    PROTEINU NEGATIVE 03/23/2018    GLUCOSEU NEGATIVE 03/23/2018    KETUA NEGATIVE 03/23/2018    BILIRUBINUR NEGATIVE 03/23/2018    UROBILINOGEN Normal 03/23/2018    NITRU NEGATIVE 03/23/2018    LEUKOCYTESUR NEGATIVE 03/23/2018     TSH:    Lab Results   Component Value Date    TSH 3.90 03/23/2018        Radiology:    Ct Head Wo Contrast    Result Date: 5/21/2019  Increasing size of the 3rd and lateral ventricles with weeks         Requiring Further Evaluation/Follow Up POST HOSPITALIZATION/Incidental Findings:     Diet: regular diet    Activity: As tolerated    Instructions to Patient: follow up with NS    Discharge Medications:      Medication List      CONTINUE taking these medications    albuterol sulfate  (90 Base) MCG/ACT inhaler  Inhale 2 puffs into the lungs 4 times daily as needed for Wheezing     aspirin 325 MG tablet     butalbital-acetaminophen-caffeine -40 MG per tablet  Commonly known as:  FIORICET, ESGIC  Take 1 tablet by mouth every 4 hours as needed for Headaches     carvedilol 3.125 MG tablet  Commonly known as:  COREG  Take 1 tablet by mouth 2 times daily (with meals)     DAYQUIL PO     fluticasone 50 MCG/ACT nasal spray  Commonly known as:  FLONASE  1 spray by Nasal route daily     ibuprofen 600 MG tablet  Commonly known as:  ADVIL;MOTRIN  Take 1 tablet by mouth every 8 hours as needed for Pain     loratadine 10 MG tablet  Commonly known as:  CLARITIN     ondansetron 4 MG disintegrating tablet  Commonly known as:  ZOFRAN ODT  Take 1 tablet by mouth every 8 hours as needed for Nausea     ondansetron 4 MG tablet  Commonly known as:  ZOFRAN  Take 1 tablet by mouth every 8 hours as needed for Nausea     SUDAFED 12 HOUR PO     tiZANidine 2 MG tablet  Commonly known as:  ZANAFLEX  Take 1 tablet by mouth 3 times daily as needed (muscle spasm or headach)           Where to Get Your Medications      You can get these medications from any pharmacy    Bring a paper prescription for each of these medications  · butalbital-acetaminophen-caffeine -40 MG per tablet         Time Spent on discharge is  35 mins in patient examination, evaluation, counseling as well as medication reconciliation, prescriptions for required medications, discharge plan and follow up.     Electronically signed by   Eliezer Santana MD  5/25/2019  7:34 AM      Thank you Dr. Evita Castro, APRN - CNP for the opportunity to be involved in this patient's care.

## 2019-05-29 ENCOUNTER — OUTSIDE SERVICES (OUTPATIENT)
Dept: FAMILY MEDICINE CLINIC | Age: 69
End: 2019-05-29
Payer: MEDICARE

## 2019-05-29 DIAGNOSIS — J41.8 MIXED SIMPLE AND MUCOPURULENT CHRONIC BRONCHITIS (HCC): ICD-10-CM

## 2019-05-29 DIAGNOSIS — Z72.0 TOBACCO ABUSE: ICD-10-CM

## 2019-05-29 DIAGNOSIS — G91.9 HYDROCEPHALUS (HCC): Primary | ICD-10-CM

## 2019-05-29 DIAGNOSIS — Z98.2 S/P VP SHUNT: ICD-10-CM

## 2019-05-29 DIAGNOSIS — F33.1 MODERATE EPISODE OF RECURRENT MAJOR DEPRESSIVE DISORDER (HCC): ICD-10-CM

## 2019-05-30 ENCOUNTER — HOSPITAL ENCOUNTER (OUTPATIENT)
Dept: NEUROLOGY | Age: 69
Discharge: HOME | End: 2019-05-30
Payer: COMMERCIAL

## 2019-05-30 DIAGNOSIS — R26.89 BALANCE PROBLEM: ICD-10-CM

## 2019-05-30 DIAGNOSIS — R42 DIZZINESS: ICD-10-CM

## 2019-05-30 DIAGNOSIS — I67.82 CHRONIC CEREBRAL ISCHEMIA: ICD-10-CM

## 2019-05-30 DIAGNOSIS — R51.9 OCCIPITAL HEADACHE: ICD-10-CM

## 2019-05-30 PROCEDURE — 95957 EEG DIGITAL ANALYSIS: CPT

## 2019-05-30 PROCEDURE — 95813 EEG EXTND MNTR 61-119 MIN: CPT

## 2019-05-30 NOTE — PROGRESS NOTES
EXTENDED EEG Completed with Damien Analysis. PCP: JOHNATHAN Holcomb - CNP    Ordering: Scott Lundberg Neurologist    Interpreting Physician: Solomon Oropeza Neurologist    Technician: Janine Lopez RN

## 2019-05-31 NOTE — PROCEDURES
[address]                 [address] [place] [id number]                       ELECTROENCEPHALOGRAM (EEG) REPORT      PATIENT NAME: Lenora Garcia                    :        1950  MED REC NO:   [id number]                             ROOM:  ACCOUNT NO:   [de-identified]                           ADMIT DATE: 2019  PROVIDER:     Danny Donaldson MD    DATE OF STUDY:  2019    REFERRING PROVIDER:  Danny Donaldson MD - Neurology    TECHNIQUE:  23 channels of EEG, 2 channels of EOG, 2 channel of EKG and  1 channel ground and 1 channel reference were recorded with [employer]  Software 32 Channel System utilizing the International 10/20 System  Protocol. Damien detection and seizure analysis protocols were utilized and the  study was reviewed using the comprehensive EEG monitoring. Damien detection trending allows to see at a glance timing of detected  seizure in the context of other changes in the EEG. Damien detection  analysis automatically notes the most types of electrode artifacts  making trends easier to review and more representative of cerebral  activity. Damien detection analysis also provides collection of trends  for monitoring and review including seizure probability, rhythmic  spectrogram left and right hemispheres, peak envelope, amplitude,  relative symmetry and suppression ratio. This is an extended EEG recorded for more than one hour. CLINICAL DATA:      The patient is 79years old female with a history of  ventriculomegaly, Chiari malformation, and hydrocephalus, history of  recent  shunt placement, dizziness, memory problems, occipital  headaches. The purpose of the study is to evaluate for associated seizure activity. MEDICATIONS:  Fioricet.       BACKGROUND ACTIVITY:      While the patient was awake, the background  activity consisted of poorly regulated 9 Hz waveforms seen over both  cerebral hemispheres. Intermittent high amplitude slow waves, slow sharp waves, and sharp  waves noted diffusely throughout this recording. ACTIVATION PROCEDURES:    HYPERVENTILATION:  Could not be performed due to the patient's clinical  condition. PHOTIC STIMULATION:  Photic stimulation was performed at the following  frequencies: 1 Hz, 3 Hz, 6 Hz, 9 Hz, 12 Hz, 15 Hz, 18 Hz, 21 Hz, 25 Hz,  30 Hz and patient tolerated well. Photic stimulation:  Unremarkable. SLEEP:  Stage I.    EKG:  EKG showed normal sinus rhythm without any significant  abnormality. IMPRESSION:      There is moderate irritable diffuse cerebral dysfunction    that is potentially epileptogenic in nature. Further clinical correlation and followup recommended.           Neelima Capps MD  Board Certified Neurologist    D: 05/30/2019 17:53:28       T: 05/30/2019 18:47:32     [id number]  Job#: [id number]     Doc#: [id number]    CC:

## 2019-06-01 PROCEDURE — 99306 1ST NF CARE HIGH MDM 50: CPT | Performed by: FAMILY MEDICINE

## 2019-06-01 ASSESSMENT — ENCOUNTER SYMPTOMS
COUGH: 1
ABDOMINAL PAIN: 0
CHEST TIGHTNESS: 0
SHORTNESS OF BREATH: 0
WHEEZING: 0

## 2019-06-02 NOTE — PROGRESS NOTES
[address] [facility]  Dept: [phone]  Dept Fax: [phone]  Loc: [phone]    Cameron Zapata is a 79 y.o. female who presents today for her medical conditions/complaints as noted below. Cameron Zapata is c/o of   Chief Complaint   Patient presents with    Headache    Other     hydrocephelus    Coronary Artery Disease       HPI:     HARMEET Roa was seen on 5/29 for her initial visit while at [facility] for rehab. She was initially in the hospital with hydrocephalus. She had a  shunt placed and she was discharged to [facility] for rehab. She was at [facility] for a few hours initially before she felt terrible and she ended up being admitted back to [facility] for revision of her shunt. She was admitted back to [facility] on 5/24 for rehab. When she was seen on 5/29 she was doing pretty well. She is not confused, her headaches are minor and worse with weather changes. She still feels like her balance is off and she does not feel steady on her feet. She has been having some issues with depression because she feels like this has been very life altering and she has not had a chance to process it yet. She is having trouble sleeping which is making it hard for her to feel like herself during the day. She feels like she needs to grieve the process and she has not had time to do that yet. She has been treated for depression before with paxil. She also has a cough that sounds productive but she is not bringing much up. She is not short of breath and she is not having any chest pain. She has only been able to do one day of therapy by 5/29 because she arrived at [facility] just before the holiday weekend.       Past Medical History:   Diagnosis Date    Chronic obstructive pulmonary disease ([address]     Coronary artery disease     Dyslipidemia     Obesity     Pneumonia           Social History     Tobacco Use    Smoking status: Current Every Day Smoker     Packs/day: 1.00     Years: 50.00     Pack years: 50.00     Start date: 1/1/1970    Smokeless tobacco: Never Used   Substance Use Topics    Alcohol use: No     Current Outpatient Medications   Medication Sig Dispense Refill    butalbital-acetaminophen-caffeine (FIORICET, ESGIC) -40 MG per tablet Take 1 tablet by mouth every 4 hours as needed for Headaches 30 tablet 0    carvedilol (COREG) 3.125 MG tablet Take 1 tablet by mouth 2 times daily (with meals) 60 tablet 3    ondansetron (ZOFRAN ODT) 4 MG disintegrating tablet Take 1 tablet by mouth every 8 hours as needed for Nausea 20 tablet 0    ibuprofen (ADVIL;MOTRIN) 600 MG tablet Take 1 tablet by mouth every 8 hours as needed for Pain 20 tablet 0    aspirin 325 MG tablet Take 325 mg by mouth daily      ondansetron (ZOFRAN) 4 MG tablet Take 1 tablet by mouth every 8 hours as needed for Nausea 20 tablet 0    tiZANidine (ZANAFLEX) 2 MG tablet Take 1 tablet by mouth 3 times daily as needed (muscle spasm or headach) 30 tablet 0    fluticasone (FLONASE) 50 MCG/ACT nasal spray 1 spray by Nasal route daily 1 Bottle 0    Pseudoephedrine HCl (SUDAFED 12 HOUR PO) Take 1 tablet by mouth 2 times daily      albuterol sulfate  (90 Base) MCG/ACT inhaler Inhale 2 puffs into the lungs 4 times daily as needed for Wheezing 1 Inhaler 0    Pseudoephedrine-APAP-DM (DAYQUIL PO) Take by mouth      loratadine (CLARITIN) 10 MG tablet Take 10 mg by mouth daily       No current facility-administered medications for this visit. Allergies   Allergen Reactions    Vicodin [Hydrocodone-Acetaminophen]        Subjective:     Review of Systems   Constitutional: Positive for appetite change and fatigue. Negative for activity change, chills and fever. Eyes: Negative for visual disturbance. Respiratory: Positive for cough. Negative for chest tightness, shortness of breath and wheezing.     Cardiovascular: Negative for chest pain, palpitations and leg swelling. Gastrointestinal: Negative for abdominal pain. Genitourinary: Negative for difficulty urinating. Skin: Positive for wound (her incisions are healing well). Neurological: Positive for headaches. Negative for dizziness, syncope, weakness and light-headedness. Psychiatric/Behavioral: Positive for dysphoric mood and sleep disturbance. The patient is not nervous/anxious. Objective:      Physical Exam   Constitutional: She is oriented to person, place, and time. She appears well-developed and well-nourished. No distress. HENT:   Head:       Right half of head is shaved   Eyes: Conjunctivae are normal.   Neck: Normal range of motion. Neck supple. No thyromegaly present. Cardiovascular: Normal rate, regular rhythm, normal heart sounds and intact distal pulses. No murmur heard. Pulmonary/Chest: Effort normal and breath sounds normal. No respiratory distress. She has no wheezes. Musculoskeletal: She exhibits no edema. Lymphadenopathy:     She has no cervical adenopathy. Neurological: She is alert and oriented to person, place, and time. Skin: Skin is warm and dry. No rash noted. No erythema. Psychiatric: Her speech is normal and behavior is normal. Judgment and thought content normal. Cognition and memory are normal. She exhibits a depressed mood. She is attentive. Nursing note and vitals reviewed. Assessment:       Diagnosis Orders   1. Hydrocephalus     2. S/P  shunt     3. Moderate episode of recurrent major depressive disorder ([address]     4. Mixed simple and mucopurulent chronic bronchitis ([address]     5. Tobacco abuse               Plan:        Hydrocephalus: improving; she is feeling much better since her shunt revision. She will work with therapy to get stronger and I suspect she will be able to go home in a few weeks. Depression: worsening; she is struggling with her diagnosis. I offered to start her on an SSRI but she refused.  I instead started her on trazodone to

## 2019-06-03 ENCOUNTER — CARE COORDINATION (OUTPATIENT)
Dept: CASE MANAGEMENT | Age: 69
End: 2019-06-03

## 2019-06-03 DIAGNOSIS — G91.9 HYDROCEPHALUS (HCC): ICD-10-CM

## 2019-06-03 DIAGNOSIS — G93.5 CHIARI MALFORMATION TYPE I (HCC): Primary | ICD-10-CM

## 2019-06-03 NOTE — CARE COORDINATION
[facility] Update Call    6/3/2019    Patient: Julito Lentz Patient : 1950   MRN: [id number]  Reason for Admission: S/P  Shunt  Discharge Date: 19 RARS: Readmission Risk Score: 11         Care Transitions Post Acute Facility Update    Care Transitions Interventions  Post Acute Facility:  [address] Update  Reported Nursing Issues:  nausea   ADLs:  Stand by Assist - Presence and Cueing   Bed Mobility:  Stand by Assist - Presence and Cueing   Transfer Assistance:  Contact Guard Assist - Hands on patient for balance   Ambulation Assistance:  Contact Guard Assist - Hands on patient for balance   How far (in feet) is the patient ambulating?:  100   Does patient use an assistive device?:  Yes (Comment: rolling walker)   Barriers to Discharge:  frequent rest breaks  Standing rest breaks   Anticipated discharge services:  Home Goal, wants to return to work. Poor endurance.   Driving program.

## 2019-06-04 DIAGNOSIS — G91.9 HYDROCEPHALUS (HCC): Primary | ICD-10-CM

## 2019-06-06 ENCOUNTER — HOSPITAL ENCOUNTER (OUTPATIENT)
Dept: NEUROLOGY | Age: 69
Discharge: HOME | End: 2019-06-06
Payer: COMMERCIAL

## 2019-06-06 DIAGNOSIS — R51.9 OCCIPITAL HEADACHE: ICD-10-CM

## 2019-06-06 DIAGNOSIS — R26.89 BALANCE PROBLEM: ICD-10-CM

## 2019-06-06 DIAGNOSIS — I63.19 CEREBRAL INFARCTION DUE TO EMBOLISM OF OTHER PRECEREBRAL ARTERY (HCC): ICD-10-CM

## 2019-06-06 DIAGNOSIS — I67.82 CHRONIC CEREBRAL ISCHEMIA: ICD-10-CM

## 2019-06-06 DIAGNOSIS — R42 DIZZINESS: ICD-10-CM

## 2019-06-06 PROCEDURE — 93886 INTRACRANIAL COMPLETE STUDY: CPT

## 2019-06-06 NOTE — PROGRESS NOTES
TCD Completed without Emboli Detection. PCP: JOHNATHAN Cohen - CNP    Ordering: Dudley Lundberg Neurologist    Interpreting Physician: Jones Selby    Electronically signed by Chandrika Chinchilla RN on 6/6/2019 at 4:01 PM

## 2019-06-07 ENCOUNTER — APPOINTMENT (OUTPATIENT)
Dept: GENERAL RADIOLOGY | Age: 69
End: 2019-06-07
Payer: MEDICARE

## 2019-06-07 ENCOUNTER — APPOINTMENT (OUTPATIENT)
Dept: CT IMAGING | Age: 69
End: 2019-06-07
Payer: MEDICARE

## 2019-06-07 ENCOUNTER — HOSPITAL ENCOUNTER (EMERGENCY)
Age: 69
Discharge: ANOTHER HOSPITAL | End: 2019-06-07
Attending: EMERGENCY MEDICINE
Payer: MEDICARE

## 2019-06-07 ENCOUNTER — TELEPHONE (OUTPATIENT)
Dept: FAMILY MEDICINE CLINIC | Age: 69
End: 2019-06-07

## 2019-06-07 VITALS
DIASTOLIC BLOOD PRESSURE: 81 MMHG | WEIGHT: 200 LBS | RESPIRATION RATE: 14 BRPM | HEART RATE: 70 BPM | SYSTOLIC BLOOD PRESSURE: 174 MMHG | TEMPERATURE: 98.2 F | OXYGEN SATURATION: 93 % | BODY MASS INDEX: 30.41 KG/M2

## 2019-06-07 DIAGNOSIS — R11.2 NON-INTRACTABLE VOMITING WITH NAUSEA, UNSPECIFIED VOMITING TYPE: ICD-10-CM

## 2019-06-07 DIAGNOSIS — G91.9 HYDROCEPHALUS (HCC): Primary | ICD-10-CM

## 2019-06-07 LAB
ABSOLUTE EOS #: 0.2 K/UL (ref 0–0.4)
ABSOLUTE IMMATURE GRANULOCYTE: ABNORMAL K/UL (ref 0–0.3)
ABSOLUTE LYMPH #: 1.1 K/UL (ref 1–4.8)
ABSOLUTE MONO #: 0.8 K/UL (ref 0.1–1.2)
ANION GAP SERPL CALCULATED.3IONS-SCNC: 14 MMOL/L (ref 9–17)
BASOPHILS # BLD: 1 % (ref 0–1)
BASOPHILS ABSOLUTE: 0.1 K/UL (ref 0–0.2)
BUN BLDV-MCNC: 19 MG/DL (ref 8–23)
BUN/CREAT BLD: 28 (ref 9–20)
CALCIUM SERPL-MCNC: 9.9 MG/DL (ref 8.6–10.4)
CHLORIDE BLD-SCNC: 98 MMOL/L (ref 98–107)
CO2: 25 MMOL/L (ref 20–31)
CREAT SERPL-MCNC: 0.68 MG/DL (ref 0.5–0.9)
DIFFERENTIAL TYPE: ABNORMAL
EOSINOPHILS RELATIVE PERCENT: 3 % (ref 1–7)
GFR AFRICAN AMERICAN: >60 ML/MIN
GFR NON-AFRICAN AMERICAN: >60 ML/MIN
GFR SERPL CREATININE-BSD FRML MDRD: ABNORMAL ML/MIN/{1.73_M2}
GFR SERPL CREATININE-BSD FRML MDRD: ABNORMAL ML/MIN/{1.73_M2}
GLUCOSE BLD-MCNC: 112 MG/DL (ref 70–99)
HCT VFR BLD CALC: 45.5 % (ref 36–46)
HEMOGLOBIN: 15.1 G/DL (ref 12–16)
IMMATURE GRANULOCYTES: ABNORMAL %
LYMPHOCYTES # BLD: 16 % (ref 16–46)
MCH RBC QN AUTO: 31.1 PG (ref 26–34)
MCHC RBC AUTO-ENTMCNC: 33.2 G/DL (ref 31–37)
MCV RBC AUTO: 93.6 FL (ref 80–100)
MONOCYTES # BLD: 11 % (ref 4–11)
NRBC AUTOMATED: ABNORMAL PER 100 WBC
PDW BLD-RTO: 15.5 % (ref 11–14.5)
PLATELET # BLD: 164 K/UL (ref 140–450)
PLATELET ESTIMATE: ABNORMAL
PMV BLD AUTO: 9.9 FL (ref 6–12)
POTASSIUM SERPL-SCNC: 4.4 MMOL/L (ref 3.7–5.3)
RBC # BLD: 4.86 M/UL (ref 4–5.2)
RBC # BLD: ABNORMAL 10*6/UL
SEG NEUTROPHILS: 69 % (ref 43–77)
SEGMENTED NEUTROPHILS ABSOLUTE COUNT: 5 K/UL (ref 1.8–7.7)
SODIUM BLD-SCNC: 137 MMOL/L (ref 135–144)
WBC # BLD: 7.1 K/UL (ref 3.5–11)
WBC # BLD: ABNORMAL 10*3/UL

## 2019-06-07 PROCEDURE — 80048 BASIC METABOLIC PNL TOTAL CA: CPT

## 2019-06-07 PROCEDURE — 96374 THER/PROPH/DIAG INJ IV PUSH: CPT

## 2019-06-07 PROCEDURE — 6360000002 HC RX W HCPCS: Performed by: EMERGENCY MEDICINE

## 2019-06-07 PROCEDURE — 99285 EMERGENCY DEPT VISIT HI MDM: CPT

## 2019-06-07 PROCEDURE — 6370000000 HC RX 637 (ALT 250 FOR IP): Performed by: EMERGENCY MEDICINE

## 2019-06-07 PROCEDURE — 85025 COMPLETE CBC W/AUTO DIFF WBC: CPT

## 2019-06-07 PROCEDURE — 70450 CT HEAD/BRAIN W/O DYE: CPT

## 2019-06-07 PROCEDURE — 74018 RADEX ABDOMEN 1 VIEW: CPT

## 2019-06-07 RX ORDER — IBUPROFEN 600 MG/1
600 TABLET ORAL ONCE
Status: COMPLETED | OUTPATIENT
Start: 2019-06-07 | End: 2019-06-07

## 2019-06-07 RX ORDER — ONDANSETRON 2 MG/ML
4 INJECTION INTRAMUSCULAR; INTRAVENOUS ONCE
Status: COMPLETED | OUTPATIENT
Start: 2019-06-07 | End: 2019-06-07

## 2019-06-07 RX ADMIN — ONDANSETRON 4 MG: 2 INJECTION INTRAMUSCULAR; INTRAVENOUS at 19:33

## 2019-06-07 RX ADMIN — IBUPROFEN 600 MG: 600 TABLET ORAL at 20:26

## 2019-06-07 ASSESSMENT — PAIN DESCRIPTION - LOCATION: LOCATION: HEAD

## 2019-06-07 ASSESSMENT — PAIN SCALES - GENERAL: PAINLEVEL_OUTOF10: 8

## 2019-06-07 ASSESSMENT — PAIN DESCRIPTION - DESCRIPTORS: DESCRIPTORS: PRESSURE

## 2019-06-07 NOTE — ED NOTES
Awaiting call back from hosp at [facility].       Maribel Bradshaw RN  06/07/19 1210

## 2019-06-07 NOTE — ED TRIAGE NOTES
Currently at [facility] for rehab after  shunt. Has had severe headaches, dizziness, nausea with emesis since the procedure.

## 2019-06-07 NOTE — PROCEDURES
[address]                 [address] [place], [place] [address]                                          TCD        PATIENT NAME: Yohannes Vallecillo                    :        1950  MED REC NO:   [id number]                             ROOM:  ACCOUNT NO:   [de-identified]                           ADMIT DATE: 2019  PROVIDER:     Paco Hand MD      DATE OF STUDY:  2019    TECHNIQUE:  Transcranial Doppler study of intracranial arteries was  performed using [employer] equipment with digital Doppler technology, with  high resolution 250 [employer] M-mode display and Multigate Spectral Windows  and 2 MHz Doppler probes via temporal, suboccipital and orbital  approaches. CLINICAL DATA:      The patient is 79years old with a history of dizziness,  ventriculomegaly, cerebral atrophy, chronic cerebral ischemia, Chiari  malformation, balance problems, vertebrobasilar insufficiency, cerebral  infarction, abnormal CT of the head, hydrocephalus, previous history of   shunt, history of coronary artery disease and tobacco usage, obesity,  hyperlipidemia. The purpose of the study is to evaluate for stroke, intracranial focal  stenosis, flow abnormalities, and vertebrobasilar insufficiency  evaluations. SUMMARY:      The mean flow velocities in the right and left anterior,  middle, and posterior cerebral arteries appear fairly within normal  limits with borderline PI values. Mean flow velocities in the right and left vertebral arteries are  borderline with elevated PI values on the right vertebral artery. Mean flow velocities in the basilar artery is low with borderline PI  values. IMPRESSION:      1. No significant intracranial focal stenosis or flow abnormalities    noted in the anterior circulation. 2.  Mild-to-moderate vertebrobasilar stenosis.       3.  TCD of intracranial arteries for emboli detection could not be    performed as the patient

## 2019-06-07 NOTE — TELEPHONE ENCOUNTER
Spoke to Maggie stated that Kaitlin Erazo has been having headaches everyday, N&V with dizziness, her vitals are fine, Dr Vidhi Estes is out of the office I went and talked with [facility] and she stated to send her to the [address] [place] [address] back and let them know to send her to the ER for evaluation

## 2019-06-07 NOTE — ED PROVIDER NOTES
[facility]  eMERGENCY dEPARTMENT eNCOUnter      Pt Name: Denise Diop  MRN: [id number]  [place] 1950  Date of evaluation: 6/7/2019      CHIEF COMPLAINT       Chief Complaint   Patient presents with    Dizziness    Emesis    Headache         HISTORY OF PRESENT ILLNESS    Denise Diop is a 79 y.o. female who presents with complaints of increasing headache especially today nausea and vomiting, she has a history of a  shunt placed for an Arnold-Chiari malformation she had similar symptoms a couple weeks ago and those resolved when she went back to [facility] and had the shunt adjusted as been no fevers or chills  No neurologic complaints    REVIEW OF SYSTEMS         Review of Systems   Constitutional: Negative for chills and fever. HENT: Negative for congestion and ear pain. Eyes: Negative for pain and visual disturbance. Respiratory: Negative for cough and shortness of breath. Cardiovascular: Negative for chest pain, palpitations and leg swelling. Gastrointestinal: Positive for nausea and vomiting. Negative for abdominal pain, blood in stool, constipation and diarrhea. Endocrine: Negative for polydipsia and polyuria. Genitourinary: Negative for difficulty urinating, dysuria, frequency, vaginal bleeding and vaginal discharge. Musculoskeletal: Negative for back pain, joint swelling, myalgias, neck pain and neck stiffness. Skin: Negative for rash. Neurological: Positive for headaches. Negative for dizziness and weakness. Hematological: Negative for adenopathy. Does not bruise/bleed easily. Psychiatric/Behavioral: Negative for confusion, self-injury and suicidal ideas. PAST MEDICAL HISTORY    has a past medical history of Chronic obstructive pulmonary disease ([address] Coronary artery disease, Dyslipidemia, Obesity, and Pneumonia.     SURGICAL HISTORY      has a past surgical history that includes Cardiac catheterization; Ventriculoperitoneal shunt (Right, 5/16/2019); 50.00 pack-year smoking history. She has never used smokeless tobacco. She reports that she does not drink alcohol or use drugs. PHYSICAL EXAM     INITIAL VITALS:  weight is 200 lb (90.7 kg). Her tympanic temperature is 98.2 °F (36.8 °C). Her blood pressure is 174/81 (abnormal) and her pulse is 70. Her respiration is 14 and oxygen saturation is 93%. Physical Exam   Constitutional: She is oriented to person, place, and time. She appears well-developed and well-nourished. HENT:   Head: Normocephalic. Mouth/Throat: Oropharynx is clear and moist.   Patient has a surgical incision anterior scalp   Eyes: Pupils are equal, round, and reactive to light. Conjunctivae and EOM are normal.   Neck: Normal range of motion. Cardiovascular: Normal rate and regular rhythm. Pulmonary/Chest: Effort normal and breath sounds normal.   Abdominal: Soft. Bowel sounds are normal.   Musculoskeletal: Normal range of motion. She exhibits no edema or tenderness. Neurological: She is alert and oriented to person, place, and time. No cranial nerve deficit or sensory deficit. She exhibits normal muscle tone. Coordination normal.   Skin: Skin is warm and dry. Psychiatric: She has a normal mood and affect.  Her behavior is normal.         DIFFERENTIAL DIAGNOSIS/ MDM:     Increasing headache with nausea vomiting rule out shunt disorder we'll do a CT of the head  Patient requested she not be admitted to [place] [address] because of a bad experience last admission she wants to be admitted to one of her neurosurgeons other hospitals  DIAGNOSTIC RESULTS     EKG: All EKG's are interpreted by the Emergency Department Physician who either signs or Co-signs this chart in the absence of a cardiologist.        RADIOLOGY:   I directly visualized the following  images and reviewed the radiologist interpretations:       EXAMINATION:   CT OF THE HEAD WITHOUT CONTRAST  6/7/2019 5:12 pm       TECHNIQUE:   CT of the head was TempSrc:       SpO2: 94% 94% 94% 93%   Weight:         -------------------------  BP: (!) 174/81, Temp: 98.2 °F (36.8 °C), Pulse: 70, Resp: 14        Re-evaluation Notes    Complaining of some nausea also I will onset enema and she is complaining of constipation    CRITICAL CARE:   None        CONSULTS: Neurosurgery was counseled that I discussed the case with her physician Dr. Rhys Ramirez  Requested to be admitted to the hospitalist service at [address] [place] [address] [place]:  None    FINAL IMPRESSION      1. Hydrocephalus    2. Non-intractable vomiting with nausea, unspecified vomiting type          DISPOSITION/PLAN   DISPOSITION transferred to [facility]     Condition on Disposition    Stable    PATIENT REFERRED TO:  No follow-up provider specified. DISCHARGE MEDICATIONS:  Discharge Medication List as of 6/7/2019  9:10 PM          (Please note that portions of this note were completed with a voice recognition program.  Efforts were made to edit the dictations but occasionally words are mis-transcribed.)    Soto MD, F.A.A.E.M.   Attending Emergency Physician                          Bhargavi Mortensen MD  06/18/19 [id number]

## 2019-06-11 ENCOUNTER — CARE COORDINATION (OUTPATIENT)
Dept: CASE MANAGEMENT | Age: 69
End: 2019-06-11

## 2019-06-11 NOTE — CARE COORDINATION
[facility] Update Call    2019    Patient: Candice Ureña Patient : 1950   MRN: [id number]  Reason for Admission: Hydrocephalus, S/P  shunt adjustment  Discharge Date: 19 RARS: Readmission Risk Score: 0       Encounter opened for Care Transitions Post Acute update. Noted patient was transferred to [facility] on 2019. Patient admitted to [facility] from 2019 - 6/10/2019. Readmitted to [address] [place] [address]. Call to [phone] for status update:  Spoke with patient's nurse at [address] [place] [address]. Per Anthony Palacios, patient is a complete new readmit. She reports no plan for discharge at this time. Confirmed with facility that Care Transitions is continuing to follow with Nury Castillo for post-acute transitions of care. Erica verbalized understanding.

## 2019-06-12 ENCOUNTER — APPOINTMENT (OUTPATIENT)
Dept: CT IMAGING | Age: 69
End: 2019-06-12
Payer: MEDICARE

## 2019-06-12 ENCOUNTER — APPOINTMENT (OUTPATIENT)
Dept: GENERAL RADIOLOGY | Age: 69
End: 2019-06-12
Payer: MEDICARE

## 2019-06-12 ENCOUNTER — HOSPITAL ENCOUNTER (EMERGENCY)
Age: 69
Discharge: HOME | End: 2019-06-12
Attending: EMERGENCY MEDICINE
Payer: MEDICARE

## 2019-06-12 ENCOUNTER — OUTSIDE SERVICES (OUTPATIENT)
Dept: FAMILY MEDICINE CLINIC | Age: 69
End: 2019-06-12
Payer: MEDICARE

## 2019-06-12 VITALS
BODY MASS INDEX: 30.31 KG/M2 | OXYGEN SATURATION: 91 % | DIASTOLIC BLOOD PRESSURE: 80 MMHG | TEMPERATURE: 99.9 F | WEIGHT: 200 LBS | RESPIRATION RATE: 14 BRPM | HEIGHT: 68 IN | SYSTOLIC BLOOD PRESSURE: 126 MMHG | HEART RATE: 89 BPM

## 2019-06-12 DIAGNOSIS — Z91.81 HISTORY OF FALL: Primary | ICD-10-CM

## 2019-06-12 DIAGNOSIS — F33.1 MODERATE EPISODE OF RECURRENT MAJOR DEPRESSIVE DISORDER (HCC): ICD-10-CM

## 2019-06-12 DIAGNOSIS — G91.9 HYDROCEPHALUS, UNSPECIFIED TYPE (HCC): ICD-10-CM

## 2019-06-12 DIAGNOSIS — G93.5 CHIARI MALFORMATION TYPE I (HCC): Primary | ICD-10-CM

## 2019-06-12 DIAGNOSIS — Z98.2 S/P VP SHUNT: ICD-10-CM

## 2019-06-12 LAB
-: ABNORMAL
ABSOLUTE EOS #: 0.1 K/UL (ref 0–0.4)
ABSOLUTE IMMATURE GRANULOCYTE: ABNORMAL K/UL (ref 0–0.3)
ABSOLUTE LYMPH #: 0.7 K/UL (ref 1–4.8)
ABSOLUTE MONO #: 0.9 K/UL (ref 0.1–1.2)
AMORPHOUS: ABNORMAL
ANION GAP SERPL CALCULATED.3IONS-SCNC: 13 MMOL/L (ref 9–17)
BACTERIA: ABNORMAL
BASOPHILS # BLD: 1 % (ref 0–1)
BASOPHILS ABSOLUTE: 0 K/UL (ref 0–0.2)
BILIRUBIN URINE: ABNORMAL
BUN BLDV-MCNC: 19 MG/DL (ref 8–23)
BUN/CREAT BLD: 29 (ref 9–20)
CALCIUM SERPL-MCNC: 9.6 MG/DL (ref 8.6–10.4)
CASTS UA: ABNORMAL /LPF (ref 0–2)
CHLORIDE BLD-SCNC: 101 MMOL/L (ref 98–107)
CO2: 26 MMOL/L (ref 20–31)
COLOR: ABNORMAL
COMMENT UA: ABNORMAL
CREAT SERPL-MCNC: 0.65 MG/DL (ref 0.5–0.9)
CRYSTALS, UA: ABNORMAL /HPF
DIFFERENTIAL TYPE: ABNORMAL
EOSINOPHILS RELATIVE PERCENT: 1 % (ref 1–7)
EPITHELIAL CELLS UA: ABNORMAL /HPF (ref 0–5)
GFR AFRICAN AMERICAN: >60 ML/MIN
GFR NON-AFRICAN AMERICAN: >60 ML/MIN
GFR SERPL CREATININE-BSD FRML MDRD: ABNORMAL ML/MIN/{1.73_M2}
GFR SERPL CREATININE-BSD FRML MDRD: ABNORMAL ML/MIN/{1.73_M2}
GLUCOSE BLD-MCNC: 111 MG/DL (ref 70–99)
GLUCOSE URINE: NEGATIVE
HCT VFR BLD CALC: 47.2 % (ref 36–46)
HEMOGLOBIN: 15.7 G/DL (ref 12–16)
IMMATURE GRANULOCYTES: ABNORMAL %
KETONES, URINE: ABNORMAL
LEUKOCYTE ESTERASE, URINE: NEGATIVE
LYMPHOCYTES # BLD: 7 % (ref 16–46)
MCH RBC QN AUTO: 31.1 PG (ref 26–34)
MCHC RBC AUTO-ENTMCNC: 33.3 G/DL (ref 31–37)
MCV RBC AUTO: 93.3 FL (ref 80–100)
MONOCYTES # BLD: 8 % (ref 4–11)
MUCUS: ABNORMAL
NITRITE, URINE: NEGATIVE
NRBC AUTOMATED: ABNORMAL PER 100 WBC
OTHER OBSERVATIONS UA: ABNORMAL
PDW BLD-RTO: 15.3 % (ref 11–14.5)
PH UA: 5.5 (ref 5–6)
PLATELET # BLD: 161 K/UL (ref 140–450)
PLATELET ESTIMATE: ABNORMAL
PMV BLD AUTO: 10.3 FL (ref 6–12)
POTASSIUM SERPL-SCNC: 4 MMOL/L (ref 3.7–5.3)
PROTEIN UA: NEGATIVE
RBC # BLD: 5.05 M/UL (ref 4–5.2)
RBC # BLD: ABNORMAL 10*6/UL
RBC UA: ABNORMAL /HPF (ref 0–4)
RENAL EPITHELIAL, UA: ABNORMAL /HPF
SEG NEUTROPHILS: 83 % (ref 43–77)
SEGMENTED NEUTROPHILS ABSOLUTE COUNT: 8.6 K/UL (ref 1.8–7.7)
SODIUM BLD-SCNC: 140 MMOL/L (ref 135–144)
SPECIFIC GRAVITY UA: 1.01 (ref 1.01–1.02)
TRICHOMONAS: ABNORMAL
TURBIDITY: ABNORMAL
URINE HGB: NEGATIVE
UROBILINOGEN, URINE: NORMAL
WBC # BLD: 10.3 K/UL (ref 3.5–11)
WBC # BLD: ABNORMAL 10*3/UL
WBC UA: ABNORMAL /HPF (ref 0–4)
YEAST: ABNORMAL

## 2019-06-12 PROCEDURE — 80048 BASIC METABOLIC PNL TOTAL CA: CPT

## 2019-06-12 PROCEDURE — 99284 EMERGENCY DEPT VISIT MOD MDM: CPT

## 2019-06-12 PROCEDURE — 70450 CT HEAD/BRAIN W/O DYE: CPT

## 2019-06-12 PROCEDURE — 81001 URINALYSIS AUTO W/SCOPE: CPT

## 2019-06-12 PROCEDURE — 2580000003 HC RX 258: Performed by: EMERGENCY MEDICINE

## 2019-06-12 PROCEDURE — 99309 SBSQ NF CARE MODERATE MDM 30: CPT | Performed by: FAMILY MEDICINE

## 2019-06-12 PROCEDURE — 85025 COMPLETE CBC W/AUTO DIFF WBC: CPT

## 2019-06-12 PROCEDURE — 71045 X-RAY EXAM CHEST 1 VIEW: CPT

## 2019-06-12 RX ORDER — 0.9 % SODIUM CHLORIDE 0.9 %
1000 INTRAVENOUS SOLUTION INTRAVENOUS ONCE
Status: COMPLETED | OUTPATIENT
Start: 2019-06-12 | End: 2019-06-12

## 2019-06-12 RX ORDER — TRAZODONE HYDROCHLORIDE 100 MG/1
100 TABLET ORAL NIGHTLY
Qty: 30 TABLET | Refills: 0 | OUTPATIENT
Start: 2019-06-12 | End: 2019-06-24 | Stop reason: SDUPTHER

## 2019-06-12 RX ADMIN — SODIUM CHLORIDE 1000 ML: 9 INJECTION, SOLUTION INTRAVENOUS at 19:24

## 2019-06-12 ASSESSMENT — ENCOUNTER SYMPTOMS
WHEEZING: 0
SHORTNESS OF BREATH: 0
NAUSEA: 1
CONSTIPATION: 0
ABDOMINAL PAIN: 0
DIARRHEA: 0
COUGH: 0
CHEST TIGHTNESS: 0

## 2019-06-12 NOTE — ED PROVIDER NOTES
Cardiac catheterization; Ventriculoperitoneal shunt (Right, 5/16/2019); eye surgery; and Cardiac catheterization (2012). CURRENT MEDICATIONS       Previous Medications    ALBUTEROL SULFATE  (90 BASE) MCG/ACT INHALER    Inhale 2 puffs into the lungs 4 times daily as needed for Wheezing    ASPIRIN 325 MG TABLET    Take 325 mg by mouth daily    BUTALBITAL-ACETAMINOPHEN-CAFFEINE (FIORICET, ESGIC) -40 MG PER TABLET    Take 1 tablet by mouth every 4 hours as needed for Headaches    CARVEDILOL (COREG) 3.125 MG TABLET    Take 1 tablet by mouth 2 times daily (with meals)    FLUTICASONE (FLONASE) 50 MCG/ACT NASAL SPRAY    1 spray by Nasal route daily    IBUPROFEN (ADVIL;MOTRIN) 600 MG TABLET    Take 1 tablet by mouth every 8 hours as needed for Pain    LORATADINE (CLARITIN) 10 MG TABLET    Take 10 mg by mouth daily    MISC. DEVICES (WALKER) MISC    Standard walker with two wheels    ONDANSETRON (ZOFRAN ODT) 4 MG DISINTEGRATING TABLET    Take 1 tablet by mouth every 8 hours as needed for Nausea    ONDANSETRON (ZOFRAN) 4 MG TABLET    Take 1 tablet by mouth every 8 hours as needed for Nausea    PSEUDOEPHEDRINE HCL (SUDAFED 12 HOUR PO)    Take 1 tablet by mouth 2 times daily    PSEUDOEPHEDRINE-APAP-DM (DAYQUIL PO)    Take by mouth    SERTRALINE (ZOLOFT) 50 MG TABLET    Take 1 tablet by mouth daily    TIZANIDINE (ZANAFLEX) 2 MG TABLET    Take 1 tablet by mouth 3 times daily as needed (muscle spasm or headach)    TRAZODONE (DESYREL) 100 MG TABLET    Take 1 tablet by mouth nightly       ALLERGIES     is allergic to vicodin [hydrocodone-acetaminophen]. FAMILY HISTORY     has no family status information on file. family history is not on file. SOCIAL HISTORY      reports that she has been smoking. She started smoking about 49 years ago. She has a 50.00 pack-year smoking history. She has never used smokeless tobacco. She reports that she does not drink alcohol or use drugs.     PHYSICAL EXAM     INITIAL VITALS:  height is 5' 8\" (1.727 m) and weight is 200 lb (90.7 kg). Her tympanic temperature is 99.9 °F (37.7 °C). Her blood pressure is 138/64 and her pulse is 100. Her respiration is 14 and oxygen saturation is 92%. Constitutional: The patient is alert and well-developed, vital signs as noted. Psychiatric: Oriented to time, place and person, affect is appropriate for age. Eyes: Pupils equal and reactive to light. EOMI. Ears, nose, and throat: Oropharynx clear  Neck: No masses, trachea midline, and no thyromegaly. Respiratory: Clear to auscultation, full aeration throughout all fields. Cardiovascular: No murmurs, heart sounds normal, no thrills. Gastrointestinal: No masses, no hepatosplenomegaly, bowel sounds positive. No tenderness. Skin: No rashes or lesions on exposed surfaces, good skin turgor. Neurological: Deep tendon reflexes 2+, sensation intact bilaterally, no focal neurological findings. Extremities: Good range of motion, no edema. Appears nontoxic and no meningeal signs  Appears moderately dehydrated with dry mucous membranes    DIFFERENTIAL DIAGNOSIS/ MEDICAL DECISION MAKING:         PLAN/ TASKS OUTSTANDING     Care of this patient has been endorsed to Dr. Julio César Dill @ 7:30PM.  We have discussed in detail the patient's history of present illness, physical exam, completed studies as well as current emergency department course. He/She is kind enough to assume care of this patient.       [place] disposition        (Please note that portions of this note were completed with a voice recognition program.  Efforts were made to edit the dictations but occasionally words are mis-transcribed.)    Etienne DO  Attending Emergency Physician           [address] [place] [address],   06/14/19 1950

## 2019-06-13 ENCOUNTER — CARE COORDINATION (OUTPATIENT)
Dept: CARE COORDINATION | Age: 69
End: 2019-06-13

## 2019-06-13 NOTE — CARE COORDINATION
Patient was called to follow up with most recent ER visit. There was no answer. A message was left on voicemail to have patient call back regarding ER visit. Office number given [phone].

## 2019-06-13 NOTE — ED PROVIDER NOTES
ATTENDING  ADDENDUM     Care of this patient was assumed from preceding physician. The patient was seen for Other (short term memory loss onset throughout the day)  . The patient's initial evaluation and plan have been discussed with the prior provider who initially evaluated the patient. Nursing Notes, Past Medical Hx, Past Surgical Hx, Social Hx, Allergies, and Family Hx were all reviewed. Signed out to me pending the results of a shunt series and a urinalysis and some lab tests- shunt series was negative for any acute abnormality, CT of the head was also negative. Ua Is negative and at this point in time the issue is mostly that the patient tries to get up on her own without supervision and falls. She has been instructed about using her walker with supervision and calling when she needs help. Only is comfortable with this decision and the patient will be discharged back to the nursing facility. DIFFERENTIAL DIAGNOSIS/ MDM:           Follow Exit Care instructions closely. I have reviewed the disposition diagnosis with the patient and or their family/guardian. I have answered their questions and given discharge instructions. They voiced understanding of these instructions and did not have any further questions or complaints. DIAGNOSTIC RESULTS     RADIOLOGY:   Non-plain film images such as CT, Ultrasound and MRI are read by the radiologist. Plain radiographic images are visualized and preliminarily interpreted by the emergency physician with the below findings:   [address] (<4 VIEWS)   Final Result    shunt intact         CT Head WO Contrast   Final Result   Stable ventriculomegaly and  shunt. No acute disease.              LABS:  Results for orders placed or performed during the hospital encounter of 03/23/54   Basic Metabolic Panel   Result Value Ref Range    Glucose 111 (H) 70 - 99 mg/dL    BUN 19 8 - 23 mg/dL    CREATININE 0.65 0.50 - 0.90 mg/dL    Bun/Cre Ratio 29 (H) 9 - 20 Calcium 9.6 8.6 - 10.4 mg/dL    Sodium 140 135 - 144 mmol/L    Potassium 4.0 3.7 - 5.3 mmol/L    Chloride 101 98 - 107 mmol/L    CO2 26 20 - 31 mmol/L    Anion Gap 13 9 - 17 mmol/L    GFR Non-African American >60 >60 mL/min    GFR African American >60 >60 mL/min    GFR Comment          GFR Staging NOT REPORTED    CBC Auto Differential   Result Value Ref Range    WBC 10.3 3.5 - 11.0 k/uL    RBC 5.05 4.0 - 5.2 m/uL    Hemoglobin 15.7 12.0 - 16.0 g/dL    Hematocrit 47.2 (H) 36 - 46 %    MCV 93.3 80 - 100 fL    MCH 31.1 26 - 34 pg    MCHC 33.3 31 - 37 g/dL    RDW 15.3 (H) 11.0 - 14.5 %    Platelets 705 726 - 385 k/uL    MPV 10.3 6.0 - 12.0 fL    NRBC Automated NOT REPORTED per 100 WBC    Differential Type NOT REPORTED     Immature Granulocytes NOT REPORTED 0 %    Absolute Immature Granulocyte NOT REPORTED 0.00 - 0.30 k/uL    WBC Morphology NOT REPORTED     RBC Morphology NOT REPORTED     Platelet Estimate NOT REPORTED     Seg Neutrophils 83 (H) 43 - 77 %    Lymphocytes 7 (L) 16 - 46 %    Monocytes 8 4 - 11 %    Eosinophils % 1 1 - 7 %    Basophils 1 0 - 1 %    Segs Absolute 8.60 (H) 1.8 - 7.7 k/uL    Absolute Lymph # 0.70 (L) 1.0 - 4.8 k/uL    Absolute Mono # 0.90 0.1 - 1.2 k/uL    Absolute Eos # 0.10 0.0 - 0.4 k/uL    Basophils # 0.00 0.0 - 0.2 k/uL   Urinalysis Reflex to Culture   Result Value Ref Range    Color, UA NOT REPORTED YELLOW    Turbidity UA NOT REPORTED CLEAR    Glucose, Ur NEGATIVE NEGATIVE    Bilirubin Urine 1+ (A) NEGATIVE    Ketones, Urine TRACE (A) NEGATIVE    Specific Gravity, UA 1.015 1.010 - 1.025    Urine Hgb NEGATIVE NEGATIVE    pH, UA 5.5 5.0 - 6.0    Protein, UA NEGATIVE NEGATIVE    Urobilinogen, Urine Normal Normal    Nitrite, Urine NEGATIVE NEGATIVE    Leukocyte Esterase, Urine NEGATIVE NEGATIVE    Urinalysis Comments NOT REPORTED    Microscopic Urinalysis   Result Value Ref Range    -          WBC, UA None 0 - 4 /HPF    RBC, UA None 0 - 4 /HPF    Casts UA NOT REPORTED 0 - 2 /LPF Crystals UA NOT REPORTED None /HPF    Epithelial Cells UA None 0 - 5 /HPF    Renal Epithelial, Urine NOT REPORTED 0 /HPF    Bacteria, UA None None    Mucus, UA 3+ (A) None    Trichomonas, UA NOT REPORTED None    Amorphous, UA NOT REPORTED None    Other Observations UA NOT REPORTED NOT REQ. Yeast, UA NOT REPORTED None       ABNORMAL LABS:  Labs Reviewed   BASIC METABOLIC PANEL - Abnormal; Notable for the following components:       Result Value    Glucose 111 (*)     Bun/Cre Ratio 29 (*)     All other components within normal limits   CBC WITH AUTO DIFFERENTIAL - Abnormal; Notable for the following components:    Hematocrit 47.2 (*)     RDW 15.3 (*)     Seg Neutrophils 83 (*)     Lymphocytes 7 (*)     Segs Absolute 8.60 (*)     Absolute Lymph # 0.70 (*)     All other components within normal limits   URINE RT REFLEX TO CULTURE - Abnormal; Notable for the following components:    Bilirubin Urine 1+ (*)     Ketones, Urine TRACE (*)     All other components within normal limits   MICROSCOPIC URINALYSIS - Abnormal; Notable for the following components:    Mucus, UA 3+ (*)     All other components within normal limits        EKG:      EMERGENCY DEPARTMENT COURSE:   Vitals:    Vitals:    06/12/19 1857 06/12/19 1943 06/12/19 2019   BP: 138/64 (!) 118/59 126/80   Pulse: 100 90 89   Resp: 14 14 14   Temp: 99.9 °F (37.7 °C)     TempSrc: Tympanic     SpO2: 92% 92% 91%   Weight: 200 lb (90.7 kg)     Height: 5' 8\" (1.727 m)       -------------------------  BP: 126/80, Temp: 99.9 °F (37.7 °C), Pulse: 89, Resp: 14          FINAL IMPRESSION      1. History of fall          DISPOSITION/PLAN   DISPOSITION Decision To Discharge 06/12/2019 09:16:39 PM    I have reviewed the disposition diagnosis with the patient and or their family/guardian. I have answered their questions and given discharge instructions. They voiced understanding of these instructions and did not have any further questions or complaints.         ReEvaluation note:

## 2019-06-13 NOTE — ED NOTES
Report called to [facility] LPN at [facility].      Reji Mckeon RN  06/12/19 0912

## 2019-06-17 LAB
CULTURE: NORMAL
Lab: NORMAL
SPECIMEN DESCRIPTION: NORMAL

## 2019-06-18 ENCOUNTER — OUTSIDE SERVICES (OUTPATIENT)
Dept: FAMILY MEDICINE CLINIC | Age: 69
End: 2019-06-18
Payer: MEDICARE

## 2019-06-18 DIAGNOSIS — J41.8 MIXED SIMPLE AND MUCOPURULENT CHRONIC BRONCHITIS (HCC): ICD-10-CM

## 2019-06-18 DIAGNOSIS — G93.5 CHIARI MALFORMATION TYPE I (HCC): Primary | ICD-10-CM

## 2019-06-18 DIAGNOSIS — G91.2 NORMAL PRESSURE HYDROCEPHALUS (HCC): ICD-10-CM

## 2019-06-18 DIAGNOSIS — R26.89 BALANCE PROBLEM: ICD-10-CM

## 2019-06-18 DIAGNOSIS — F33.1 MODERATE EPISODE OF RECURRENT MAJOR DEPRESSIVE DISORDER (HCC): ICD-10-CM

## 2019-06-18 ASSESSMENT — ENCOUNTER SYMPTOMS
SHORTNESS OF BREATH: 0
CONSTIPATION: 0
COUGH: 0
BLOOD IN STOOL: 0
NAUSEA: 1
BACK PAIN: 0
VOMITING: 1
DIARRHEA: 0
ABDOMINAL PAIN: 0
EYE PAIN: 0

## 2019-06-18 NOTE — PROGRESS NOTES
ESGIC) -40 MG per tablet Take 1 tablet by mouth every 4 hours as needed for Headaches 30 tablet 0    carvedilol (COREG) 3.125 MG tablet Take 1 tablet by mouth 2 times daily (with meals) 60 tablet 3    ondansetron (ZOFRAN ODT) 4 MG disintegrating tablet Take 1 tablet by mouth every 8 hours as needed for Nausea 20 tablet 0    ibuprofen (ADVIL;MOTRIN) 600 MG tablet Take 1 tablet by mouth every 8 hours as needed for Pain 20 tablet 0    aspirin 325 MG tablet Take 325 mg by mouth daily      tiZANidine (ZANAFLEX) 2 MG tablet Take 1 tablet by mouth 3 times daily as needed (muscle spasm or headach) 30 tablet 0    fluticasone (FLONASE) 50 MCG/ACT nasal spray 1 spray by Nasal route daily 1 Bottle 0    Pseudoephedrine HCl (SUDAFED 12 HOUR PO) Take 1 tablet by mouth 2 times daily      albuterol sulfate  (90 Base) MCG/ACT inhaler Inhale 2 puffs into the lungs 4 times daily as needed for Wheezing 1 Inhaler 0    loratadine (CLARITIN) 10 MG tablet Take 10 mg by mouth daily       No current facility-administered medications for this visit. Allergies   Allergen Reactions    Vicodin [Hydrocodone-Acetaminophen]        Subjective:     Review of Systems   Constitutional: Positive for activity change, appetite change and fatigue. Negative for chills and fever. Eyes: Negative for visual disturbance. Respiratory: Negative for cough, chest tightness, shortness of breath and wheezing. Cardiovascular: Negative for chest pain, palpitations and leg swelling. Gastrointestinal: Positive for nausea and vomiting. Negative for abdominal pain, constipation and diarrhea. Genitourinary: Negative for difficulty urinating. Skin: Positive for wound (healing well). Neurological: Positive for dizziness, weakness (feels generally weak), light-headedness and headaches (none currently). Negative for syncope.    Psychiatric/Behavioral: Positive for decreased concentration, dysphoric mood, sleep disturbance and suicidal ideas (no plan and the thoughts seem a little better this week). Negative for behavioral problems. The patient is nervous/anxious. Objective:      Physical Exam   Constitutional: She is oriented to person, place, and time. She appears well-developed and well-nourished. No distress. Eyes: Conjunctivae are normal.   Neck: Normal range of motion. Neck supple. Cardiovascular: Normal rate, regular rhythm, normal heart sounds and intact distal pulses. No murmur heard. Pulmonary/Chest: Effort normal and breath sounds normal. No respiratory distress. She has no wheezes. Abdominal: Soft. She exhibits no distension and no mass. There is no tenderness. There is no rebound and no guarding. Musculoskeletal: She exhibits no edema. Lymphadenopathy:     She has no cervical adenopathy. Neurological: She is alert and oriented to person, place, and time. Skin: Skin is warm and dry. No rash noted. No erythema. Psychiatric: Her speech is normal. Judgment and thought content normal. She is withdrawn. Cognition and memory are normal. She exhibits a depressed mood. She is attentive. Nursing note and vitals reviewed. Assessment:       Diagnosis Orders   1. Chiari malformation type I ([facility] [address]     2. Normal pressure hydrocephalus     3. Balance problem     4. Moderate episode of recurrent major depressive disorder ([address]     5. Mixed simple and mucopurulent chronic bronchitis (HCC)               Plan:        Chiari malformation causing NPH: stable; she has not needed her shunt readjusted this week. I will try treating nausea with dramamine to see if it helps. She is also going to try the [place]. Depression: stable; she has not seen any improvement yet from the Zoloft. I will change her trazodone so she can get a dose in the middle of the night when she wakes up. Copd: stable no recent issues. Return in about 1 week (around 6/25/2019) for Depression follow up.       Orders Placed This Encounter Medications    traZODone (DESYREL) 50 MG tablet     Sig: Take 1 tablet by mouth nightly May take another dose in the middle of the night when she wakes up     Dispense:  60 tablet     Refill:  2       Patientgiven educational materials - see patient instructions. Discussed use, benefit,and side effects of prescribed medications. All patient questions answered. Ptvoiced understanding. Reviewed health maintenance. Instructed to continue currentmedications, diet and exercise. Patient agreed with treatment plan. Follow up asdirected.      Electronically signed by Batsheva Khan MD on 6/24/2019 at 8:34 AM

## 2019-06-22 PROCEDURE — 99308 SBSQ NF CARE LOW MDM 20: CPT | Performed by: FAMILY MEDICINE

## 2019-06-22 ASSESSMENT — ENCOUNTER SYMPTOMS
CONSTIPATION: 0
ABDOMINAL PAIN: 0
SHORTNESS OF BREATH: 0
DIARRHEA: 0
NAUSEA: 1
VOMITING: 1
CHEST TIGHTNESS: 0
COUGH: 0
WHEEZING: 0

## 2019-06-24 RX ORDER — TRAZODONE HYDROCHLORIDE 50 MG/1
50 TABLET ORAL NIGHTLY
Qty: 60 TABLET | Refills: 2 | OUTPATIENT
Start: 2019-06-24 | End: 2019-07-11 | Stop reason: SDUPTHER

## 2019-06-26 ENCOUNTER — OUTSIDE SERVICES (OUTPATIENT)
Dept: FAMILY MEDICINE CLINIC | Age: 69
End: 2019-06-26
Payer: MEDICARE

## 2019-06-26 DIAGNOSIS — G91.2 NORMAL PRESSURE HYDROCEPHALUS (HCC): ICD-10-CM

## 2019-06-26 DIAGNOSIS — G93.5 CHIARI MALFORMATION TYPE I (HCC): ICD-10-CM

## 2019-06-26 DIAGNOSIS — F33.1 MODERATE EPISODE OF RECURRENT MAJOR DEPRESSIVE DISORDER (HCC): Primary | ICD-10-CM

## 2019-06-27 NOTE — PROGRESS NOTES
[address] [facility]  Dept: [phone]  Dept Fax: [phone]  Loc: [phone]    Denise Diop is a 79 y.o. female who presents today for her medical conditions/complaints as noted below. Denise Diop is c/o of   Chief Complaint   Patient presents with    Headache    Gait Problem       HPI:     HPI Reji Avitia was seen for her regular weekly follow up while at [facility]. She is doing remarkably better this week. She is no longer nauseated and has not vomited in 5 days. She is not using the dramamine anymore and has been able to do therapy without any issues. She was walking the halls earlier this week with minimal difficulty. Her headaches are gone. Her appetite has returned and she is eating well. She is feeling much better emotionally now that her zoloft has started to work and as she has seen improvement in her medical condition. She has not had a home visit yet but she plan to do that soon, and she hopes to go home around July 8th. Past Medical History:   Diagnosis Date    Chronic obstructive pulmonary disease ([address]     Coronary artery disease     Dyslipidemia     Obesity     Pneumonia           Social History     Tobacco Use    Smoking status: Current Every Day Smoker     Packs/day: 1.00     Years: 50.00     Pack years: 50.00     Start date: 1/1/1970    Smokeless tobacco: Never Used   Substance Use Topics    Alcohol use: No     Current Outpatient Medications   Medication Sig Dispense Refill    traZODone (DESYREL) 50 MG tablet Take 1 tablet by mouth nightly May take another dose in the middle of the night when she wakes up 60 tablet 2    sertraline (ZOLOFT) 50 MG tablet Take 1 tablet by mouth daily 30 tablet 0    Misc.  Devices (WALKER) MISC Standard walker with two wheels 1 each 0    butalbital-acetaminophen-caffeine (FIORICET, ESGIC) -40 MG per tablet Take 1 tablet by mouth every 4 hours as needed for Headaches 30 tablet 0    carvedilol (COREG) 3.125 MG tablet Take 1 tablet by mouth 2 times daily (with meals) 60 tablet 3    ibuprofen (ADVIL;MOTRIN) 600 MG tablet Take 1 tablet by mouth every 8 hours as needed for Pain 20 tablet 0    aspirin 325 MG tablet Take 325 mg by mouth daily      tiZANidine (ZANAFLEX) 2 MG tablet Take 1 tablet by mouth 3 times daily as needed (muscle spasm or headach) 30 tablet 0    fluticasone (FLONASE) 50 MCG/ACT nasal spray 1 spray by Nasal route daily 1 Bottle 0    albuterol sulfate  (90 Base) MCG/ACT inhaler Inhale 2 puffs into the lungs 4 times daily as needed for Wheezing 1 Inhaler 0    loratadine (CLARITIN) 10 MG tablet Take 10 mg by mouth daily      ondansetron (ZOFRAN ODT) 4 MG disintegrating tablet Take 1 tablet by mouth every 8 hours as needed for Nausea 20 tablet 0     No current facility-administered medications for this visit. Allergies   Allergen Reactions    Vicodin [Hydrocodone-Acetaminophen]        Subjective:     Review of Systems   Constitutional: Negative for activity change, appetite change, chills, fatigue and fever. Eyes: Negative for visual disturbance. Respiratory: Negative for cough, chest tightness, shortness of breath and wheezing. Cardiovascular: Negative for chest pain, palpitations and leg swelling. Gastrointestinal: Negative for abdominal pain, constipation, diarrhea and nausea (resolved). Genitourinary: Negative for difficulty urinating. Skin: Negative for wound (her incisions are healing well ). Neurological: Negative for dizziness (resolved), syncope, weakness, light-headedness and headaches. Psychiatric/Behavioral: Negative for agitation, confusion, decreased concentration, dysphoric mood (improving) and sleep disturbance (she is sleeping much better). The patient is not nervous/anxious. Objective:      Physical Exam   Constitutional: She is oriented to person, place, and time.  She appears well-developed and well-nourished. No distress. Eyes: Conjunctivae are normal.   Neck: Normal range of motion. Neck supple. No thyromegaly present. Cardiovascular: Normal rate, regular rhythm, normal heart sounds and intact distal pulses. No murmur heard. Pulmonary/Chest: Effort normal and breath sounds normal. No respiratory distress. She has no wheezes. Abdominal: She exhibits no distension. There is no tenderness. There is no guarding. Musculoskeletal: She exhibits no edema. Lymphadenopathy:     She has no cervical adenopathy. Neurological: She is alert and oriented to person, place, and time. Skin: Skin is warm and dry. No rash noted. No erythema. Psychiatric: She has a normal mood and affect. Her behavior is normal. Judgment and thought content normal.   She is sitting up in bed and appears quite happy and cheerful   Nursing note and vitals reviewed. Assessment:       Diagnosis Orders   1. Moderate episode of recurrent major depressive disorder ([facility]     2. Chiari malformation type I ([facility]     3. Normal pressure hydrocephalus               Plan:        Depression: improving; she is doing much better. She seems to be responding to the zoloft so I will continue her on the 50mg and her pcp can adjust the dose as needed when she is discharged. Chiari malformation and NPH: improving; she is feeling much better. She is not having any lingering headaches or nausea. She plans to go home on July 8th and I feel she will be ready for discharge by that time. Return in about 1 week (around 7/3/2019) for Depression follow up. Patientgiven educational materials - see patient instructions. Discussed use, benefit,and side effects of prescribed medications. All patient questions answered. Ptvoiced understanding. Reviewed health maintenance. Instructed to continue currentmedications, diet and exercise. Patient agreed with treatment plan. Follow up asdirected.      Electronically signed by Kosta Cleary MD on 6/30/2019 at 10:41 AM

## 2019-06-30 PROCEDURE — 99308 SBSQ NF CARE LOW MDM 20: CPT | Performed by: FAMILY MEDICINE

## 2019-06-30 ASSESSMENT — ENCOUNTER SYMPTOMS
COUGH: 0
ABDOMINAL PAIN: 0
DIARRHEA: 0
SHORTNESS OF BREATH: 0
CONSTIPATION: 0
NAUSEA: 0
CHEST TIGHTNESS: 0
WHEEZING: 0

## 2019-07-01 ENCOUNTER — CARE COORDINATION (OUTPATIENT)
Dept: CASE MANAGEMENT | Age: 69
End: 2019-07-01

## 2019-07-03 ENCOUNTER — OUTSIDE SERVICES (OUTPATIENT)
Dept: FAMILY MEDICINE CLINIC | Age: 69
End: 2019-07-03
Payer: MEDICARE

## 2019-07-03 DIAGNOSIS — F33.1 MODERATE EPISODE OF RECURRENT MAJOR DEPRESSIVE DISORDER (HCC): Primary | ICD-10-CM

## 2019-07-03 DIAGNOSIS — G91.2 NORMAL PRESSURE HYDROCEPHALUS (HCC): ICD-10-CM

## 2019-07-03 DIAGNOSIS — G93.5 CHIARI MALFORMATION TYPE I (HCC): ICD-10-CM

## 2019-07-03 NOTE — PROGRESS NOTES
[address] [facility]  Dept: [phone]  Dept Fax: [phone]  Loc: [phone]    Kari Davis is a 79 y.o. female who presents today for her medical conditions/complaints as noted below. Kari Davis is c/o of   Chief Complaint   Patient presents with    Headache    Other     balance issues    Depression       HPI:     HARMEET Lemos was seen for her weekly follow up while at [facility]. She is doing very well this week. She has continued to improve and she feels ready to go home. She had one episode of emesis yesterday after she took a dramamine so she does not want to take that anymore. She has been doing well in therapy and she has not been getting dizzy or nauseated in therapy, other than the one time yesterday, for the the past week. Her appetite is still decreased because she feels like everything she eats tastes like the food from [facility]. She has only had one small headache in the past week. She feels like her balance is better, although she realizes that she will need some assistive devices when she gets home. She has a home visit [facility] for 7/8 and she hopes to go home 7/11 or 12. Past Medical History:   Diagnosis Date    Chronic obstructive pulmonary disease ([address]     Coronary artery disease     Dyslipidemia     Obesity     Pneumonia           Social History     Tobacco Use    Smoking status: Current Every Day Smoker     Packs/day: 1.00     Years: 50.00     Pack years: 50.00     Start date: 1/1/1970    Smokeless tobacco: Never Used   Substance Use Topics    Alcohol use: No     Current Outpatient Medications   Medication Sig Dispense Refill    traZODone (DESYREL) 50 MG tablet Take 1 tablet by mouth nightly May take another dose in the middle of the night when she wakes up 60 tablet 2    sertraline (ZOLOFT) 50 MG tablet Take 1 tablet by mouth daily 30 tablet 0    Misc.  Devices Sola Camargo) MISC Standard plan. Follow up asdirected.      Electronically signed by Harper Light MD on 7/3/2019 at 1:16 PM

## 2019-07-04 PROCEDURE — 99308 SBSQ NF CARE LOW MDM 20: CPT | Performed by: FAMILY MEDICINE

## 2019-07-04 ASSESSMENT — ENCOUNTER SYMPTOMS
COUGH: 0
WHEEZING: 0
NAUSEA: 1
SHORTNESS OF BREATH: 0
CHEST TIGHTNESS: 0
VOMITING: 1

## 2019-07-09 ENCOUNTER — TELEPHONE (OUTPATIENT)
Dept: FAMILY MEDICINE CLINIC | Age: 69
End: 2019-07-09

## 2019-07-10 ENCOUNTER — OUTSIDE SERVICES (OUTPATIENT)
Dept: PRIMARY CARE CLINIC | Age: 69
End: 2019-07-10
Payer: MEDICARE

## 2019-07-10 DIAGNOSIS — F33.1 MODERATE EPISODE OF RECURRENT MAJOR DEPRESSIVE DISORDER (HCC): Primary | ICD-10-CM

## 2019-07-10 DIAGNOSIS — G93.5 CHIARI MALFORMATION TYPE I (HCC): ICD-10-CM

## 2019-07-10 DIAGNOSIS — G91.2 NORMAL PRESSURE HYDROCEPHALUS (HCC): ICD-10-CM

## 2019-07-10 DIAGNOSIS — R26.89 BALANCE PROBLEM: ICD-10-CM

## 2019-07-10 PROCEDURE — 99315 NF DSCHRG MGMT 30 MIN/LESS: CPT | Performed by: FAMILY MEDICINE

## 2019-07-10 NOTE — PROGRESS NOTES
and wheezing. Cardiovascular: Negative for chest pain, palpitations and leg swelling. Gastrointestinal: Positive for nausea (once) and vomiting (once). Genitourinary: Negative for difficulty urinating. Neurological: Positive for headaches (one mild one). Negative for dizziness, syncope, weakness and light-headedness. Psychiatric/Behavioral: Negative for decreased concentration, dysphoric mood and sleep disturbance. The patient is not nervous/anxious. Objective:      Physical Exam   Constitutional: She is oriented to person, place, and time. She appears well-developed and well-nourished. No distress. Eyes: Pupils are equal, round, and reactive to light. Conjunctivae and EOM are normal.   Neck: Normal range of motion. Neck supple. No thyromegaly present. Cardiovascular: Normal rate, regular rhythm, normal heart sounds and intact distal pulses. No murmur heard. Pulmonary/Chest: Effort normal and breath sounds normal. No respiratory distress. She has no wheezes. Musculoskeletal: She exhibits no edema. Lymphadenopathy:     She has no cervical adenopathy. Neurological: She is alert and oriented to person, place, and time. Skin: Skin is warm and dry. No rash noted. No erythema. Psychiatric: She has a normal mood and affect. Her behavior is normal. Judgment and thought content normal.   Nursing note and vitals reviewed. Assessment:       Diagnosis Orders   1. Moderate episode of recurrent major depressive disorder ([address]     2. Chiari malformation type I ([facility]     3. Normal pressure hydrocephalus     4. Balance problem               Plan:        Depression: improving; she is doing very well. She is more hopeful now that her physical symptoms have improved. I advised her to continue the zoloft for at least a few months and she was advised to use the trazodone as needed. NPH: improving; she is doing very well and will be discharged home on 7/11.  She has home health coming out to her agreed with treatment plan. Follow up asdirected.      Electronically signed by Luke Steinberg MD on 7/13/2019 at 2:50 PM

## 2019-07-11 RX ORDER — ONDANSETRON 4 MG/1
4 TABLET, ORALLY DISINTEGRATING ORAL EVERY 8 HOURS PRN
Qty: 20 TABLET | Refills: 0 | Status: SHIPPED | OUTPATIENT
Start: 2019-07-11 | End: 2020-05-14 | Stop reason: ALTCHOICE

## 2019-07-11 RX ORDER — CARVEDILOL 3.12 MG/1
3.12 TABLET ORAL 2 TIMES DAILY WITH MEALS
Qty: 60 TABLET | Refills: 0 | Status: SHIPPED | OUTPATIENT
Start: 2019-07-11 | End: 2019-08-08 | Stop reason: SDUPTHER

## 2019-07-11 RX ORDER — BUTALBITAL, ACETAMINOPHEN AND CAFFEINE 50; 325; 40 MG/1; MG/1; MG/1
1 TABLET ORAL EVERY 4 HOURS PRN
Qty: 30 TABLET | Refills: 0 | Status: SHIPPED | OUTPATIENT
Start: 2019-07-11 | End: 2021-01-14 | Stop reason: ALTCHOICE

## 2019-07-11 RX ORDER — IBUPROFEN 600 MG/1
600 TABLET ORAL EVERY 8 HOURS PRN
Qty: 20 TABLET | Refills: 0 | Status: SHIPPED | OUTPATIENT
Start: 2019-07-11 | End: 2021-06-03

## 2019-07-11 RX ORDER — TRAZODONE HYDROCHLORIDE 50 MG/1
50 TABLET ORAL NIGHTLY PRN
Qty: 30 TABLET | Refills: 0 | Status: SHIPPED | OUTPATIENT
Start: 2019-07-11 | End: 2019-10-08 | Stop reason: ALTCHOICE

## 2019-07-13 ASSESSMENT — ENCOUNTER SYMPTOMS
WHEEZING: 0
NAUSEA: 1
COUGH: 0
VOMITING: 1
CHEST TIGHTNESS: 0
SHORTNESS OF BREATH: 0

## 2019-07-15 ENCOUNTER — CARE COORDINATION (OUTPATIENT)
Dept: CASE MANAGEMENT | Age: 69
End: 2019-07-15

## 2019-07-15 NOTE — CARE COORDINATION
[facility] Update Call    7/15/2019    Patient: Saulo Oropeza Patient : 1950   MRN: [id number]  Reason for Admission:   Discharge Date: 19 RARS: Readmission Risk Score: 0         Care Transitions Post Acute Facility Update    Care Transitions Interventions  Post Acute Facility:  Home from [facility]  Post Acute Facility Update

## 2019-07-17 ENCOUNTER — CARE COORDINATION (OUTPATIENT)
Dept: FAMILY MEDICINE CLINIC | Age: 69
End: 2019-07-17

## 2019-07-22 ENCOUNTER — CARE COORDINATION (OUTPATIENT)
Dept: CARE COORDINATION | Age: 69
End: 2019-07-22

## 2019-07-22 NOTE — CARE COORDINATION
Shadia Lemos has CAD, Dyslipidemia, Tob Abuse, COPD, Obesity, Depression, Chiari Malformation- S/P  shunt 5/16/2019.      She was to have seen pulmonology, neurology, and cardiology. Appts were cancelled.      She was discharged from [address] [place] [address] Drive to home 7/11/2019.         Plan of Care : Enroll in [facility]. Reschedule appointments for pulmonology, neurology, and cardiology after review with PCP                          Assist with Health Maintenance needs    Per chart review- Shadia Lemos rescheduled with PCP from 7/19 to 7/25/2019. This writer is unable to see at this appt- scheduling conflict. Unable to reach by phone- left second message and mailed second [facility] letter. Discussed with PCP and staff nurse.      Future Appointments   Date Time Provider Demi Colmenares   7/25/2019  9:00 AM [facility]ijerina [facility]   3/27/2020  9:40 AM [facility]

## 2019-07-25 ENCOUNTER — OFFICE VISIT (OUTPATIENT)
Dept: FAMILY MEDICINE CLINIC | Age: 69
End: 2019-07-25
Payer: MEDICARE

## 2019-07-25 VITALS
HEIGHT: 68 IN | BODY MASS INDEX: 29.7 KG/M2 | RESPIRATION RATE: 16 BRPM | SYSTOLIC BLOOD PRESSURE: 124 MMHG | DIASTOLIC BLOOD PRESSURE: 64 MMHG | TEMPERATURE: 97.6 F | HEART RATE: 80 BPM | OXYGEN SATURATION: 98 % | WEIGHT: 196 LBS

## 2019-07-25 DIAGNOSIS — G91.9 HYDROCEPHALUS, UNSPECIFIED TYPE (HCC): Primary | ICD-10-CM

## 2019-07-25 DIAGNOSIS — R43.2 DECREASED SENSE OF TASTE: ICD-10-CM

## 2019-07-25 DIAGNOSIS — R26.89 BALANCE PROBLEMS: ICD-10-CM

## 2019-07-25 PROCEDURE — 99495 TRANSJ CARE MGMT MOD F2F 14D: CPT | Performed by: NURSE PRACTITIONER

## 2019-07-25 PROCEDURE — 1111F DSCHRG MED/CURRENT MED MERGE: CPT | Performed by: NURSE PRACTITIONER

## 2019-07-25 RX ORDER — MECLIZINE HYDROCHLORIDE 25 MG/1
25 TABLET ORAL EVERY 4 HOURS PRN
COMMUNITY
End: 2019-10-08 | Stop reason: ALTCHOICE

## 2019-07-25 ASSESSMENT — ENCOUNTER SYMPTOMS
GASTROINTESTINAL NEGATIVE: 1
RESPIRATORY NEGATIVE: 1

## 2019-07-25 NOTE — PROGRESS NOTES
Post-Discharge Transitional Care Management Services or Hospital Follow Up      Umberto Kelly   YOB: 1950    Date of Office Visit:  7/25/2019  Date of Hospital Admission: 5/21/19  Date of Hospital Discharge: 6/12/19  Readmission Risk Score(high >=14%.  Medium >=10%):Readmission Risk Score: 0      Care management risk score Rising risk (score 2-5) and Complex Care (Scores >=6): 10     Non face to face  following discharge, date last encounter closed (first attempt may have been earlier): 7/15/2019  1:02 PM 7/15/2019  1:02 PM    Call initiated 2 business days of discharge: No     Patient Active Problem List   Diagnosis    CAD (coronary artery disease)    Tobacco abuse    Environmental allergies    Obesity    Dyslipidemia    Coronary artery disease    Chronic obstructive pulmonary disease ([address]    Occipital headache    Dizziness    Cerebral ventriculomegaly    Acquired cerebral atrophy    Chronic cerebral ischemia    Chiari malformation type I ([address]    Balance problem    VBI (vertebrobasilar insufficiency)    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)    Abnormal CT of the head    Normal pressure hydrocephalus    S/P  shunt    Headache    Moderate episode of recurrent major depressive disorder (HCC)       Allergies   Allergen Reactions    Vicodin [Hydrocodone-Acetaminophen]        Medications listed as ordered at the time of discharge from hospital   Radha MOHR   Home Medication Instructions CATERINA:    Printed on:07/25/19 5095   Medication Information                      aspirin 325 MG tablet  Take 325 mg by mouth daily             butalbital-acetaminophen-caffeine (FIORICET, ESGIC) -40 MG per tablet  Take 1 tablet by mouth every 4 hours as needed for Headaches             carvedilol (COREG) 3.125 MG tablet  Take 1 tablet by mouth 2 times daily (with meals)             fluticasone (FLONASE) 50 MCG/ACT nasal spray  1 spray by Nasal route daily mouth every 4 hours as needed for Headaches 30 tablet 0    Misc. Devices (ADJUST BATH/SHOWER SEAT/BACK) MISC Use daily with showers 1 each 0    Misc. Devices (SUCTION GRAB BAR) MISC Use in the shower 2 each 0    Misc. Devices (QUAD CANE/SMALL BASE) MISC Use daily for balance 1 each 0    Misc. Devices (WALKER) MISC Standard walker with two wheels 1 each 0    aspirin 325 MG tablet Take 325 mg by mouth daily      tiZANidine (ZANAFLEX) 2 MG tablet Take 1 tablet by mouth 3 times daily as needed (muscle spasm or headach) 30 tablet 0    fluticasone (FLONASE) 50 MCG/ACT nasal spray 1 spray by Nasal route daily 1 Bottle 0    loratadine (CLARITIN) 10 MG tablet Take 10 mg by mouth daily          Medications patient taking as of now reconciled against medications ordered at time of hospital discharge: Yes    Chief Complaint   Patient presents with    Follow-Up from Hospital     shunt placed at [facility] to follow up with them next week at [address] [place] [address] patient is here for trance care visit for hydrocephalus on 5/21/2019. I have reviewed the patient's medical history in detail and updated the computerized patient record. She had a shunt placed for this on 5/17/2019 and then had complications from it so she was then admitted at [facility] from ER at [place] on 5/21/2019. She was there for three days and went to skilled nursing. Then she came back to [address] [place] [address] and they adjusted her shunt again and then sent her back to skilled care on 5/29/2019-7/10/2019. From there she went to long term care to home on the 10th. She states that she is currently seeing a neurologist in [place]. She has a history of noncompliance with her appmts. She does have a care coordinator who has been trying to contact her. She states that she is continuing to have balance issues. She states that she is doing PT/OT at home and states that it is going well. She is ambulating with walker today.  She also states that she is having and affect. Her behavior is normal. Judgment and thought content normal.   Nursing note and vitals reviewed. Assessment/Plan:  1. Hydrocephalus, unspecified type  Follow up with neurologist next tuesday  - [facility] MED LIST    2. Balance problems-new  Cont with PT/OT    3. Decreased sense of taste-new  Cont to monitor. Answered all of the patient's questions. Agrees with plan of care. Follow up at next visit for her 6 month follow up. She needs to be compliants with her appmts. She also needs to reschedule with pulmonology and cardiology as well. She states that she does not feel like this is necessary.      Medical Decision Making: high complexity

## 2019-07-26 ENCOUNTER — TELEPHONE (OUTPATIENT)
Dept: FAMILY MEDICINE CLINIC | Age: 69
End: 2019-07-26

## 2019-07-26 DIAGNOSIS — Z98.2 S/P VP SHUNT: Primary | ICD-10-CM

## 2019-07-26 DIAGNOSIS — G91.9 HYDROCEPHALUS, UNSPECIFIED TYPE (HCC): ICD-10-CM

## 2019-07-26 DIAGNOSIS — R26.89 BALANCE PROBLEMS: ICD-10-CM

## 2019-07-29 ENCOUNTER — TELEPHONE (OUTPATIENT)
Dept: FAMILY MEDICINE CLINIC | Age: 69
End: 2019-07-29
Payer: MEDICARE

## 2019-07-29 DIAGNOSIS — Z98.2 S/P VP SHUNT: Primary | ICD-10-CM

## 2019-07-29 DIAGNOSIS — R26.89 BALANCE PROBLEMS: ICD-10-CM

## 2019-07-29 DIAGNOSIS — G93.5 CHIARI MALFORMATION TYPE I (HCC): ICD-10-CM

## 2019-07-29 DIAGNOSIS — G91.9 HYDROCEPHALUS, UNSPECIFIED TYPE (HCC): ICD-10-CM

## 2019-07-29 PROCEDURE — G0179 MD RECERTIFICATION HHA PT: HCPCS | Performed by: FAMILY MEDICINE

## 2019-07-29 NOTE — TELEPHONE ENCOUNTER
Home health certification and plan of care done 7/29/2019 on patient for date services 7/12/2019-9/9/2019. Verified medications. Physician time spent is 15 minutes for activities to coordinate services, documenting, medical decision making, and review of reports, treatment plans, and test results.

## 2019-07-30 ENCOUNTER — CARE COORDINATION (OUTPATIENT)
Dept: CARE COORDINATION | Age: 69
End: 2019-07-30

## 2019-07-30 NOTE — CARE COORDINATION
Cali Laguna has CAD, Dyslipidemia, Tob Abuse, COPD, Obesity, Depression, Chiari Malformation- S/P  shunt 5/16/2019.      She was to have seen pulmonology, neurology, and cardiology. Appts were cancelled.      She was discharged from [address] [place] [address] Drive to home 7/11/2019.         Plan of Care : Enroll in [facility].                            Reschedule appointments for pulmonology, neurology, and cardiology after review with PCP                          Assist with Health Maintenance needs     Per chart review- Cali Laguna rescheduled with PCP from 7/19 to 7/25/2019. This writer was unable to see at this appt- scheduling conflict.     Unable to reach by phone- left second message and mailed second [facility] letter.      No response from Patient to previous call and letter mailed. Sh did see PCP 7/25/2019.     2:10 pm- left third message requesting return call. Spoke with staff- appt 10/2019 changed to AWV to assist with updating HM. She also stated that Cali Laguna is following with Dr. Tatiana Johnson. Care Team updated.      Future Appointments   Date Time Provider Demi Colmenares   10/25/2019  3:40 PM [facility]   3/27/2020  9:40 AM [facility]

## 2019-08-01 ENCOUNTER — TELEPHONE (OUTPATIENT)
Dept: FAMILY MEDICINE CLINIC | Age: 69
End: 2019-08-01

## 2019-08-01 DIAGNOSIS — J44.9 CHRONIC OBSTRUCTIVE PULMONARY DISEASE, UNSPECIFIED COPD TYPE (HCC): ICD-10-CM

## 2019-08-01 DIAGNOSIS — G93.5 CHIARI MALFORMATION TYPE I (HCC): ICD-10-CM

## 2019-08-01 DIAGNOSIS — Z98.2 S/P VP SHUNT: Primary | ICD-10-CM

## 2019-08-01 DIAGNOSIS — R26.89 BALANCE PROBLEMS: ICD-10-CM

## 2019-08-01 DIAGNOSIS — G91.9 HYDROCEPHALUS, UNSPECIFIED TYPE (HCC): ICD-10-CM

## 2019-08-01 NOTE — TELEPHONE ENCOUNTER
Home health certification and plan of care done 8/1/2019 on patient for date services 7/12/2019-9/9/2019. Verified medications. Physician time spent is 15 minutes for activities to coordinate services, documenting, medical decision making, and review of reports, treatment plans, and test results.

## 2019-08-06 ENCOUNTER — CARE COORDINATION (OUTPATIENT)
Dept: CARE COORDINATION | Age: 69
End: 2019-08-06

## 2019-08-08 RX ORDER — CARVEDILOL 3.12 MG/1
3.12 TABLET ORAL 2 TIMES DAILY WITH MEALS
Qty: 60 TABLET | Refills: 2 | Status: SHIPPED | OUTPATIENT
Start: 2019-08-08 | End: 2019-11-08 | Stop reason: SDUPTHER

## 2019-08-13 ENCOUNTER — CARE COORDINATION (OUTPATIENT)
Dept: CARE COORDINATION | Age: 69
End: 2019-08-13

## 2019-08-13 NOTE — CARE COORDINATION
not apply route Expires: 7/26/2024  Dx: R23.89, G93.5 7/26/19   JOHNATHAN Doyle - CNP   meclizine (ANTIVERT) 25 MG tablet Take 25 mg by mouth every 4 hours as needed for Dizziness or Nausea    Historical Provider, MD   ondansetron (ZOFRAN ODT) 4 MG disintegrating tablet Take 1 tablet by mouth every 8 hours as needed for Nausea 7/11/19   Joshua Montiel MD   traZODone (DESYREL) 50 MG tablet Take 1 tablet by mouth nightly as needed for Sleep 7/11/19   Joshua Montiel MD   ibuprofen (ADVIL;MOTRIN) 600 MG tablet Take 1 tablet by mouth every 8 hours as needed for Pain 7/11/19   Joshua Montiel MD   butalbital-acetaminophen-caffeine (FIORICET, ESGIC) -68 MG per tablet Take 1 tablet by mouth every 4 hours as needed for Headaches 7/11/19   Joshua Montiel MD   Misc. Devices (ADJUST BATH/SHOWER SEAT/BACK) MISC Use daily with showers 7/10/19   Joshua Montiel MD   Misc. Devices (SUCTION GRAB BAR) MISC Use in the shower 7/10/19   Joshua Montiel MD   Misc. Devices (QUAD CANE/SMALL BASE) MISC Use daily for balance 7/10/19   MD Eneida Dunbarc.  Devices Verneita Fickle) MISC Standard walker with two wheels 6/3/19   Joshua Montiel MD   aspirin 325 MG tablet Take 325 mg by mouth daily    Historical Provider, MD   tiZANidine (ZANAFLEX) 2 MG tablet Take 1 tablet by mouth 3 times daily as needed (muscle spasm or headach) 4/24/19   Orlando Philip MD   fluticasone [facility]) 50 MCG/ACT nasal spray 1 spray by Nasal route daily 4/4/19   Orlando Philip MD   loratadine (CLARITIN) 10 MG tablet Take 10 mg by mouth daily    Historical Provider, MD       Future Appointments   Date Time Provider Demi Colmenares   10/8/2019  7:93 PM Milton Salguero MD [facility]   10/25/2019  3:40 PM JOHNATHAN Crespo - CNP [facility]   3/27/2020  9:40 AM [facility] Jimenez [facility]

## 2019-08-21 ENCOUNTER — CARE COORDINATION (OUTPATIENT)
Dept: CARE COORDINATION | Age: 69
End: 2019-08-21

## 2019-08-21 NOTE — CARE COORDINATION
Ambulatory Care Coordination Note  8/21/2019  CM Risk Score: 10  Charlson 10 Year Mortality Risk Score: 47%     ACC: Melly Morris RN    Summary Note: She was referred by CTN for [address] has CAD, Dyslipidemia, Tob Abuse, COPD, Obesity, Depression, Chiari Malformation- S/P  shunt 5/16/2019.      She follows with neurosurgeon, optometry, and ophthalmology.      She was to have seen pulmonology and cardiology. Appts were cancelled.      She was discharged from [address] [place] [address] Drive to home 7/11/2019.      Per chart review- she does have [facility], PT/OT.       Plan of Care : Continue assessments, education, and support.                            She is scheduled with neurology- appt 10/8/2019                          She is [facility] 10/2019                          Discuss Urgent Care and same day appointments                          Discuss Advanced Directives/Living Will                          Assist with Health Maintenance needs        8/21/2019- 3:39 pm Unable to reach by phone- LM requesting return call. 8/22/2019- 5:06 pm- left second message requesting return call.        Care Coordination Interventions    Program Enrollment:  Complex Care  Referral from Primary Care Provider:  No  Suggested Interventions and [address]:  Completed  Occupational Therapy:  Completed  Physical Therapy:  Completed  Transportation Support:  Completed           Prior to Admission medications    Medication Sig Start Date End Date Taking?  Authorizing Provider   sertraline (ZOLOFT) 50 MG tablet Take 1 tablet by mouth nightly 8/8/19   JOHNATHAN Doyle CNP   carvedilol (COREG) 3.125 MG tablet Take 1 tablet by mouth 2 times daily (with meals) 8/8/19   JOHNATHAN Wilson CNP   Handicap Placard MISC by Does not apply route Expires: 7/26/2024  Dx: R23.89, G93.5 7/26/19   JOHNATHAN Doyle CNP   meclizine (ANTIVERT) 25 MG tablet Take 25 mg by

## 2019-09-04 ENCOUNTER — CARE COORDINATION (OUTPATIENT)
Dept: CARE COORDINATION | Age: 69
End: 2019-09-04

## 2019-09-05 ENCOUNTER — CARE COORDINATION (OUTPATIENT)
Dept: CARE COORDINATION | Age: 69
End: 2019-09-05

## 2019-09-05 ENCOUNTER — TELEPHONE (OUTPATIENT)
Dept: FAMILY MEDICINE CLINIC | Age: 69
End: 2019-09-05

## 2019-09-05 DIAGNOSIS — B96.89 ACUTE BACTERIAL SINUSITIS: ICD-10-CM

## 2019-09-05 DIAGNOSIS — R06.2 WHEEZING: ICD-10-CM

## 2019-09-05 DIAGNOSIS — J01.90 ACUTE BACTERIAL SINUSITIS: ICD-10-CM

## 2019-09-05 DIAGNOSIS — R06.02 SOB (SHORTNESS OF BREATH): ICD-10-CM

## 2019-09-05 RX ORDER — ALBUTEROL SULFATE 90 UG/1
2 AEROSOL, METERED RESPIRATORY (INHALATION) 4 TIMES DAILY PRN
Qty: 1 INHALER | Refills: 0 | Status: SHIPPED | OUTPATIENT
Start: 2019-09-05 | End: 2019-10-14 | Stop reason: SDUPTHER

## 2019-09-05 SDOH — ECONOMIC STABILITY: TRANSPORTATION INSECURITY
IN THE PAST 12 MONTHS, HAS LACK OF TRANSPORTATION KEPT YOU FROM MEETINGS, WORK, OR FROM GETTING THINGS NEEDED FOR DAILY LIVING?: NO

## 2019-09-05 SDOH — ECONOMIC STABILITY: FOOD INSECURITY: WITHIN THE PAST 12 MONTHS, THE FOOD YOU BOUGHT JUST DIDN'T LAST AND YOU DIDN'T HAVE MONEY TO GET MORE.: NEVER TRUE

## 2019-09-05 SDOH — HEALTH STABILITY: MENTAL HEALTH
STRESS IS WHEN SOMEONE FEELS TENSE, NERVOUS, ANXIOUS, OR CAN'T SLEEP AT NIGHT BECAUSE THEIR MIND IS TROUBLED. HOW STRESSED ARE YOU?: NOT AT ALL

## 2019-09-05 SDOH — ECONOMIC STABILITY: TRANSPORTATION INSECURITY
IN THE PAST 12 MONTHS, HAS THE LACK OF TRANSPORTATION KEPT YOU FROM MEDICAL APPOINTMENTS OR FROM GETTING MEDICATIONS?: NO

## 2019-09-05 SDOH — SOCIAL STABILITY: SOCIAL NETWORK: ARE YOU MARRIED, WIDOWED, DIVORCED, SEPARATED, NEVER MARRIED, OR LIVING WITH A PARTNER?: DIVORCED

## 2019-09-05 SDOH — HEALTH STABILITY: PHYSICAL HEALTH: ON AVERAGE, HOW MANY DAYS PER WEEK DO YOU ENGAGE IN MODERATE TO STRENUOUS EXERCISE (LIKE A BRISK WALK)?: 0 DAYS

## 2019-09-05 SDOH — SOCIAL STABILITY: SOCIAL NETWORK: HOW OFTEN DO YOU ATTEND CHURCH OR RELIGIOUS SERVICES?: MORE THAN 4 TIMES PER YEAR

## 2019-09-05 SDOH — SOCIAL STABILITY: SOCIAL NETWORK
DO YOU BELONG TO ANY CLUBS OR ORGANIZATIONS SUCH AS CHURCH GROUPS UNIONS, FRATERNAL OR ATHLETIC GROUPS, OR SCHOOL GROUPS?: YES

## 2019-09-05 SDOH — HEALTH STABILITY: PHYSICAL HEALTH: ON AVERAGE, HOW MANY MINUTES DO YOU ENGAGE IN EXERCISE AT THIS LEVEL?: 0 MIN

## 2019-09-05 SDOH — SOCIAL STABILITY: SOCIAL NETWORK
IN A TYPICAL WEEK, HOW MANY TIMES DO YOU TALK ON THE PHONE WITH FAMILY, FRIENDS, OR NEIGHBORS?: MORE THAN THREE TIMES A WEEK

## 2019-09-05 SDOH — ECONOMIC STABILITY: INCOME INSECURITY: HOW HARD IS IT FOR YOU TO PAY FOR THE VERY BASICS LIKE FOOD, HOUSING, MEDICAL CARE, AND HEATING?: NOT VERY HARD

## 2019-09-05 SDOH — ECONOMIC STABILITY: FOOD INSECURITY: WITHIN THE PAST 12 MONTHS, YOU WORRIED THAT YOUR FOOD WOULD RUN OUT BEFORE YOU GOT MONEY TO BUY MORE.: NEVER TRUE

## 2019-09-05 SDOH — SOCIAL STABILITY: SOCIAL NETWORK: HOW OFTEN DO YOU GET TOGETHER WITH FRIENDS OR RELATIVES?: ONCE A WEEK

## 2019-09-05 SDOH — SOCIAL STABILITY: SOCIAL NETWORK: HOW OFTEN DO YOU ATTENT MEETINGS OF THE CLUB OR ORGANIZATION YOU BELONG TO?: 1 TO 4 TIMES PER YEAR

## 2019-09-05 NOTE — CARE COORDINATION
Ambulatory Care Coordination Note  9/5/2019  CM Risk Score: 10  Charlson 10 Year Mortality Risk Score: 47%     ACC: Noni Osullivan RN    Summary Note: She was referred by CTN for ACC.       Nury Flores has CAD, Dyslipidemia, Tob Abuse, COPD, Obesity, Depression, Chiari Malformation- S/P  shunt 5/16/2019.      She follows with neurology, neurosurgeon, optometry, and ophthalmology.      She was discharged from [address] to home 7/11/2019.      Per chart review- she does have [address] [place], (PT/OT- completed).       Plan of Care : Continue assessments, education, and support.                            NRQ is scheduled with neurology- appt 10/8/2019                          She is scheduled with PCP for [address] 10/2019    COPD Zone Tool reviewed 9/5/2019                          Discussed Urgent Care and same day appointments- 9/5/2019                          Discuss Advanced Directives/Living Will                          Assist with Health Maintenance needs    No SDOH needs identified. Spoke with Nury Flores. She has completed PT and OT. Nurse continues to see. She is using quad cane outdoors. She denied any concerns at this time. She was given [employer] contact information for transportation. She declined HEAP and PIPP stating she has used in past.    She reported that she will need a refill on her albuterol inhaler. Separate encounter to PCP.         General Assessment    Do you have any symptoms that are causing concern?:  No       COPD Assessment    Does the patient understand envrionmental exposure?:  Yes  Is the patient able to verbalize Rescue vs. Long Acting medications?:  Yes  Does the patient have a nebulizer?:  No  Does the patient use a space with inhaled medications?:  No     No patient-reported symptoms         Symptoms:      Symptom course:  stable  Increase use of rapid acting/rescue inhaled medications?:  No  Change in chronic cough?:  No/At Baseline  Change in sputum?:

## 2019-09-19 ENCOUNTER — TELEPHONE (OUTPATIENT)
Dept: FAMILY MEDICINE CLINIC | Age: 69
End: 2019-09-19

## 2019-09-19 DIAGNOSIS — G91.2 NPH (NORMAL PRESSURE HYDROCEPHALUS) (HCC): Primary | ICD-10-CM

## 2019-09-19 DIAGNOSIS — J44.9 OBSTRUCTIVE CHRONIC BRONCHITIS WITHOUT EXACERBATION (HCC): ICD-10-CM

## 2019-09-19 DIAGNOSIS — I25.10 ATHEROSCLEROSIS OF NATIVE CORONARY ARTERY OF NATIVE HEART WITHOUT ANGINA PECTORIS: ICD-10-CM

## 2019-09-19 DIAGNOSIS — R13.14 DYSPHAGIA, PHARYNGOESOPHAGEAL PHASE: ICD-10-CM

## 2019-09-19 RX ORDER — TRAZODONE HYDROCHLORIDE 50 MG/1
50 TABLET ORAL NIGHTLY PRN
COMMUNITY
End: 2019-10-08 | Stop reason: ALTCHOICE

## 2019-09-20 ENCOUNTER — CARE COORDINATION (OUTPATIENT)
Dept: FAMILY MEDICINE CLINIC | Age: 69
End: 2019-09-20

## 2019-09-20 NOTE — CARE COORDINATION
Ambulatory Care Coordination Note  9/20/2019  CM Risk Score: 10  Charlson 10 Year Mortality Risk Score: 47%     ACC: Argenis William RN    Summary Note: She was referred by CTN for ACC.       Rosie White has CAD, Dyslipidemia, Tob Abuse, COPD, Obesity, Depression, Chiari Malformation- S/P  shunt 5/16/2019.      She follows with neurology, neurosurgeon, optometry, and ophthalmology.      She was discharged from [address] to home 7/11/2019.      Per chart review- she does have [address] [place], (PT/OT- completed).       Plan of Care : Continue assessments, education, and support.                            PEF is scheduled with neurology- appt 10/8/2019                          She is scheduled with PCP for [address] 10/2019                          COPD Zone Tool reviewed 9/5/2019                          Discussed Urgent Care and same day appointments- 9/5/2019                          Discuss Advanced Directives/Living Will                          Assist with Health Maintenance needs                          No SDOH needs identified. 9/20/2019- 10:22 am LM requesting call. Care Coordination Interventions    Program Enrollment:  Complex Care  Referral from Primary Care Provider:  No  Suggested Interventions and [address]:  Completed  Occupational Therapy:  Completed  Physical Therapy:  Completed  Transportation Support:  Completed           Prior to Admission medications    Medication Sig Start Date End Date Taking?  Authorizing Provider   traZODone (DESYREL) 50 MG tablet Take 50 mg by mouth nightly as needed for Sleep Between 1am - 4am    Historical Provider, MD   albuterol sulfate  (90 Base) MCG/ACT inhaler Inhale 2 puffs into the lungs 4 times daily as needed for Wheezing 9/5/19   Grady Carmen MD   sertraline (ZOLOFT) 50 MG tablet Take 1 tablet by mouth nightly 8/8/19   Radha Lane, APRN - CNP   carvedilol (COREG) 3.125 MG

## 2019-09-26 ENCOUNTER — CARE COORDINATION (OUTPATIENT)
Dept: CARE COORDINATION | Age: 69
End: 2019-09-26

## 2019-09-26 NOTE — CARE COORDINATION
Ambulatory Care Coordination Note  9/26/2019  CM Risk Score: 10  Charlson 10 Year Mortality Risk Score: 47%     ACC: Janis Alvarez RN    Summary Note: She was referred by CTN for ACC.       Johnny Landau has CAD, Dyslipidemia, Tob Abuse, COPD, Obesity, Depression, Chiari Malformation- S/P  shunt 5/16/2019.      She follows with neurology, neurosurgeon, optometry, and ophthalmology.      She was discharged from [address] to home 7/11/2019.      Per chart review- she does have [address] [facility]- nursing, (PT/OT- completed).       Plan of Care : Continue assessments, education, and support.                            NAM is scheduled with neurology- appt 10/8/2019                          She is scheduled with PCP for [facility] 10/2019                          COPD Zone Tool reviewed 9/5/2019                          Discussed Urgent Care and same day appointments- 9/5/2019                          Discuss Advanced Directives/Living Will                          Assist with Health Maintenance needs                          FB SDOH needs identified.         9/20/2019- 10:22 am LM requesting call.           9/26/2019- 2:41 pm left second message requesting return call. Care Coordination Interventions    Program Enrollment:  Complex Care  Referral from Primary Care Provider:  No  Suggested Interventions and [address]:  Completed  Occupational Therapy:  Completed  Physical Therapy:  Completed  Transportation Support:  Completed           Prior to Admission medications    Medication Sig Start Date End Date Taking?  Authorizing Provider   traZODone (DESYREL) 50 MG tablet Take 50 mg by mouth nightly as needed for Sleep Between 1am - 4am    Historical Provider, MD   albuterol sulfate  (90 Base) MCG/ACT inhaler Inhale 2 puffs into the lungs 4 times daily as needed for Wheezing 9/5/19   Yuliana Bingham MD   sertraline (ZOLOFT) 50 MG tablet Take 1 tablet by mouth nightly 8/8/19   Frannie Macias JOHNATHAN Lane CNP   carvedilol (COREG) 3.125 MG tablet Take 1 tablet by mouth 2 times daily (with meals) 8/8/19   Emiliano DangJOHNATHAN Salgado CNP   Handicap Placard MISC by Does not apply route Expires: 7/26/2024  Dx: R23.89, G93.5 7/26/19   Radha METZGER JOHNATHAN Lane CNP   meclizine (ANTIVERT) 25 MG tablet Take 25 mg by mouth every 4 hours as needed for Dizziness or Nausea    Historical Provider, MD   ondansetron (ZOFRAN ODT) 4 MG disintegrating tablet Take 1 tablet by mouth every 8 hours as needed for Nausea 7/11/19   Channing Shipman MD   traZODone (DESYREL) 50 MG tablet Take 1 tablet by mouth nightly as needed for Sleep  Patient taking differently: Take 50 mg by mouth nightly  7/11/19   Channing Shipman MD   ibuprofen (ADVIL;MOTRIN) 600 MG tablet Take 1 tablet by mouth every 8 hours as needed for Pain 7/11/19   Channing Shipman MD   butalbital-acetaminophen-caffeine (FIORICET, ESGIC) -59 MG per tablet Take 1 tablet by mouth every 4 hours as needed for Headaches 7/11/19   Channing Shipman MD   [place]. Devices (ADJUST BATH/SHOWER SEAT/BACK) MISC Use daily with showers 7/10/19   Channing Shipman MD   Mis. Devices (SUCTION GRAB BAR) MISC Use in the shower 7/10/19   MD Eneida Brownc. Devices (QUAD CANE/SMALL BASE) MISC Use daily for balance 7/10/19   MD Dejan Brown.  Devices Charlett Deaner) MISC Standard walker with two wheels 6/3/19   Channing Shipman MD   aspirin 325 MG tablet Take 325 mg by mouth daily    Historical Provider, MD   tiZANidine (ZANAFLEX) 2 MG tablet Take 1 tablet by mouth 3 times daily as needed (muscle spasm or headach) 4/24/19   Mick Hodge MD   fluticasone [place] Hensen) 50 MCG/ACT nasal spray 1 spray by Nasal route daily 4/4/19   Mick Hodge MD   loratadine (CLARITIN) 10 MG tablet Take 10 mg by mouth daily    Historical Provider, MD       Future Appointments   Date Time Provider Demi Dedra   10/8/2019  9:41 PM Eloise Robert MD [facility]   10/24/2019  3:40 PM Kip Alvarez

## 2019-09-26 NOTE — CARE COORDINATION
Ambulatory Care Coordination Note  9/26/2019  CM Risk Score: 10  Charlson 10 Year Mortality Risk Score: 47%     ACC: Mikayla Carlos RN    Summary Note:  She was referred by CTN for ACC.       Neva Alaniz has CAD, Dyslipidemia, Tob Abuse, COPD, Obesity, Depression, Chiari Malformation- S/P  shunt 5/16/2019.      She follows with neurology, neurosurgeon, optometry, and ophthalmology.      She was discharged from [address] to home 7/11/2019.      Per chart review- she does have [address] [facility]- nursing, (PT/OT- completed).       Plan of Care : Continue assessments, education, and support.                            RNT is scheduled with neurology- appt 10/8/2019                          She is scheduled with PCP for [facility] 10/2019                          COPD Zone Tool reviewed 9/26/2019                          Discussed Urgent Care and same day appointments- 9/5/2019                          Discussed Advanced Directives/Living Will 9/26/2019- mailed paperwork.                           formerly [facility] with Health Maintenance needs                          CC SDOH needs identified.      Spoke with Neva Alaniz. Discussed Living Will. She stated she would like to complete the paperwork. She is aware that she can for assistance if needed. She is also aware that copy needs placed in EMR.      General Assessment    Do you have any symptoms that are causing concern?:  No       COPD Assessment    Does the patient understand envrionmental exposure?:  Yes  Is the patient able to verbalize Rescue vs. Long Acting medications?:  Yes  Does the patient have a nebulizer?:  No  Does the patient use a space with inhaled medications?:  No     No patient-reported symptoms         Symptoms:      Symptom course:  stable  Increase use of rapid acting/rescue inhaled medications?:  No  Change in chronic cough?:  No/At Baseline  Change in sputum?:  No/At Baseline  Sputum characteristics:  Clear  Self Monitoring - SaO2:  No  Have you had a recent diagnosis of

## 2019-10-08 ENCOUNTER — OFFICE VISIT (OUTPATIENT)
Dept: NEUROLOGY | Age: 69
End: 2019-10-08
Payer: MEDICARE

## 2019-10-08 VITALS
SYSTOLIC BLOOD PRESSURE: 122 MMHG | BODY MASS INDEX: 32.87 KG/M2 | HEART RATE: 80 BPM | DIASTOLIC BLOOD PRESSURE: 64 MMHG | HEIGHT: 68 IN | WEIGHT: 216.9 LBS | RESPIRATION RATE: 16 BRPM

## 2019-10-08 DIAGNOSIS — I63.10 CEREBRAL INFARCTION DUE TO EMBOLISM OF PRECEREBRAL ARTERY (HCC): ICD-10-CM

## 2019-10-08 DIAGNOSIS — R26.89 BALANCE PROBLEM: ICD-10-CM

## 2019-10-08 DIAGNOSIS — G91.2 NORMAL PRESSURE HYDROCEPHALUS (HCC): ICD-10-CM

## 2019-10-08 DIAGNOSIS — G93.89 CEREBRAL VENTRICULOMEGALY: Primary | ICD-10-CM

## 2019-10-08 DIAGNOSIS — R51.9 OCCIPITAL HEADACHE: ICD-10-CM

## 2019-10-08 DIAGNOSIS — Z98.2 S/P VP SHUNT: ICD-10-CM

## 2019-10-08 DIAGNOSIS — J41.8 MIXED SIMPLE AND MUCOPURULENT CHRONIC BRONCHITIS (HCC): ICD-10-CM

## 2019-10-08 DIAGNOSIS — R42 DIZZINESS: ICD-10-CM

## 2019-10-08 DIAGNOSIS — I67.82 CHRONIC CEREBRAL ISCHEMIA: ICD-10-CM

## 2019-10-08 DIAGNOSIS — G45.0 VBI (VERTEBROBASILAR INSUFFICIENCY): ICD-10-CM

## 2019-10-08 DIAGNOSIS — G93.5 CHIARI MALFORMATION TYPE I (HCC): ICD-10-CM

## 2019-10-08 DIAGNOSIS — E78.5 DYSLIPIDEMIA: ICD-10-CM

## 2019-10-08 DIAGNOSIS — E66.09 CLASS 2 OBESITY DUE TO EXCESS CALORIES IN ADULT, UNSPECIFIED BMI, UNSPECIFIED WHETHER SERIOUS COMORBIDITY PRESENT: ICD-10-CM

## 2019-10-08 DIAGNOSIS — Z72.0 TOBACCO ABUSE: ICD-10-CM

## 2019-10-08 DIAGNOSIS — I25.10 CORONARY ARTERY DISEASE INVOLVING NATIVE CORONARY ARTERY OF NATIVE HEART WITHOUT ANGINA PECTORIS: ICD-10-CM

## 2019-10-08 DIAGNOSIS — R93.0 ABNORMAL CT OF THE HEAD: ICD-10-CM

## 2019-10-08 DIAGNOSIS — G31.9 ACQUIRED CEREBRAL ATROPHY (HCC): ICD-10-CM

## 2019-10-08 DIAGNOSIS — J44.9 CHRONIC OBSTRUCTIVE PULMONARY DISEASE, UNSPECIFIED COPD TYPE (HCC): ICD-10-CM

## 2019-10-08 PROCEDURE — 99215 OFFICE O/P EST HI 40 MIN: CPT | Performed by: PSYCHIATRY & NEUROLOGY

## 2019-10-08 PROCEDURE — 3023F SPIROM DOC REV: CPT | Performed by: PSYCHIATRY & NEUROLOGY

## 2019-10-08 PROCEDURE — 1090F PRES/ABSN URINE INCON ASSESS: CPT | Performed by: PSYCHIATRY & NEUROLOGY

## 2019-10-08 PROCEDURE — 1036F TOBACCO NON-USER: CPT | Performed by: PSYCHIATRY & NEUROLOGY

## 2019-10-08 PROCEDURE — G8598 ASA/ANTIPLAT THER USED: HCPCS | Performed by: PSYCHIATRY & NEUROLOGY

## 2019-10-08 PROCEDURE — 1123F ACP DISCUSS/DSCN MKR DOCD: CPT | Performed by: PSYCHIATRY & NEUROLOGY

## 2019-10-08 PROCEDURE — G8417 CALC BMI ABV UP PARAM F/U: HCPCS | Performed by: PSYCHIATRY & NEUROLOGY

## 2019-10-08 PROCEDURE — G8400 PT W/DXA NO RESULTS DOC: HCPCS | Performed by: PSYCHIATRY & NEUROLOGY

## 2019-10-08 PROCEDURE — 3017F COLORECTAL CA SCREEN DOC REV: CPT | Performed by: PSYCHIATRY & NEUROLOGY

## 2019-10-08 PROCEDURE — G8926 SPIRO NO PERF OR DOC: HCPCS | Performed by: PSYCHIATRY & NEUROLOGY

## 2019-10-08 PROCEDURE — 4040F PNEUMOC VAC/ADMIN/RCVD: CPT | Performed by: PSYCHIATRY & NEUROLOGY

## 2019-10-08 PROCEDURE — G8427 DOCREV CUR MEDS BY ELIG CLIN: HCPCS | Performed by: PSYCHIATRY & NEUROLOGY

## 2019-10-08 PROCEDURE — G8484 FLU IMMUNIZE NO ADMIN: HCPCS | Performed by: PSYCHIATRY & NEUROLOGY

## 2019-10-08 ASSESSMENT — ENCOUNTER SYMPTOMS
COLOR CHANGE: 0
SCALP TENDERNESS: 0
CHEST TIGHTNESS: 0
SHORTNESS OF BREATH: 0
PHOTOPHOBIA: 1
ABDOMINAL PAIN: 0
BACK PAIN: 0
EYE ITCHING: 0
VOMITING: 0
EYE PAIN: 0
BLURRED VISION: 0
SORE THROAT: 0
SINUS PRESSURE: 0
FACIAL SWELLING: 0
APNEA: 0
COUGH: 0
ABDOMINAL DISTENTION: 0
NAUSEA: 1
BLOOD IN STOOL: 0
EYE REDNESS: 0
FACIAL SWEATING: 0
VISUAL CHANGE: 0
CONSTIPATION: 0
EYE WATERING: 0
VOICE CHANGE: 0
DIARRHEA: 0
TROUBLE SWALLOWING: 0
CHANGE IN BOWEL HABIT: 0
CHOKING: 0
EYE DISCHARGE: 0
WHEEZING: 0
SWOLLEN GLANDS: 0

## 2019-10-11 ENCOUNTER — CARE COORDINATION (OUTPATIENT)
Dept: CARE COORDINATION | Age: 69
End: 2019-10-11

## 2019-10-14 DIAGNOSIS — J01.90 ACUTE BACTERIAL SINUSITIS: ICD-10-CM

## 2019-10-14 DIAGNOSIS — B96.89 ACUTE BACTERIAL SINUSITIS: ICD-10-CM

## 2019-10-14 DIAGNOSIS — J44.9 CHRONIC OBSTRUCTIVE PULMONARY DISEASE, UNSPECIFIED COPD TYPE (HCC): Primary | ICD-10-CM

## 2019-10-14 DIAGNOSIS — R06.2 WHEEZING: ICD-10-CM

## 2019-10-14 DIAGNOSIS — R06.02 SOB (SHORTNESS OF BREATH): ICD-10-CM

## 2019-10-14 RX ORDER — ALBUTEROL SULFATE 90 UG/1
2 AEROSOL, METERED RESPIRATORY (INHALATION) 4 TIMES DAILY PRN
Qty: 1 INHALER | Refills: 0 | Status: SHIPPED | OUTPATIENT
Start: 2019-10-14 | End: 2020-05-14 | Stop reason: SDUPTHER

## 2019-10-14 RX ORDER — BUDESONIDE AND FORMOTEROL FUMARATE DIHYDRATE 160; 4.5 UG/1; UG/1
2 AEROSOL RESPIRATORY (INHALATION) 2 TIMES DAILY
Qty: 3 INHALER | Refills: 1 | Status: SHIPPED | OUTPATIENT
Start: 2019-10-14 | End: 2019-10-24

## 2019-10-18 ENCOUNTER — CARE COORDINATION (OUTPATIENT)
Dept: CARE COORDINATION | Age: 69
End: 2019-10-18

## 2019-10-24 ENCOUNTER — OFFICE VISIT (OUTPATIENT)
Dept: FAMILY MEDICINE CLINIC | Age: 69
End: 2019-10-24
Payer: MEDICARE

## 2019-10-24 VITALS
TEMPERATURE: 97.7 F | RESPIRATION RATE: 12 BRPM | WEIGHT: 219.6 LBS | OXYGEN SATURATION: 97 % | HEART RATE: 62 BPM | BODY MASS INDEX: 33.28 KG/M2 | SYSTOLIC BLOOD PRESSURE: 100 MMHG | HEIGHT: 68 IN | DIASTOLIC BLOOD PRESSURE: 62 MMHG

## 2019-10-24 DIAGNOSIS — Z78.0 POST-MENOPAUSAL: ICD-10-CM

## 2019-10-24 DIAGNOSIS — Z23 NEED FOR PROPHYLACTIC VACCINATION AND INOCULATION AGAINST VARICELLA: ICD-10-CM

## 2019-10-24 DIAGNOSIS — Z13.1 ENCOUNTER FOR SCREENING FOR DIABETES MELLITUS: ICD-10-CM

## 2019-10-24 DIAGNOSIS — Z12.11 SCREENING FOR COLON CANCER: ICD-10-CM

## 2019-10-24 DIAGNOSIS — Z23 NEED FOR PROPHYLACTIC VACCINATION AGAINST DIPHTHERIA-TETANUS-PERTUSSIS (DTP): ICD-10-CM

## 2019-10-24 DIAGNOSIS — Z11.59 NEED FOR HEPATITIS C SCREENING TEST: ICD-10-CM

## 2019-10-24 DIAGNOSIS — Z87.891 PERSONAL HISTORY OF TOBACCO USE: ICD-10-CM

## 2019-10-24 DIAGNOSIS — Z00.00 ROUTINE GENERAL MEDICAL EXAMINATION AT A HEALTH CARE FACILITY: Primary | ICD-10-CM

## 2019-10-24 PROCEDURE — 4040F PNEUMOC VAC/ADMIN/RCVD: CPT | Performed by: NURSE PRACTITIONER

## 2019-10-24 PROCEDURE — 1123F ACP DISCUSS/DSCN MKR DOCD: CPT | Performed by: NURSE PRACTITIONER

## 2019-10-24 PROCEDURE — G8598 ASA/ANTIPLAT THER USED: HCPCS | Performed by: NURSE PRACTITIONER

## 2019-10-24 PROCEDURE — G0438 PPPS, INITIAL VISIT: HCPCS | Performed by: NURSE PRACTITIONER

## 2019-10-24 PROCEDURE — G8484 FLU IMMUNIZE NO ADMIN: HCPCS | Performed by: NURSE PRACTITIONER

## 2019-10-24 PROCEDURE — G0296 VISIT TO DETERM LDCT ELIG: HCPCS | Performed by: NURSE PRACTITIONER

## 2019-10-24 PROCEDURE — 3017F COLORECTAL CA SCREEN DOC REV: CPT | Performed by: NURSE PRACTITIONER

## 2019-10-24 RX ORDER — BUDESONIDE AND FORMOTEROL FUMARATE DIHYDRATE 160; 4.5 UG/1; UG/1
2 AEROSOL RESPIRATORY (INHALATION) 2 TIMES DAILY
COMMUNITY
End: 2020-01-08 | Stop reason: ALTCHOICE

## 2019-10-24 ASSESSMENT — PATIENT HEALTH QUESTIONNAIRE - PHQ9
SUM OF ALL RESPONSES TO PHQ QUESTIONS 1-9: 0
SUM OF ALL RESPONSES TO PHQ QUESTIONS 1-9: 0

## 2019-10-25 ENCOUNTER — CARE COORDINATION (OUTPATIENT)
Dept: CARE COORDINATION | Age: 69
End: 2019-10-25

## 2019-11-08 RX ORDER — CARVEDILOL 3.12 MG/1
3.12 TABLET ORAL 2 TIMES DAILY WITH MEALS
Qty: 60 TABLET | Refills: 2 | Status: SHIPPED | OUTPATIENT
Start: 2019-11-08 | End: 2020-02-13 | Stop reason: SDUPTHER

## 2019-11-20 ENCOUNTER — TELEPHONE (OUTPATIENT)
Dept: ONCOLOGY | Age: 69
End: 2019-11-20

## 2019-11-26 ENCOUNTER — HOSPITAL ENCOUNTER (OUTPATIENT)
Dept: CT IMAGING | Age: 69
Discharge: HOME | End: 2019-11-28
Payer: MEDICARE

## 2019-11-26 ENCOUNTER — HOSPITAL ENCOUNTER (OUTPATIENT)
Dept: BONE DENSITY | Age: 69
Discharge: HOME | End: 2019-11-28
Payer: MEDICARE

## 2019-11-26 DIAGNOSIS — Z87.891 PERSONAL HISTORY OF TOBACCO USE: ICD-10-CM

## 2019-11-26 DIAGNOSIS — Z78.0 POST-MENOPAUSAL: ICD-10-CM

## 2019-11-26 PROCEDURE — G0297 LDCT FOR LUNG CA SCREEN: HCPCS

## 2019-11-26 PROCEDURE — 77080 DXA BONE DENSITY AXIAL: CPT

## 2019-11-26 PROCEDURE — 77085 DXA BONE DENSITY AXL VRT FX: CPT

## 2019-12-02 DIAGNOSIS — R91.8 LUNG NODULES: Primary | ICD-10-CM

## 2019-12-02 DIAGNOSIS — Z87.891 PERSONAL HISTORY OF TOBACCO USE, PRESENTING HAZARDS TO HEALTH: ICD-10-CM

## 2019-12-09 DIAGNOSIS — Z72.0 TOBACCO ABUSE: Primary | ICD-10-CM

## 2019-12-27 ENCOUNTER — TELEPHONE (OUTPATIENT)
Dept: CASE MANAGEMENT | Age: 69
End: 2019-12-27

## 2020-01-06 ENCOUNTER — HOSPITAL ENCOUNTER (OUTPATIENT)
Dept: PULMONOLOGY | Age: 70
Discharge: HOME | End: 2020-01-06
Payer: MEDICARE

## 2020-01-06 PROCEDURE — 94726 PLETHYSMOGRAPHY LUNG VOLUMES: CPT

## 2020-01-06 PROCEDURE — 94729 DIFFUSING CAPACITY: CPT

## 2020-01-06 PROCEDURE — 94060 EVALUATION OF WHEEZING: CPT

## 2020-01-06 PROCEDURE — 94640 AIRWAY INHALATION TREATMENT: CPT

## 2020-01-06 PROCEDURE — 6360000002 HC RX W HCPCS: Performed by: INTERNAL MEDICINE

## 2020-01-06 RX ORDER — ALBUTEROL SULFATE 2.5 MG/3ML
2.5 SOLUTION RESPIRATORY (INHALATION) ONCE
Status: COMPLETED | OUTPATIENT
Start: 2020-01-06 | End: 2020-01-06

## 2020-01-06 RX ADMIN — ALBUTEROL SULFATE 2.5 MG: 2.5 SOLUTION RESPIRATORY (INHALATION) at 13:10

## 2020-01-08 ENCOUNTER — IMMUNIZATION (OUTPATIENT)
Dept: LAB | Age: 70
End: 2020-01-08

## 2020-01-08 ENCOUNTER — OFFICE VISIT (OUTPATIENT)
Dept: PULMONOLOGY | Age: 70
End: 2020-01-08
Payer: MEDICARE

## 2020-01-08 VITALS
BODY MASS INDEX: 38.92 KG/M2 | OXYGEN SATURATION: 95 % | HEART RATE: 73 BPM | HEIGHT: 67 IN | DIASTOLIC BLOOD PRESSURE: 82 MMHG | WEIGHT: 248 LBS | SYSTOLIC BLOOD PRESSURE: 112 MMHG

## 2020-01-08 PROCEDURE — 3023F SPIROM DOC REV: CPT | Performed by: INTERNAL MEDICINE

## 2020-01-08 PROCEDURE — G8417 CALC BMI ABV UP PARAM F/U: HCPCS | Performed by: INTERNAL MEDICINE

## 2020-01-08 PROCEDURE — G8926 SPIRO NO PERF OR DOC: HCPCS | Performed by: INTERNAL MEDICINE

## 2020-01-08 PROCEDURE — 1036F TOBACCO NON-USER: CPT | Performed by: INTERNAL MEDICINE

## 2020-01-08 PROCEDURE — 90653 IIV ADJUVANT VACCINE IM: CPT

## 2020-01-08 PROCEDURE — 1123F ACP DISCUSS/DSCN MKR DOCD: CPT | Performed by: INTERNAL MEDICINE

## 2020-01-08 PROCEDURE — 1090F PRES/ABSN URINE INCON ASSESS: CPT | Performed by: INTERNAL MEDICINE

## 2020-01-08 PROCEDURE — 3017F COLORECTAL CA SCREEN DOC REV: CPT | Performed by: INTERNAL MEDICINE

## 2020-01-08 PROCEDURE — 99214 OFFICE O/P EST MOD 30 MIN: CPT | Performed by: INTERNAL MEDICINE

## 2020-01-08 PROCEDURE — G8482 FLU IMMUNIZE ORDER/ADMIN: HCPCS | Performed by: INTERNAL MEDICINE

## 2020-01-08 PROCEDURE — 4040F PNEUMOC VAC/ADMIN/RCVD: CPT | Performed by: INTERNAL MEDICINE

## 2020-01-08 PROCEDURE — 99204 OFFICE O/P NEW MOD 45 MIN: CPT | Performed by: INTERNAL MEDICINE

## 2020-01-08 PROCEDURE — G8427 DOCREV CUR MEDS BY ELIG CLIN: HCPCS | Performed by: INTERNAL MEDICINE

## 2020-01-08 PROCEDURE — G8400 PT W/DXA NO RESULTS DOC: HCPCS | Performed by: INTERNAL MEDICINE

## 2020-01-08 RX ORDER — ALBUTEROL SULFATE 90 UG/1
2 AEROSOL, METERED RESPIRATORY (INHALATION) EVERY 6 HOURS PRN
Qty: 1 INHALER | Refills: 11 | Status: SHIPPED | OUTPATIENT
Start: 2020-01-08 | End: 2020-05-14 | Stop reason: SDUPTHER

## 2020-01-08 ASSESSMENT — ENCOUNTER SYMPTOMS
COUGH: 1
SHORTNESS OF BREATH: 1
GASTROINTESTINAL NEGATIVE: 1
EYES NEGATIVE: 1

## 2020-01-08 NOTE — PROGRESS NOTES
needed for Wheezing 1 Inhaler 11    carvedilol (COREG) 3.125 MG tablet Take 1 tablet by mouth 2 times daily (with meals) 60 tablet 2    sertraline (ZOLOFT) 50 MG tablet Take 1 tablet by mouth nightly 30 tablet 2    albuterol sulfate  (90 Base) MCG/ACT inhaler Inhale 2 puffs into the lungs 4 times daily as needed for Wheezing 1 Inhaler 0    Handicap Placard MISC by Does not apply route Expires: 2024  Dx: R23.89, G93.5 1 each 0    ibuprofen (ADVIL;MOTRIN) 600 MG tablet Take 1 tablet by mouth every 8 hours as needed for Pain 20 tablet 0    Misc. Devices (ADJUST BATH/SHOWER SEAT/BACK) MISC Use daily with showers 1 each 0    Misc. Devices (SUCTION GRAB BAR) MISC Use in the shower 2 each 0    aspirin 325 MG tablet Take 325 mg by mouth daily      loratadine (CLARITIN) 10 MG tablet Take 10 mg by mouth daily      ondansetron (ZOFRAN ODT) 4 MG disintegrating tablet Take 1 tablet by mouth every 8 hours as needed for Nausea (Patient not taking: Reported on 10/8/2019) 20 tablet 0    butalbital-acetaminophen-caffeine (FIORICET, ESGIC) -40 MG per tablet Take 1 tablet by mouth every 4 hours as needed for Headaches (Patient not taking: Reported on 10/24/2019) 30 tablet 0    tiZANidine (ZANAFLEX) 2 MG tablet Take 1 tablet by mouth 3 times daily as needed (muscle spasm or headach) (Patient not taking: Reported on 2020) 30 tablet 0     No current facility-administered medications for this visit. History reviewed. No pertinent family history. Social History     Tobacco Use    Smoking status: Former Smoker     Packs/day: 1.00     Years: 50.00     Pack years: 50.00     Types: Cigarettes     Start date: 1970     Last attempt to quit: 2019     Years since quittin.6    Smokeless tobacco: Never Used   Substance Use Topics    Alcohol use: No    Drug use: No       Review of Systems   Constitutional: Negative. HENT: Negative. Eyes: Negative.     Respiratory: Positive for cough and encounter of 75/90/58   Basic Metabolic Panel   Result Value Ref Range    Glucose 111 (H) 70 - 99 mg/dL    BUN 19 8 - 23 mg/dL    CREATININE 0.65 0.50 - 0.90 mg/dL    Bun/Cre Ratio 29 (H) 9 - 20    Calcium 9.6 8.6 - 10.4 mg/dL    Sodium 140 135 - 144 mmol/L    Potassium 4.0 3.7 - 5.3 mmol/L    Chloride 101 98 - 107 mmol/L    CO2 26 20 - 31 mmol/L    Anion Gap 13 9 - 17 mmol/L    GFR Non-African American >60 >60 mL/min    GFR African American >60 >60 mL/min    GFR Comment          GFR Staging NOT REPORTED    CBC Auto Differential   Result Value Ref Range    WBC 10.3 3.5 - 11.0 k/uL    RBC 5.05 4.0 - 5.2 m/uL    Hemoglobin 15.7 12.0 - 16.0 g/dL    Hematocrit 47.2 (H) 36 - 46 %    MCV 93.3 80 - 100 fL    MCH 31.1 26 - 34 pg    MCHC 33.3 31 - 37 g/dL    RDW 15.3 (H) 11.0 - 14.5 %    Platelets 150 105 - 232 k/uL    MPV 10.3 6.0 - 12.0 fL    NRBC Automated NOT REPORTED per 100 WBC    Differential Type NOT REPORTED     Immature Granulocytes NOT REPORTED 0 %    Absolute Immature Granulocyte NOT REPORTED 0.00 - 0.30 k/uL    WBC Morphology NOT REPORTED     RBC Morphology NOT REPORTED     Platelet Estimate NOT REPORTED     Seg Neutrophils 83 (H) 43 - 77 %    Lymphocytes 7 (L) 16 - 46 %    Monocytes 8 4 - 11 %    Eosinophils % 1 1 - 7 %    Basophils 1 0 - 1 %    Segs Absolute 8.60 (H) 1.8 - 7.7 k/uL    Absolute Lymph # 0.70 (L) 1.0 - 4.8 k/uL    Absolute Mono # 0.90 0.1 - 1.2 k/uL    Absolute Eos # 0.10 0.0 - 0.4 k/uL    Basophils Absolute 0.00 0.0 - 0.2 k/uL   Urinalysis Reflex to Culture   Result Value Ref Range    Color, UA NOT REPORTED YELLOW    Turbidity UA NOT REPORTED CLEAR    Glucose, Ur NEGATIVE NEGATIVE    Bilirubin Urine 1+ (A) NEGATIVE    Ketones, Urine TRACE (A) NEGATIVE    Specific Gravity, UA 1.015 1.010 - 1.025    Urine Hgb NEGATIVE NEGATIVE    pH, UA 5.5 5.0 - 6.0    Protein, UA NEGATIVE NEGATIVE    Urobilinogen, Urine Normal Normal    Nitrite, Urine NEGATIVE NEGATIVE    Leukocyte Esterase, Urine NEGATIVE

## 2020-01-13 ENCOUNTER — OFFICE VISIT (OUTPATIENT)
Dept: NEUROLOGY | Age: 70
End: 2020-01-13
Payer: MEDICARE

## 2020-01-13 VITALS
OXYGEN SATURATION: 93 % | HEIGHT: 66 IN | HEART RATE: 75 BPM | WEIGHT: 248.02 LBS | SYSTOLIC BLOOD PRESSURE: 122 MMHG | DIASTOLIC BLOOD PRESSURE: 80 MMHG | BODY MASS INDEX: 39.86 KG/M2

## 2020-01-13 PROCEDURE — 1090F PRES/ABSN URINE INCON ASSESS: CPT | Performed by: PSYCHIATRY & NEUROLOGY

## 2020-01-13 PROCEDURE — 1123F ACP DISCUSS/DSCN MKR DOCD: CPT | Performed by: PSYCHIATRY & NEUROLOGY

## 2020-01-13 PROCEDURE — G8400 PT W/DXA NO RESULTS DOC: HCPCS | Performed by: PSYCHIATRY & NEUROLOGY

## 2020-01-13 PROCEDURE — 99214 OFFICE O/P EST MOD 30 MIN: CPT | Performed by: PSYCHIATRY & NEUROLOGY

## 2020-01-13 PROCEDURE — G8482 FLU IMMUNIZE ORDER/ADMIN: HCPCS | Performed by: PSYCHIATRY & NEUROLOGY

## 2020-01-13 PROCEDURE — 99213 OFFICE O/P EST LOW 20 MIN: CPT | Performed by: PSYCHIATRY & NEUROLOGY

## 2020-01-13 PROCEDURE — 3017F COLORECTAL CA SCREEN DOC REV: CPT | Performed by: PSYCHIATRY & NEUROLOGY

## 2020-01-13 PROCEDURE — G8427 DOCREV CUR MEDS BY ELIG CLIN: HCPCS | Performed by: PSYCHIATRY & NEUROLOGY

## 2020-01-13 PROCEDURE — 1036F TOBACCO NON-USER: CPT | Performed by: PSYCHIATRY & NEUROLOGY

## 2020-01-13 PROCEDURE — G8417 CALC BMI ABV UP PARAM F/U: HCPCS | Performed by: PSYCHIATRY & NEUROLOGY

## 2020-01-13 PROCEDURE — G8926 SPIRO NO PERF OR DOC: HCPCS | Performed by: PSYCHIATRY & NEUROLOGY

## 2020-01-13 PROCEDURE — 3023F SPIROM DOC REV: CPT | Performed by: PSYCHIATRY & NEUROLOGY

## 2020-01-13 PROCEDURE — 4040F PNEUMOC VAC/ADMIN/RCVD: CPT | Performed by: PSYCHIATRY & NEUROLOGY

## 2020-01-13 ASSESSMENT — ENCOUNTER SYMPTOMS
NAUSEA: 0
COUGH: 0
APNEA: 0
SINUS PRESSURE: 0
FACIAL SWEATING: 0
CHOKING: 0
VISUAL CHANGE: 0
SCALP TENDERNESS: 0
EYE WATERING: 0
COLOR CHANGE: 0
VOMITING: 0
BACK PAIN: 0
BLOOD IN STOOL: 0
EYE REDNESS: 0
EYE DISCHARGE: 0
VOICE CHANGE: 0
CHEST TIGHTNESS: 0
EYE ITCHING: 0
ABDOMINAL PAIN: 0
WHEEZING: 0
TROUBLE SWALLOWING: 0
BLURRED VISION: 0
SWOLLEN GLANDS: 0
ABDOMINAL DISTENTION: 0
SHORTNESS OF BREATH: 0
CONSTIPATION: 0
BOWEL INCONTINENCE: 0
FACIAL SWELLING: 0
EYE PAIN: 0
PHOTOPHOBIA: 1
CHANGE IN BOWEL HABIT: 0
DIARRHEA: 0
SORE THROAT: 0

## 2020-01-13 NOTE — PROGRESS NOTES
[facility]  Neurology  [address] [address]  ZYWZF:[phone]   Fax: [phone]    SUBJECTIVE:     PATIENT ID:  Noris Garcia is a  RIGHT   HANDED 81 y.o. female. Dizziness   This is a new problem. Episode onset: SINCE   EARLY  APRIL 2019. The problem occurs intermittently. The problem has been resolved. Associated symptoms include headaches, numbness and vertigo. Pertinent negatives include no abdominal pain, anorexia, arthralgias, change in bowel habit, chest pain, chills, congestion, coughing, diaphoresis, fatigue, fever, joint swelling, myalgias, nausea, neck pain, rash, sore throat, swollen glands, urinary symptoms, visual change, vomiting or weakness. The symptoms are aggravated by bending and standing. She has tried rest and sleep (  SHUNT  PLACEMENT) for the symptoms. The treatment provided significant relief. Headache    This is a new problem. Episode onset: SINCE  APRIL 2019. The problem occurs intermittently. The problem has been waxing and waning. The pain is located in the occipital and frontal region. The pain does not radiate. The pain quality is not similar to prior headaches. The quality of the pain is described as aching and throbbing. The pain is at a severity of 3/10. The pain is mild. Associated symptoms include dizziness, a loss of balance, numbness, phonophobia and photophobia. Pertinent negatives include no abdominal pain, abnormal behavior, anorexia, back pain, blurred vision, coughing, drainage, ear pain, eye pain, eye redness, eye watering, facial sweating, fever, hearing loss, insomnia, muscle aches, nausea, neck pain, scalp tenderness, seizures, sinus pressure, sore throat, swollen glands, tingling, tinnitus, visual change, vomiting, weakness or weight loss. The symptoms are aggravated by coughing, bright light and noise. She has tried acetaminophen for the symptoms. The treatment provided significant relief.  Her past medical history is significant for obesity and sinus disease. There is no history of cancer, cluster headaches, hypertension, immunosuppression, migraine headaches, migraines in the family, pseudotumor cerebri, recent head traumas or TMJ. Neurologic Problem   The patient's primary symptoms include clumsiness, a loss of balance and memory loss. The patient's pertinent negatives include no syncope, visual change or weakness. This is a chronic problem. The neurological problem developed insidiously. The problem is unchanged. There was no focality noted. Associated symptoms include dizziness, headaches and vertigo. Pertinent negatives include no abdominal pain, auditory change, aura, back pain, bladder incontinence, bowel incontinence, chest pain, confusion, diaphoresis, fatigue, fever, light-headedness, nausea, neck pain, palpitations, shortness of breath or vomiting. Past treatments include bed rest, medication and sleep. The treatment provided moderate relief. Her past medical history is significant for dementia. There is no history of a bleeding disorder, a clotting disorder, a CVA, head trauma, liver disease, mood changes or seizures. History obtained from  The patient  AND   DAUGHTERS             and other  available medical records were  Also  reviewed. The  Duration,  Quality,  Severity,  Location,  Timing,  Context,  Modifying  Factors   Of   The   Chief   Complaint       AndPresent  Illness   Was   Reviewed   In   Chronological   Manner.                                              PATIENT'S  MAIN  CONCERNS INCLUDE :                       1)     DIZZINESS     INTERMITTENT     SINCE      April 2019                                         -     IMPROVED    SIGNIFICANTLY                               2)       GLOBAL  HEADACHES     STARTING  IN  OCCIPITAL   AREA                                    RADIATING  TO    FRONTAL  AREA      SINCE   April 2019 VENTRICULAR  DRAIN    PLACEMENT  IN   MAY    2019        AND                                  WAS    IN    NURSING  HOME  REHAB   FOR  TWO  MONTHS . PATIENT  RECOVERED   WELL. NO   DIZZINESS. NO  BALANCE  PROBLEMS                                    NO   SEIZURES. NO   HEADACHES . MEMORY    PROBLEMS     ARE  AT  HER  BASELINE                               11)      FOLLOW UP  CT  HEAD   June 2019     SHOWED                                   STABLE   VENTRICULOMEGALY    AND    SHUNT                               12)    ALSO  HAD  FOLLOW  NEURO  SURGEON   EVALUATIONS                                                      IN   SEPT. 2019                                13)     PATIENT  LIVES   WITH   HER  DAUGHTER     CURRENTLY. PATIENT     PLANS  TO   DRIVE  LOCALLY       AS  TOLERATED. 14)     PATIENT     AND  HER  DAUGHTERS    DO NOT  HAVE       ANY  NEW  NEUROLOGICAL                                         CONCERNS.         PATIENT  APPEAR  NEUROLOGICALLY   STABLE                               PRECIPITATING  FACTORS: including  fever/infection, exertion/relaxation, position change, stress,                         weather change, medications/alcohol, time of day/darkness/light  Are  Present                                                                MODIFYING  FACTORS:  fever/infection, exertion/relaxation, position change, stress, weather change,                      medications/alcohol, time of day/darkness/light    Are  present           Patient   Indicates   The  Presence   And  The  Absence  Of  The  FollowingAssociated  And   Additional  Neurological    Symptoms:                                Balance  And coordination problems  present           Gait problems     present            Headaches      present Migraines           present           Memory problems absent             Confusion        absent            Paresthesia numbness          absent           SeizuresAnd  Starring  Episodes           absent           Syncope,  Near  syncopal episodes         absent           Speech problems           absent             Swallowing  Problems      absent            Dizziness,  Light headedness           present              Vertigo        present             Generalized   Weakness    absent              focal  Weakness     absent             Tremors         absent              Sleep  Problems     absent             History  Of   RecentHead  Injury     absent             History  Of   Recent  TIA     absent             History  Of   Recent    Stroke     absent             Neck  Pain and  Neck muscle  Spasms  Absent               Radiating  down   And   Weakness           absent            Lower back   Pain  And     Spasms  Absent              Radiating    Down   And   Weakness          absent                H/OFALLS        absent               History  Of   Visual  Symptoms    Absent                  Associated   Diplopia       absent                                             Also   Additional   Symptoms   Present    As  Documented    In   The detailed                  Review  Of  Systems   And    Please   Refer   To    Them for   Additional  Information. Any components  That are either  Unobtainable  Or  Limited  In   HPI, ROS  And/or PFSH   Are                Due   To       Patient's  Medical  Problems,  Clinical  Condition and/or lack of                                other    Alternate resources.               RECORDS   REVIEWED:    historical medical records     INFORMATION   REVIEWED:       MEDICAL   HISTORY,SURGICAL   HISTORY,   MEDICATIONS   LIST,   ALLERGIES AND  DRUG  INTOLERANCES,     FAMILY   HISTORY,  SOCIAL  HISTORY,    PROBLEM  LIST   FOR  PATIENT  CARE COORDINATION          Past Medical History:   Diagnosis Date    Chronic obstructive pulmonary disease ([address]     Coronary artery disease     Dyslipidemia     Obesity     Pneumonia          Past Surgical History:   Procedure Laterality Date    CARDIAC CATHETERIZATION      CARDIAC CATHETERIZATION  2012    cath with x1 stent    EYE SURGERY      shar cataract surg x2 y ago    VENTRICULOPERITONEAL SHUNT Right 5/16/2019    VENTRICULAR PERITONEAL SHUNT INSERTION ENDOSCOPIC performed by Aldo Doran MD at [place] [address]         Current Outpatient Medications   Medication Sig Dispense Refill    Tiotropium Bromide-Olodaterol (STIOLTO RESPIMAT) 2.5-2.5 MCG/ACT AERS Inhale 2 puffs into the lungs every morning 1 Inhaler 11    albuterol sulfate HFA (VENTOLIN HFA) 108 (90 Base) MCG/ACT inhaler Inhale 2 puffs into the lungs every 6 hours as needed for Wheezing 1 Inhaler 11    carvedilol (COREG) 3.125 MG tablet Take 1 tablet by mouth 2 times daily (with meals) 60 tablet 2    sertraline (ZOLOFT) 50 MG tablet Take 1 tablet by mouth nightly 30 tablet 2    albuterol sulfate  (90 Base) MCG/ACT inhaler Inhale 2 puffs into the lungs 4 times daily as needed for Wheezing 1 Inhaler 0    Handicap Placard MISC by Does not apply route Expires: 7/26/2024  Dx: R23.89, G93.5 1 each 0    ibuprofen (ADVIL;MOTRIN) 600 MG tablet Take 1 tablet by mouth every 8 hours as needed for Pain 20 tablet 0    Misc. Devices (ADJUST BATH/SHOWER SEAT/BACK) MISC Use daily with showers 1 each 0    Misc.  Devices (SUCTION GRAB BAR) MISC Use in the shower 2 each 0    aspirin 325 MG tablet Take 325 mg by mouth daily      loratadine (CLARITIN) 10 MG tablet Take 10 mg by mouth daily      ondansetron (ZOFRAN ODT) 4 MG disintegrating tablet Take 1 tablet by mouth every 8 hours as needed for Nausea (Patient not taking: Reported on 10/8/2019) 20 tablet 0    butalbital-acetaminophen-caffeine (FIORICET, ESGIC) -40 MG per tablet Take 1 tablet by Forced sexual activity: Not on file   Other Topics Concern    Not on file   Social History Narrative    No SDOH needs identified. She was given [facility] number for transportation if family unable to transport. Discussed HEAP and PIPP programs. She stated she has used in the past but declines at this time.        Vitals:    01/13/20 1545   BP: 122/80   Pulse: 75   SpO2: 93%         Wt Readings from Last 3 Encounters:   01/13/20 248 lb 0.3 oz (112.5 kg)   01/08/20 248 lb (112.5 kg)   10/24/19 219 lb 9.6 oz (99.6 kg)         BP Readings from Last 3 Encounters:   01/13/20 122/80   01/08/20 112/82   10/24/19 100/62       Hematology and Coagulation  Lab Results   Component Value Date    WBC 10.3 06/12/2019    RBC 5.05 06/12/2019    HGB 15.7 06/12/2019    HCT 47.2 06/12/2019    MCV 93.3 06/12/2019    MCH 31.1 06/12/2019    MCHC 33.3 06/12/2019    RDW 15.3 06/12/2019     06/12/2019    MPV 10.3 06/12/2019       Chemistries  Lab Results   Component Value Date     06/12/2019    K 4.0 06/12/2019     06/12/2019    CO2 26 06/12/2019    BUN 19 06/12/2019    CREATININE 0.65 06/12/2019    CALCIUM 9.6 06/12/2019    PROT 7.3 05/12/2019    LABALBU 4.3 05/12/2019    BILITOT 0.65 05/12/2019    ALKPHOS 77 05/12/2019    AST 9 05/12/2019    ALT 9 05/12/2019     Lab Results   Component Value Date    ALKPHOS 77 05/12/2019    ALT 9 05/12/2019    AST 9 05/12/2019    PROT 7.3 05/12/2019    BILITOT 0.65 05/12/2019    BILIDIR 0.12 07/02/2012    LABALBU 4.3 05/12/2019     Lab Results   Component Value Date    BUN 19 06/12/2019    CREATININE 0.65 06/12/2019     Lab Results   Component Value Date    CALCIUM 9.6 06/12/2019    MG 2.2 07/02/2012     Lab Results   Component Value Date    AST 9 05/12/2019    ALT 9 05/12/2019       Lab Results   Component Value Date    CKTOTAL 28 07/02/2012         Review of Systems   Constitutional: Negative for appetite change, chills, diaphoresis, fatigue, fever, unexpected weight change and weight loss. HENT: Negative for congestion, dental problem, drooling, ear discharge, ear pain, facial swelling, hearing loss, mouth sores, nosebleeds, postnasal drip, sinus pressure, sore throat, tinnitus, trouble swallowing and voice change. Eyes: Positive for photophobia. Negative for blurred vision, pain, discharge, redness, itching and visual disturbance. Respiratory: Negative for apnea, cough, choking, chest tightness, shortness of breath and wheezing. Cardiovascular: Negative for chest pain, palpitations and leg swelling. Gastrointestinal: Negative for abdominal distention, abdominal pain, anorexia, blood in stool, bowel incontinence, change in bowel habit, constipation, diarrhea, nausea and vomiting. Endocrine: Negative for cold intolerance, heat intolerance, polydipsia, polyphagia and polyuria. Genitourinary: Negative for bladder incontinence. Musculoskeletal: Positive for gait problem. Negative for arthralgias, back pain, joint swelling, myalgias, neck pain and neck stiffness. Skin: Negative for color change, pallor, rash and wound. Allergic/Immunologic: Negative for environmental allergies, food allergies and immunocompromised state. Neurological: Positive for dizziness, vertigo, numbness, headaches and loss of balance. Negative for tingling, tremors, seizures, syncope, facial asymmetry, speech difficulty, weakness and light-headedness. Hematological: Negative for adenopathy. Does not bruise/bleed easily. Psychiatric/Behavioral: Positive for memory loss. Negative for agitation, behavioral problems, confusion, decreased concentration, dysphoric mood, hallucinations, self-injury, sleep disturbance and suicidal ideas. The patient is not nervous/anxious, does not have insomnia and is not hyperactive. OBJECTIVE:    Physical Exam  Constitutional:       Appearance: She is well-developed. HENT:      Head: Normocephalic and atraumatic.  No raccoon eyes or Holland's sign. Right Ear: External ear normal.      Left Ear: External ear normal.      Nose: Nose normal.   Eyes:      Conjunctiva/sclera: Conjunctivae normal.   Neck:      Musculoskeletal: Normal range of motion and neck supple. Normal range of motion. No neck rigidity or muscular tenderness. Thyroid: No thyroid mass or thyromegaly. Vascular: No carotid bruit. Trachea: No tracheal deviation. Meningeal: Brudzinski's sign and Kernig's sign absent. Cardiovascular:      Rate and Rhythm: Normal rate and regular rhythm. Pulmonary:      Effort: Pulmonary effort is normal.   Musculoskeletal:         General: No tenderness. Skin:     General: Skin is warm. Coloration: Skin is not pale. Findings: No erythema or rash. Nails: There is no clubbing. Psychiatric:         Attention and Perception: She is attentive. Mood and Affect: Mood is not anxious or depressed. Affect is not labile, blunt or inappropriate. Behavior: Behavior is slowed. Behavior is not agitated, aggressive, withdrawn, hyperactive or combative. Behavior is cooperative. Thought Content: Thought content is not paranoid or delusional. Thought content does not include homicidal or suicidal ideation. Thought content does not include homicidal or suicidal plan. Cognition and Memory: Cognition is impaired. Memory is impaired. Judgment: Judgment is not impulsive or inappropriate. Neurologic Exam      NEUROLOGICALEXAMINATION :      A) MENTAL STATUS:                   Alert and  oriented  To time, place  And  Person. No Aphasia. No  Dysarthria. Able   To  Follow   SIMPLE   commands without   Any  Difficulty. No right  To left confusion. SLOW   Speech  And language function.                    Insight and  Judgment ,Fund  Of  Knowledge   within normal limits                Recent  And  Remote memory DECREASED                Attention &Concentration are    DECREASED                                                  B) CRANIAL NERVES :      CN : Visual  Acuity  And  Visual fields  within normal limits               Fundi  Could  Not  Be  Could  Not  Be  Evaluated. 3,4,6 CN : Both  Pupils are   Reactive and  Equal.  Movements  Are  Intact. No  Nystagmus. No  RHONAD. No  Afferent  Pupillary  Defect noted. 5 CN :  Normal  Facial sensations and Corneal  Reflexes           7 CN:  Normal  Facial  Symmetry  And  Strength. No facial  Weakness. 8 CN :  Hearing  Appears within normal limits          9, 10 CN: Normal spontaneous, reflex palate movements         11 CN:   Normal  Shouldershrug and  strength         12 CN :   Normal  Tongue movements and  Tongue  In midline                        No tongue   Fasciculations or atrophy       C) MOTOR  EXAM:                 Strength  In upper  AndLower extremities   within normal limits             Rapid alternating  And  repetitions  Movements  within normal limits               Muscle  Tone  In upper  And  LowerExtremities  normal                No rigidity. No  Spasticity. Bradykinesia   absent               No  Asterixis. Sustention  Tremor , Resting  Tremor   absent                    No other  Abnormal  Movements noted           D) SENSORY :               light touch, pinprick, position  And  Vibration  within normal limits        E) REFLEXES:                   Deep  Tendon  Reflexes normal                  No  pathological  Reflexes  Bilaterally.                                   F) COORDINATION  AND  GAIT :                                Station and  Gait    SLOW                             Romberg 's test   POSITIVE                             Ataxia negative          ASSESSMENT:    Patient Active Problem List   Diagnosis    CAD (coronary artery disease)    Tobacco abuse    ventriculostomy catheter   terminates in the of the left lateral ventricle.           Impression   Stable ventriculomegaly and  shunt.  No acute disease.               CT OF THE HEAD WITHOUT CONTRAST  5/2/2019 2:52 pm       TECHNIQUE:   CT of the head was performed without the administration of intravenous   contrast. Dose modulation, iterative reconstruction, and/or weight based   adjustment of the mA/kV was utilized to reduce the radiation dose to as low   as reasonably achievable.       COMPARISON:   None.       HISTORY:   ORDERING SYSTEM PROVIDED HISTORY: headache   TECHNOLOGIST PROVIDED HISTORY:       Ordering Physician Provided Reason for Exam: C/o headache/dizziness   Acuity: Acute   Type of Exam: Initial       FINDINGS:   BRAIN/VENTRICLES: There is no acute intracranial hemorrhage, mass effect or   midline shift.  No abnormal extra-axial fluid collection.  Ventriculomegaly   is noted and cerebellar tonsillar displacement into the foramen magnum,   extending off the field of view, is noted.  There is also mild cerebral and   cerebellar atrophy and scattered white matter changes in the brain.       ORBITS: The visualized portion of the orbits demonstrate no acute abnormality.       SINUSES: The visualized paranasal sinuses and mastoid air cells demonstrate   no acute abnormality.       SOFT TISSUES/SKULL:  No acute abnormality of the visualized skull or soft   tissues.           Impression   Partially visualized Chiari malformation.  Ventriculomegaly is noted as well   as cerebral and cerebellar cortical atrophy with chronic appearing white   matter changes in the brain.                 VISITING DIAGNOSIS:      ICD-10-CM    1. Dizziness R42    2. Cerebral ventriculomegaly G93.89    3. Chronic cerebral ischemia I67.82    4. Acquired cerebral atrophy ([address] G31.9    5. Chiari malformation type I ([address] G93.5    6.  Tobacco abuse Z72.0    7. Class 2 obesity due to excess calories in adult, unspecified BMI, Complex  Treatment  Regimen,  Multiple medications       and   Risk  Of   Side  Effects,  Difficulty  In  Medication  Management  And  Diagnostic  Challenges       In  View  Of  The  Associated   Co  Morbid  Conditions   And  Problems. * FALL   PRECAUTIONS. THESE  REVIEWED   AND  DISCUSSED      *    SUPERVISED   CARE      *   ABSOLUTELY   NO  DRIVING      *   BE  CAREFUL  WITH  ACTIVITIES            *   ADEQUATE   FLUIDINTAKE   AND  ELECTROLYTE  BALANCE         * KEEP  DAIRY  OF   THE  NEUROLOGICAL  SYMPTOMS        RECORDING THE    DURATION  AND  FREQUENCY. *  AVOID    CONDITIONS  AND  FACTORS   THAT  MAKE                  NEUROLOGICAL  SYMPTOMS  WORSE.           *TO  MAINTAIN  REGULAR  SLEEP  WAKE  CYCLES. *   TO  HAVE  ADEQUATE  REST  AND   SLEEP    HOURS.          *    AVOID  ANY USAGE OF                   TOBACCO,EXCESSIVE  ALCOHOL  AND   ILLEGAL   SUBSTANCES        *   Compliance   With  Medications   And  Instructions      *    MIGRAINE/ HEADACHE    DAIRY   WITH  MONITORING                       OF  DURATION  ANDFREQUENCY. *     The    Antiplatelet  therapy    As   Recommended  Was   Discussed      *    Prophylactic  Use   Of     Vitamin   B   Complex,  Folic  Acid,    Vitamin  B12    Multivitamin,       Calcium  With  magnesium  And  Vit D    Supplementations   Over  The  Counter  Discussed              *  EVALUATIONS  AND  FOLLOW UP:                    * PHYSICAL  THERAPY                                *CARDIOLOGY              *   OPTHALMOLOGY                                *        PATIENT  HAD   VENTRICULAR  DRAIN    PLACEMENT  IN   MAY    2019        AND                                  WAS    IN    NURSING  HOME  REHAB   FOR  TWO  MONTHS . *         PATIENT  RECOVERED   WELL. NO   DIZZINESS. NO  BALANCE  PROBLEMS                                    NO   SEIZURES. NO   HEADACHES . DAUGHTER                          questions  Answered  In  Detail. More   Than  50% of face  To face Time   Was  Spent  On  Counseling   And   Coordination  Of  Care                 Of   Patient's  multiple   Neurological  Problems   And   Comorbid  Medical   Conditions. Electronically signed by Adonis Munoz MD     Board Certified in  Neurology &  In  [facility] Psych[facility] and [facility] ([facility])      DISCLAIMER:   Although every effort was made to ensure the accuracy of this  electronictranscription, some errors in transcription may have occurred. GENERAL PATIENT INSTRUCTIONS:      A Healthy Lifestyle: Care Instructions   Your Care Instructions   A healthy lifestyle can help you feel good, stay at ahealthy weight, and have plenty of energy for both work and play. A healthy lifestyle is something you can share with your whole family.  A healthy lifestyle also can lower your risk for serious health problems, such ashigh blood pressure, heart disease, and diabetes.  You can follow a few steps listed below to improve your health and the health of your family.  Follow-up careis a key part of your treatment and safety. Be sure to make and go to all appointments, and call your doctor if you are having problems. Its also a good idea to know your test results and keep a list of the medicines you take.  How can you care for yourself at home?  Do not eat too much sugar, fat, or fast foods. You can still have dessert and treats nowand then. The goal is moderation.  Start small to improve your eating habits. Pay attention to portion sizes, drink less juice and soda pop, and eat more fruits and vegetables.  Eat a healthy amount of food. A 3-ounce serving of meat, for example, is about the size of a deck of cards. Fill the rest of your plate with vegetables and whole grains.  Limit theamount of soda and sports drinks you have every day.  Drink more water when you are thirsty.  Eat at least 5 servings of fruits and vegetables every day. It may seem like a lot, but it is not hard to reach this goal. Aserving or helping is 1 piece of fruit, 1 cup of vegetables, or 2 cups of leafy, raw vegetables. Have an apple or some carrot sticks as an afternoon snack instead of a candy bar. Try to have fruits and/or vegetables at everymeal.   Make exercise part of your daily routine. You may want to start with simple activities, such as walking, bicycling, or slow swimming. Try akua active 30 to 60 minutes every day. You do not need to do all 30 to 60 minutes all at once. For example, you can exercise 3 times a day for 10 or 20 minutes. Moderate exercise is safe for most people, but it is always agood idea to talk to your doctor before starting an exercise program.   Keep moving. Noreene Screws the lawn, work in the garden, or Symform. Take the stairs instead of the elevator at work.  If you smoke, quit. Peoplewho smoke have an increased risk for heart attack, stroke, cancer, and other lung illnesses. Quitting is hard, but there are ways to boost your chance of quitting tobacco for good.  Use nicotine gum, patches, or lozenges.  Ask your doctor about stop-smoking programs and medicines.  Keep trying.  In addition to reducing your risk of diseases in the future, you will notice some benefits soon after you stop using tobacco. If you have shortness of breath or asthma symptoms, they will likely getbetter within a few weeks after you quit.  Limit how much alcohol you drink. Moderate amounts of alcohol (up to 2 drinks a day for men, 1drink a day for women) are okay. But drinking too much can lead to liver problems, high blood pressure, and other health problems.  health   If you have a family, there are many things you can do together to improve your health.  Eat meals together as a family as often as possible.  Eat healthy foods.  This includes fruits,

## 2020-01-13 NOTE — PROCEDURES
[address]                 [address] [place] [id number]                               PULMONARY FUNCTION    PATIENT NAME: Lennox Lewis                    :        1950  MED REC NO:   [id number]                             ROOM:  ACCOUNT NO:   [de-identified]                           ADMIT DATE: 2020  PROVIDER:     Norah Wolff    DATE OF PROCEDURE:  2020    The patient's flow volume loop shows obstructive pattern. FEV1 is 50% predicted with 17% bronchodilator change to 58% predicted,  1.25 liters to 1.46 liters. FVC is 2.08 liters, 64% predicted to 83%  predicted, 2.66 liters with 28% bronchodilator change. FEV1/FVC ratio  60. Lung volume by body box, total lung capacity 138% predicted, %  predicted consistent with air trapping and hyperventilation. Diffusion capacity 52% predicted, uncorrected. Airway resistance increased. FINAL IMPRESSION:  This study shows stage II COPD with significant  bronchodilator response, hyperinflation, air trapping, and moderately  decreased diffusion capacity due to emphysema versus anemia versus  interstitial lung disease. Clinical correlation advised.         Emily Postal    D: 2020 62:72:82       T: 2020 0:41:31     [email or web address]  Job#: [id number]     Doc#: [id number]    CC:  Robin Mejia

## 2020-01-17 ENCOUNTER — TELEPHONE (OUTPATIENT)
Dept: CASE MANAGEMENT | Age: 70
End: 2020-01-17

## 2020-02-04 ENCOUNTER — OFFICE VISIT (OUTPATIENT)
Dept: PRIMARY CARE CLINIC | Age: 70
End: 2020-02-04
Payer: MEDICARE

## 2020-02-04 VITALS
HEART RATE: 89 BPM | HEIGHT: 66 IN | BODY MASS INDEX: 39.86 KG/M2 | DIASTOLIC BLOOD PRESSURE: 88 MMHG | WEIGHT: 248 LBS | OXYGEN SATURATION: 97 % | TEMPERATURE: 100 F | SYSTOLIC BLOOD PRESSURE: 132 MMHG

## 2020-02-04 PROCEDURE — 94640 AIRWAY INHALATION TREATMENT: CPT | Performed by: FAMILY MEDICINE

## 2020-02-04 PROCEDURE — 99214 OFFICE O/P EST MOD 30 MIN: CPT

## 2020-02-04 PROCEDURE — 1123F ACP DISCUSS/DSCN MKR DOCD: CPT | Performed by: FAMILY MEDICINE

## 2020-02-04 PROCEDURE — 1090F PRES/ABSN URINE INCON ASSESS: CPT | Performed by: FAMILY MEDICINE

## 2020-02-04 PROCEDURE — 99214 OFFICE O/P EST MOD 30 MIN: CPT | Performed by: FAMILY MEDICINE

## 2020-02-04 PROCEDURE — 3017F COLORECTAL CA SCREEN DOC REV: CPT | Performed by: FAMILY MEDICINE

## 2020-02-04 PROCEDURE — G8482 FLU IMMUNIZE ORDER/ADMIN: HCPCS | Performed by: FAMILY MEDICINE

## 2020-02-04 PROCEDURE — 1036F TOBACCO NON-USER: CPT | Performed by: FAMILY MEDICINE

## 2020-02-04 PROCEDURE — 4040F PNEUMOC VAC/ADMIN/RCVD: CPT | Performed by: FAMILY MEDICINE

## 2020-02-04 PROCEDURE — G8417 CALC BMI ABV UP PARAM F/U: HCPCS | Performed by: FAMILY MEDICINE

## 2020-02-04 PROCEDURE — 3023F SPIROM DOC REV: CPT | Performed by: FAMILY MEDICINE

## 2020-02-04 PROCEDURE — G8427 DOCREV CUR MEDS BY ELIG CLIN: HCPCS | Performed by: FAMILY MEDICINE

## 2020-02-04 PROCEDURE — G8926 SPIRO NO PERF OR DOC: HCPCS | Performed by: FAMILY MEDICINE

## 2020-02-04 PROCEDURE — G8400 PT W/DXA NO RESULTS DOC: HCPCS | Performed by: FAMILY MEDICINE

## 2020-02-04 RX ORDER — BUDESONIDE AND FORMOTEROL FUMARATE DIHYDRATE 160; 4.5 UG/1; UG/1
2 AEROSOL RESPIRATORY (INHALATION) 2 TIMES DAILY
Qty: 1 INHALER | Refills: 0
Start: 2020-02-04 | End: 2020-08-12

## 2020-02-04 RX ORDER — IPRATROPIUM BROMIDE AND ALBUTEROL SULFATE 2.5; .5 MG/3ML; MG/3ML
1 SOLUTION RESPIRATORY (INHALATION) ONCE
Status: COMPLETED | OUTPATIENT
Start: 2020-02-04 | End: 2020-02-04

## 2020-02-04 RX ORDER — PREDNISONE 20 MG/1
20 TABLET ORAL 2 TIMES DAILY
Qty: 10 TABLET | Refills: 0 | Status: SHIPPED | OUTPATIENT
Start: 2020-02-04 | End: 2020-02-09

## 2020-02-04 RX ORDER — LEVOFLOXACIN 500 MG/1
500 TABLET, FILM COATED ORAL DAILY
Qty: 5 TABLET | Refills: 0 | Status: SHIPPED | OUTPATIENT
Start: 2020-02-04 | End: 2020-03-04 | Stop reason: ALTCHOICE

## 2020-02-04 RX ADMIN — IPRATROPIUM BROMIDE AND ALBUTEROL SULFATE 1 AMPULE: 2.5; .5 SOLUTION RESPIRATORY (INHALATION) at 09:55

## 2020-02-04 ASSESSMENT — ENCOUNTER SYMPTOMS
TROUBLE SWALLOWING: 0
NAUSEA: 0
DIARRHEA: 0
SINUS PRESSURE: 1
CONSTIPATION: 0
SORE THROAT: 0
SHORTNESS OF BREATH: 1
CHEST TIGHTNESS: 1
COUGH: 1
WHEEZING: 1
CHOKING: 0

## 2020-02-04 NOTE — PROGRESS NOTES
Respiratory: Positive for cough, chest tightness, shortness of breath and wheezing. Negative for choking. Cardiovascular: Negative for chest pain, palpitations and leg swelling. Gastrointestinal: Negative for constipation, diarrhea and nausea. Skin: Negative for rash. Allergic/Immunologic: Negative for environmental allergies. Neurological: Positive for headaches. Objective:      Physical Exam  Vitals signs and nursing note reviewed. Constitutional:       General: She is not in acute distress. Appearance: She is well-developed. HENT:      Right Ear: Tympanic membrane, ear canal and external ear normal.      Left Ear: Tympanic membrane, ear canal and external ear normal.      Nose: Mucosal edema present. Right Sinus: No maxillary sinus tenderness or frontal sinus tenderness. Left Sinus: No maxillary sinus tenderness or frontal sinus tenderness. Mouth/Throat:      Pharynx: No oropharyngeal exudate. Eyes:      Conjunctiva/sclera: Conjunctivae normal.   Neck:      Musculoskeletal: Normal range of motion and neck supple. Cardiovascular:      Rate and Rhythm: Normal rate and regular rhythm. Heart sounds: Normal heart sounds. No murmur. Pulmonary:      Effort: Pulmonary effort is normal. No respiratory distress. Breath sounds: Examination of the right-upper field reveals wheezing. Examination of the left-upper field reveals wheezing. Examination of the left-middle field reveals wheezing. Examination of the right-lower field reveals wheezing. Examination of the left-lower field reveals wheezing. Wheezing present. No rales. Lymphadenopathy:      Cervical: No cervical adenopathy. Skin:     General: Skin is warm and dry. Findings: No rash. Neurological:      Mental Status: She is alert and oriented to person, place, and time.    Psychiatric:         Behavior: Behavior normal.         Judgment: Judgment normal.       /88 (Site: Left Upper Arm, Position: Sitting, Cuff Size: Medium Adult)   Pulse 89   Temp 100 °F (37.8 °C) (Tympanic)   Ht 5' 6\" (1.676 m)   Wt 248 lb (112.5 kg)   LMP 08/01/2000 (Approximate)   SpO2 97%   BMI 40.03 kg/m²     Assessment:       Diagnosis Orders   1. COPD with acute exacerbation ([facility] [address]               Plan:       No follow-ups on file. No orders of the defined types were placed in this encounter. No orders of the defined types were placed in this encounter. Patientgiven educational materials - see patient instructions. Discussed use, benefit,and side effects of prescribed medications. All patient questions answered. Ptvoiced understanding. Reviewed health maintenance. Instructed to continue currentmedications, diet and exercise. Patient agreed with treatment plan. Follow up asdirected.      Electronically signed by Pricila Jason MD on 2/4/2020 at 9:44 AM

## 2020-02-13 RX ORDER — CARVEDILOL 3.12 MG/1
3.12 TABLET ORAL 2 TIMES DAILY WITH MEALS
Qty: 60 TABLET | Refills: 2 | Status: SHIPPED | OUTPATIENT
Start: 2020-02-13 | End: 2020-05-14 | Stop reason: SDUPTHER

## 2020-02-17 ENCOUNTER — TELEPHONE (OUTPATIENT)
Dept: CT IMAGING | Age: 70
End: 2020-02-17

## 2020-02-28 ENCOUNTER — HOSPITAL ENCOUNTER (OUTPATIENT)
Dept: LAB | Age: 70
Discharge: HOME | End: 2020-02-28
Payer: MEDICARE

## 2020-02-28 LAB
GLUCOSE FASTING: 105 MG/DL (ref 70–99)
HEPATITIS C ANTIBODY: NONREACTIVE

## 2020-02-28 PROCEDURE — 36415 COLL VENOUS BLD VENIPUNCTURE: CPT

## 2020-02-28 PROCEDURE — 82947 ASSAY GLUCOSE BLOOD QUANT: CPT

## 2020-02-28 PROCEDURE — 86803 HEPATITIS C AB TEST: CPT

## 2020-03-03 ENCOUNTER — HOSPITAL ENCOUNTER (OUTPATIENT)
Dept: CT IMAGING | Age: 70
Discharge: HOME | End: 2020-03-05
Payer: MEDICARE

## 2020-03-03 PROCEDURE — 71250 CT THORAX DX C-: CPT

## 2020-03-04 ENCOUNTER — OFFICE VISIT (OUTPATIENT)
Dept: PULMONOLOGY | Age: 70
End: 2020-03-04
Payer: MEDICARE

## 2020-03-04 VITALS
DIASTOLIC BLOOD PRESSURE: 68 MMHG | TEMPERATURE: 99.5 F | HEART RATE: 60 BPM | HEIGHT: 66 IN | WEIGHT: 253.4 LBS | BODY MASS INDEX: 40.72 KG/M2 | OXYGEN SATURATION: 96 % | SYSTOLIC BLOOD PRESSURE: 108 MMHG

## 2020-03-04 PROCEDURE — 1123F ACP DISCUSS/DSCN MKR DOCD: CPT | Performed by: NURSE PRACTITIONER

## 2020-03-04 PROCEDURE — G8926 SPIRO NO PERF OR DOC: HCPCS | Performed by: NURSE PRACTITIONER

## 2020-03-04 PROCEDURE — G8427 DOCREV CUR MEDS BY ELIG CLIN: HCPCS | Performed by: NURSE PRACTITIONER

## 2020-03-04 PROCEDURE — G0296 VISIT TO DETERM LDCT ELIG: HCPCS | Performed by: NURSE PRACTITIONER

## 2020-03-04 PROCEDURE — 99213 OFFICE O/P EST LOW 20 MIN: CPT | Performed by: NURSE PRACTITIONER

## 2020-03-04 PROCEDURE — G8400 PT W/DXA NO RESULTS DOC: HCPCS | Performed by: NURSE PRACTITIONER

## 2020-03-04 PROCEDURE — 3017F COLORECTAL CA SCREEN DOC REV: CPT | Performed by: NURSE PRACTITIONER

## 2020-03-04 PROCEDURE — G8482 FLU IMMUNIZE ORDER/ADMIN: HCPCS | Performed by: NURSE PRACTITIONER

## 2020-03-04 PROCEDURE — 1036F TOBACCO NON-USER: CPT | Performed by: NURSE PRACTITIONER

## 2020-03-04 PROCEDURE — G8417 CALC BMI ABV UP PARAM F/U: HCPCS | Performed by: NURSE PRACTITIONER

## 2020-03-04 PROCEDURE — 3023F SPIROM DOC REV: CPT | Performed by: NURSE PRACTITIONER

## 2020-03-04 PROCEDURE — 4040F PNEUMOC VAC/ADMIN/RCVD: CPT | Performed by: NURSE PRACTITIONER

## 2020-03-04 PROCEDURE — 99214 OFFICE O/P EST MOD 30 MIN: CPT | Performed by: NURSE PRACTITIONER

## 2020-03-04 PROCEDURE — 1090F PRES/ABSN URINE INCON ASSESS: CPT | Performed by: NURSE PRACTITIONER

## 2020-03-04 ASSESSMENT — ENCOUNTER SYMPTOMS
GASTROINTESTINAL NEGATIVE: 1
ALLERGIC/IMMUNOLOGIC NEGATIVE: 1
EYES NEGATIVE: 1

## 2020-03-04 NOTE — PROGRESS NOTES
file.    Social History     Socioeconomic History    Marital status:      Spouse name: Not on file    Number of children: 2    Years of education: 15    Highest education level: Associate degree: occupational, technical, or vocational program   Occupational History    Occupation: factory      Comment: retired   Social Needs    Financial resource strain: Not very hard    Food insecurity:     Worry: Never true     Inability: Never true    Transportation needs:     Medical: No     Non-medical: No   Tobacco Use    Smoking status: Former Smoker     Packs/day: 1.00     Years: 50.00     Pack years: 50.00     Types: Cigarettes     Start date: 1970     Last attempt to quit: 2019     Years since quittin.8    Smokeless tobacco: Never Used   Substance and Sexual Activity    Alcohol use: No    Drug use: No    Sexual activity: Not Currently     Partners: Male   Lifestyle    Physical activity:     Days per week: 0 days     Minutes per session: 0 min    Stress: Not at all   Relationships    Social connections:     Talks on phone: More than three times a week     Gets together: Once a week     Attends [employer] service: More than 4 times per year     Active member of club or organization: Yes     Attends meetings of clubs or organizations: 1 to 4 times per year     Relationship status:     Intimate partner violence:     Fear of current or ex partner: Not on file     Emotionally abused: Not on file     Physically abused: Not on file     Forced sexual activity: Not on file   Other Topics Concern    Not on file   Social History Narrative    No SDOH needs identified. She was given [facility] number for transportation if family unable to transport. Discussed HEAP and PIPP programs. She stated she has used in the past but declines at this time. Review of Systems   Constitutional: Negative. HENT: Negative. Eyes: Negative.     Respiratory:        Occasional CONTRAST 3/3/2020 10:44 am TECHNIQUE: Low Dose of the CT Chest without the administration of intravenous contrast (MA=40). Multiplanar reformatted images are provided for review. Dose modulation, iterative reconstruction, and/or weight based adjustment of the mA/kV was utilized to reduce the radiation dose to as low as reasonably achievable. COMPARISON: 26 November 2017 HISTORY: ORDERING SYSTEM PROVIDED HISTORY: Multiple lung nodules on CT TECHNOLOGIST PROVIDED HISTORY: RLL 8 mm nodule with high risk factor profile; interval change Reason for Exam: Recheck lung nodule, quit smoking 5/2019, smoked for 50 years, h/o FINDINGS: Mediastinum: No mediastinal adenopathy, acute aortic or cardiac abnormality is noted. Coronary artery calcification and aortic valvular calcification is noted as well as scattered plaque in the aorta. No pericardial effusion or epicardial adenopathy is demonstrated. Lungs/pleura: There are no changes in the left lobe nodule seen on prior examination measuring 2-3 mm seen on images 32, 67 and 71 of series 4. Prior 6 mm right lower lobe nodule is unchanged. No new nodules, consolidations or effusions are noted. Upper Abdomen: Structures in the upper abdomen are grossly unremarkable with the exception of granulomas in the spleen. Soft Tissues/Bones: No acute osseous or soft tissue abnormality. No change from prior study. Stable tiny nodules bilaterally without interval difference. No mediastinal adenopathy. Lung rads category 2. Benign findings. RECOMMENDATIONS: Advise annual low-dose CT scan of the chest.       Assessment:      1. Personal history of tobacco use    2. Multiple lung nodules on CT    3. Stage 2 moderate COPD by GOLD classification (HCC)    4. Stopped smoking with greater than 40 pack year history          Plan:      1. Medications reviewed, continue as ordered.   Patient encouraged to talk with her pharmacist to find out what medication would be most affordable within the classification for her LABA/LAMA  2. Educated and clarified the medication use. 3. Recommend flu vaccination in the fall annually. Up-to-date  4. Recommendations given regarding pneumococcal vaccinations. Up-to-date  5. Patient is up-to-date withvaccinations from pulmonary perspective. 6. Maintain an active lifestyle. 7. Patient's questions were answered to her satisfaction. 8. Ongoing smoking cessation strongly advised  9. CT scan of the chest was reviewed  10. We'll see the patient back in 6 months        Electronically signed by JOHNATHAN Cameron CNP on 3/4/2020 at 12:31 PM  Low Dose CT (LDCT) Lung Screening criteria met   Age 50-69   Pack year smoking >30   Still smoking or less than 15 year since quit   No sign or symptoms of lung cancer   > 11 months since last LDCT     Risks and benefits of lung cancer screening with LDCT scans discussed:    Significance of positive screen - False-positive LDCT results often occur. 95% of all positive results do not lead to a diagnosis of cancer. Usually further imaging can resolve most false-positive results; however, some patients may require invasive procedures. Over diagnosis risk - 10% to 12% of screen-detected lung cancer cases are over diagnosed--that is, the cancer would not have been detected in the patient's lifetime without the screening. Need for follow up screens annually to continue lung cancer screening effectiveness     Risks associated with radiation from annual LDCT- Radiation exposure is about the same as for a mammogram, which is about 1/3 of the annual background radiation exposure from everyday life. Starting screening at age 54 is not likely to increase cancer risk from radiation exposure. Patients with comorbidities resulting in life expectancy of < 10 years, or that would preclude treatment of an abnormality identified on CT, should not be screened due to lack of benefit.     To obtain maximal benefit from this screening, smoking

## 2020-04-04 ENCOUNTER — OFFICE VISIT (OUTPATIENT)
Dept: PRIMARY CARE CLINIC | Age: 70
End: 2020-04-04
Payer: MEDICARE

## 2020-04-04 VITALS
TEMPERATURE: 98.5 F | OXYGEN SATURATION: 92 % | WEIGHT: 255 LBS | HEART RATE: 88 BPM | SYSTOLIC BLOOD PRESSURE: 130 MMHG | BODY MASS INDEX: 41.18 KG/M2 | DIASTOLIC BLOOD PRESSURE: 80 MMHG

## 2020-04-04 PROCEDURE — 4040F PNEUMOC VAC/ADMIN/RCVD: CPT | Performed by: NURSE PRACTITIONER

## 2020-04-04 PROCEDURE — G8926 SPIRO NO PERF OR DOC: HCPCS | Performed by: NURSE PRACTITIONER

## 2020-04-04 PROCEDURE — G8417 CALC BMI ABV UP PARAM F/U: HCPCS | Performed by: NURSE PRACTITIONER

## 2020-04-04 PROCEDURE — G8400 PT W/DXA NO RESULTS DOC: HCPCS | Performed by: NURSE PRACTITIONER

## 2020-04-04 PROCEDURE — 1123F ACP DISCUSS/DSCN MKR DOCD: CPT | Performed by: NURSE PRACTITIONER

## 2020-04-04 PROCEDURE — 3017F COLORECTAL CA SCREEN DOC REV: CPT | Performed by: NURSE PRACTITIONER

## 2020-04-04 PROCEDURE — 1090F PRES/ABSN URINE INCON ASSESS: CPT | Performed by: NURSE PRACTITIONER

## 2020-04-04 PROCEDURE — 99212 OFFICE O/P EST SF 10 MIN: CPT

## 2020-04-04 PROCEDURE — 99214 OFFICE O/P EST MOD 30 MIN: CPT | Performed by: NURSE PRACTITIONER

## 2020-04-04 PROCEDURE — G8427 DOCREV CUR MEDS BY ELIG CLIN: HCPCS | Performed by: NURSE PRACTITIONER

## 2020-04-04 PROCEDURE — 1036F TOBACCO NON-USER: CPT | Performed by: NURSE PRACTITIONER

## 2020-04-04 PROCEDURE — 3023F SPIROM DOC REV: CPT | Performed by: NURSE PRACTITIONER

## 2020-04-04 RX ORDER — LEVOFLOXACIN 500 MG/1
500 TABLET, FILM COATED ORAL DAILY
Qty: 7 TABLET | Refills: 0 | Status: SHIPPED | OUTPATIENT
Start: 2020-04-04 | End: 2020-04-30 | Stop reason: SDUPTHER

## 2020-04-04 RX ORDER — PREDNISONE 20 MG/1
20 TABLET ORAL 2 TIMES DAILY
Qty: 20 TABLET | Refills: 0 | Status: SHIPPED | OUTPATIENT
Start: 2020-04-04 | End: 2020-04-30 | Stop reason: SDUPTHER

## 2020-04-04 RX ORDER — ALBUTEROL SULFATE 90 UG/1
2 AEROSOL, METERED RESPIRATORY (INHALATION) EVERY 4 HOURS PRN
Qty: 1 INHALER | Refills: 3 | Status: SHIPPED | OUTPATIENT
Start: 2020-04-04 | End: 2020-07-07 | Stop reason: SDUPTHER

## 2020-04-04 NOTE — PROGRESS NOTES
tablet by mouth nightly 30 tablet 2    Tiotropium Bromide-Olodaterol (STIOLTO RESPIMAT) 2.5-2.5 MCG/ACT AERS Inhale 2 puffs into the lungs every morning 1 Inhaler 11    albuterol sulfate HFA (VENTOLIN HFA) 108 (90 Base) MCG/ACT inhaler Inhale 2 puffs into the lungs every 6 hours as needed for Wheezing 1 Inhaler 11    albuterol sulfate  (90 Base) MCG/ACT inhaler Inhale 2 puffs into the lungs 4 times daily as needed for Wheezing 1 Inhaler 0    Handicap Placard MISC by Does not apply route Expires: 7/26/2024  Dx: R23.89, G93.5 1 each 0    ondansetron (ZOFRAN ODT) 4 MG disintegrating tablet Take 1 tablet by mouth every 8 hours as needed for Nausea 20 tablet 0    ibuprofen (ADVIL;MOTRIN) 600 MG tablet Take 1 tablet by mouth every 8 hours as needed for Pain 20 tablet 0    butalbital-acetaminophen-caffeine (FIORICET, ESGIC) -40 MG per tablet Take 1 tablet by mouth every 4 hours as needed for Headaches 30 tablet 0    Misc. Devices (ADJUST BATH/SHOWER SEAT/BACK) MISC Use daily with showers 1 each 0    Misc. Devices (SUCTION GRAB BAR) MISC Use in the shower 2 each 0    aspirin 325 MG tablet Take 325 mg by mouth daily      tiZANidine (ZANAFLEX) 2 MG tablet Take 1 tablet by mouth 3 times daily as needed (muscle spasm or headach) 30 tablet 0    loratadine (CLARITIN) 10 MG tablet Take 10 mg by mouth daily      budesonide-formoterol (SYMBICORT) 160-4.5 MCG/ACT AERO Inhale 2 puffs into the lungs 2 times daily (Patient not taking: Reported on 3/4/2020) 1 Inhaler 0     No current facility-administered medications for this visit.           Past Medical History:   Diagnosis Date    Chronic obstructive pulmonary disease ([address]     Coronary artery disease     Dyslipidemia     Obesity     Pneumonia        Social History     Tobacco Use    Smoking status: Former Smoker     Packs/day: 1.00     Years: 50.00     Pack years: 50.00     Types: Cigarettes     Start date: 1/1/1970     Last attempt to quit: 5/16/2019 place, and time, appropriate mood and affect for situation    Assessment and Plan:     Diagnosis Orders   1. COPD with acute exacerbation ([facility] [address]     2. Wheezing     3. Cough     4. Shortness of breath       Orders Placed This Encounter   Medications    predniSONE (DELTASONE) 20 MG tablet     Sig: Take 1 tablet by mouth 2 times daily for 10 days     Dispense:  20 tablet     Refill:  0    levoFLOXacin (LEVAQUIN) 500 MG tablet     Sig: Take 1 tablet by mouth daily for 7 days     Dispense:  7 tablet     Refill:  0    albuterol sulfate  (90 Base) MCG/ACT inhaler     Sig: Inhale 2 puffs into the lungs every 4 hours as needed for Wheezing or Shortness of Breath     Dispense:  1 Inhaler     Refill:  3       Offered symbicort -- declined because she cannot afford  Informed that illness appears viral and may be COVID-19 but that there is not currently testing available unless symptoms require hospitalization. This may change in the near future. They were instructed to self-quarantine for at least 14 days. Supportive care encouraged -- hand hygiene, cover cough/sneeze, avoid touching face, saline nasal spray/sinus rinses prn, humidifier prn, warm salt water gargles prn, and increased fluid intake. If frequent vomiting, diarrhea, or low appetite, electrolyte beverage recommended. To ER if signs/symptoms of respiratory failure or distress. Education provided. OTC acetaminophen as needed--follow package instructions. Follow up with PCP, sooner as needed. Return or go to an urgent care or emergency room if symptoms worsen, fail to improve, or new symptoms arise. The use, risks, benefits, and side effects of prescribed or recommended medications were discussed. All questions were answered and the patient/caregiver voiced understanding.        Electronically signed by Truong MANN, NPSherryC, FNP-BC on 4/4/2020 at 12:00 PM  Internal Medicine

## 2020-04-04 NOTE — PATIENT INSTRUCTIONS
Patient Education     [address] with COVID-19 may have no symptoms, mild symptoms, such as fever, cough, and shortness of breath or they may have more severe illness, developing severe and fatal pneumonia. As a result, Advance Care Planning with attention to naming a health care decision maker (someone you trust to make healthcare decisions for you if you could not speak for yourself) and sharing other health care preferences is important BEFORE a possible health crisis. Please contact your Primary Care Provider to discuss Advance Care Planning. (    Preventing the Spread of Coronavirus Disease 2019 in Homes and Residential Communities  For the most recent information go to [email or web address]    Prevention steps for People with confirmed or suspected COVID-19 (including persons under investigation) who do not need to be hospitalized  and   People with confirmed COVID-19 who were hospitalized and determined to be medically stable to go home    Your healthcare provider and public health staff will evaluate whether you can be cared for at home. If it is determined that you do not need to be hospitalized and can be isolated at home, you will be monitored by staff from your local or state health department. You should follow the prevention steps below until a healthcare provider or local or state health department says you can return to your normal activities. Stay home except to get medical care  People who are mildly ill with COVID-19 are able to isolate at home during their illness. You should restrict activities outside your home, except for getting medical care. Do not go to work, school, or public areas. Avoid using public transportation, ride-sharing, or taxis. Separate yourself from other people and animals in your home  People: As much as possible, you should stay in a specific room and away from other people in your home.  Also, you should use a separate bathroom, if available. Animals: You should restrict contact with pets and other animals while you are sick with COVID-19, just like you would around other people. Although there have not been reports of pets or other animals becoming sick with COVID-19, it is still recommended that people sick with COVID-19 limit contact with animals until more information is known about the virus. When possible, have another member of your household care for your animals while you are sick. If you are sick with COVID-19, avoid contact with your pet, including petting, snuggling, being kissed or licked, and sharing food. If you must care for your pet or be around animals while you are sick, wash your hands before and after you interact with pets and wear a facemask. Call ahead before visiting your doctor  If you have a medical appointment, call the healthcare provider and tell them that you have or may have COVID-19. This will help the healthcare providers office take steps to keep other people from getting infected or exposed. Wear a facemask  You should wear a facemask when you are around other people (e.g., sharing a room or vehicle) or pets and before you enter a healthcare providers office. If you are not able to wear a facemask (for example, because it causes trouble breathing), then people who live with you should not stay in the same room with you, or they should wear a facemask if they enter your room. Cover your coughs and sneezes  Cover your mouth and nose with a tissue when you cough or sneeze. Throw used tissues in a lined trash can. Immediately wash your hands with soap and water for at least 20 seconds or, if soap and water are not available, clean your hands with an alcohol-based hand  that contains at least 60% alcohol.   Clean your hands often  Wash your hands often with soap and water for at least 20 seconds, especially after blowing your nose, coughing, or sneezing; going to available hours. If you have a medical emergency and need to call 911, notify the dispatch personnel that you have, or are being evaluated for COVID-19. If possible, put on a facemask before emergency medical services arrive. Discontinuing home isolation  Patients with confirmed COVID-19 should remain under home isolation precautions until the risk of secondary transmission to others is thought to be low. The decision to discontinue home isolation precautions should be made on a case-by-case basis, in consultation with healthcare providers and state and local health departments. Chronic Obstructive Pulmonary Disease (COPD) Flare-Ups: Care Instructions  Your Care Instructions    Chronic obstructive pulmonary disease (COPD) is a lung disease that makes it hard to breathe. It is caused by damage to the lungs over many years, usually from smoking. COPD is often a mix of two diseases:  · Chronic bronchitis: The airways that carry air to the lungs (bronchial tubes) get inflamed and make a lot of mucus. This can narrow or block the airways. · Emphysema: In a healthy person, the tiny air sacs in the lungs are like balloons. As you breathe in and out, they get bigger and smaller to move air through your lungs. But with emphysema, these air sacs are damaged and lose their stretch. Less air gets in and out of the lungs. Many people with COPD have attacks called flare-ups or exacerbations. This is when your usual symptoms quickly get worse and stay worse. The doctor has checked you carefully. But problems can develop later. If you notice any problems or new symptoms, get medical treatment right away. Follow-up care is a key part of your treatment and safety. Be sure to make and go to all appointments, and call your doctor if you are having problems. It's also a good idea to know your test results and keep a list of the medicines you take. How can you care for yourself at home? · Be safe with medicines.  Take

## 2020-04-30 ENCOUNTER — VIRTUAL VISIT (OUTPATIENT)
Dept: FAMILY MEDICINE CLINIC | Age: 70
End: 2020-04-30
Payer: MEDICARE

## 2020-04-30 VITALS — TEMPERATURE: 99.2 F | WEIGHT: 250 LBS | BODY MASS INDEX: 39.24 KG/M2 | HEIGHT: 67 IN

## 2020-04-30 PROCEDURE — G8926 SPIRO NO PERF OR DOC: HCPCS | Performed by: NURSE PRACTITIONER

## 2020-04-30 PROCEDURE — G8417 CALC BMI ABV UP PARAM F/U: HCPCS | Performed by: NURSE PRACTITIONER

## 2020-04-30 PROCEDURE — G8427 DOCREV CUR MEDS BY ELIG CLIN: HCPCS | Performed by: NURSE PRACTITIONER

## 2020-04-30 PROCEDURE — 3017F COLORECTAL CA SCREEN DOC REV: CPT | Performed by: NURSE PRACTITIONER

## 2020-04-30 PROCEDURE — 1090F PRES/ABSN URINE INCON ASSESS: CPT | Performed by: NURSE PRACTITIONER

## 2020-04-30 PROCEDURE — 4040F PNEUMOC VAC/ADMIN/RCVD: CPT | Performed by: NURSE PRACTITIONER

## 2020-04-30 PROCEDURE — 3023F SPIROM DOC REV: CPT | Performed by: NURSE PRACTITIONER

## 2020-04-30 PROCEDURE — 1036F TOBACCO NON-USER: CPT | Performed by: NURSE PRACTITIONER

## 2020-04-30 PROCEDURE — G8400 PT W/DXA NO RESULTS DOC: HCPCS | Performed by: NURSE PRACTITIONER

## 2020-04-30 PROCEDURE — 99213 OFFICE O/P EST LOW 20 MIN: CPT | Performed by: NURSE PRACTITIONER

## 2020-04-30 PROCEDURE — 1123F ACP DISCUSS/DSCN MKR DOCD: CPT | Performed by: NURSE PRACTITIONER

## 2020-04-30 RX ORDER — LEVOFLOXACIN 500 MG/1
500 TABLET, FILM COATED ORAL DAILY
Qty: 7 TABLET | Refills: 0 | Status: SHIPPED | OUTPATIENT
Start: 2020-04-30 | End: 2020-05-07

## 2020-04-30 RX ORDER — PREDNISONE 20 MG/1
20 TABLET ORAL 2 TIMES DAILY
Qty: 20 TABLET | Refills: 0 | Status: SHIPPED | OUTPATIENT
Start: 2020-04-30 | End: 2020-05-10

## 2020-04-30 ASSESSMENT — ENCOUNTER SYMPTOMS
GASTROINTESTINAL NEGATIVE: 1
SORE THROAT: 0
SHORTNESS OF BREATH: 1
WHEEZING: 1
COUGH: 1

## 2020-04-30 NOTE — PROGRESS NOTES
Vitals/Constitutional/EENT/Resp/CV/GI//MS/Neuro/Skin/Heme-Lymph-Imm. Pursuant to the emergency declaration under the [address] [place] [address], [address] [place] [address] and the [employer] Act, this Virtual Visit was conducted with patient's (and/or legal guardian's) consent, to reduce the patient's risk of exposure to COVID-19 and provide necessary medical care. The patient (and/or legal guardian) has also been advised to contact this office for worsening conditions or problems, and seek emergency medical treatment and/or call 911 if deemed necessary. Patient identification was verified at the start of the visit: Yes    Total time spent for this encounter: Not billed by time    Services were provided through a video synchronous discussion virtually to substitute for in-person clinic visit. Patient and provider were located at their individual homes. --JOHNATHAN Saavedra CNP on 4/30/2020 at 11:43 AM    An electronic signature was used to authenticate this note.     JOHNATHAN Saavedra CNP   4/30/2020 11:26 AM EDT

## 2020-05-14 ENCOUNTER — OFFICE VISIT (OUTPATIENT)
Dept: FAMILY MEDICINE CLINIC | Age: 70
End: 2020-05-14
Payer: MEDICARE

## 2020-05-14 VITALS
DIASTOLIC BLOOD PRESSURE: 68 MMHG | HEART RATE: 84 BPM | BODY MASS INDEX: 41.44 KG/M2 | OXYGEN SATURATION: 95 % | WEIGHT: 264 LBS | SYSTOLIC BLOOD PRESSURE: 110 MMHG | HEIGHT: 67 IN | TEMPERATURE: 97.2 F

## 2020-05-14 PROCEDURE — G8400 PT W/DXA NO RESULTS DOC: HCPCS | Performed by: NURSE PRACTITIONER

## 2020-05-14 PROCEDURE — 1123F ACP DISCUSS/DSCN MKR DOCD: CPT | Performed by: NURSE PRACTITIONER

## 2020-05-14 PROCEDURE — 3017F COLORECTAL CA SCREEN DOC REV: CPT | Performed by: NURSE PRACTITIONER

## 2020-05-14 PROCEDURE — 1090F PRES/ABSN URINE INCON ASSESS: CPT | Performed by: NURSE PRACTITIONER

## 2020-05-14 PROCEDURE — 4040F PNEUMOC VAC/ADMIN/RCVD: CPT | Performed by: NURSE PRACTITIONER

## 2020-05-14 PROCEDURE — 99214 OFFICE O/P EST MOD 30 MIN: CPT | Performed by: NURSE PRACTITIONER

## 2020-05-14 PROCEDURE — G8427 DOCREV CUR MEDS BY ELIG CLIN: HCPCS | Performed by: NURSE PRACTITIONER

## 2020-05-14 PROCEDURE — G8417 CALC BMI ABV UP PARAM F/U: HCPCS | Performed by: NURSE PRACTITIONER

## 2020-05-14 PROCEDURE — 1036F TOBACCO NON-USER: CPT | Performed by: NURSE PRACTITIONER

## 2020-05-14 PROCEDURE — 99212 OFFICE O/P EST SF 10 MIN: CPT

## 2020-05-14 RX ORDER — CARVEDILOL 3.12 MG/1
3.12 TABLET ORAL 2 TIMES DAILY WITH MEALS
Qty: 60 TABLET | Refills: 5 | Status: SHIPPED | OUTPATIENT
Start: 2020-05-14 | End: 2020-11-10 | Stop reason: SDUPTHER

## 2020-05-14 RX ORDER — SERTRALINE HYDROCHLORIDE 25 MG/1
25 TABLET, FILM COATED ORAL NIGHTLY
Qty: 30 TABLET | Refills: 0 | Status: SHIPPED | OUTPATIENT
Start: 2020-05-14 | End: 2020-06-15 | Stop reason: SDUPTHER

## 2020-05-14 ASSESSMENT — PATIENT HEALTH QUESTIONNAIRE - PHQ9
1. LITTLE INTEREST OR PLEASURE IN DOING THINGS: 0
SUM OF ALL RESPONSES TO PHQ9 QUESTIONS 1 & 2: 0
2. FEELING DOWN, DEPRESSED OR HOPELESS: 0
SUM OF ALL RESPONSES TO PHQ QUESTIONS 1-9: 0
SUM OF ALL RESPONSES TO PHQ QUESTIONS 1-9: 0

## 2020-05-14 ASSESSMENT — ENCOUNTER SYMPTOMS
COUGH: 0
GASTROINTESTINAL NEGATIVE: 1
RESPIRATORY NEGATIVE: 1
WHEEZING: 0
SHORTNESS OF BREATH: 0

## 2020-05-14 NOTE — PROGRESS NOTES
[facility]    Subjective:     Patient ID: Karlee Ashford is a 81 y.o. y.o. female. HPI Patient in for office for anxiety and depression. Patient is currently taking Zoloft. She states that she wants to wean off her Zoloft. She feels that she does not need it. She was placed on the medication when she was in the nursing home. She is currently taking 50 mg at night. She also needs a refill of her coreg. Her BP is WNL limits today. Has history of CAD and cardiac cath on 2012. She has quit smoking and has not smoked in a year. She has history of COPD and continues on her inhalers. She sees pulmonology regularly. She has no other concerns at this time. Past Medical History:   Diagnosis Date    Chronic obstructive pulmonary disease ([address]     Coronary artery disease     Dyslipidemia     Obesity     Pneumonia        Past Surgical History:   Procedure Laterality Date    CARDIAC CATHETERIZATION      CARDIAC CATHETERIZATION  2012    cath with x1 stent    EYE SURGERY      shar cataract surg x2 y ago    VENTRICULOPERITONEAL SHUNT Right 5/16/2019    VENTRICULAR PERITONEAL SHUNT INSERTION ENDOSCOPIC performed by Yany Christine MD at [place] [id number]       History reviewed. No pertinent family history.        Allergies   Allergen Reactions    Vicodin [Hydrocodone-Acetaminophen]        Current Outpatient Medications   Medication Sig Dispense Refill    carvedilol (COREG) 3.125 MG tablet Take 1 tablet by mouth 2 times daily (with meals) 60 tablet 5    sertraline (ZOLOFT) 25 MG tablet Take 1 tablet by mouth nightly 30 tablet 0    albuterol sulfate  (90 Base) MCG/ACT inhaler Inhale 2 puffs into the lungs every 4 hours as needed for Wheezing or Shortness of Breath 1 Inhaler 3    budesonide-formoterol (SYMBICORT) 160-4.5 MCG/ACT AERO Inhale 2 puffs into the lungs 2 times daily 1 Inhaler 0    butalbital-acetaminophen-caffeine (FIORICET, ESGIC) -40 MG per tablet Take 1 tablet by mouth every 4 hours as needed for Headaches 30 tablet 0    aspirin 325 MG tablet Take 325 mg by mouth daily      Tiotropium Bromide-Olodaterol (STIOLTO RESPIMAT) 2.5-2.5 MCG/ACT AERS Inhale 2 puffs into the lungs every morning 1 Inhaler 11    Handicap Placard MISC by Does not apply route Expires: 7/26/2024  Dx: R23.89, G93.5 1 each 0    ibuprofen (ADVIL;MOTRIN) 600 MG tablet Take 1 tablet by mouth every 8 hours as needed for Pain 20 tablet 0    Misc. Devices (ADJUST BATH/SHOWER SEAT/BACK) MISC Use daily with showers 1 each 0    Misc. Devices (SUCTION GRAB BAR) MISC Use in the shower 2 each 0    loratadine (CLARITIN) 10 MG tablet Take 10 mg by mouth daily       No current facility-administered medications for this visit. Review of Systems   Constitutional: Negative. Negative for activity change and appetite change. Respiratory: Negative. Negative for cough, shortness of breath and wheezing. Cardiovascular: Negative. Gastrointestinal: Negative. Musculoskeletal: Negative. Objective:       /68 (Site: Right Upper Arm, Position: Sitting, Cuff Size: Large Adult)   Pulse 84   Temp 97.2 °F (36.2 °C) (Tympanic)   Ht 5' 6.5\" (1.689 m)   Wt 264 lb (119.7 kg)   LMP 08/01/2000 (Approximate)   SpO2 95%   Breastfeeding No   BMI 41.97 kg/m²     Physical Exam  Vitals signs and nursing note reviewed. Constitutional:       Appearance: She is well-developed. HENT:      Head: Normocephalic and atraumatic. Right Ear: External ear normal.      Left Ear: External ear normal.      Nose: Nose normal.   Eyes:      Pupils: Pupils are equal, round, and reactive to light. Neck:      Musculoskeletal: Normal range of motion. Cardiovascular:      Rate and Rhythm: Normal rate and regular rhythm. Pulmonary:      Effort: Pulmonary effort is normal. No respiratory distress. Breath sounds: Normal breath sounds. No wheezing or rhonchi.    Abdominal:      General: Bowel sounds are normal.

## 2020-05-27 ENCOUNTER — TELEMEDICINE (OUTPATIENT)
Dept: PULMONOLOGY | Age: 70
End: 2020-05-27
Payer: MEDICARE

## 2020-05-27 VITALS
TEMPERATURE: 99.8 F | BODY MASS INDEX: 42.41 KG/M2 | WEIGHT: 263.89 LBS | RESPIRATION RATE: 18 BRPM | HEART RATE: 78 BPM | HEIGHT: 66 IN | SYSTOLIC BLOOD PRESSURE: 122 MMHG | DIASTOLIC BLOOD PRESSURE: 84 MMHG

## 2020-05-27 PROCEDURE — 1090F PRES/ABSN URINE INCON ASSESS: CPT | Performed by: NURSE PRACTITIONER

## 2020-05-27 PROCEDURE — 4040F PNEUMOC VAC/ADMIN/RCVD: CPT | Performed by: NURSE PRACTITIONER

## 2020-05-27 PROCEDURE — G8427 DOCREV CUR MEDS BY ELIG CLIN: HCPCS | Performed by: NURSE PRACTITIONER

## 2020-05-27 PROCEDURE — G8400 PT W/DXA NO RESULTS DOC: HCPCS | Performed by: NURSE PRACTITIONER

## 2020-05-27 PROCEDURE — 99213 OFFICE O/P EST LOW 20 MIN: CPT | Performed by: NURSE PRACTITIONER

## 2020-05-27 PROCEDURE — 3017F COLORECTAL CA SCREEN DOC REV: CPT | Performed by: NURSE PRACTITIONER

## 2020-05-27 PROCEDURE — 1123F ACP DISCUSS/DSCN MKR DOCD: CPT | Performed by: NURSE PRACTITIONER

## 2020-05-27 RX ORDER — TIOTROPIUM BROMIDE 18 UG/1
18 CAPSULE ORAL; RESPIRATORY (INHALATION) DAILY
Qty: 1 CAPSULE | Refills: 11 | Status: SHIPPED | OUTPATIENT
Start: 2020-05-27 | End: 2020-08-12

## 2020-05-27 ASSESSMENT — ENCOUNTER SYMPTOMS
SINUS PRESSURE: 0
CONSTIPATION: 0
NAUSEA: 0
DIARRHEA: 0
VOMITING: 0
SINUS PAIN: 0
RHINORRHEA: 0

## 2020-05-28 ENCOUNTER — TELEPHONE (OUTPATIENT)
Dept: PULMONOLOGY | Age: 70
End: 2020-05-28

## 2020-06-15 RX ORDER — SERTRALINE HYDROCHLORIDE 25 MG/1
12.5 TABLET, FILM COATED ORAL NIGHTLY
Qty: 15 TABLET | Refills: 1 | Status: SHIPPED | OUTPATIENT
Start: 2020-06-15 | End: 2021-01-14 | Stop reason: ALTCHOICE

## 2020-07-07 RX ORDER — ALBUTEROL SULFATE 90 UG/1
2 AEROSOL, METERED RESPIRATORY (INHALATION) EVERY 4 HOURS PRN
Qty: 1 INHALER | Refills: 3 | Status: SHIPPED | OUTPATIENT
Start: 2020-07-07 | End: 2020-11-10 | Stop reason: SDUPTHER

## 2020-07-08 ENCOUNTER — TELEMEDICINE (OUTPATIENT)
Dept: NEUROLOGY | Age: 70
End: 2020-07-08
Payer: MEDICARE

## 2020-07-08 PROCEDURE — G8417 CALC BMI ABV UP PARAM F/U: HCPCS | Performed by: PSYCHIATRY & NEUROLOGY

## 2020-07-08 PROCEDURE — 3017F COLORECTAL CA SCREEN DOC REV: CPT | Performed by: PSYCHIATRY & NEUROLOGY

## 2020-07-08 PROCEDURE — 99215 OFFICE O/P EST HI 40 MIN: CPT | Performed by: PSYCHIATRY & NEUROLOGY

## 2020-07-08 PROCEDURE — G8400 PT W/DXA NO RESULTS DOC: HCPCS | Performed by: PSYCHIATRY & NEUROLOGY

## 2020-07-08 PROCEDURE — 4040F PNEUMOC VAC/ADMIN/RCVD: CPT | Performed by: PSYCHIATRY & NEUROLOGY

## 2020-07-08 PROCEDURE — 1090F PRES/ABSN URINE INCON ASSESS: CPT | Performed by: PSYCHIATRY & NEUROLOGY

## 2020-07-08 PROCEDURE — 1036F TOBACCO NON-USER: CPT | Performed by: PSYCHIATRY & NEUROLOGY

## 2020-07-08 PROCEDURE — G8427 DOCREV CUR MEDS BY ELIG CLIN: HCPCS | Performed by: PSYCHIATRY & NEUROLOGY

## 2020-07-08 PROCEDURE — 1123F ACP DISCUSS/DSCN MKR DOCD: CPT | Performed by: PSYCHIATRY & NEUROLOGY

## 2020-07-08 PROCEDURE — G8926 SPIRO NO PERF OR DOC: HCPCS | Performed by: PSYCHIATRY & NEUROLOGY

## 2020-07-08 PROCEDURE — 3023F SPIROM DOC REV: CPT | Performed by: PSYCHIATRY & NEUROLOGY

## 2020-07-08 ASSESSMENT — ENCOUNTER SYMPTOMS
SORE THROAT: 0
BLURRED VISION: 0
CHEST TIGHTNESS: 0
BACK PAIN: 0
VOMITING: 0
WHEEZING: 0
VOICE CHANGE: 0
FACIAL SWEATING: 0
EYE WATERING: 0
BOWEL INCONTINENCE: 0
VISUAL CHANGE: 0
CHOKING: 0
EYE PAIN: 0
COUGH: 0
PHOTOPHOBIA: 1
CHANGE IN BOWEL HABIT: 0
NAUSEA: 0
APNEA: 0
SHORTNESS OF BREATH: 0
BLOOD IN STOOL: 0
DIARRHEA: 0
ABDOMINAL DISTENTION: 0
ABDOMINAL PAIN: 0
SINUS PRESSURE: 0
SCALP TENDERNESS: 0
EYE DISCHARGE: 0
CONSTIPATION: 0
EYE ITCHING: 0
SWOLLEN GLANDS: 0
FACIAL SWELLING: 0
COLOR CHANGE: 0
TROUBLE SWALLOWING: 0
EYE REDNESS: 0

## 2020-07-08 NOTE — PATIENT INSTRUCTIONS
* FALL   PRECAUTIONS. *   ADEQUATE   FLUID  INTAKE   AND  ELECTROLYTE  BALANCE           * KEEP  DAIRY  OF   THE  NEUROLOGICAL  SYMPTOMS          *  TO  MAINTAIN  REGULAR  SLEEP  WAKE  CYCLES. *   TO  HAVE  ADEQUATE  REST  AND   SLEEP    HOURS.        *    AVOID  USAGE OF   TOBACCO,  EXCESSIVE  ALCOHOL                AND   ILLEGAL   SUBSTANCES,  IF  ANY          *  Maintain   Healthy  Life Style    With   Periodic  Monitoring  Of      Any  Medical  Conditions  Including   Elevated  Blood  Pressure,  Lipid  Profile,     Blood  Sugar levels  And   Heart  Disease. *   Period   Screening  For  Cancers  Involving  Breast,  Colon,   lungs  And  Other  Organs  As  Applicable,  In consultation   With  Your  Primary Care Providers. *  If   There is  Any  Significant  Worsening   Of  Current  Symptoms  And  Or  If    Any additional  New  Neurological  Symptoms                 Or  Significant  Concerns   Should  Call  911 or  Go  To  Emergency  Department  For  Further  Immediate  Evaluation.

## 2020-07-08 NOTE — PROGRESS NOTES
[facility]  Neurology  [address]IUF:[phone]   Fax: [phone]    SUBJECTIVE:     PATIENT ID:  Mick Walker is a  RIGHT   HANDED 81 y.o. female. Dizziness   This is a new problem. Episode onset: SINCE   EARLY  APRIL 2019. The problem occurs intermittently. The problem has been resolved. Associated symptoms include headaches, numbness and vertigo. Pertinent negatives include no abdominal pain, anorexia, arthralgias, change in bowel habit, chest pain, chills, congestion, coughing, diaphoresis, fatigue, fever, joint swelling, myalgias, nausea, neck pain, rash, sore throat, swollen glands, urinary symptoms, visual change, vomiting or weakness. The symptoms are aggravated by bending and standing. She has tried rest and sleep (  SHUNT  PLACEMENT) for the symptoms. The treatment provided significant relief. Headache    This is a new problem. Episode onset: SINCE  APRIL 2019. The problem occurs intermittently. The problem has been waxing and waning. The pain is located in the occipital and frontal region. The pain does not radiate. The pain quality is not similar to prior headaches. The quality of the pain is described as aching and throbbing. The pain is at a severity of 3/10. The pain is mild. Associated symptoms include dizziness, a loss of balance, numbness, phonophobia and photophobia. Pertinent negatives include no abdominal pain, abnormal behavior, anorexia, back pain, blurred vision, coughing, drainage, ear pain, eye pain, eye redness, eye watering, facial sweating, fever, hearing loss, insomnia, muscle aches, nausea, neck pain, scalp tenderness, seizures, sinus pressure, sore throat, swollen glands, tingling, tinnitus, visual change, vomiting, weakness or weight loss. The symptoms are aggravated by coughing, bright light and noise. She has tried acetaminophen for the symptoms. The treatment provided significant relief.  Her past medical history is significant for obesity and sinus disease. There is no history of cancer, cluster headaches, hypertension, immunosuppression, migraine headaches, migraines in the family, pseudotumor cerebri, recent head traumas or TMJ. Neurologic Problem   The patient's primary symptoms include clumsiness, a loss of balance and memory loss. The patient's pertinent negatives include no syncope, visual change or weakness. This is a chronic problem. The neurological problem developed insidiously. The problem is unchanged. There was no focality noted. Associated symptoms include dizziness, headaches and vertigo. Pertinent negatives include no abdominal pain, auditory change, aura, back pain, bladder incontinence, bowel incontinence, chest pain, confusion, diaphoresis, fatigue, fever, light-headedness, nausea, neck pain, palpitations, shortness of breath or vomiting. Past treatments include bed rest, medication and sleep. The treatment provided moderate relief. Her past medical history is significant for dementia. There is no history of a bleeding disorder, a clotting disorder, a CVA, head trauma, liver disease, mood changes or seizures. History obtained from  The patient            and other  available medical records were  Also  reviewed. The  Duration,  Quality,  Severity,  Location,  Timing,  Context,  Modifying  Factors   Of   The   Chief   Complaint       AndPresent  Illness   Was   Reviewed   In   Chronological   Manner.                                              PATIENT'S  MAIN  CONCERNS INCLUDE :                         1)     DIZZINESS     INTERMITTENT     SINCE      April 2019                                         -        RESOLVED                                  2)       GLOBAL  HEADACHES     STARTING  IN  OCCIPITAL   AREA                                    RADIATING  TO    FRONTAL  AREA      SINCE   April 2019                                                  -    RESOLVED MAY    2019        AND                                  WAS    IN    NURSING  HOME  REHAB   FOR  TWO  MONTHS . PATIENT  RECOVERED   WELL. NO   DIZZINESS. NO  BALANCE  PROBLEMS                                    NO   SEIZURES. NO   HEADACHES . MEMORY    PROBLEMS     ARE  AT  HER  BASELINE                                     PATIENT   CURRENTLY  [place]  ANY  GAIT  DIFFICULTIES                                            NO   FALLS                              11)      FOLLOW UP  CT  HEAD   June 2019     SHOWED                                   STABLE   VENTRICULOMEGALY    AND    SHUNT                               12)    ALSO  HAD  FOLLOW  NEURO  SURGEON   EVALUATIONS                                                      IN   SEPT. 2019                                13)     PATIENT  LIVES        AT   HOME   WITH   HER    FAMILY                                                                     PATIENT     PLANS  TO   DRIVE  LOCALLY       AS  TOLERATED. 14)     PATIENT        DENIES        ANY  NEW  NEUROLOGICAL                                         CONCERNS.                                  PATIENT  APPEAR  NEUROLOGICALLY   STABLE                                     PRECIPITATING  FACTORS: including  fever/infection, exertion/relaxation, position change, stress,                         weather change, medications/alcohol, time of day/darkness/light  Are  Present                                                                MODIFYING  FACTORS:  fever/infection, exertion/relaxation, position change, stress, weather change,                      medications/alcohol, time of day/darkness/light    Are  present           Patient   Indicates   The  Presence   And  The  Absence  Of  The  FollowingAssociated  And   Additional  Neurological    Symptoms: Balance  And coordination problems  present           Gait problems     present            Headaches      present              Migraines           present           Memory problems absent             Confusion        absent            Paresthesia numbness          absent           SeizuresAnd  Starring  Episodes           absent           Syncope,  Near  syncopal episodes         absent           Speech problems           absent             Swallowing  Problems      absent            Dizziness,  Light headedness           present              Vertigo        present             Generalized   Weakness    absent              focal  Weakness     absent             Tremors         absent              Sleep  Problems     absent             History  Of   RecentHead  Injury     absent             History  Of   Recent  TIA     absent             History  Of   Recent    Stroke     absent             Neck  Pain and  Neck muscle  Spasms  Absent               Radiating  down   And   Weakness           absent            Lower back   Pain  And     Spasms  Absent              Radiating    Down   And   Weakness          absent                H/OFALLS        absent               History  Of   Visual  Symptoms    Absent                  Associated   Diplopia       absent                                             Also   Additional   Symptoms   Present    As  Documented    In   The detailed                  Review  Of  Systems   And    Please   Refer   To    Them for   Additional  Information. Any components  That are either  Unobtainable  Or  Limited  In   HPI, ROS  And/or PFSH   Are                Due   To       Patient's  Medical  Problems,  Clinical  Condition and/or lack of                                other    Alternate resources.               RECORDS   REVIEWED:    historical medical records     INFORMATION   REVIEWED:       MEDICAL   HISTORY,SURGICAL   HISTORY,   MEDICATIONS   LIST,   ALLERGIES AND  DRUG Headaches (Patient not taking: Reported on 2020) 30 tablet 0    Misc. Devices (ADJUST BATH/SHOWER SEAT/BACK) MISC Use daily with showers 1 each 0    Misc. Devices (SUCTION GRAB BAR) MISC Use in the shower 2 each 0    aspirin 325 MG tablet Take 325 mg by mouth daily      loratadine (CLARITIN) 10 MG tablet Take 10 mg by mouth daily       No current facility-administered medications for this visit. Allergies   Allergen Reactions    Vicodin [Hydrocodone-Acetaminophen]          History reviewed. No pertinent family history.       Social History     Socioeconomic History    Marital status:      Spouse name: Not on file    Number of children: 2    Years of education: 15    Highest education level: Associate degree: occupational, technical, or vocational program   Occupational History    Occupation: factory      Comment: retired   Social Needs    Financial resource strain: Not very hard    Food insecurity     Worry: Never true     Inability: Never true    Transportation needs     Medical: No     Non-medical: No   Tobacco Use    Smoking status: Former Smoker     Packs/day: 1.00     Years: 50.00     Pack years: 50.00     Types: Cigarettes     Start date: 1970     Last attempt to quit: 2019     Years since quittin.1    Smokeless tobacco: Never Used   Substance and Sexual Activity    Alcohol use: No    Drug use: No    Sexual activity: Not Currently     Partners: Male   Lifestyle    Physical activity     Days per week: 0 days     Minutes per session: 0 min    Stress: Not at all   Relationships    Social connections     Talks on phone: More than three times a week     Gets together: Once a week     Attends Spiritism service: More than 4 times per year     Active member of club or organization: Yes     Attends meetings of clubs or organizations: 1 to 4 times per year     Relationship status:     Intimate partner violence     Fear of current or ex partner: Not on file Emotionally abused: Not on file     Physically abused: Not on file     Forced sexual activity: Not on file   Other Topics Concern    Not on file   Social History Narrative    No SDOH needs identified. She was given [facility] number for transportation if family unable to transport. Discussed HEAP and PIPP programs. She stated she has used in the past but declines at this time. There were no vitals filed for this visit.       Wt Readings from Last 3 Encounters:   05/27/20 263 lb 14.3 oz (119.7 kg)   05/14/20 264 lb (119.7 kg)   04/30/20 250 lb (113.4 kg)         BP Readings from Last 3 Encounters:   05/27/20 122/84   05/14/20 110/68   04/04/20 130/80       Hematology and Coagulation  Lab Results   Component Value Date    WBC 10.3 06/12/2019    RBC 5.05 06/12/2019    HGB 15.7 06/12/2019    HCT 47.2 06/12/2019    MCV 93.3 06/12/2019    MCH 31.1 06/12/2019    MCHC 33.3 06/12/2019    RDW 15.3 06/12/2019     06/12/2019    MPV 10.3 06/12/2019       Chemistries  Lab Results   Component Value Date     06/12/2019    K 4.0 06/12/2019     06/12/2019    CO2 26 06/12/2019    BUN 19 06/12/2019    CREATININE 0.65 06/12/2019    CALCIUM 9.6 06/12/2019    PROT 7.3 05/12/2019    LABALBU 4.3 05/12/2019    BILITOT 0.65 05/12/2019    ALKPHOS 77 05/12/2019    AST 9 05/12/2019    ALT 9 05/12/2019     Lab Results   Component Value Date    ALKPHOS 77 05/12/2019    ALT 9 05/12/2019    AST 9 05/12/2019    PROT 7.3 05/12/2019    BILITOT 0.65 05/12/2019    BILIDIR 0.12 07/02/2012    LABALBU 4.3 05/12/2019     Lab Results   Component Value Date    BUN 19 06/12/2019    CREATININE 0.65 06/12/2019     Lab Results   Component Value Date    CALCIUM 9.6 06/12/2019    MG 2.2 07/02/2012     Lab Results   Component Value Date    AST 9 05/12/2019    ALT 9 05/12/2019       Lab Results   Component Value Date    CKTOTAL 28 07/02/2012         Review of Systems   Constitutional: Negative for appetite change, chills, diaphoresis, fatigue, fever, unexpected weight change and weight loss. HENT: Negative for congestion, dental problem, drooling, ear discharge, ear pain, facial swelling, hearing loss, mouth sores, nosebleeds, postnasal drip, sinus pressure, sore throat, tinnitus, trouble swallowing and voice change. Eyes: Positive for photophobia. Negative for blurred vision, pain, discharge, redness, itching and visual disturbance. Respiratory: Negative for apnea, cough, choking, chest tightness, shortness of breath and wheezing. Cardiovascular: Negative for chest pain, palpitations and leg swelling. Gastrointestinal: Negative for abdominal distention, abdominal pain, anorexia, blood in stool, bowel incontinence, change in bowel habit, constipation, diarrhea, nausea and vomiting. Endocrine: Negative for cold intolerance, heat intolerance, polydipsia, polyphagia and polyuria. Genitourinary: Negative for bladder incontinence. Musculoskeletal: Positive for gait problem. Negative for arthralgias, back pain, joint swelling, myalgias, neck pain and neck stiffness. Skin: Negative for color change, pallor, rash and wound. Allergic/Immunologic: Negative for environmental allergies, food allergies and immunocompromised state. Neurological: Positive for dizziness, vertigo, numbness, headaches and loss of balance. Negative for tingling, tremors, seizures, syncope, facial asymmetry, speech difficulty, weakness and light-headedness. Hematological: Negative for adenopathy. Does not bruise/bleed easily. Psychiatric/Behavioral: Positive for memory loss. Negative for agitation, behavioral problems, confusion, decreased concentration, dysphoric mood, hallucinations, self-injury, sleep disturbance and suicidal ideas. The patient is not nervous/anxious, does not have insomnia and is not hyperactive.           OBJECTIVE:      LIMITED  DUE  TO VIDEO  VISIT          NEUROLOGICALEXAMINATION :        LIMITED  DUE  TO VIDEO VISIT          A) MENTAL STATUS:                   Alert and  oriented  To time, place  And  Person. No Aphasia. No  Dysarthria. Able   To  Follow   SIMPLE   commands without   Any  Difficulty.                                     B) CRANIAL NERVES :        C) MOTOR  EXAM:                       D) SENSORY :                   E) REFLEXES:                        F) COORDINATION  AND  GAIT :                     LIMITED  DUE  TO VIDEO  VISIT        ASSESSMENT:    Patient Active Problem List   Diagnosis    CAD (coronary artery disease)    Tobacco abuse    Environmental allergies    Obesity    Dyslipidemia    Coronary artery disease    Chronic obstructive pulmonary disease (HCC)    Occipital headache    Dizziness    Cerebral ventriculomegaly    Acquired cerebral atrophy (HCC)    Chronic cerebral ischemia    Chiari malformation type I ([facility] [address]    Balance problem    VBI (vertebrobasilar insufficiency)    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 Dose modulation, iterative reconstruction, and/or weight based   adjustment of the mA/kV was utilized to reduce the radiation dose to as low   as reasonably achievable.       COMPARISON:   June 7, 2019       HISTORY:   ORDERING SYSTEM PROVIDED HISTORY: Memory loss with history of cerebral shunt   placement   TECHNOLOGIST PROVIDED HISTORY:       Ordering Physician Provided Reason for Exam: Dizzy, fall today hit head, pt   has recently placed shunt   Acuity: Acute   Type of Exam: Initial       FINDINGS:   BRAIN/VENTRICLES: There is no acute intracranial hemorrhage, mass effect or   midline shift. No abnormal extra-axial fluid collection.  The gray-white   differentiation is maintained without evidence of an acute infarct. There is   prominence of the ventricles and sulci due to global parenchymal volume loss.    There are nonspecific areas of hypoattenuation within the periventricular and   subcortical white matter, which likely represent chronic microvascular   ischemic change.       ORBITS: Bilateral lens implants.       SINUSES: The visualized paranasal sinuses and mastoid air cells demonstrate   no acute abnormality.       SOFT TISSUES/SKULL: Right frontal approach ventriculostomy catheter   terminates in the of the left lateral ventricle.           Impression   Stable ventriculomegaly and  shunt.  No acute disease.               CT OF THE HEAD WITHOUT CONTRAST  5/2/2019 2:52 pm       TECHNIQUE:   CT of the head was performed without the administration of intravenous   contrast. Dose modulation, iterative reconstruction, and/or weight based   adjustment of the mA/kV was utilized to reduce the radiation dose to as low   as reasonably achievable.       COMPARISON:   None.       HISTORY:   ORDERING SYSTEM PROVIDED HISTORY: headache   TECHNOLOGIST PROVIDED HISTORY:       Ordering Physician Provided Reason for Exam: C/o headache/dizziness   Acuity: Acute   Type of Exam: Initial       FINDINGS:   BRAIN/VENTRICLES: There is no acute intracranial hemorrhage, mass effect or   midline shift.  No abnormal extra-axial fluid collection.  Ventriculomegaly   is noted and cerebellar tonsillar displacement into the foramen magnum,   extending off the field of view, is noted. Channie Cluster is also mild cerebral and   cerebellar atrophy and scattered white matter changes in the brain.       ORBITS: The visualized portion of the orbits demonstrate no acute abnormality.       SINUSES: The visualized paranasal sinuses and mastoid air cells demonstrate   no acute abnormality.       SOFT TISSUES/SKULL:  No acute abnormality of the visualized skull or soft   tissues.           Impression   Partially visualized Chiari malformation.  Ventriculomegaly is noted as well   as cerebral and cerebellar cortical atrophy with chronic appearing white   matter changes in the brain.                 VISITING DIAGNOSIS:      ICD-10-CM    1. Normal pressure hydrocephalus (HCC) G91.2    2. Acquired cerebral atrophy ([address] G31.9    3. S/P  shunt Z98.2    4. Chronic cerebral ischemia I67.82    5. Occipital headache R51    6. Chiari malformation type I ([address] G93.5    7. Class 2 obesity due to excess calories in adult, unspecified BMI, unspecified whether serious comorbidity present E66.09    8. 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 Dyslipidemia E78.5    10. Balance problem R26.89    11. Tobacco abuse Z72.0    12. VBI (vertebrobasilar insufficiency) G45.0    13. Coronary artery disease involving native coronary artery of native heart without angina pectoris I25.10    14. Mixed simple and mucopurulent chronic bronchitis (HCC) J41.8    15. Cerebral ventriculomegaly G93.89               CONCERNS   &   INCREASED   RISK   FOR         * TIA,  CEREBRO  VASCULAR  ISCHEMIA,STROKE     *   DIZZINESS,   VERTEBROBASILAR  INSUFFICIENCY ,         *  COMPLICATED  MIGRAINES      *     TENSION  HEADACHES        *  GAIT  DIFFICULTIES  &   BALANCE PROBLMES                     VARIOUS  RISK   FACTORS   WERE  REVIEWED   AND   DISCUSSED. *  PATIENT   HAS  MULTIPLE   MEDICAL, NEUROLOGICAL    PROBLEMS . PATIENT'S   MANAGEMENT  IS  CHALLENGING. PLAN:                    Nata Clark  Of  The  Diagnoses,  The  Management & Treatment  Options            AND    Care  plan  Were          Reviewed and   Discussed   With  patient. * Goals  And  Expectations  Of  The  Therapy  Discussed   And  Reviewed.           *   Benefits   And   Side  Effect  Profile  Of  Medication/s   Were   Discussed                * Need   For Further   Follow up For  The  Various  Problems Were  discussed. * Results  Of  The  Previous  Diagnostic tests were reviewed and questions answered. patient  understand the same. Medical  Decision  Making  Was  Made  Based on the   Complexity  Of  Above  Mentioned  Diagnoses,        Data reviewed   & diagnostic  Tests  Reviewed,       Risk  Of  Significant   Co morbidities and   complicating   Factors. Medical  Decision  Was   High    Complexity   Due   To  The  Patient's  Multiple  Symptoms,      Advancing   Disease,  Complex  Treatment  Regimen,  Multiple medications       and   Risk  Of   Side  Effects,  Difficulty  In  Medication  Management  And  Diagnostic  Challenges       In  View  Of  The  Associated   Co  Morbid  Conditions   And  Problems. * FALL   PRECAUTIONS. THESE  REVIEWED   AND  DISCUSSED      *    SUPERVISED   CARE      *   ABSOLUTELY   NO  DRIVING      *   BE  CAREFUL  WITH  ACTIVITIES            *   ADEQUATE   FLUIDINTAKE   AND  ELECTROLYTE  BALANCE         * KEEP  DAIRY  OF   THE  NEUROLOGICAL  SYMPTOMS        RECORDING THE    DURATION  AND  FREQUENCY. *  AVOID    CONDITIONS  AND  FACTORS   THAT  MAKE                  NEUROLOGICAL  SYMPTOMS  WORSE.           *TO  MAINTAIN  REGULAR  SLEEP  WAKE  CYCLES. *   TO  HAVE  ADEQUATE  REST  AND   SLEEP    HOURS.          *    AVOID  ANY USAGE OF                   TOBACCO,EXCESSIVE  ALCOHOL  AND   ILLEGAL   SUBSTANCES        *   Compliance   With  Medications   And  Instructions      *    MIGRAINE/ HEADACHE    DAIRY   WITH  MONITORING                       OF  DURATION  ANDFREQUENCY.         *     The    Antiplatelet  therapy    As   Recommended  Was   Discussed      *    Prophylactic  Use   Of     Vitamin   B   Complex,  Folic  Acid,    Vitamin  B12    Multivitamin,       Calcium  With  magnesium  And  Vit D    Supplementations   Over  The  Counter Discussed              *  EVALUATIONS  AND  FOLLOW UP:                    * PHYSICAL  THERAPY                                *CARDIOLOGY              *   OPTHALMOLOGY                                       *    PATIENT  HAD   VENTRICULAR  DRAIN    PLACEMENT  IN   MAY    2019        AND                                  WAS    IN    NURSING  HOME  REHAB   FOR  TWO  MONTHS . *             PATIENT  RECOVERED   WELL. NO   DIZZINESS. NO  BALANCE  PROBLEMS                                    NO   SEIZURES. NO   HEADACHES . MEMORY    PROBLEMS     ARE  AT  HER  BASELINE                                     PATIENT   CURRENTLY  [place]  ANY  GAIT  DIFFICULTIES                                            NO   FALLS                           *     FOLLOW UP  CT  HEAD   June 2019     SHOWED                                   STABLE   VENTRICULOMEGALY    AND    SHUNT                                 *   ALSO  HAD  FOLLOW  NEURO  SURGEON   EVALUATIONS                                                      IN   SEPT. 2019                                   *     PATIENT  LIVES        AT   HOME   WITH   HER    FAMILY                                                                     PATIENT     PLANS  TO   DRIVE  LOCALLY       AS  TOLERATED. *     PATIENT        DENIES        ANY  NEW  NEUROLOGICAL                                         CONCERNS. PATIENT  APPEAR  NEUROLOGICALLY   STABLE                           *PATIENT   TO  FOLLOW  UP  WITH   PRIMARY  CARE         OTHER  CONSULTANTS  AS  BEFORE. *  Maintain   Healthy  Life Style    With   Periodic  Monitoring  Of      Any  Medical  Conditions  Including   Elevated  Blood  Pressure,  Lipid  Profile,     Blood  Sugar levels  AndHeart  Disease.                 *   Period   Screening  For  Cancers  Involving  Breast, Colon,    lungs  And  Other  Organs  As  Applicable,  In consultation   With  Your  Primary Care Providers. *Second  Neurological  Opinion  And  Evaluations  In  [facility] AND [facility]  Setting  If  Patient  Is  Interested. * Please   Contact   Neurology  Clinic   Early   If   Are  Any  New  Neurological   And  Any neurological  Concerns. *  If  The  Patient remains  Neurologically  Stable   Return   To  [facility] Neurology Department   IN   3 -  6     MONTHS  TIME   FOR  FURTHER              FOLLOW UP.                       *   The  Neurological   Findings,  Possible  Diagnosis,  Differential diagnoses   And  Options  For    Further   Investigations                   And  management   Are  Discussed  Comprehensively. Medications   And  Prescription   Risks  And  Side effects  Are   Also  Discussed. *  If   There is  Any  Significant  Worsening   Of  Current  Symptoms  And  Or  If patient  Develops                                  Any additional  New  NeurologicalSymptoms                  Or  Significant  Concerns   Should  Call  911 or  Go  To  Emergency  Department  For  Further  Immediate  Evaluation. The   Above  Were  Reviewed  With  Patient                         questions  Answered  In  Detail. VIRTUAL   VIDEO  VISIT          PATIENT  BEING   EVALUATED   BY  NEUROLOGIST   VIA   a Virtual Visit (video visit) encounter          to address concerns as mentioned  ABOVE        ALSO    nursing   caregiver was present     before,  during  and  after   the    visit        Due to this being a TeleHealth encounter (During [facility] emergency), evaluation of the following organ systems was limited:     Vitals/Constitutional/EENT/Resp/CV/GI//MS/Neuro/Skin/Heme-Lymph-Imm.         Pursuant to the emergency declaration under the [address] Emergencies Act, 1135 waiver authority and the Coronavirus Preparedness and Response Supplemental Appropriations Act, this Virtual Visit was conducted with patient's (and/or legal guardian's) consent, to reduce the patient's risk of exposure to COVID-19 and provide necessary medical care. The patient (and/or legal guardian) has also been advised to contact this office for worsening conditions or problems, and seek emergency medical treatment and/or call 911 if deemed necessary. Patient identification was verified at the start of the visit: Yes    Total time spent for this encounter:  ( Time Spent:   40   minutes )    Services were provided through a video synchronous discussion   and  limited    examination  virtually to substitute for in-person clinic visit. Patient    located at     [address] [place] [address]. Dom Colby. MD Michael Hoffmann MD on 0/6/1230 at 11:30 AM    An electronic signature was used to authenticate this note. More   Than  50% of face  To face Time   Was  Spent  On  Counseling   And   Coordination  Of  Care                 Of   Patient's  multiple   Neurological  Problems   And   Comorbid  Medical   Conditions. Electronically signed by Gavin Martinez MD     Board Certified in  Neurology &  In  [facility] of [facility]iatry and Neurology (ABPN)      DISCLAIMER:   Although every effort was made to ensure the accuracy of this  electronictranscription, some errors in transcription may have occurred. GENERAL PATIENT INSTRUCTIONS:      A Healthy Lifestyle: Care Instructions   Your Care Instructions   A healthy lifestyle can help you feel good, stay at ahealthy weight, and have plenty of energy for both work and play. A healthy lifestyle is something you can share with your whole family.    A healthy lifestyle also can lower your risk for serious health problems, such ashigh blood pressure, heart disease, and diabetes.  You can follow a few steps listed below to improve your health and the health of your family.  Follow-up careis a key part of your treatment and safety. Be sure to make and go to all appointments, and call your doctor if you are having problems. Its also a good idea to know your test results and keep a list of the medicines you take.  How can you care for yourself at home?  Do not eat too much sugar, fat, or fast foods. You can still have dessert and treats nowand then. The goal is moderation.  Start small to improve your eating habits. Pay attention to portion sizes, drink less juice and soda pop, and eat more fruits and vegetables.  Eat a healthy amount of food. A 3-ounce serving of meat, for example, is about the size of a deck of cards. Fill the rest of your plate with vegetables and whole grains.  Limit theamount of soda and sports drinks you have every day. Drink more water when you are thirsty.  Eat at least 5 servings of fruits and vegetables every day. It may seem like a lot, but it is not hard to reach this goal. Aserving or helping is 1 piece of fruit, 1 cup of vegetables, or 2 cups of leafy, raw vegetables. Have an apple or some carrot sticks as an afternoon snack instead of a candy bar. Try to have fruits and/or vegetables at everymeal.   Make exercise part of your daily routine. You may want to start with simple activities, such as walking, bicycling, or slow swimming. Try akua active 30 to 60 minutes every day. You do not need to do all 30 to 60 minutes all at once. For example, you can exercise 3 times a day for 10 or 20 minutes. Moderate exercise is safe for most people, but it is always agood idea to talk to your doctor before starting an exercise program.   Keep moving. Juan Manuel Vieraachejessenia the lawn, work in the garden, or [employer]. Take the stairs instead of the elevator at work.  If you smoke, quit. Peoplewho smoke have an increased risk for heart attack, stroke, cancer, and other lung illnesses. Quitting is hard, but there are ways to boost your chance of quitting tobacco for good.  Use nicotine gum, patches, or lozenges.  Ask your doctor about stop-smoking programs and medicines.  Keep trying.  In addition to reducing your risk of diseases in the future, you will notice some benefits soon after you stop using tobacco. If you have shortness of breath or asthma symptoms, they will likely getbetter within a few weeks after you quit.  Limit how much alcohol you drink. Moderate amounts of alcohol (up to 2 drinks a day for men, 1drink a day for women) are okay. But drinking too much can lead to liver problems, high blood pressure, and other health problems.  health   If you have a family, there are many things you can do together to improve your health.  Eat meals together as a family as often as possible.  Eat healthy foods. This includes fruits, vegetables, lean meats and dairy, and whole grains.  Include your family in your fitness plan. Most peoplethink of activities such as jogging or tennis as the way to fitness, but there are many ways you and your family can be more active. Anything that makes you breathe hard and gets your heart pumping is exercise. Here are sometips:   Walk to do errands or to take your child to school or the bus.  Go for a family bike ride after dinner instead of watching TV.  Where can you learn more?  Go toVarick Media [email or web address]"[email or web address][email or web address] org and sign in to your [employer] account. Enter S483 in the Search HealthInformation box to learn more about \"A Healthy Lifestyle: Care Instructions. \"     If you do not have anaccount, please click on the \"Sign Up Now\" link.  Current as of: July 26, 2016   Content Version: 11.2   © 8953-1685 Healthwise, Incorporated. Care instructions adapted under license by [facility] ([facility]).  If you have questions about a medical condition or this instruction, always ask your healthcare professional. Healthwise,Incorporated disclaims any warranty or liability for your use of this information.

## 2020-07-14 ENCOUNTER — OFFICE VISIT (OUTPATIENT)
Dept: FAMILY MEDICINE CLINIC | Age: 70
End: 2020-07-14
Payer: MEDICARE

## 2020-07-14 VITALS
TEMPERATURE: 98.5 F | HEIGHT: 66 IN | OXYGEN SATURATION: 96 % | WEIGHT: 278 LBS | BODY MASS INDEX: 44.68 KG/M2 | HEART RATE: 74 BPM | RESPIRATION RATE: 16 BRPM | SYSTOLIC BLOOD PRESSURE: 118 MMHG | DIASTOLIC BLOOD PRESSURE: 68 MMHG

## 2020-07-14 PROCEDURE — 1090F PRES/ABSN URINE INCON ASSESS: CPT | Performed by: NURSE PRACTITIONER

## 2020-07-14 PROCEDURE — 3017F COLORECTAL CA SCREEN DOC REV: CPT | Performed by: NURSE PRACTITIONER

## 2020-07-14 PROCEDURE — G8427 DOCREV CUR MEDS BY ELIG CLIN: HCPCS | Performed by: NURSE PRACTITIONER

## 2020-07-14 PROCEDURE — 4040F PNEUMOC VAC/ADMIN/RCVD: CPT | Performed by: NURSE PRACTITIONER

## 2020-07-14 PROCEDURE — 1123F ACP DISCUSS/DSCN MKR DOCD: CPT | Performed by: NURSE PRACTITIONER

## 2020-07-14 PROCEDURE — 99213 OFFICE O/P EST LOW 20 MIN: CPT | Performed by: NURSE PRACTITIONER

## 2020-07-14 PROCEDURE — 1036F TOBACCO NON-USER: CPT | Performed by: NURSE PRACTITIONER

## 2020-07-14 PROCEDURE — G8417 CALC BMI ABV UP PARAM F/U: HCPCS | Performed by: NURSE PRACTITIONER

## 2020-07-14 PROCEDURE — 99212 OFFICE O/P EST SF 10 MIN: CPT

## 2020-07-14 PROCEDURE — G8400 PT W/DXA NO RESULTS DOC: HCPCS | Performed by: NURSE PRACTITIONER

## 2020-07-14 ASSESSMENT — PATIENT HEALTH QUESTIONNAIRE - PHQ9
SUM OF ALL RESPONSES TO PHQ QUESTIONS 1-9: 0
2. FEELING DOWN, DEPRESSED OR HOPELESS: 0
SUM OF ALL RESPONSES TO PHQ QUESTIONS 1-9: 0
SUM OF ALL RESPONSES TO PHQ9 QUESTIONS 1 & 2: 0
1. LITTLE INTEREST OR PLEASURE IN DOING THINGS: 0

## 2020-07-14 ASSESSMENT — ENCOUNTER SYMPTOMS
RESPIRATORY NEGATIVE: 1
GASTROINTESTINAL NEGATIVE: 1
SHORTNESS OF BREATH: 0
COUGH: 0
COLOR CHANGE: 1

## 2020-07-14 NOTE — PROGRESS NOTES
[facility]    Subjective:     Patient ID: Aubrey North is a 81 y.o. y.o. female. HPI Patient in for office for multiple concerns today. She states that she has had BLE on and off for about a a month. She states that the edema gets worse as the day goes on. She states that some days are better than other. Today is not a bad day for her. She states that when she wakes up in morning and the swelling is gone. She does not elevate her legs when she is sitting. She does admit to cramping at night in her legs off and on. She states that she does not have compression. She states that she also has 2 areas on her skin she would like evaluated. There is one on the left side of her face and one on her back. She says the one on her back has had it for years. She states that recently the one on her face feels rough. Past Medical History:   Diagnosis Date    Chronic obstructive pulmonary disease ([address]     Coronary artery disease     Dyslipidemia     Obesity     Pneumonia        Past Surgical History:   Procedure Laterality Date    CARDIAC CATHETERIZATION      CARDIAC CATHETERIZATION  2012    cath with x1 stent    EYE SURGERY      shar cataract surg x2 y ago    VENTRICULOPERITONEAL SHUNT Right 5/16/2019    VENTRICULAR PERITONEAL SHUNT INSERTION ENDOSCOPIC performed by Ratna Messer MD at [place] [id number]       History reviewed. No pertinent family history.        Allergies   Allergen Reactions    Vicodin [Hydrocodone-Acetaminophen]        Current Outpatient Medications   Medication Sig Dispense Refill    albuterol sulfate  (90 Base) MCG/ACT inhaler Inhale 2 puffs into the lungs every 4 hours as needed for Wheezing or Shortness of Breath 1 Inhaler 3    sertraline (ZOLOFT) 25 MG tablet Take 0.5 tablets by mouth nightly 15 tablet 1    Fluticasone furoate-vilanterol (BREO ELLIPTA) 200-25 MCG/INH AEPB inhaler Inhale 1 puff into the lungs daily 1 each 11    tiotropium (Jocelyn Durand) Head: Normocephalic and atraumatic. Right Ear: External ear normal.      Left Ear: External ear normal.      Nose: Nose normal.   Eyes:      Conjunctiva/sclera: Conjunctivae normal.      Pupils: Pupils are equal, round, and reactive to light. Neck:      Musculoskeletal: Normal range of motion. Cardiovascular:      Rate and Rhythm: Normal rate and regular rhythm. Pulmonary:      Effort: Pulmonary effort is normal.      Breath sounds: Normal breath sounds. Musculoskeletal: Normal range of motion. Skin:     General: Skin is warm and dry. Neurological:      Mental Status: She is alert and oriented to person, place, and time. Psychiatric:         Behavior: Behavior normal. Behavior is cooperative. Thought Content: Thought content normal.         Judgment: Judgment normal.           Assessment & Plan:      1. Bilateral leg edema-new  Advised to wear compression hose. On in AM off at night. She will get these on her own. 2. Actinic keratosis-cally    - Pilar Bruner MD, Dermatology, [place]    Answered all of the patient's questions. Agrees with plan of care. Follow up PRN.        JOHNATHAN Acosta - CNP   7/14/2020 10:06 AM EDT

## 2020-08-12 ENCOUNTER — OFFICE VISIT (OUTPATIENT)
Dept: PULMONOLOGY | Age: 70
End: 2020-08-12
Payer: MEDICARE

## 2020-08-12 VITALS
BODY MASS INDEX: 46.32 KG/M2 | SYSTOLIC BLOOD PRESSURE: 150 MMHG | TEMPERATURE: 97.2 F | HEIGHT: 65 IN | OXYGEN SATURATION: 97 % | DIASTOLIC BLOOD PRESSURE: 100 MMHG | HEART RATE: 75 BPM | WEIGHT: 278 LBS

## 2020-08-12 PROCEDURE — 1123F ACP DISCUSS/DSCN MKR DOCD: CPT | Performed by: NURSE PRACTITIONER

## 2020-08-12 PROCEDURE — 3017F COLORECTAL CA SCREEN DOC REV: CPT | Performed by: NURSE PRACTITIONER

## 2020-08-12 PROCEDURE — 99213 OFFICE O/P EST LOW 20 MIN: CPT

## 2020-08-12 PROCEDURE — G8400 PT W/DXA NO RESULTS DOC: HCPCS | Performed by: NURSE PRACTITIONER

## 2020-08-12 PROCEDURE — G8417 CALC BMI ABV UP PARAM F/U: HCPCS | Performed by: NURSE PRACTITIONER

## 2020-08-12 PROCEDURE — 4040F PNEUMOC VAC/ADMIN/RCVD: CPT | Performed by: NURSE PRACTITIONER

## 2020-08-12 PROCEDURE — G8427 DOCREV CUR MEDS BY ELIG CLIN: HCPCS | Performed by: NURSE PRACTITIONER

## 2020-08-12 PROCEDURE — 1036F TOBACCO NON-USER: CPT | Performed by: NURSE PRACTITIONER

## 2020-08-12 PROCEDURE — G8926 SPIRO NO PERF OR DOC: HCPCS | Performed by: NURSE PRACTITIONER

## 2020-08-12 PROCEDURE — 1090F PRES/ABSN URINE INCON ASSESS: CPT | Performed by: NURSE PRACTITIONER

## 2020-08-12 PROCEDURE — 99213 OFFICE O/P EST LOW 20 MIN: CPT | Performed by: NURSE PRACTITIONER

## 2020-08-12 PROCEDURE — 3023F SPIROM DOC REV: CPT | Performed by: NURSE PRACTITIONER

## 2020-08-12 NOTE — PROGRESS NOTES
Subjective:      Patient ID: Markus Hayward is a 81 y.o. female. HPI:    Patient here for follow-up for her underlying COPD and lung nodules. Patient was last seen as a telephone visit in May per me. At that visit patient was started on Breo and states that she feels better with Gloria Noss use and notes that she is having to use her rescue inhaler much less. She states that she is not coughing as often. Unfortunately patient is now complaining of shortness of breath and cough with sore throat, and fatigue that began on Saturday, 8/8/2020. She is concerned because she lives with her granddaughter who is a STNA in a healthcare facility who is been sick for approximately last 1 week with shortness of breath, cough, fever. She believes that her granddaughter has symptoms of COVID-19. Lengthy discussion with patient in the office, that would recommend that she self quarantining's at home, needs to be quarantined for at least 10 days from onset of symptoms as long as her respiratory symptoms are improving and as long as she is 24 hours fever free with a absence of fever reducing medications. Discussed COVID-19 testing, limited testing supplies available in the region. Positive test would not change recommendations to stay home. Patient advised on signs and symptoms to be concerned about to pursue in person health care assistance. These would include worsening of her shortness of breath. Patient advised that she should have remained home and been evaluated with a tele-visit today to limit community spread of COVID-19. Prior work-up:    CT chest 3/3/2020: No mediastinal adenopathy, no changes in the left lobe nodule measuring 2 to 3 mm, prior 6 mm right lower lobe nodule unchanged. No new pulmonary nodules, consolidations or effusions noted. No significant change from prior studies. Recommendation is to continue annual CT lung screening.       CT lung screening 11/26/2019: Several scattered pulmonary nodules in the left lower lobe largest measuring 4 mm. 2 left upper lobe nodules measuring greater than 3 mm. Right upper lobe nodule measuring 4 mm. Largest nodules in the posterior aspect of the superior segment of the right lower lobe measuring 8 mm.     PFT 1/6/2020: FEV1 50% predicted with a 17% bronchodilator change. FVC 64% predicted with a 28% bronchodilator change, FEV1/FVC ratio 60. % predicted, % predicted. Diffusion capacity 52% predicted. Impression: Stage II COPD with significant bronchodilator response. Moderately decreased diffusion capacity due to emphysema versus anemia versus interstitial lung disease. Medications:   Breo 200-25 mcg: Albuterol HFA:      Immunizations:   Immunization History   Administered Date(s) Administered    Influenza, Triv, inactivated, subunit, adjuvanted, IM (Fluad 65 yrs and older) 01/08/2020    Pneumococcal Conjugate 13-valent ([id number]) 05/17/2019    Pneumococcal Polysaccharide ([id number]) 07/02/2012        No flowsheet data found. BP (!) 150/100   Pulse 75   Temp 97.2 °F (36.2 °C)   Ht 5' 5\" (1.651 m)   Wt 278 lb (126.1 kg)   LMP 08/01/2000 (Approximate)   SpO2 97%   BMI 46.26 kg/m²     Past Medical History:   Diagnosis Date    Chronic obstructive pulmonary disease ([address]     Coronary artery disease     Dyslipidemia     Obesity     Pneumonia      Past Surgical History:   Procedure Laterality Date    CARDIAC CATHETERIZATION      CARDIAC CATHETERIZATION  2012    cath with x1 stent    EYE SURGERY      shar cataract surg x2 y ago    VENTRICULOPERITONEAL SHUNT Right 5/16/2019    VENTRICULAR PERITONEAL SHUNT INSERTION ENDOSCOPIC performed by Meghann Nunez MD at [place] [address]     History reviewed. No pertinent family history.     Social History     Socioeconomic History    Marital status:      Spouse name: Not on file    Number of children: 2    Years of education: 14    Highest education level: Associate degree: occupational, technical, or vocational program   Occupational History    Occupation: factory      Comment: retired   Social Needs    Financial resource strain: Not very hard    Food insecurity     Worry: Never true     Inability: Never true    Transportation needs     Medical: No     Non-medical: No   Tobacco Use    Smoking status: Former Smoker     Packs/day: 1.00     Years: 50.00     Pack years: 50.00     Types: Cigarettes     Start date: 1970     Last attempt to quit: 2019     Years since quittin.2    Smokeless tobacco: Never Used   Substance and Sexual Activity    Alcohol use: No    Drug use: No    Sexual activity: Not Currently     Partners: Male   Lifestyle    Physical activity     Days per week: 0 days     Minutes per session: 0 min    Stress: Not at all   Relationships    Social connections     Talks on phone: More than three times a week     Gets together: Once a week     Attends [employer] service: More than 4 times per year     Active member of club or organization: Yes     Attends meetings of clubs or organizations: 1 to 4 times per year     Relationship status:     Intimate partner violence     Fear of current or ex partner: Not on file     Emotionally abused: Not on file     Physically abused: Not on file     Forced sexual activity: Not on file   Other Topics Concern    Not on file   Social History Narrative    No SDOH needs identified. She was given [facility] number for transportation if family unable to transport. Discussed HEAP and PIPP programs. She stated she has used in the past but declines at this time.        Review of Systems    Objective:       Physical Exam  General appearance - alert, well appearing, and in no distress and oriented to person, place, and time  Mental status - alert, oriented to person, place, and time, normal mood, behavior, speech, dress, motor activity, and thought processes  Eyes - pupils equal and reactive, extraocular eye movements intact  Ears - not examined  Nose - normal and patent, no erythema, discharge or polyps  Mouth - mucous membranes moist, pharynx normal without lesions  Neck - supple, no significant adenopathy  Chest - clear to auscultation, no wheezes, rales or rhonchi, symmetric air entry  Heart -normal rate, regular rhythm, normal S1, S2, no murmurs, rubs, clicks or gallops  Abdomen - soft, nontender, nondistended, no masses or organomegaly  Neuro- alert, oriented, normal speech, no focal findings or movement disorder noted}  Extremities - peripheral pulses normal, no pedal edema, no clubbing or cyanosis  Skin - normal coloration and turgor, no rashes, no suspicious skin lesions noted     Wt Readings from Last 3 Encounters:   08/12/20 278 lb (126.1 kg)   07/14/20 278 lb (126.1 kg)   05/27/20 263 lb 14.3 oz (119.7 kg)       Results for orders placed or performed during the hospital encounter of 02/28/20   Hepatitis C Antibody   Result Value Ref Range    Hepatitis C Ab NONREACTIVE NONREACTIVE   Glucose, Fasting   Result Value Ref Range    Glucose, Fasting 105 (H) 70 - 99 mg/dL       No results found. Assessment:      1. Cough    2. Multiple lung nodules on CT    3. Stage 2 moderate COPD by GOLD classification (HCC)    4. Stopped smoking with greater than 40 pack year history    5. Personal history of tobacco use    6. Upper respiratory tract infection, unspecified type          Plan:      1. Medications reviewed, continue as ordered. 2. Vocal hoarseness may be secondary to postnasal drip, however if persists consider discontinuation of Breo  3. COVID-19 ambulatory screening ordered. Patient resides with a healthcare provider working in extended care and is currently ill with upper respiratory infection symptoms.   Patient also advised 10-day quarantine from date of onset of symptoms as long as her upper respiratory symptoms improve and she is fever free for at least 24 hours without the assistance of antipyretic medications. Patient also counseled that after her quarantine. If she is out about in public to keep her face covered with a mask, socially distance and avoid touching your face in in addition to frequent handwashing. 4. Educated and clarified the medication use. 5. Recommend flu vaccination in the fall annually. 6. Patient is up-to-date with vaccinations from pulmonary perspective. 7. Maintain an active lifestyle. 8. Patient's questions were answered to her satisfaction. 5. Former smoker, continue smoking cessation. Repeat CT lung screening annually until 2034 due to smoking cessation date. 10. Pulmonary function tests were reviewed   11. CT scan of the chest was reviewed. Repeat after 1 year  12.  We'll see the patient back in 8 months        Electronically signed by JOHNATHAN Kelsey CNP on 8/12/2020 at 12:58 PM

## 2020-08-13 ENCOUNTER — NURSE ONLY (OUTPATIENT)
Dept: PRIMARY CARE CLINIC | Age: 70
End: 2020-08-13
Payer: MEDICARE

## 2020-08-13 ENCOUNTER — HOSPITAL ENCOUNTER (OUTPATIENT)
Age: 70
Setting detail: SPECIMEN
Discharge: HOME | End: 2020-08-13
Payer: MEDICARE

## 2020-08-13 PROCEDURE — U0003 INFECTIOUS AGENT DETECTION BY NUCLEIC ACID (DNA OR RNA); SEVERE ACUTE RESPIRATORY SYNDROME CORONAVIRUS 2 (SARS-COV-2) (CORONAVIRUS DISEASE [COVID-19]), AMPLIFIED PROBE TECHNIQUE, MAKING USE OF HIGH THROUGHPUT TECHNOLOGIES AS DESCRIBED BY CMS-2020-01-R: HCPCS

## 2020-08-13 PROCEDURE — 99211 OFF/OP EST MAY X REQ PHY/QHP: CPT | Performed by: NURSE PRACTITIONER

## 2020-08-16 LAB — SARS-COV-2, NAA: NOT DETECTED

## 2020-08-17 ENCOUNTER — TELEPHONE (OUTPATIENT)
Dept: PRIMARY CARE CLINIC | Age: 70
End: 2020-08-17

## 2020-11-10 RX ORDER — CARVEDILOL 3.12 MG/1
3.12 TABLET ORAL 2 TIMES DAILY WITH MEALS
Qty: 60 TABLET | Refills: 0 | Status: SHIPPED | OUTPATIENT
Start: 2020-11-10 | End: 2020-12-03 | Stop reason: SDUPTHER

## 2020-11-10 RX ORDER — ALBUTEROL SULFATE 90 UG/1
2 AEROSOL, METERED RESPIRATORY (INHALATION) EVERY 4 HOURS PRN
Qty: 1 INHALER | Refills: 3 | Status: SHIPPED | OUTPATIENT
Start: 2020-11-10 | End: 2021-03-09 | Stop reason: SDUPTHER

## 2020-11-10 NOTE — TELEPHONE ENCOUNTER
Last Appt:  7/14/2020 (sick visit)  Next Appt:   12/3/2020 (AWV w/TRUMAN)    Med verified in [address]

## 2020-12-01 ASSESSMENT — LIFESTYLE VARIABLES
AUDIT TOTAL SCORE: INCOMPLETE
HOW OFTEN DO YOU HAVE A DRINK CONTAINING ALCOHOL: 0
AUDIT-C TOTAL SCORE: INCOMPLETE
HOW OFTEN DO YOU HAVE A DRINK CONTAINING ALCOHOL: NEVER

## 2020-12-01 ASSESSMENT — PATIENT HEALTH QUESTIONNAIRE - PHQ9
2. FEELING DOWN, DEPRESSED OR HOPELESS: 1
SUM OF ALL RESPONSES TO PHQ9 QUESTIONS 1 & 2: 1
SUM OF ALL RESPONSES TO PHQ QUESTIONS 1-9: 1
1. LITTLE INTEREST OR PLEASURE IN DOING THINGS: 0
SUM OF ALL RESPONSES TO PHQ QUESTIONS 1-9: 1
SUM OF ALL RESPONSES TO PHQ QUESTIONS 1-9: 1

## 2020-12-03 ENCOUNTER — HOSPITAL ENCOUNTER (OUTPATIENT)
Dept: LAB | Age: 70
Discharge: HOME | End: 2020-12-03
Payer: MEDICARE

## 2020-12-03 ENCOUNTER — OFFICE VISIT (OUTPATIENT)
Dept: FAMILY MEDICINE CLINIC | Age: 70
End: 2020-12-03
Payer: MEDICARE

## 2020-12-03 VITALS
HEART RATE: 95 BPM | OXYGEN SATURATION: 99 % | BODY MASS INDEX: 45.8 KG/M2 | DIASTOLIC BLOOD PRESSURE: 78 MMHG | SYSTOLIC BLOOD PRESSURE: 110 MMHG | TEMPERATURE: 98.1 F | HEIGHT: 66 IN | WEIGHT: 285 LBS

## 2020-12-03 LAB
ANION GAP SERPL CALCULATED.3IONS-SCNC: 9 MMOL/L (ref 9–17)
BUN BLDV-MCNC: 19 MG/DL (ref 8–23)
BUN/CREAT BLD: 20 (ref 9–20)
CALCIUM SERPL-MCNC: 9.6 MG/DL (ref 8.6–10.4)
CHLORIDE BLD-SCNC: 102 MMOL/L (ref 98–107)
CHOLESTEROL/HDL RATIO: 3.2
CHOLESTEROL: 193 MG/DL
CO2: 27 MMOL/L (ref 20–31)
CREAT SERPL-MCNC: 0.93 MG/DL (ref 0.5–0.9)
ESTIMATED AVERAGE GLUCOSE: 120 MG/DL
GFR AFRICAN AMERICAN: >60 ML/MIN
GFR NON-AFRICAN AMERICAN: 60 ML/MIN
GFR SERPL CREATININE-BSD FRML MDRD: ABNORMAL ML/MIN/{1.73_M2}
GFR SERPL CREATININE-BSD FRML MDRD: ABNORMAL ML/MIN/{1.73_M2}
GLUCOSE BLD-MCNC: 106 MG/DL (ref 70–99)
HBA1C MFR BLD: 5.8 % (ref 4.8–5.9)
HDLC SERPL-MCNC: 61 MG/DL
LDL CHOLESTEROL: 111 MG/DL (ref 0–130)
POTASSIUM SERPL-SCNC: 4.6 MMOL/L (ref 3.7–5.3)
SODIUM BLD-SCNC: 138 MMOL/L (ref 135–144)
TRIGL SERPL-MCNC: 105 MG/DL
VLDLC SERPL CALC-MCNC: NORMAL MG/DL (ref 1–30)

## 2020-12-03 PROCEDURE — 90694 VACC AIIV4 NO PRSRV 0.5ML IM: CPT | Performed by: FAMILY MEDICINE

## 2020-12-03 PROCEDURE — 1090F PRES/ABSN URINE INCON ASSESS: CPT | Performed by: FAMILY MEDICINE

## 2020-12-03 PROCEDURE — 99213 OFFICE O/P EST LOW 20 MIN: CPT | Performed by: FAMILY MEDICINE

## 2020-12-03 PROCEDURE — G8417 CALC BMI ABV UP PARAM F/U: HCPCS | Performed by: FAMILY MEDICINE

## 2020-12-03 PROCEDURE — 4040F PNEUMOC VAC/ADMIN/RCVD: CPT | Performed by: FAMILY MEDICINE

## 2020-12-03 PROCEDURE — G8400 PT W/DXA NO RESULTS DOC: HCPCS | Performed by: FAMILY MEDICINE

## 2020-12-03 PROCEDURE — 1036F TOBACCO NON-USER: CPT | Performed by: FAMILY MEDICINE

## 2020-12-03 PROCEDURE — 3017F COLORECTAL CA SCREEN DOC REV: CPT | Performed by: FAMILY MEDICINE

## 2020-12-03 PROCEDURE — G8484 FLU IMMUNIZE NO ADMIN: HCPCS | Performed by: FAMILY MEDICINE

## 2020-12-03 PROCEDURE — 3023F SPIROM DOC REV: CPT | Performed by: FAMILY MEDICINE

## 2020-12-03 PROCEDURE — 80061 LIPID PANEL: CPT

## 2020-12-03 PROCEDURE — 36415 COLL VENOUS BLD VENIPUNCTURE: CPT

## 2020-12-03 PROCEDURE — G8427 DOCREV CUR MEDS BY ELIG CLIN: HCPCS | Performed by: FAMILY MEDICINE

## 2020-12-03 PROCEDURE — 1123F ACP DISCUSS/DSCN MKR DOCD: CPT | Performed by: FAMILY MEDICINE

## 2020-12-03 PROCEDURE — 80048 BASIC METABOLIC PNL TOTAL CA: CPT

## 2020-12-03 PROCEDURE — G8926 SPIRO NO PERF OR DOC: HCPCS | Performed by: FAMILY MEDICINE

## 2020-12-03 PROCEDURE — G0439 PPPS, SUBSEQ VISIT: HCPCS | Performed by: FAMILY MEDICINE

## 2020-12-03 PROCEDURE — 83036 HEMOGLOBIN GLYCOSYLATED A1C: CPT

## 2020-12-03 RX ORDER — CARVEDILOL 3.12 MG/1
3.12 TABLET ORAL 2 TIMES DAILY WITH MEALS
Qty: 60 TABLET | Refills: 5 | Status: SHIPPED | OUTPATIENT
Start: 2020-12-03 | End: 2021-06-08 | Stop reason: SDUPTHER

## 2020-12-03 ASSESSMENT — ENCOUNTER SYMPTOMS
DIARRHEA: 0
WHEEZING: 0
COUGH: 0
CONSTIPATION: 0
ABDOMINAL PAIN: 0
BLOOD IN STOOL: 0
CHEST TIGHTNESS: 0
SHORTNESS OF BREATH: 0

## 2020-12-03 NOTE — PROGRESS NOTES
Medicare Annual Wellness Visit  Name: Akilah Cobb Date: 12/3/2020   MRN: [id number] Sex: Female   Age: 78 y.o. Ethnicity: Non-/Non    : 1950 Race: Eliza Kim is here for Medicare AWV and 1 Year Follow Up (COPD)    Screenings for behavioral, psychosocial and functional/safety risks, and cognitive dysfunction are all negative except as indicated below. These results, as well as other patient data from the [address] form, are documented in Flowsheets linked to this Encounter. Allergies   Allergen Reactions    Vicodin [Hydrocodone-Acetaminophen]          Prior to Visit Medications    Medication Sig Taking? Authorizing Provider   carvedilol (COREG) 3.125 MG tablet Take 1 tablet by mouth 2 times daily (with meals) Yes Bernardino Rodríguez MD   fluticasone-umeclidin-vilant (TRELEGY ELLIPTA) 666-60.1-27 MCG/INH AEPB Inhale 1 puff into the lungs daily Yes Bernardino Rodríguez MD   albuterol sulfate  (90 Base) MCG/ACT inhaler Inhale 2 puffs into the lungs every 4 hours as needed for Wheezing or Shortness of Breath Yes Constance Tobar DO   Fluticasone furoate-vilanterol (BREO ELLIPTA) 200-25 MCG/INH AEPB inhaler Inhale 1 puff into the lungs daily Yes JOHNATHAN Monge CNP   Handicap Placard MISC by Does not apply route Expires: 2024  Dx: R23.89, G93.5 Yes JOHNATHAN Doyle CNP   ibuprofen (ADVIL;MOTRIN) 600 MG tablet Take 1 tablet by mouth every 8 hours as needed for Pain Yes MD Dejan Lebron [employer] (ADJUST BATH/SHOWER SEAT/BACK) MISC Use daily with showers Yes MD Dejan Lebron [employer] (SUCTION GRAB BAR) MISC Use in the shower Yes Bernardino Rodríguez MD   aspirin 325 MG tablet Take 325 mg by mouth daily Yes Historical Provider, MD   loratadine (CLARITIN) 10 MG tablet Take 10 mg by mouth daily Yes Historical Provider, MD   sertraline (ZOLOFT) 25 MG tablet Take 0.5 tablets by mouth nightly  JOHNATHAN Dumas CNP health is?: Good  In the past 7 days, have you experienced any of the following?  New or Increased Pain, New or Increased Fatigue, Loneliness, Social Isolation, Stress or Anger?: (!) Social Isolation  Do you get the social and emotional support that you need?: Yes  Do you have a Living Will?: (!) No  Advance Directives     Power of [address] Will ACP-Advance Directive ACP-Power of     Not on File Not on File [address] [facility] Risk Interventions:  · No Living Will: Patient declines ACP discussion/assistance    Health Habits/Nutrition:  Health Habits/Nutrition  Do you exercise for at least 20 minutes 2-3 times per week?: (!) No  Have you lost any weight without trying in the past 3 months?: No  Do you eat fewer than 2 meals per day?: (!) Yes  Have you seen a dentist within the past year?: (!) No  Body mass index: (!) 46  Health Habits/Nutrition Interventions:  · Dental exam overdue:  patient encouraged to make appointment with his/her dentist    Hearing/Vision:  No exam data present  Hearing/Vision  Do you or your family notice any trouble with your hearing?: (!) Yes  Do you have difficulty driving, watching TV, or doing any of your daily activities because of your eyesight?: No  Have you had an eye exam within the past year?: (!) No  Hearing/Vision Interventions:  · Hearing concerns:  patient declines any further evaluation/treatment for hearing issues  · Vision concerns:  patient encouraged to make appointment with his/her eye specialist    Personalized Preventive Plan   Current Health Maintenance Status  Immunization History   Administered Date(s) Administered    Influenza, Quadv, adjuvanted, 65 yrs +, IM, PF (Fluad) 12/03/2020    Influenza, Triv, inactivated, subunit, adjuvanted, IM (Fluad 65 yrs and older) 01/08/2020    Pneumococcal Conjugate 13-valent ([id number]) 05/17/2019    Pneumococcal Polysaccharide ([id number]) 07/02/2012        Health Maintenance   Topic Date Due    Colon cancer screen colonoscopy  10/08/2002    Annual Wellness Visit (AWV)  05/29/2019    Pneumococcal 65+ years Vaccine (2 of 2 - PPSV23) 05/17/2020    DTaP/Tdap/Td vaccine (1 - Tdap) 12/03/2021 (Originally [birth year]8/1971)    Shingles Vaccine (1 of 2) 12/03/2021 (Originally 10/8/2002)    Low dose CT lung screening  03/03/2021    Breast cancer screen  04/11/2021    Diabetes screen  02/28/2023    Lipid screen  03/23/2023    DEXA (modify frequency per FRAX score)  Completed    Flu vaccine  Completed    Hepatitis A vaccine  Aged Out    Hepatitis B vaccine  Aged Out    Hib vaccine  Aged Out    Meningococcal (ACWY) vaccine  Aged Out    Hepatitis C screen  Discontinued     Recommendations for [facility] Due: see orders and patient instructions/AVS.  . Recommended screening schedule for the next 5-10 years is provided to the patient in written form: see Patient Dee Dee Davis was seen today for medicare awv and 1 year follow up. Diagnoses and all orders for this visit:    Routine general medical examination at a health care facility  -     Hemoglobin A1C; Future    Coronary artery disease involving native coronary artery of native heart without angina pectoris  -     carvedilol (COREG) 3.125 MG tablet; Take 1 tablet by mouth 2 times daily (with meals)  -     Basic Metabolic Panel; Future    Flu vaccine need  -     INFLUENZA, QUADV, ADJUVANTED, 65 YRS =, IM, PF, PREFILL SYR, 0.5ML (FLUAD)    Screening for colon cancer  -     Cologuard (For External Results Only); Future    Dyslipidemia  -     Lipid Panel; Future    Other orders  -     fluticasone-umeclidin-vilant (Domi Born) 100-62.5-25 MCG/INH AEPB;  Inhale 1 puff into the lungs daily        Kika Sierra MD  12/3/2020  9:54 AM

## 2020-12-03 NOTE — PROGRESS NOTES
Have you had an allergic reaction to the flu (influenza) shot? no  Are you allergic to eggs or any component of the flu vaccine? no  Do you have a history of Guillain-Windham Syndrome (GBS), which is paralysis after receiving the flu vaccine? no  Are you feeling well today? yes  Flu vaccine given as ordered. Patient tolerated it well. No questions re: VIS information.

## 2020-12-03 NOTE — PROGRESS NOTES
[facility] [address]  [address] [address]  Dept: [phone]  Dept Fax: [phone]  Loc: [phone]    Lyubov Chaudhari is a 78 y.o. female who presents today for her medical conditions/complaints as noted below. Lyubov Chaudhari is c/o of   Chief Complaint   Patient presents with    Medicare AWV    1 Year Follow Up     COPD       HPI:     HPI Here today for a follow up of her COPD and her MWV    COPD: stable; she only gets short of breath when she gets anxious or when she wears a mask. She doesn't cough much at all right now. She doesn't feel like she is wheezing much. She feels like she needs her albuterol about 2-4 times a day. She rarely has a productive cough. She is not having any problems with chest pain. She has been getting some headaches over the past week. She is not getting lightheaded or dizzy at all upon standing.        Past Medical History:   Diagnosis Date    Chronic obstructive pulmonary disease ([facility] [address]     Coronary artery disease     Dyslipidemia     Obesity     Pneumonia           Social History     Tobacco Use    Smoking status: Former Smoker     Packs/day: 1.00     Years: 50.00     Pack years: 50.00     Types: Cigarettes     Start date: 1970     Last attempt to quit: 2019     Years since quittin.5    Smokeless tobacco: Never Used   Substance Use Topics    Alcohol use: No     Current Outpatient Medications   Medication Sig Dispense Refill    carvedilol (COREG) 3.125 MG tablet Take 1 tablet by mouth 2 times daily (with meals) 60 tablet 5    fluticasone-umeclidin-vilant (TRELEGY ELLIPTA) 100-62.5-25 MCG/INH AEPB Inhale 1 puff into the lungs daily 30 each 3    albuterol sulfate  (90 Base) MCG/ACT inhaler Inhale 2 puffs into the lungs every 4 hours as needed for Wheezing or Shortness of Breath 1 Inhaler 3    Fluticasone furoate-vilanterol (BREO ELLIPTA) 200-25 MCG/INH AEPB inhaler Inhale 1 puff into the lungs daily 1 each 11    Handicap Placard MISC by Does not apply route Expires: 7/26/2024  Dx: R23.89, G93.5 1 each 0    ibuprofen (ADVIL;MOTRIN) 600 MG tablet Take 1 tablet by mouth every 8 hours as needed for Pain 20 tablet 0    Misc. Devices (ADJUST BATH/SHOWER SEAT/BACK) MISC Use daily with showers 1 each 0    Misc. Devices (SUCTION GRAB BAR) MISC Use in the shower 2 each 0    aspirin 325 MG tablet Take 325 mg by mouth daily      loratadine (CLARITIN) 10 MG tablet Take 10 mg by mouth daily      sertraline (ZOLOFT) 25 MG tablet Take 0.5 tablets by mouth nightly 15 tablet 1    butalbital-acetaminophen-caffeine (FIORICET, ESGIC) -40 MG per tablet Take 1 tablet by mouth every 4 hours as needed for Headaches (Patient not taking: Reported on 8/12/2020) 30 tablet 0     No current facility-administered medications for this visit. Allergies   Allergen Reactions    Vicodin [Hydrocodone-Acetaminophen]        Subjective:     Review of Systems   Constitutional: Negative for activity change, appetite change, chills, fatigue and fever. Eyes: Negative for visual disturbance. Respiratory: Negative for cough, chest tightness, shortness of breath and wheezing. Cardiovascular: Negative for chest pain, palpitations and leg swelling. Gastrointestinal: Negative for abdominal pain, blood in stool, constipation and diarrhea. Genitourinary: Negative for difficulty urinating. Neurological: Negative for dizziness, syncope, weakness, light-headedness and headaches. Psychiatric/Behavioral: Negative for dysphoric mood and sleep disturbance. The patient is not nervous/anxious. Objective:      Physical Exam  Vitals signs and nursing note reviewed. Constitutional:       General: She is not in acute distress. Appearance: She is well-developed.    Eyes:      Conjunctiva/sclera: Conjunctivae normal.   Neck:      Musculoskeletal: Normal range of motion and neck supple. Thyroid: No thyromegaly. Cardiovascular:      Rate and Rhythm: Normal rate and regular rhythm. Heart sounds: Normal heart sounds. No murmur. Pulmonary:      Effort: Pulmonary effort is normal. No respiratory distress. Breath sounds: Normal breath sounds. No wheezing. Lymphadenopathy:      Cervical: No cervical adenopathy. Skin:     General: Skin is warm and dry. Findings: No erythema or rash. Neurological:      Mental Status: She is alert and oriented to person, place, and time. Psychiatric:         Mood and Affect: Mood normal.         Behavior: Behavior normal.         Thought Content: Thought content normal.         Judgment: Judgment normal.       /78   Pulse 95   Temp 98.1 °F (36.7 °C)   Ht 5' 6\" (1.676 m)   Wt 285 lb (129.3 kg)   LMP 08/01/2000 (Approximate)   SpO2 99%   BMI 46.00 kg/m²     Assessment:       Diagnosis Orders   1. Routine general medical examination at a health care facility  Hemoglobin A1C   2. Mixed simple and mucopurulent chronic bronchitis ([address]     3. Coronary artery disease involving native coronary artery of native heart without angina pectoris  carvedilol (COREG) 3.125 MG tablet    Basic Metabolic Panel   4. Flu vaccine need  INFLUENZA, QUADV, ADJUVANTED, 65 YRS =, IM, PF, PREFILL SYR, 0.5ML (FLUAD)   5. Screening for colon cancer  Cologuard (For External Results Only)   6. Dyslipidemia  Lipid Panel             Plan:        MWV: see other note    COPD: stable; she is doing okay but since she is using her albuterol frequently I will try switching her to trelegy to see if that is covered. If not she can stay on the breo. She will continue to see pulmonology. CAD: stable; she is doing well and she is currently asymptomatic. She does not see cardiology. Health Maintenance reviewed - Flu shot given, cologuard ordered. Return in 6 months (on 6/3/2021) for COPD follow up.     Orders Placed This Encounter   Procedures    Xander (For External Results Only)     This test is performed by an external laboratory and is used for result attachment only. It is required that this order requisition be faxed to: [facility] @ [phone]. See [email or web address] for further information. Standing Status:   Future     Standing Expiration Date:   12/3/2021    INFLUENZA, QUADV, ADJUVANTED, 65 YRS =, IM, PF, PREFILL SYR, 0.5ML (FLUAD)    Basic Metabolic Panel     Standing Status:   Future     Number of Occurrences:   1     Standing Expiration Date:   12/3/2021    Lipid Panel     Standing Status:   Future     Number of Occurrences:   1     Standing Expiration Date:   12/3/2021     Order Specific Question:   Is Patient Fasting?/# of Hours     Answer:   0 per dr Vanesa Hernandez Hemoglobin A1C     Standing Status:   Future     Number of Occurrences:   1     Standing Expiration Date:   12/3/2021     Orders Placed This Encounter   Medications    carvedilol (COREG) 3.125 MG tablet     Sig: Take 1 tablet by mouth 2 times daily (with meals)     Dispense:  60 tablet     Refill:  5    fluticasone-umeclidin-vilant (TRELEGY ELLIPTA) 100-62.5-25 MCG/INH AEPB     Sig: Inhale 1 puff into the lungs daily     Dispense:  30 each     Refill:  3       Patientgiven educational materials - see patient instructions. Discussed use, benefit,and side effects of prescribed medications. All patient questions answered. Ptvoiced understanding. Reviewed health maintenance. Instructed to continue currentmedications, diet and exercise. Patient agreed with treatment plan. Follow up asdirected.      Electronically signed by Aparna Etienne MD on 12/3/2020 at 9:57 AM

## 2020-12-03 NOTE — PATIENT INSTRUCTIONS
Personalized Preventive Plan for Alida Duane - 12/3/2020  Medicare offers a range of preventive health benefits. Some of the tests and screenings are paid in full while other may be subject to a deductible, co-insurance, and/or copay. Some of these benefits include a comprehensive review of your medical history including lifestyle, illnesses that may run in your family, and various assessments and screenings as appropriate. After reviewing your medical record and screening and assessments performed today your provider may have ordered immunizations, labs, imaging, and/or referrals for you. A list of these orders (if applicable) as well as your Preventive Care list are included within your After Visit Summary for your review. Other Preventive Recommendations:    · A preventive eye exam performed by an eye specialist is recommended every 1-2 years to screen for glaucoma; cataracts, macular degeneration, and other eye disorders. · A preventive dental visit is recommended every 6 months. · Try to get at least 150 minutes of exercise per week or 10,000 steps per day on a pedometer . · Order or download the FREE \"Exercise & Physical Activity: Your Everyday Guide\" from [employer]. Call 8-729.981.3183 or search [employer] online. · You need 3182-4765 mg of calcium and 1395-6844 IU of vitamin D per day. It is possible to meet your calcium requirement with diet alone, but a vitamin D supplement is usually necessary to meet this goal.  · When exposed to the sun, use a sunscreen that protects against both UVA and UVB radiation with an SPF of 30 or greater. Reapply every 2 to 3 hours or after sweating, drying off with a towel, or swimming. · Always wear a seat belt when traveling in a car. Always wear a helmet when riding a bicycle or motorcycle.

## 2021-01-14 ENCOUNTER — OFFICE VISIT (OUTPATIENT)
Dept: NEUROLOGY | Age: 71
End: 2021-01-14
Payer: MEDICARE

## 2021-01-14 VITALS
OXYGEN SATURATION: 93 % | RESPIRATION RATE: 16 BRPM | SYSTOLIC BLOOD PRESSURE: 122 MMHG | DIASTOLIC BLOOD PRESSURE: 86 MMHG | BODY MASS INDEX: 47.09 KG/M2 | HEART RATE: 78 BPM | HEIGHT: 66 IN | WEIGHT: 293 LBS

## 2021-01-14 DIAGNOSIS — R26.89 BALANCE PROBLEM: ICD-10-CM

## 2021-01-14 DIAGNOSIS — G45.0 VBI (VERTEBROBASILAR INSUFFICIENCY): ICD-10-CM

## 2021-01-14 DIAGNOSIS — I25.10 CORONARY ARTERY DISEASE INVOLVING NATIVE CORONARY ARTERY OF NATIVE HEART WITHOUT ANGINA PECTORIS: ICD-10-CM

## 2021-01-14 DIAGNOSIS — J44.9 CHRONIC OBSTRUCTIVE PULMONARY DISEASE, UNSPECIFIED COPD TYPE (HCC): ICD-10-CM

## 2021-01-14 DIAGNOSIS — G44.1 OTHER VASCULAR HEADACHE: ICD-10-CM

## 2021-01-14 DIAGNOSIS — G93.5 CHIARI MALFORMATION TYPE I (HCC): ICD-10-CM

## 2021-01-14 DIAGNOSIS — R51.9 OCCIPITAL HEADACHE: ICD-10-CM

## 2021-01-14 DIAGNOSIS — I67.82 CHRONIC CEREBRAL ISCHEMIA: ICD-10-CM

## 2021-01-14 DIAGNOSIS — F33.1 MODERATE EPISODE OF RECURRENT MAJOR DEPRESSIVE DISORDER (HCC): ICD-10-CM

## 2021-01-14 DIAGNOSIS — G31.9 ACQUIRED CEREBRAL ATROPHY (HCC): ICD-10-CM

## 2021-01-14 DIAGNOSIS — E78.5 DYSLIPIDEMIA: ICD-10-CM

## 2021-01-14 DIAGNOSIS — G93.89 CEREBRAL VENTRICULOMEGALY: Primary | ICD-10-CM

## 2021-01-14 DIAGNOSIS — Z91.09 ENVIRONMENTAL ALLERGIES: ICD-10-CM

## 2021-01-14 DIAGNOSIS — I63.10 CEREBRAL INFARCTION DUE TO EMBOLISM OF PRECEREBRAL ARTERY (HCC): ICD-10-CM

## 2021-01-14 DIAGNOSIS — Z98.2 S/P VP SHUNT: ICD-10-CM

## 2021-01-14 DIAGNOSIS — E66.01 CLASS 2 SEVERE OBESITY DUE TO EXCESS CALORIES WITH SERIOUS COMORBIDITY IN ADULT, UNSPECIFIED BMI (HCC): ICD-10-CM

## 2021-01-14 DIAGNOSIS — R93.0 ABNORMAL CT OF THE HEAD: ICD-10-CM

## 2021-01-14 DIAGNOSIS — R42 DIZZINESS: ICD-10-CM

## 2021-01-14 DIAGNOSIS — G91.2 NORMAL PRESSURE HYDROCEPHALUS (HCC): ICD-10-CM

## 2021-01-14 PROCEDURE — 1036F TOBACCO NON-USER: CPT | Performed by: PSYCHIATRY & NEUROLOGY

## 2021-01-14 PROCEDURE — G8427 DOCREV CUR MEDS BY ELIG CLIN: HCPCS | Performed by: PSYCHIATRY & NEUROLOGY

## 2021-01-14 PROCEDURE — 3017F COLORECTAL CA SCREEN DOC REV: CPT | Performed by: PSYCHIATRY & NEUROLOGY

## 2021-01-14 PROCEDURE — G8400 PT W/DXA NO RESULTS DOC: HCPCS | Performed by: PSYCHIATRY & NEUROLOGY

## 2021-01-14 PROCEDURE — 3023F SPIROM DOC REV: CPT | Performed by: PSYCHIATRY & NEUROLOGY

## 2021-01-14 PROCEDURE — 4040F PNEUMOC VAC/ADMIN/RCVD: CPT | Performed by: PSYCHIATRY & NEUROLOGY

## 2021-01-14 PROCEDURE — 99215 OFFICE O/P EST HI 40 MIN: CPT | Performed by: PSYCHIATRY & NEUROLOGY

## 2021-01-14 PROCEDURE — G8417 CALC BMI ABV UP PARAM F/U: HCPCS | Performed by: PSYCHIATRY & NEUROLOGY

## 2021-01-14 PROCEDURE — G8926 SPIRO NO PERF OR DOC: HCPCS | Performed by: PSYCHIATRY & NEUROLOGY

## 2021-01-14 PROCEDURE — 1123F ACP DISCUSS/DSCN MKR DOCD: CPT | Performed by: PSYCHIATRY & NEUROLOGY

## 2021-01-14 PROCEDURE — G8484 FLU IMMUNIZE NO ADMIN: HCPCS | Performed by: PSYCHIATRY & NEUROLOGY

## 2021-01-14 PROCEDURE — 1090F PRES/ABSN URINE INCON ASSESS: CPT | Performed by: PSYCHIATRY & NEUROLOGY

## 2021-01-14 PROCEDURE — 99213 OFFICE O/P EST LOW 20 MIN: CPT

## 2021-01-14 ASSESSMENT — ENCOUNTER SYMPTOMS
BACK PAIN: 0
EYE DISCHARGE: 0
NAUSEA: 0
CHOKING: 0
BLURRED VISION: 0
SORE THROAT: 0
CONSTIPATION: 0
APNEA: 0
VOMITING: 0
CHANGE IN BOWEL HABIT: 0
FACIAL SWELLING: 0
FACIAL SWEATING: 0
EYE PAIN: 0
COUGH: 0
EYE REDNESS: 0
DIARRHEA: 0
VISUAL CHANGE: 0
BOWEL INCONTINENCE: 0
TROUBLE SWALLOWING: 0
WHEEZING: 0
SINUS PRESSURE: 0
EYE WATERING: 0
ABDOMINAL DISTENTION: 0
VOICE CHANGE: 0
ABDOMINAL PAIN: 0
CHEST TIGHTNESS: 0
COLOR CHANGE: 0
EYE ITCHING: 0
BLOOD IN STOOL: 0
SCALP TENDERNESS: 0
PHOTOPHOBIA: 1
SHORTNESS OF BREATH: 0
SWOLLEN GLANDS: 0

## 2021-01-14 NOTE — PROGRESS NOTES
[facility]  Neurology  [address] [place] [address]  ATPUR:[phone]   Fax: [phone]    SUBJECTIVE:     PATIENT ID:  Elizabeth Garcia is a  RIGHT   HANDED 78 y.o. female. Dizziness  This is a new problem. Episode onset: SINCE   EARLY  APRIL 2019. The problem occurs intermittently. The problem has been resolved. Associated symptoms include headaches, numbness and vertigo. Pertinent negatives include no abdominal pain, anorexia, arthralgias, change in bowel habit, chest pain, chills, congestion, coughing, diaphoresis, fatigue, fever, joint swelling, myalgias, nausea, neck pain, rash, sore throat, swollen glands, urinary symptoms, visual change, vomiting or weakness. The symptoms are aggravated by bending and standing. She has tried rest and sleep (  SHUNT  PLACEMENT) for the symptoms. The treatment provided significant relief. Headache   This is a new problem. Episode onset: SINCE  APRIL 2019. The problem occurs intermittently. The problem has been waxing and waning. The pain is located in the occipital and frontal region. The pain does not radiate. The pain quality is not similar to prior headaches. The quality of the pain is described as aching and throbbing. The pain is at a severity of 3/10. The pain is mild. Associated symptoms include dizziness, a loss of balance, numbness, phonophobia and photophobia. Pertinent negatives include no abdominal pain, abnormal behavior, anorexia, back pain, blurred vision, coughing, drainage, ear pain, eye pain, eye redness, eye watering, facial sweating, fever, hearing loss, insomnia, muscle aches, nausea, neck pain, scalp tenderness, seizures, sinus pressure, sore throat, swollen glands, tingling, tinnitus, visual change, vomiting, weakness or weight loss. The symptoms are aggravated by coughing, bright light and noise. She has tried acetaminophen for the symptoms. The treatment provided significant relief.  Her past medical history is significant for obesity and sinus disease. There is no history of cancer, cluster headaches, hypertension, immunosuppression, migraine headaches, migraines in the family, pseudotumor cerebri, recent head traumas or TMJ. Neurologic Problem  The patient's primary symptoms include clumsiness, a loss of balance and memory loss. The patient's pertinent negatives include no syncope, visual change or weakness. This is a chronic problem. The neurological problem developed insidiously. The problem is unchanged. There was no focality noted. Associated symptoms include dizziness, headaches and vertigo. Pertinent negatives include no abdominal pain, auditory change, aura, back pain, bladder incontinence, bowel incontinence, chest pain, confusion, diaphoresis, fatigue, fever, light-headedness, nausea, neck pain, palpitations, shortness of breath or vomiting. Past treatments include bed rest, medication and sleep. The treatment provided moderate relief. Her past medical history is significant for dementia. There is no history of a bleeding disorder, a clotting disorder, a CVA, head trauma, liver disease, mood changes or seizures. History obtained from  The patient       and other  available medical records were  Also  reviewed. The  Duration,  Quality,  Severity,  Location,  Timing,  Context,  Modifying  Factors   Of   The   Chief   Complaint       AndPresent  Illness   Was   Reviewed   In   Chronological   Manner.                                                PATIENT'S  MAIN  CONCERNS INCLUDE :                       1)     DIZZINESS     INTERMITTENT     SINCE      April 2019                                         -     IMPROVED    SIGNIFICANTLY                               2)       GLOBAL  HEADACHES     STARTING  IN  OCCIPITAL   AREA                                    RADIATING  TO    FRONTAL  AREA      SINCE   April 2019                                                  -     RESOLVED 3)    H/O   CHRONIC   GAIT  AND  BALANCE  PROBLEMS                                         DUE  TO  DIZZINESS  SINCE  April 2019                                                -     IMPROVED    SIGNIFICANTLY                                 4)   NO   H/O   FALLS                                NO   H/O   HEAD  INJURIES                                               5)     PREVIOUS    H/O     CHRONIC  SMOKING                    PATIENT  AWARE  OF  RISKS  AND                 SIDE  EFFECTS  OF   SMOKING   DISCUSSED. PATIENT   ADVISED   AND  COUNSELED    TO   QUIT  SMOKING                       PATIENT  STOPPED   SMOKING  SINCE   2019  . 6)     MULTIPLE   CO  MORBID  MEDICAL  CONDITIONS                             BEING  FOLLOWED  BY  HER  PCP. 7)    CT  HEAD    5/ 02/ 2019     SHOWED                                     Partially visualized Chiari malformation.      Ventriculomegaly is noted as well                             as cerebral and cerebellar cortical atrophy     with chronic appearing white     matter changes in the brain. 8)    D / D   INCLUDE   POST  FOSSA  PATHOLOGY,                                      TIA,   CEREBRAL ISCHEMIA,                                          CARDIAC  PATHOLOGY.                                               9)       MRI  BRAIN   WITH  AND  W/O  CONTRAST   DONE                                AT  [facility]                                     DATED   5 /15/ 2019  REVIEWED         SHOWED                                        \"   HYDROCEPHALUS   OF   RECENT  TO  SUBACUTE.                                       ASSOCIATED   TONSILLAR  HERNIATION  AT  FORAMEN  MAGNUM   SUSPECTED  \"    .                                             RECOMMENDED      EMERGENCY    NEURO  SURGICAL EVALUATION                                          FOR      VENTRICULAR  DRAIN                         10) Diagnosis Date    Chronic obstructive pulmonary disease ([facility] [address]     Coronary artery disease     Dyslipidemia     Obesity     Pneumonia          Past Surgical History:   Procedure Laterality Date    CARDIAC CATHETERIZATION      CARDIAC CATHETERIZATION  2012    cath with x1 stent    EYE SURGERY      shar cataract surg x2 y ago    VENTRICULOPERITONEAL SHUNT Right 5/16/2019    VENTRICULAR PERITONEAL SHUNT INSERTION ENDOSCOPIC performed by Christo Nayak MD at [place] [address]         Current Outpatient Medications   Medication Sig Dispense Refill    carvedilol (COREG) 3.125 MG tablet Take 1 tablet by mouth 2 times daily (with meals) 60 tablet 5    fluticasone-umeclidin-vilant (TRELEGY ELLIPTA) 100-62.5-25 MCG/INH AEPB Inhale 1 puff into the lungs daily 30 each 3    albuterol sulfate  (90 Base) MCG/ACT inhaler Inhale 2 puffs into the lungs every 4 hours as needed for Wheezing or Shortness of Breath 1 Inhaler 3    Handicap Placard MISC by Does not apply route Expires: 7/26/2024  Dx: R23.89, G93.5 1 each 0    Misc. Devices (ADJUST BATH/SHOWER SEAT/BACK) MISC Use daily with showers 1 each 0    Misc. Devices (SUCTION GRAB BAR) MISC Use in the shower 2 each 0    aspirin 325 MG tablet Take 325 mg by mouth daily      loratadine (CLARITIN) 10 MG tablet Take 10 mg by mouth daily      sertraline (ZOLOFT) 25 MG tablet Take 0.5 tablets by mouth nightly 15 tablet 1    Fluticasone furoate-vilanterol (BREO ELLIPTA) 200-25 MCG/INH AEPB inhaler Inhale 1 puff into the lungs daily 1 each 11    ibuprofen (ADVIL;MOTRIN) 600 MG tablet Take 1 tablet by mouth every 8 hours as needed for Pain (Patient not taking: Reported on 1/14/2021) 20 tablet 0    butalbital-acetaminophen-caffeine (FIORICET, ESGIC) -40 MG per tablet Take 1 tablet by mouth every 4 hours as needed for Headaches (Patient not taking: Reported on 8/12/2020) 30 tablet 0     No current facility-administered medications for this visit.           Allergies Vascular: No carotid bruit. Trachea: No tracheal deviation. Meningeal: Brudzinski's sign and Kernig's sign absent. Cardiovascular:      Rate and Rhythm: Normal rate and regular rhythm. Pulmonary:      Effort: Pulmonary effort is normal.   Musculoskeletal:         General: No tenderness. Skin:     General: Skin is warm. Coloration: Skin is not pale. Findings: No erythema or rash. Nails: There is no clubbing. Psychiatric:         Attention and Perception: She is attentive. Mood and Affect: Mood is not anxious or depressed. Affect is not labile, blunt or inappropriate. Behavior: Behavior is slowed. Behavior is not agitated, aggressive, withdrawn, hyperactive or combative. Behavior is cooperative. Thought Content: Thought content is not paranoid or delusional. Thought content does not include homicidal or suicidal ideation. Thought content does not include homicidal or suicidal plan. Cognition and Memory: Cognition is impaired. Memory is impaired. Judgment: Judgment is not impulsive or inappropriate. Neurologic Exam      NEUROLOGICALEXAMINATION :      A) MENTAL STATUS:                   Alert and  oriented  To time, place  And  Person. No Aphasia. No  Dysarthria. Able   To  Follow   SIMPLE   commands without   Any  Difficulty. No right  To left confusion. SLOW   Speech  And language function. Insight and  Judgment ,Fund  Of  Knowledge   within normal limits                Recent  And  Remote memory    DECREASED                Attention &Concentration are    DECREASED                                                  B) CRANIAL NERVES :      CN : Visual  Acuity  And  Visual fields  within normal limits               Fundi  Could  Not  Be  Could  Not  Be  Evaluated. 3,4,6 CN : Both  Pupils are   Reactive and  Equal.  Movements  Are  Intact. No  Nystagmus. No  RHONDA. No  Afferent  Pupillary  Defect noted. 5 CN :  Normal  Facial sensations and Corneal  Reflexes           7 CN:  Normal  Facial  Symmetry  And  Strength. No facial  Weakness. 8 CN :  Hearing  Appears within normal limits          9, 10 CN: Normal spontaneous, reflex palate movements         11 CN:   Normal  Shouldershrug and  strength         12 CN :   Normal  Tongue movements and  Tongue  In midline                        No tongue   Fasciculations or atrophy       C) MOTOR  EXAM:                 Strength  In upper  AndLower extremities   within normal limits             Rapid alternating  And  repetitions  Movements  within normal limits               Muscle  Tone  In upper  And  LowerExtremities  normal                No rigidity. No  Spasticity. Bradykinesia   absent               No  Asterixis. Sustention  Tremor , Resting  Tremor   absent                    No other  Abnormal  Movements noted           D) SENSORY :               light touch, pinprick, position  And  Vibration  within normal limits        E) REFLEXES:                   Deep  Tendon  Reflexes normal                  No  pathological  Reflexes  Bilaterally.                                   F) COORDINATION  AND  GAIT :                                Station and  Gait    SLOW                             Romberg 's test   POSITIVE                             Ataxia negative          ASSESSMENT:    Patient Active Problem List   Diagnosis    Tobacco abuse    Environmental allergies    Obesity    Dyslipidemia    Coronary artery disease    Chronic obstructive pulmonary disease (HCC)    Occipital headache    Dizziness    Cerebral ventriculomegaly    Acquired cerebral atrophy (HCC)    Chronic cerebral ischemia    Chiari malformation type I ([facility] [address]    Balance problem    VBI (vertebrobasilar insufficiency)    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 Dose modulation, iterative reconstruction, and/or weight based   adjustment of the mA/kV was utilized to reduce the radiation dose to as low   as reasonably achievable.       COMPARISON:   June 7, 2019       HISTORY:   ORDERING SYSTEM PROVIDED HISTORY: Memory loss with history of cerebral shunt   placement   TECHNOLOGIST PROVIDED HISTORY:       Ordering Physician Provided Reason for Exam: Dizzy, fall today hit head, pt   has recently placed shunt   Acuity: Acute   Type of Exam: Initial       FINDINGS:   BRAIN/VENTRICLES: There is no acute intracranial hemorrhage, mass effect or   midline shift. No abnormal extra-axial fluid collection.  The gray-white   differentiation is maintained without evidence of an acute infarct. There is   prominence of the ventricles and sulci due to global parenchymal volume loss.    There are nonspecific areas of hypoattenuation within the periventricular and   subcortical white matter, which likely represent chronic microvascular   ischemic change.       ORBITS: Bilateral lens implants.       SINUSES: The visualized paranasal sinuses and mastoid air cells demonstrate   no acute abnormality.       SOFT TISSUES/SKULL: Right frontal approach ventriculostomy catheter   terminates in the of the left lateral ventricle.           Impression   Stable ventriculomegaly and  shunt.  No acute disease.               CT OF THE HEAD WITHOUT CONTRAST  5/2/2019 2:52 pm       TECHNIQUE:   CT of the head was performed without the administration of intravenous   contrast. Dose modulation, iterative reconstruction, and/or weight based adjustment of the mA/kV was utilized to reduce the radiation dose to as low   as reasonably achievable.       COMPARISON:   None.       HISTORY:   ORDERING SYSTEM PROVIDED HISTORY: headache   TECHNOLOGIST PROVIDED HISTORY:       Ordering Physician Provided Reason for Exam: C/o headache/dizziness   Acuity: Acute   Type of Exam: Initial       FINDINGS:   BRAIN/VENTRICLES: There is no acute intracranial hemorrhage, mass effect or   midline shift.  No abnormal extra-axial fluid collection.  Ventriculomegaly   is noted and cerebellar tonsillar displacement into the foramen magnum,   extending off the field of view, is noted. Chloe Gravely is also mild cerebral and   cerebellar atrophy and scattered white matter changes in the brain.       ORBITS: The visualized portion of the orbits demonstrate no acute abnormality.       SINUSES: The visualized paranasal sinuses and mastoid air cells demonstrate   no acute abnormality.       SOFT TISSUES/SKULL:  No acute abnormality of the visualized skull or soft   tissues.           Impression   Partially visualized Chiari malformation.  Ventriculomegaly is noted as well   as cerebral and cerebellar cortical atrophy with chronic appearing white   matter changes in the brain.                 VISITING DIAGNOSIS:      ICD-10-CM    1. Cerebral ventriculomegaly  G93.89    2. Chiari malformation type I ([facility] [address]  G93.5    3. S/P  shunt  Z98.2    4. Coronary artery disease involving native coronary artery of native heart without angina pectoris  I25.10    5. Balance problem  R26.89    6. Normal pressure hydrocephalus (HCC)  G91.2    7. Acquired cerebral atrophy ([address]  G31.9    8. 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 Class 2 severe obesity due to excess calories with serious comorbidity in adult, unspecified BMI ([facility])  E66.01    10. Moderate episode of recurrent major depressive disorder ([facility] [address]  F33.1    11. Chronic cerebral ischemia  I67.82    12.  Occipital headache  R51.9    13. Dyslipidemia  E78.5    14. Dizziness  R42    15. Abnormal CT of the head  R93.0    16. VBI (vertebrobasilar insufficiency)  G45.0    17. Chronic obstructive pulmonary disease, unspecified COPD type ([facility]  J44.9    18. Environmental allergies  Z91.09    19. Other vascular headache  G44.1               CONCERNS   &   INCREASED   RISK   FOR         * TIA,  CEREBRO  VASCULAR  ISCHEMIA,STROKE     *   DIZZINESS,   VERTEBROBASILAR  INSUFFICIENCY ,         *  COMPLICATED  MIGRAINES      *     TENSION  HEADACHES        *  GAIT  DIFFICULTIES  &   BALANCE PROBLMES                     VARIOUS  RISK   FACTORS   WERE  REVIEWED   AND   DISCUSSED. *  PATIENT   HAS  MULTIPLE   MEDICAL, NEUROLOGICAL    PROBLEMS . PATIENT'S   MANAGEMENT  IS  CHALLENGING. PLAN:                    Chi Creamer  Of  The  Diagnoses,  The  Management & Treatment  Options            AND    Care  plan  Were          Reviewed and   Discussed   With  patient. * Goals  And  Expectations  Of  The  Therapy  Discussed   And  Reviewed. *   Benefits   And   Side  Effect  Profile  Of  Medication/s   Were   Discussed                * Need   For  Further   Follow up For  The  Various  Problems Were  discussed. * Results  Of  The  Previous  Diagnostic tests were reviewed and questions answered. patient  understand the same. Medical  Decision  Making  Was  Made  Based on the   Complexity  Of  Above  Mentioned  Diagnoses,        Data reviewed   & diagnostic  Tests  Reviewed,       Risk  Of  Significant   Co morbidities and   complicating   Factors.                Medical  Decision  Was   High    Complexity   Due   To  The  Patient's  Multiple  Symptoms,      Advancing   Disease,  Complex  Treatment  Regimen,  Multiple medications       and   Risk  Of   Side  Effects,  Difficulty  In  Medication  Management  And  Diagnostic  Challenges       In  View  Of  The  Associated Co  Morbid  Conditions   And  Problems. * FALL   PRECAUTIONS. THESE  REVIEWED   AND  DISCUSSED      *    SUPERVISED   CARE      *   ABSOLUTELY   NO  DRIVING      *   BE  CAREFUL  WITH  ACTIVITIES            *   ADEQUATE   FLUIDINTAKE   AND  ELECTROLYTE  BALANCE         * KEEP  DAIRY  OF   THE  NEUROLOGICAL  SYMPTOMS        RECORDING THE    DURATION  AND  FREQUENCY. *  AVOID    CONDITIONS  AND  FACTORS   THAT  MAKE                  NEUROLOGICAL  SYMPTOMS  WORSE.           *TO  MAINTAIN  REGULAR  SLEEP  WAKE  CYCLES. *   TO  HAVE  ADEQUATE  REST  AND   SLEEP    HOURS.          *    AVOID  ANY USAGE OF                   TOBACCO,EXCESSIVE  ALCOHOL  AND   ILLEGAL   SUBSTANCES        *   Compliance   With  Medications   And  Instructions      *    MIGRAINE/ HEADACHE    DAIRY   WITH  MONITORING                       OF  DURATION  ANDFREQUENCY. *     The    Antiplatelet  therapy    As   Recommended  Was   Discussed        *    Prophylactic  Use   Of     Vitamin   B   Complex,  Folic  Acid,    Vitamin  B12    Multivitamin,       Calcium  With  magnesium  And  Vit D    Supplementations   Over  The  Counter  Discussed              *  EVALUATIONS  AND  FOLLOW UP:                    * PHYSICAL  THERAPY                                *CARDIOLOGY              *   OPTHALMOLOGY                                *        PATIENT  HAD   VENTRICULAR  DRAIN    PLACEMENT  IN   MAY    2019        AND                                  WAS    IN    NURSING  HOME  REHAB   FOR  TWO  MONTHS . *         PATIENT  RECOVERED   WELL. NO   DIZZINESS. NO  BALANCE  PROBLEMS                                    NO   SEIZURES. NO   HEADACHES .                                     MEMORY    PROBLEMS     ARE  AT  HER  BASELINE                                *          FOLLOW UP  CT  HEAD   June 2019     SHOWED thirsty.  Eat at least 5 servings of fruits and vegetables every day. It may seem like a lot, but it is not hard to reach this goal. Aserving or helping is 1 piece of fruit, 1 cup of vegetables, or 2 cups of leafy, raw vegetables. Have an apple or some carrot sticks as an afternoon snack instead of a candy bar. Try to have fruits and/or vegetables at everymeal.   Make exercise part of your daily routine. You may want to start with simple activities, such as walking, bicycling, or slow swimming. Try akua active 30 to 60 minutes every day. You do not need to do all 30 to 60 minutes all at once. For example, you can exercise 3 times a day for 10 or 20 minutes. Moderate exercise is safe for most people, but it is always agood idea to talk to your doctor before starting an exercise program.   Keep moving. Renetta Camper the lawn, work in the garden, or [employer]. Take the stairs instead of the elevator at work.  If you smoke, quit. Peoplewho smoke have an increased risk for heart attack, stroke, cancer, and other lung illnesses. Quitting is hard, but there are ways to boost your chance of quitting tobacco for good.  Use nicotine gum, patches, or lozenges.  Ask your doctor about stop-smoking programs and medicines.  Keep trying.  In addition to reducing your risk of diseases in the future, you will notice some benefits soon after you stop using tobacco. If you have shortness of breath or asthma symptoms, they will likely getbetter within a few weeks after you quit.  Limit how much alcohol you drink. Moderate amounts of alcohol (up to 2 drinks a day for men, 1drink a day for women) are okay. But drinking too much can lead to liver problems, high blood pressure, and other health problems.  health   If you have a family, there are many things you can do together to improve your health.  Eat meals together as a family as often as possible.  Eat healthy foods.  This includes fruits, vegetables, lean meats and dairy, and whole grains.  Include your family in your fitness plan. Most peoplethink of activities such as jogging or tennis as the way to fitness, but there are many ways you and your family can be more active. Anything that makes you breathe hard and gets your heart pumping is exercise. Here are sometips:   Walk to do errands or to take your child to school or the bus.  Go for a family bike ride after dinner instead of watching TV.  Where can you learn more?  Go toLittle Big Thingstps://Burst.itjessi.[employer]. org and sign in to your [employer] account. Enter V382 in the Search HealthInformation box to learn more about \"A Healthy Lifestyle: Care Instructions. \"     If you do not have anaccount, please click on the \"Sign Up Now\" link.  Current as of: July 26, 2016   Content Version: 11.2   © 8735-9880 [employer]. Care instructions adapted under license by [facility] ([facility]). If you have questions about a medical condition or this instruction, always ask your healthcare professional. [employer] disclaims any warranty or liability for your use of this information.

## 2021-03-01 ENCOUNTER — TELEPHONE (OUTPATIENT)
Dept: ONCOLOGY | Age: 71
End: 2021-03-01

## 2021-03-01 NOTE — LETTER
3/1/2021        [address]    Dear Leonardo Pike: Your healthcare provider has ordered a low dose CT scan of the chest for lung cancer screening. You will find enclosed, information about CT lung screening. Please review the statement of understanding, you will be asked to sign a copy of this at the time of your CT scan    If you have not already been contacted to make the appointment for your scan, please call our scheduling department at [phone]    Keep in mind that CT lung screening does not take the place of smoking cessation. If you are a current smoker, you will find enclosed smoking cessation resources. Please do not hesitate to contact me if you have any questions or concerns.     [address] [place] [address] Lung Screening Program  [phone]

## 2021-03-09 RX ORDER — ALBUTEROL SULFATE 90 UG/1
2 AEROSOL, METERED RESPIRATORY (INHALATION) EVERY 4 HOURS PRN
Qty: 1 INHALER | Refills: 3 | Status: SHIPPED | OUTPATIENT
Start: 2021-03-09 | End: 2021-06-23 | Stop reason: SDUPTHER

## 2021-03-11 ENCOUNTER — HOSPITAL ENCOUNTER (OUTPATIENT)
Dept: CT IMAGING | Age: 71
Discharge: HOME | End: 2021-03-13
Payer: MEDICARE

## 2021-03-11 DIAGNOSIS — Z87.891 PERSONAL HISTORY OF TOBACCO USE: ICD-10-CM

## 2021-03-11 PROCEDURE — 71271 CT THORAX LUNG CANCER SCR C-: CPT

## 2021-03-22 DIAGNOSIS — Z12.11 SCREENING FOR COLON CANCER: ICD-10-CM

## 2021-04-13 DIAGNOSIS — J44.9 CHRONIC OBSTRUCTIVE PULMONARY DISEASE, UNSPECIFIED COPD TYPE (HCC): Primary | ICD-10-CM

## 2021-04-13 NOTE — PROGRESS NOTES
Subjective:      Patient ID: Anson Kent is a 78 y.o. female. HPI  Here for follow-up for COPD and lung nodules. Patient was last seen in the office in May 2020 per me. Patient has received her Moderna Covid vaccine is up-to-date on influenza and pneumococcal vaccine. Patient reports that she still has exertional dyspnea, cough that is generally nonproductive. She continues to be compliant with her Trelegy. Notes that it controls her symptoms fairly well throughout the day. Has been using her albuterol HFA, reports that with her inhaler after she gets below 100 inhalations she has the sensation that is not working as well. Discussed with patient on how to clean her inhaler and check to make sure the medication is not . Assessed her current inhaler and her albuterol is good through 2022. Encourage patient to clean her inhaler no visible debris but may be limiting the flow of the medication. Reviewed CT of her chest with patient. We will repeat in 1 year in 2022. Patient will follow up at that time. Medications:   Trelegy  Albuterol HFA:      PRIOR WORKUP:  Prior work-up:  PFT 2020: FEV1 50% of predicted with a 17% bronchodilator change, FVC 64% with a 28% bronchodilator change. FEV1/FVC ratio 60. Lung volume by body box notes total lung capacity 138% of predicted, RV of 227% of predicted consistent with air trapping and hyperventilation. Diffusion capacity is 52% of predicted uncorrected. Final impression: Stage II COPD with significant bronchodilator response, hyperinflation, air trapping and moderately decreased diffusion capacity due to emphysema versus anemia versus interstitial lung disease. CT chest 3/11/2021: Emphysematous changes with bibasilar atelectasis/scarring. No focal consolidation, pneumothorax or pleural effusion. 8 mm solid noncalcified pulmonary nodule in the right lower lobe, stable from prior imaging.   3 mm solid noncalcified pulmonary nodule left lower lobe, somewhat nodular scarring is seen involving the right upper lobe also unchanged. Stable 3 mm or less nodules are seen within the left upper lobe. No new or enlarging suspicious pulmonary nodule.     CT chest 3/3/2020: No mediastinal adenopathy, no changes in the left lobe nodule measuring 2 to 3 mm, prior 6 mm right lower lobe nodule unchanged.  No new pulmonary nodules, consolidations or effusions noted.  No significant change from prior studies.  Recommendation is to continue annual CT lung screening.       CT lung screening 11/26/2019: Several scattered pulmonary nodules in the left lower lobe largest measuring 4 mm. 2 left upper lobe nodules measuring greater than 3 mm. Right upper lobe nodule measuring 4 mm. Largest nodules in the posterior aspect of the superior segment of the right lower lobe measuring 8 mm.     PFT 1/6/2020: FEV1 50% predicted with a 17% bronchodilator change.  FVC 64% predicted with a 28% bronchodilator change, FEV1/FVC ratio 60.  % predicted, % predicted.  Diffusion capacity 52% predicted.  Impression: Stage II COPD with significant bronchodilator response.  Moderately decreased diffusion capacity due to emphysema versus anemia versus interstitial lung disease. Immunizations:   Immunization History   Administered Date(s) Administered    COVID-19, Moderna, PF, 100mcg/0.5mL 02/12/2021, 03/12/2021    Influenza, Quadv, adjuvanted, 65 yrs +, IM, PF (Fluad) 12/03/2020    Influenza, Triv, inactivated, subunit, adjuvanted, IM (Fluad 65 yrs and older) 01/08/2020    Pneumococcal Conjugate 13-valent ([id number]) 05/17/2019    Pneumococcal Polysaccharide ([id number]) 07/02/2012        No flowsheet data found.     /74 (Site: Left Upper Arm, Position: Sitting, Cuff Size: Large Adult)   Pulse 81   Temp 97.2 °F (36.2 °C)   Ht 5' 6\" (1.676 m)   Wt (!) 305 lb (138.3 kg)   LMP 08/01/2000 (Approximate)   SpO2 95%   BMI 49.23 kg/m²     Past Medical History:   Diagnosis Date    Chronic obstructive pulmonary disease ([facility] [address]     Coronary artery disease     Dyslipidemia     Obesity     Pneumonia      Past Surgical History:   Procedure Laterality Date    CARDIAC CATHETERIZATION      CARDIAC CATHETERIZATION  2012    cath with x1 stent    EYE SURGERY      shar cataract surg x2 y ago    VENTRICULOPERITONEAL SHUNT Right 2019    VENTRICULAR PERITONEAL SHUNT INSERTION ENDOSCOPIC performed by Kori Roman MD at [place] [address]     No family history on file.     Social History     Socioeconomic History    Marital status:      Spouse name: Not on file    Number of children: 2    Years of education: 15    Highest education level: Associate degree: occupational, technical, or vocational program   Occupational History    Occupation: factory      Comment: retired   Social Needs    Financial resource strain: Not very hard    Food insecurity     Worry: Never true     Inability: Never true    Transportation needs     Medical: No     Non-medical: No   Tobacco Use    Smoking status: Former Smoker     Packs/day: 1.00     Years: 50.00     Pack years: 50.00     Types: Cigarettes     Start date: 1970     Quit date: 2019     Years since quittin.9    Smokeless tobacco: Never Used   Substance and Sexual Activity    Alcohol use: No    Drug use: No    Sexual activity: Not Currently     Partners: Male   Lifestyle    Physical activity     Days per week: 0 days     Minutes per session: 0 min    Stress: Not at all   Relationships    Social connections     Talks on phone: More than three times a week     Gets together: Once a week     Attends [employer] service: More than 4 times per year     Active member of club or organization: Yes     Attends meetings of clubs or organizations: 1 to 4 times per year     Relationship status:     Intimate partner violence     Fear of current or ex partner: Not on file     Emotionally abused: Not on file Physically abused: Not on file     Forced sexual activity: Not on file   Other Topics Concern    Not on file   Social History Narrative    No SDOH needs identified. She was given [facility] number for transportation if family unable to transport. Discussed HEAP and PIPP programs. She stated she has used in the past but declines at this time. Review of Systems   Constitutional:        Exertional shortness of breath, decreased physical activity tolerance   HENT:        Occasional cough, generally nonproductive   Eyes: Negative. Respiratory:        Exertional dyspnea, occasional cough generally nonproductive. Denies hemoptysis or purulent sputum. Cardiovascular: Negative. Gastrointestinal: Negative. Endocrine: Negative. Genitourinary: Negative. Musculoskeletal: Negative. Skin: Negative. Allergic/Immunologic: Negative. Neurological: Negative. Hematological: Negative. Psychiatric/Behavioral: Negative.         Objective:       Physical Exam  General appearance - alert, well appearing, and in no distress, oriented to person, place, and time and overweight  Mental status - alert, oriented to person, place, and time, normal mood, behavior, speech, dress, motor activity, and thought processes  Eyes - pupils equal and reactive, extraocular eye movements intact  Ears - not examined  Nose - normal and patent, no erythema, discharge or polyps  Mouth - mucous membranes moist, pharynx normal without lesions  Neck - supple, no significant adenopathy  Chest - clear to auscultation, no wheezes, rales or rhonchi, symmetric air entry  Heart -normal rate, regular rhythm, normal S1, S2, no murmurs, rubs, clicks or gallops  Abdomen - soft, nontender, nondistended, no masses or organomegaly  Neuro- alert, oriented, normal speech, no focal findings or movement disorder noted}  Extremities - peripheral pulses normal, no pedal edema, no clubbing or cyanosis  Skin - normal coloration and turgor, no rashes, no suspicious skin lesions noted     Wt Readings from Last 3 Encounters:   04/14/21 (!) 305 lb (138.3 kg)   01/14/21 296 lb 6.4 oz (134.4 kg)   12/03/20 285 lb (129.3 kg)       Results for orders placed or performed during the hospital encounter of 12/03/20   Hemoglobin A1C   Result Value Ref Range    Hemoglobin A1C 5.8 4.8 - 5.9 %    Estimated Avg Glucose 120 mg/dL   Lipid Panel   Result Value Ref Range    Cholesterol 193 <200 mg/dL    HDL 61 >40 mg/dL    LDL Cholesterol 111 0 - 130 mg/dL    Chol/HDL Ratio 3.2 <5    Triglycerides 105 <150 mg/dL    VLDL NOT REPORTED 1 - 30 mg/dL   Basic Metabolic Panel   Result Value Ref Range    Glucose 106 (H) 70 - 99 mg/dL    BUN 19 8 - 23 mg/dL    CREATININE 0.93 (H) 0.50 - 0.90 mg/dL    Bun/Cre Ratio 20 9 - 20    Calcium 9.6 8.6 - 10.4 mg/dL    Sodium 138 135 - 144 mmol/L    Potassium 4.6 3.7 - 5.3 mmol/L    Chloride 102 98 - 107 mmol/L    CO2 27 20 - 31 mmol/L    Anion Gap 9 9 - 17 mmol/L    GFR Non-African American 60 (L) >60 mL/min    GFR African American >60 >60 mL/min    GFR Comment          GFR Staging NOT REPORTED        No results found. Assessment:      1. Stage 2 moderate COPD by GOLD classification ([address]    2. Multiple lung nodules on CT    3. Stopped smoking with greater than 40 pack year history    4. Cough    5. Personal history of tobacco use          Plan:      1. Medications reviewed, continue as ordered. 2. Educated and clarified the medication use. 3. Recommend flu vaccination in the fall annually. Up-to-date  4. Patient is up-to-date with vaccinations from pulmonary perspective. Up-to-date including Covid vaccine  5. Maintain an active lifestyle. 6. Patient's questions were answered to her satisfaction. 7. Former smoker ongoing smoking cessation strongly advised. Patient remains eligible for CT lung screening. Cessation date was 2019  8. Pulmonary function tests were reviewed   9. CT scan of the chest was reviewed  10.  We'll see the patient back in 12 months    Electronically signed by JOHNATHAN Walters CNP on 4/14/2021 at 12:35 PM  Low Dose CT (LDCT) Lung Screening criteria met   Age 50-69   Pack year smoking >30   Still smoking or less than 15 year since quit   No sign or symptoms of lung cancer   > 11 months since last LDCT     Risks and benefits of lung cancer screening with LDCT scans discussed:    Significance of positive screen - False-positive LDCT results often occur. 95% of all positive results do not lead to a diagnosis of cancer. Usually further imaging can resolve most false-positive results; however, some patients may require invasive procedures. Over diagnosis risk - 10% to 12% of screen-detected lung cancer cases are over diagnosedthat is, the cancer would not have been detected in the patient's lifetime without the screening. Need for follow up screens annually to continue lung cancer screening effectiveness     Risks associated with radiation from annual LDCT- Radiation exposure is about the same as for a mammogram, which is about 1/3 of the annual background radiation exposure from everyday life. Starting screening at age 54 is not likely to increase cancer risk from radiation exposure. Patients with comorbidities resulting in life expectancy of < 10 years, or that would preclude treatment of an abnormality identified on CT, should not be screened due to lack of benefit.     To obtain maximal benefit from this screening, smoking cessation and long-term abstinence from smoking is critical

## 2021-04-13 NOTE — TELEPHONE ENCOUNTER
Ronni Major called requesting a refill of the below medication which has been pended for you:     Requested Prescriptions     Pending Prescriptions Disp Refills    fluticasone-umeclidin-vilant (Dorise Madhuri) 100-62.5-25 MCG/INH AEPB 30 each 3     Sig: Inhale 1 puff into the lungs daily       Last Appointment Date: 12/3/2020  Next Appointment Date: 6/3/2021    Allergies   Allergen Reactions    Vicodin [Hydrocodone-Acetaminophen]

## 2021-04-14 ENCOUNTER — OFFICE VISIT (OUTPATIENT)
Dept: PULMONOLOGY | Age: 71
End: 2021-04-14
Payer: MEDICARE

## 2021-04-14 VITALS
HEIGHT: 66 IN | SYSTOLIC BLOOD PRESSURE: 114 MMHG | HEART RATE: 81 BPM | TEMPERATURE: 97.2 F | DIASTOLIC BLOOD PRESSURE: 74 MMHG | WEIGHT: 293 LBS | BODY MASS INDEX: 47.09 KG/M2 | OXYGEN SATURATION: 95 %

## 2021-04-14 DIAGNOSIS — R91.8 MULTIPLE LUNG NODULES ON CT: ICD-10-CM

## 2021-04-14 DIAGNOSIS — R05.9 COUGH: ICD-10-CM

## 2021-04-14 DIAGNOSIS — J44.9 STAGE 2 MODERATE COPD BY GOLD CLASSIFICATION (HCC): Primary | ICD-10-CM

## 2021-04-14 DIAGNOSIS — Z87.891 PERSONAL HISTORY OF TOBACCO USE: ICD-10-CM

## 2021-04-14 DIAGNOSIS — Z87.891 STOPPED SMOKING WITH GREATER THAN 40 PACK YEAR HISTORY: ICD-10-CM

## 2021-04-14 PROCEDURE — 99214 OFFICE O/P EST MOD 30 MIN: CPT | Performed by: NURSE PRACTITIONER

## 2021-04-14 PROCEDURE — 3023F SPIROM DOC REV: CPT | Performed by: NURSE PRACTITIONER

## 2021-04-14 PROCEDURE — G8427 DOCREV CUR MEDS BY ELIG CLIN: HCPCS | Performed by: NURSE PRACTITIONER

## 2021-04-14 PROCEDURE — 1036F TOBACCO NON-USER: CPT | Performed by: NURSE PRACTITIONER

## 2021-04-14 PROCEDURE — 1090F PRES/ABSN URINE INCON ASSESS: CPT | Performed by: NURSE PRACTITIONER

## 2021-04-14 PROCEDURE — 4040F PNEUMOC VAC/ADMIN/RCVD: CPT | Performed by: NURSE PRACTITIONER

## 2021-04-14 PROCEDURE — G8400 PT W/DXA NO RESULTS DOC: HCPCS | Performed by: NURSE PRACTITIONER

## 2021-04-14 PROCEDURE — 1123F ACP DISCUSS/DSCN MKR DOCD: CPT | Performed by: NURSE PRACTITIONER

## 2021-04-14 PROCEDURE — 3017F COLORECTAL CA SCREEN DOC REV: CPT | Performed by: NURSE PRACTITIONER

## 2021-04-14 PROCEDURE — G0296 VISIT TO DETERM LDCT ELIG: HCPCS | Performed by: NURSE PRACTITIONER

## 2021-04-14 PROCEDURE — G8417 CALC BMI ABV UP PARAM F/U: HCPCS | Performed by: NURSE PRACTITIONER

## 2021-04-14 PROCEDURE — G8926 SPIRO NO PERF OR DOC: HCPCS | Performed by: NURSE PRACTITIONER

## 2021-04-14 ASSESSMENT — ENCOUNTER SYMPTOMS
ALLERGIC/IMMUNOLOGIC NEGATIVE: 1
GASTROINTESTINAL NEGATIVE: 1
EYES NEGATIVE: 1

## 2021-06-03 ENCOUNTER — OFFICE VISIT (OUTPATIENT)
Dept: FAMILY MEDICINE CLINIC | Age: 71
End: 2021-06-03
Payer: MEDICARE

## 2021-06-03 VITALS
SYSTOLIC BLOOD PRESSURE: 138 MMHG | HEIGHT: 66 IN | OXYGEN SATURATION: 94 % | HEART RATE: 86 BPM | WEIGHT: 293 LBS | TEMPERATURE: 98.7 F | DIASTOLIC BLOOD PRESSURE: 82 MMHG | BODY MASS INDEX: 47.09 KG/M2

## 2021-06-03 DIAGNOSIS — Z23 NEED FOR 23-POLYVALENT PNEUMOCOCCAL POLYSACCHARIDE VACCINE: ICD-10-CM

## 2021-06-03 DIAGNOSIS — Z12.31 VISIT FOR SCREENING MAMMOGRAM: ICD-10-CM

## 2021-06-03 DIAGNOSIS — J44.9 CHRONIC OBSTRUCTIVE PULMONARY DISEASE, UNSPECIFIED COPD TYPE (HCC): Primary | ICD-10-CM

## 2021-06-03 PROCEDURE — 4040F PNEUMOC VAC/ADMIN/RCVD: CPT | Performed by: FAMILY MEDICINE

## 2021-06-03 PROCEDURE — 3023F SPIROM DOC REV: CPT | Performed by: FAMILY MEDICINE

## 2021-06-03 PROCEDURE — G8427 DOCREV CUR MEDS BY ELIG CLIN: HCPCS | Performed by: FAMILY MEDICINE

## 2021-06-03 PROCEDURE — 99212 OFFICE O/P EST SF 10 MIN: CPT

## 2021-06-03 PROCEDURE — G8926 SPIRO NO PERF OR DOC: HCPCS | Performed by: FAMILY MEDICINE

## 2021-06-03 PROCEDURE — G8417 CALC BMI ABV UP PARAM F/U: HCPCS | Performed by: FAMILY MEDICINE

## 2021-06-03 PROCEDURE — G8400 PT W/DXA NO RESULTS DOC: HCPCS | Performed by: FAMILY MEDICINE

## 2021-06-03 PROCEDURE — 3017F COLORECTAL CA SCREEN DOC REV: CPT | Performed by: FAMILY MEDICINE

## 2021-06-03 PROCEDURE — 1123F ACP DISCUSS/DSCN MKR DOCD: CPT | Performed by: FAMILY MEDICINE

## 2021-06-03 PROCEDURE — 90732 PPSV23 VACC 2 YRS+ SUBQ/IM: CPT | Performed by: FAMILY MEDICINE

## 2021-06-03 PROCEDURE — 99214 OFFICE O/P EST MOD 30 MIN: CPT | Performed by: FAMILY MEDICINE

## 2021-06-03 PROCEDURE — 1036F TOBACCO NON-USER: CPT | Performed by: FAMILY MEDICINE

## 2021-06-03 PROCEDURE — 1090F PRES/ABSN URINE INCON ASSESS: CPT | Performed by: FAMILY MEDICINE

## 2021-06-03 RX ORDER — MONTELUKAST SODIUM 10 MG/1
10 TABLET ORAL NIGHTLY
Qty: 30 TABLET | Refills: 3 | Status: SHIPPED | OUTPATIENT
Start: 2021-06-03 | End: 2021-06-23 | Stop reason: SDUPTHER

## 2021-06-03 SDOH — ECONOMIC STABILITY: FOOD INSECURITY: WITHIN THE PAST 12 MONTHS, THE FOOD YOU BOUGHT JUST DIDN'T LAST AND YOU DIDN'T HAVE MONEY TO GET MORE.: PATIENT DECLINED

## 2021-06-03 SDOH — ECONOMIC STABILITY: FOOD INSECURITY: WITHIN THE PAST 12 MONTHS, YOU WORRIED THAT YOUR FOOD WOULD RUN OUT BEFORE YOU GOT MONEY TO BUY MORE.: PATIENT DECLINED

## 2021-06-03 ASSESSMENT — ENCOUNTER SYMPTOMS
WHEEZING: 0
SHORTNESS OF BREATH: 1
COUGH: 1
CHEST TIGHTNESS: 1

## 2021-06-03 ASSESSMENT — SOCIAL DETERMINANTS OF HEALTH (SDOH): HOW HARD IS IT FOR YOU TO PAY FOR THE VERY BASICS LIKE FOOD, HOUSING, MEDICAL CARE, AND HEATING?: PATIENT DECLINED

## 2021-06-03 NOTE — PROGRESS NOTES
 albuterol sulfate  (90 Base) MCG/ACT inhaler Inhale 2 puffs into the lungs every 4 hours as needed for Wheezing or Shortness of Breath 1 Inhaler 3    carvedilol (COREG) 3.125 MG tablet Take 1 tablet by mouth 2 times daily (with meals) 60 tablet 5    Handicap Placard MISC by Does not apply route Expires: 7/26/2024  Dx: R23.89, G93.5 1 each 0    Misc. Devices (ADJUST BATH/SHOWER SEAT/BACK) MISC Use daily with showers 1 each 0    Misc. Devices (SUCTION GRAB BAR) MISC Use in the shower 2 each 0    aspirin 325 MG tablet Take 325 mg by mouth daily      loratadine (CLARITIN) 10 MG tablet Take 10 mg by mouth daily       No current facility-administered medications for this visit. Allergies   Allergen Reactions    Vicodin [Hydrocodone-Acetaminophen]        Subjective:     Review of Systems   Constitutional: Negative for activity change, appetite change, chills, fatigue and fever. Eyes: Negative for visual disturbance. Respiratory: Positive for cough, chest tightness and shortness of breath. Negative for wheezing. Cardiovascular: Negative for chest pain, palpitations and leg swelling. Genitourinary: Negative for difficulty urinating. Allergic/Immunologic: Positive for environmental allergies. Neurological: Negative for dizziness, syncope, weakness, light-headedness and headaches. Objective:      Physical Exam  Vitals and nursing note reviewed. Constitutional:       General: She is not in acute distress. Appearance: She is well-developed. Eyes:      Conjunctiva/sclera: Conjunctivae normal.   Neck:      Thyroid: No thyromegaly. Cardiovascular:      Rate and Rhythm: Normal rate and regular rhythm. Heart sounds: Normal heart sounds. No murmur heard. Pulmonary:      Effort: Pulmonary effort is normal. No respiratory distress. Breath sounds: Normal breath sounds. No wheezing. Musculoskeletal:      Cervical back: Normal range of motion and neck supple. Instructed to continue currentmedications, diet and exercise. Patient agreed with treatment plan. Follow up asdirected.      Electronically signed by Danya Apple MD on 6/3/2021 at 9:14 AM

## 2021-06-07 ENCOUNTER — HOSPITAL ENCOUNTER (OUTPATIENT)
Dept: MAMMOGRAPHY | Age: 71
Discharge: HOME | End: 2021-06-09
Payer: MEDICARE

## 2021-06-07 DIAGNOSIS — Z12.31 VISIT FOR SCREENING MAMMOGRAM: ICD-10-CM

## 2021-06-07 PROCEDURE — 77063 BREAST TOMOSYNTHESIS BI: CPT

## 2021-06-08 DIAGNOSIS — I25.10 CORONARY ARTERY DISEASE INVOLVING NATIVE CORONARY ARTERY OF NATIVE HEART WITHOUT ANGINA PECTORIS: ICD-10-CM

## 2021-06-08 NOTE — TELEPHONE ENCOUNTER
Chucho Vargas called requesting a refill of the below medication which has been pended for you:     Requested Prescriptions     Pending Prescriptions Disp Refills    carvedilol (COREG) 3.125 MG tablet 60 tablet 5     Sig: Take 1 tablet by mouth 2 times daily (with meals)       Last Appointment Date: 6/3/2021  Next Appointment Date: 12/3/2021    Allergies   Allergen Reactions    Vicodin [Hydrocodone-Acetaminophen]

## 2021-06-09 RX ORDER — CARVEDILOL 3.12 MG/1
3.12 TABLET ORAL 2 TIMES DAILY WITH MEALS
Qty: 60 TABLET | Refills: 5 | Status: SHIPPED | OUTPATIENT
Start: 2021-06-09 | End: 2021-06-23 | Stop reason: SDUPTHER

## 2021-06-23 DIAGNOSIS — I25.10 CORONARY ARTERY DISEASE INVOLVING NATIVE CORONARY ARTERY OF NATIVE HEART WITHOUT ANGINA PECTORIS: ICD-10-CM

## 2021-06-23 RX ORDER — ALBUTEROL SULFATE 90 UG/1
2 AEROSOL, METERED RESPIRATORY (INHALATION) EVERY 4 HOURS PRN
Qty: 3 INHALER | Refills: 3 | Status: SHIPPED | OUTPATIENT
Start: 2021-06-23 | End: 2021-12-23

## 2021-06-23 NOTE — TELEPHONE ENCOUNTER
Neno Fernández called requesting a refill of the below medication which has been pended for you:     Requested Prescriptions     Pending Prescriptions Disp Refills    carvedilol (COREG) 3.125 MG tablet 180 tablet 1     Sig: Take 1 tablet by mouth 2 times daily (with meals)    montelukast (SINGULAIR) 10 MG tablet 90 tablet 1     Sig: Take 1 tablet by mouth nightly       Last Appointment Date: 6/3/2021  Next Appointment Date: 12/3/2021    Allergies   Allergen Reactions    Vicodin [Hydrocodone-Acetaminophen]

## 2021-06-28 RX ORDER — CARVEDILOL 3.12 MG/1
3.12 TABLET ORAL 2 TIMES DAILY WITH MEALS
Qty: 180 TABLET | Refills: 1 | Status: SHIPPED | OUTPATIENT
Start: 2021-06-28 | End: 2021-12-21

## 2021-06-28 RX ORDER — MONTELUKAST SODIUM 10 MG/1
10 TABLET ORAL NIGHTLY
Qty: 90 TABLET | Refills: 1 | Status: SHIPPED | OUTPATIENT
Start: 2021-06-28 | End: 2021-12-21

## 2021-07-14 ENCOUNTER — OFFICE VISIT (OUTPATIENT)
Dept: NEUROLOGY | Age: 71
End: 2021-07-14
Payer: MEDICARE

## 2021-07-14 VITALS
DIASTOLIC BLOOD PRESSURE: 70 MMHG | WEIGHT: 293 LBS | OXYGEN SATURATION: 94 % | HEIGHT: 66 IN | BODY MASS INDEX: 47.09 KG/M2 | SYSTOLIC BLOOD PRESSURE: 120 MMHG | HEART RATE: 77 BPM

## 2021-07-14 DIAGNOSIS — I63.10 CEREBRAL INFARCTION DUE TO EMBOLISM OF PRECEREBRAL ARTERY (HCC): ICD-10-CM

## 2021-07-14 DIAGNOSIS — R26.89 BALANCE PROBLEM: ICD-10-CM

## 2021-07-14 DIAGNOSIS — G93.89 CEREBRAL VENTRICULOMEGALY: Primary | ICD-10-CM

## 2021-07-14 DIAGNOSIS — F33.1 MODERATE EPISODE OF RECURRENT MAJOR DEPRESSIVE DISORDER (HCC): ICD-10-CM

## 2021-07-14 DIAGNOSIS — G44.219 EPISODIC TENSION-TYPE HEADACHE, NOT INTRACTABLE: ICD-10-CM

## 2021-07-14 DIAGNOSIS — G31.9 ACQUIRED CEREBRAL ATROPHY (HCC): ICD-10-CM

## 2021-07-14 DIAGNOSIS — Z72.0 TOBACCO ABUSE: ICD-10-CM

## 2021-07-14 DIAGNOSIS — R93.0 ABNORMAL CT OF THE HEAD: ICD-10-CM

## 2021-07-14 DIAGNOSIS — R42 DIZZINESS: ICD-10-CM

## 2021-07-14 DIAGNOSIS — I67.82 CHRONIC CEREBRAL ISCHEMIA: ICD-10-CM

## 2021-07-14 DIAGNOSIS — Z98.2 S/P VP SHUNT: ICD-10-CM

## 2021-07-14 DIAGNOSIS — G45.0 VBI (VERTEBROBASILAR INSUFFICIENCY): ICD-10-CM

## 2021-07-14 DIAGNOSIS — G93.5 CHIARI MALFORMATION TYPE I (HCC): ICD-10-CM

## 2021-07-14 DIAGNOSIS — R51.9 OCCIPITAL HEADACHE: ICD-10-CM

## 2021-07-14 DIAGNOSIS — G91.2 NORMAL PRESSURE HYDROCEPHALUS (HCC): ICD-10-CM

## 2021-07-14 PROCEDURE — 1090F PRES/ABSN URINE INCON ASSESS: CPT | Performed by: PSYCHIATRY & NEUROLOGY

## 2021-07-14 PROCEDURE — 3017F COLORECTAL CA SCREEN DOC REV: CPT | Performed by: PSYCHIATRY & NEUROLOGY

## 2021-07-14 PROCEDURE — 99214 OFFICE O/P EST MOD 30 MIN: CPT | Performed by: PSYCHIATRY & NEUROLOGY

## 2021-07-14 PROCEDURE — 99213 OFFICE O/P EST LOW 20 MIN: CPT | Performed by: PSYCHIATRY & NEUROLOGY

## 2021-07-14 PROCEDURE — 4040F PNEUMOC VAC/ADMIN/RCVD: CPT | Performed by: PSYCHIATRY & NEUROLOGY

## 2021-07-14 PROCEDURE — G8427 DOCREV CUR MEDS BY ELIG CLIN: HCPCS | Performed by: PSYCHIATRY & NEUROLOGY

## 2021-07-14 PROCEDURE — 1036F TOBACCO NON-USER: CPT | Performed by: PSYCHIATRY & NEUROLOGY

## 2021-07-14 PROCEDURE — G8417 CALC BMI ABV UP PARAM F/U: HCPCS | Performed by: PSYCHIATRY & NEUROLOGY

## 2021-07-14 PROCEDURE — G8400 PT W/DXA NO RESULTS DOC: HCPCS | Performed by: PSYCHIATRY & NEUROLOGY

## 2021-07-14 PROCEDURE — 1123F ACP DISCUSS/DSCN MKR DOCD: CPT | Performed by: PSYCHIATRY & NEUROLOGY

## 2021-07-14 ASSESSMENT — ENCOUNTER SYMPTOMS
SINUS PRESSURE: 0
VISUAL CHANGE: 0
FACIAL SWEATING: 0
BLOOD IN STOOL: 0
COLOR CHANGE: 0
BLURRED VISION: 0
WHEEZING: 0
EYE DISCHARGE: 0
VOICE CHANGE: 0
EYE WATERING: 0
ABDOMINAL PAIN: 0
VOMITING: 0
CHANGE IN BOWEL HABIT: 0
SHORTNESS OF BREATH: 0
ABDOMINAL DISTENTION: 0
CHOKING: 0
SCALP TENDERNESS: 0
APNEA: 0
TROUBLE SWALLOWING: 0
EYE REDNESS: 0
EYE PAIN: 0
BOWEL INCONTINENCE: 0
SORE THROAT: 0
DIARRHEA: 0
COUGH: 0
PHOTOPHOBIA: 1
FACIAL SWELLING: 0
SWOLLEN GLANDS: 0
EYE ITCHING: 0
CONSTIPATION: 0
CHEST TIGHTNESS: 0
BACK PAIN: 0
NAUSEA: 0

## 2021-07-14 NOTE — PROGRESS NOTES
[facility]  Neurology  [address] [place] [address]  WAOFJ:[phone]   Fax: [phone]        SUBJECTIVE:       PATIENT ID:  Moreno Evangelista is a  RIGHT   HANDED 78 y.o. female. Dizziness  This is a new problem. Episode onset: SINCE   EARLY  APRIL 2019. The problem occurs intermittently. The problem has been resolved. Associated symptoms include headaches, numbness and vertigo. Pertinent negatives include no abdominal pain, anorexia, arthralgias, change in bowel habit, chest pain, chills, congestion, coughing, diaphoresis, fatigue, fever, joint swelling, myalgias, nausea, neck pain, rash, sore throat, swollen glands, urinary symptoms, visual change, vomiting or weakness. The symptoms are aggravated by bending and standing. She has tried rest and sleep (  SHUNT  PLACEMENT) for the symptoms. The treatment provided significant relief. Headache   This is a new problem. Episode onset: SINCE  APRIL 2019. The problem occurs intermittently. The problem has been waxing and waning. The pain is located in the occipital and frontal region. The pain does not radiate. The pain quality is not similar to prior headaches. The quality of the pain is described as aching and throbbing. The pain is at a severity of 3/10. The pain is mild. Associated symptoms include dizziness, a loss of balance, numbness, phonophobia and photophobia. Pertinent negatives include no abdominal pain, abnormal behavior, anorexia, back pain, blurred vision, coughing, drainage, ear pain, eye pain, eye redness, eye watering, facial sweating, fever, hearing loss, insomnia, muscle aches, nausea, neck pain, scalp tenderness, seizures, sinus pressure, sore throat, swollen glands, tingling, tinnitus, visual change, vomiting, weakness or weight loss. The symptoms are aggravated by coughing, bright light and noise. She has tried acetaminophen for the symptoms. The treatment provided significant relief.  Her past medical history is significant for obesity and sinus disease. There is no history of cancer, cluster headaches, hypertension, immunosuppression, migraine headaches, migraines in the family, pseudotumor cerebri, recent head traumas or TMJ. Neurologic Problem  The patient's primary symptoms include clumsiness, a loss of balance and memory loss. The patient's pertinent negatives include no syncope, visual change or weakness. This is a chronic problem. The neurological problem developed insidiously. The problem is unchanged. There was no focality noted. Associated symptoms include dizziness, headaches and vertigo. Pertinent negatives include no abdominal pain, auditory change, aura, back pain, bladder incontinence, bowel incontinence, chest pain, confusion, diaphoresis, fatigue, fever, light-headedness, nausea, neck pain, palpitations, shortness of breath or vomiting. Past treatments include bed rest, medication and sleep. The treatment provided moderate relief. Her past medical history is significant for dementia. There is no history of a bleeding disorder, a clotting disorder, a CVA, head trauma, liver disease, mood changes or seizures. History obtained from  The patient       and other  available medical records were  Also  reviewed. The  Duration,  Quality,  Severity,  Location,  Timing,  Context,  Modifying  Factors   Of   The   Chief   Complaint       AndPresent  Illness   Was   Reviewed   In   Chronological   Manner.                                                PATIENT'S  MAIN  CONCERNS INCLUDE :                       1)     PREVIOUS   H/O       DIZZINESS     INTERMITTENT                                              SINCE      April 2019                                         -     IMPROVED    SIGNIFICANTLY                                 2)       PREVIOUS    H/O    GLOBAL  HEADACHES                                             STARTING  IN  OCCIPITAL   AREA RADIATING  TO    FRONTAL  AREA      SINCE   April 2019                                                  -     RESOLVED                              3)    H/O   CHRONIC   GAIT  AND  BALANCE  PROBLEMS                                         DUE  TO  DIZZINESS  SINCE  April 2019                                                -     IMPROVED    SIGNIFICANTLY                                 4)   NO   H/O   FALLS                                NO   H/O   HEAD  INJURIES                                               5)     PREVIOUS    H/O     CHRONIC  SMOKING                                     PATIENT  STOPPED   SMOKING  SINCE   2019  . 6)     MULTIPLE   CO  MORBID  MEDICAL  CONDITIONS                             BEING  FOLLOWED  BY  HER  PCP. 7)    CT  HEAD    5/ 02/ 2019     SHOWED                                     Partially visualized Chiari malformation.      Ventriculomegaly is noted as well                             as cerebral and cerebellar cortical atrophy     with chronic appearing white     matter changes in the brain. 8)    D / D   INCLUDE   POST  FOSSA  PATHOLOGY,                                      TIA,   CEREBRAL ISCHEMIA,                                          CARDIAC  PATHOLOGY.                                               9)       MRI  BRAIN   WITH  AND  W/O  CONTRAST   DONE                                AT  [facility]                                     DATED   5 /15/ 2019  REVIEWED         SHOWED                                        \"   HYDROCEPHALUS   OF   RECENT  TO  SUBACUTE.                                       ASSOCIATED   TONSILLAR  HERNIATION  AT  FORAMEN  MAGNUM   SUSPECTED  \"    .                                             RECOMMENDED      EMERGENCY    NEURO  SURGICAL EVALUATION                                          FOR      VENTRICULAR  DRAIN                         10)       PATIENT  HAD VENTRICULAR  DRAIN    PLACEMENT  IN   MAY    2019        AND                                  WAS    IN    NURSING  HOME  REHAB   FOR  TWO  MONTHS . PATIENT  RECOVERED   WELL. NO   DIZZINESS. NO  BALANCE  PROBLEMS                                    NO   SEIZURES. NO   HEADACHES . MEMORY    PROBLEMS     ARE  AT  HER  BASELINE                               11)      FOLLOW UP  CT  HEAD   June 2019     SHOWED                                   STABLE   VENTRICULOMEGALY    AND    SHUNT                               12)      HAD  FOLLOW  NEURO  SURGEON   EVALUATIONS                                                      IN   SEPT. 2019                                13)     PATIENT  LIVES   WITH   HER  DAUGHTER     CURRENTLY. PATIENT     PLANS  TO   DRIVE  LOCALLY       AS  TOLERATED. 14)     PATIENT    DENIES     ANY  NEW  NEUROLOGICAL     CONCERNS.                                   PATIENT  APPEAR  NEUROLOGICALLY   STABLE                               PRECIPITATING  FACTORS: including  fever/infection, exertion/relaxation, position change, stress,                         weather change, medications/alcohol, time of day/darkness/light  Are  Present                                                                MODIFYING  FACTORS:  fever/infection, exertion/relaxation, position change, stress, weather change,                      medications/alcohol, time of day/darkness/light    Are  present           Patient   Indicates   The  Presence   And  The  Absence  Of  The  FollowingAssociated  And   Additional  Neurological    Symptoms:                                Balance  And coordination problems  present           Gait problems     present            Headaches      present              Migraines           present Memory problems absent             Confusion        absent            Paresthesia numbness          absent           SeizuresAnd  Starring  Episodes           absent           Syncope,  Near  syncopal episodes         absent           Speech problems           absent             Swallowing  Problems      absent            Dizziness,  Light headedness           present              Vertigo        present             Generalized   Weakness    absent              focal  Weakness     absent             Tremors         absent              Sleep  Problems     absent             History  Of   RecentHead  Injury     absent             History  Of   Recent  TIA     absent             History  Of   Recent    Stroke     absent             Neck  Pain and  Neck muscle  Spasms  Absent               Radiating  down   And   Weakness           absent            Lower back   Pain  And     Spasms  Absent              Radiating    Down   And   Weakness          absent                H/OFALLS        absent               History  Of   Visual  Symptoms    Absent                  Associated   Diplopia       absent                                             Also   Additional   Symptoms   Present    As  Documented    In   The detailed                  Review  Of  Systems   And    Please   Refer   To    Them for   Additional  Information. Any components  That are either  Unobtainable  Or  Limited  In   HPI, ROS  And/or PFSH   Are                Due   To       Patient's  Medical  Problems,  Clinical  Condition and/or lack of                                other    Alternate resources.               RECORDS   REVIEWED:    historical medical records     INFORMATION   REVIEWED:       MEDICAL   HISTORY,SURGICAL   HISTORY,   MEDICATIONS   LIST,   ALLERGIES AND  DRUG  INTOLERANCES,     FAMILY   HISTORY,  SOCIAL  HISTORY,    PROBLEM  LIST   FOR  PATIENT  CARE   COORDINATION          Past Medical History:   Diagnosis Date    Chronic obstructive pulmonary disease (HCC)     Coronary artery disease     Dyslipidemia     Obesity     Pneumonia          Past Surgical History:   Procedure Laterality Date    CARDIAC CATHETERIZATION      CARDIAC CATHETERIZATION  2012    cath with x1 stent    EYE SURGERY      shar cataract surg x2 y ago    VENTRICULOPERITONEAL SHUNT Right 5/16/2019    VENTRICULAR PERITONEAL SHUNT INSERTION ENDOSCOPIC performed by Caroline Casillas MD at [place] [address]         Current Outpatient Medications   Medication Sig Dispense Refill    carvedilol (COREG) 3.125 MG tablet Take 1 tablet by mouth 2 times daily (with meals) 180 tablet 1    montelukast (SINGULAIR) 10 MG tablet Take 1 tablet by mouth nightly 90 tablet 1    albuterol sulfate  (90 Base) MCG/ACT inhaler Inhale 2 puffs into the lungs every 4 hours as needed for Wheezing or Shortness of Breath 3 Inhaler 3    fluticasone-umeclidin-vilant (TRELEGY ELLIPTA) 100-62.5-25 MCG/INH AEPB Inhale 1 puff into the lungs daily 30 each 3    Handicap Placard MISC by Does not apply route Expires: 7/26/2024  Dx: R23.89, G93.5 1 each 0    Misc. Devices (ADJUST BATH/SHOWER SEAT/BACK) MISC Use daily with showers 1 each 0    Misc. Devices (SUCTION GRAB BAR) MISC Use in the shower 2 each 0    aspirin 325 MG tablet Take 325 mg by mouth daily      loratadine (CLARITIN) 10 MG tablet Take 10 mg by mouth daily       No current facility-administered medications for this visit.          Allergies   Allergen Reactions    Vicodin [Hydrocodone-Acetaminophen]          Family History   Problem Relation Age of Onset    No Known Problems Sister     No Known Problems Daughter     Breast Cancer Paternal Aunt     No Known Problems Sister     No Known Problems Sister     No Known Problems Daughter     No Known Problems Maternal Aunt     No Known Problems Maternal Aunt          Social History     Socioeconomic History    Marital status:      Spouse name: Not on file    Number of children: 2    Years of education: 15    Highest education level: Associate degree: occupational, technical, or vocational program   Occupational History    Occupation: factory      Comment: retired   Tobacco Use    Smoking status: Former Smoker     Packs/day: 1.00     Years: 50.00     Pack years: 50.00     Types: Cigarettes     Start date: 1970     Quit date: 2019     Years since quittin.1    Smokeless tobacco: Never Used   Vaping Use    Vaping Use: Never used   Substance and Sexual Activity    Alcohol use: No    Drug use: No    Sexual activity: Not Currently     Partners: Male   Other Topics Concern    Not on file   Social History Narrative    No SDOH needs identified. She was given [facility] number for transportation if family unable to transport. Discussed HEAP and PIPP programs. She stated she has used in the past but declines at this time. Social Determinants of Health     Financial Resource Strain: Unknown    Difficulty of Paying Living Expenses: Patient refused   Food Insecurity: Unknown    Worried About Running Out of Food in the Last Year: Patient refused    [address] in the Last Year: Patient refused   Transportation Needs:     Lack of Transportation (Medical):      Lack of Transportation (Non-Medical):    Physical Activity:     Days of Exercise per Week:     Minutes of Exercise per Session:    Stress:     Feeling of Stress :    Social Connections:     Frequency of Communication with Friends and Family:     Frequency of Social Gatherings with Friends and Family:     Attends Jainism Services:     Active Member of Clubs or Organizations:     Attends Club or Organization Meetings:     Marital Status:    Intimate Partner Violence:     Fear of Current or Ex-Partner:     Emotionally Abused:     Physically Abused:     Sexually Abused:        Vitals:    21 1118   BP: 120/70   Pulse: 77   SpO2: 94%         Wt Readings from Last 3 Encounters:   07/14/21 297 lb (134.7 kg)   06/03/21 300 lb (136.1 kg)   04/14/21 (!) 305 lb (138.3 kg)         BP Readings from Last 3 Encounters:   07/14/21 120/70   06/03/21 138/82   04/14/21 114/74       Hematology and Coagulation  Lab Results   Component Value Date    WBC 10.3 06/12/2019    RBC 5.05 06/12/2019    HGB 15.7 06/12/2019    HCT 47.2 06/12/2019    MCV 93.3 06/12/2019    MCH 31.1 06/12/2019    MCHC 33.3 06/12/2019    RDW 15.3 06/12/2019     06/12/2019    MPV 10.3 06/12/2019       Chemistries  Lab Results   Component Value Date     12/03/2020    K 4.6 12/03/2020     12/03/2020    CO2 27 12/03/2020    BUN 19 12/03/2020    CREATININE 0.93 12/03/2020    CALCIUM 9.6 12/03/2020    PROT 7.3 05/12/2019    LABALBU 4.3 05/12/2019    BILITOT 0.65 05/12/2019    ALKPHOS 77 05/12/2019    AST 9 05/12/2019    ALT 9 05/12/2019     Lab Results   Component Value Date    ALKPHOS 77 05/12/2019    ALT 9 05/12/2019    AST 9 05/12/2019    PROT 7.3 05/12/2019    BILITOT 0.65 05/12/2019    BILIDIR 0.12 07/02/2012    LABALBU 4.3 05/12/2019     Lab Results   Component Value Date    BUN 19 12/03/2020    CREATININE 0.93 12/03/2020     Lab Results   Component Value Date    CALCIUM 9.6 12/03/2020    MG 2.2 07/02/2012     Lab Results   Component Value Date    AST 9 05/12/2019    ALT 9 05/12/2019       Lab Results   Component Value Date    CKTOTAL 28 07/02/2012         Review of Systems   Constitutional: Negative for appetite change, chills, diaphoresis, fatigue, fever, unexpected weight change and weight loss. HENT: Negative for congestion, dental problem, drooling, ear discharge, ear pain, facial swelling, hearing loss, mouth sores, nosebleeds, postnasal drip, sinus pressure, sore throat, tinnitus, trouble swallowing and voice change. Eyes: Positive for photophobia. Negative for blurred vision, pain, discharge, redness, itching and visual disturbance.    Respiratory: Negative for apnea, cough, choking, chest tightness, shortness of breath and wheezing. Cardiovascular: Negative for chest pain, palpitations and leg swelling. Gastrointestinal: Negative for abdominal distention, abdominal pain, anorexia, blood in stool, bowel incontinence, change in bowel habit, constipation, diarrhea, nausea and vomiting. Endocrine: Negative for cold intolerance, heat intolerance, polydipsia, polyphagia and polyuria. Genitourinary: Negative for bladder incontinence. Musculoskeletal: Positive for gait problem. Negative for arthralgias, back pain, joint swelling, myalgias, neck pain and neck stiffness. Skin: Negative for color change, pallor, rash and wound. Allergic/Immunologic: Negative for environmental allergies, food allergies and immunocompromised state. Neurological: Positive for dizziness, vertigo, numbness, headaches and loss of balance. Negative for tingling, tremors, seizures, syncope, facial asymmetry, speech difficulty, weakness and light-headedness. Hematological: Negative for adenopathy. Does not bruise/bleed easily. Psychiatric/Behavioral: Positive for memory loss. Negative for agitation, behavioral problems, confusion, decreased concentration, dysphoric mood, hallucinations, self-injury, sleep disturbance and suicidal ideas. The patient is not nervous/anxious, does not have insomnia and is not hyperactive. OBJECTIVE:    Physical Exam  Constitutional:       Appearance: She is well-developed. HENT:      Head: Normocephalic and atraumatic. No raccoon eyes or Holland's sign. Right Ear: External ear normal.      Left Ear: External ear normal.      Nose: Nose normal.   Eyes:      Conjunctiva/sclera: Conjunctivae normal.   Neck:      Thyroid: No thyroid mass or thyromegaly. Vascular: No carotid bruit. Trachea: No tracheal deviation. Meningeal: Brudzinski's sign and Kernig's sign absent. Cardiovascular:      Rate and Rhythm: Normal rate and regular rhythm.    Pulmonary: Effort: Pulmonary effort is normal.   Musculoskeletal:         General: No tenderness. Cervical back: Normal range of motion and neck supple. No rigidity. No muscular tenderness. Normal range of motion. Skin:     General: Skin is warm. Coloration: Skin is not pale. Findings: No erythema or rash. Nails: There is no clubbing. Psychiatric:         Attention and Perception: She is attentive. Mood and Affect: Mood is not anxious or depressed. Affect is not labile, blunt or inappropriate. Behavior: Behavior is slowed. Behavior is not agitated, aggressive, withdrawn, hyperactive or combative. Behavior is cooperative. Thought Content: Thought content is not paranoid or delusional. Thought content does not include homicidal or suicidal ideation. Thought content does not include homicidal or suicidal plan. Cognition and Memory: Cognition is impaired. Memory is impaired. Judgment: Judgment is not impulsive or inappropriate. Neurologic Exam      NEUROLOGICALEXAMINATION :      A) MENTAL STATUS:                   Alert and  oriented  To time, place  And  Person. No Aphasia. No  Dysarthria. Able   To  Follow   SIMPLE   commands without   Any  Difficulty. No right  To left confusion. SLOW   Speech  And language function. Insight and  Judgment ,Fund  Of  Knowledge   within normal limits                Recent  And  Remote memory    DECREASED                Attention &Concentration are    DECREASED                                                  B) CRANIAL NERVES :      CN : Visual  Acuity  And  Visual fields  within normal limits               Fundi  Could  Not  Be  Could  Not  Be  Evaluated. 3,4,6 CN : Both  Pupils are   Reactive and  Equal.  Movements  Are  Intact. No  Nystagmus. No  RHONDA. No  Afferent  Pupillary  Defect noted.  S/P  shunt    Headache    Moderate episode of recurrent major depressive disorder ([facility] [address]    Cephalalgia         CT OF THE HEAD WITHOUT CONTRAST  6/12/2019 7:53 pm       TECHNIQUE:   CT of the head was performed without the administration of intravenous   contrast. Dose modulation, iterative reconstruction, and/or weight based   adjustment of the mA/kV was utilized to reduce the radiation dose to as low   as reasonably achievable.       COMPARISON:   June 7, 2019       HISTORY:   ORDERING SYSTEM PROVIDED HISTORY: Memory loss with history of cerebral shunt   placement   TECHNOLOGIST PROVIDED HISTORY:       Ordering Physician Provided Reason for Exam: Dizzy, fall today hit head, pt   has recently placed shunt   Acuity: Acute   Type of Exam: Initial       FINDINGS:   BRAIN/VENTRICLES: There is no acute intracranial hemorrhage, mass effect or   midline shift. No abnormal extra-axial fluid collection.  The gray-white   differentiation is maintained without evidence of an acute infarct. There is   prominence of the ventricles and sulci due to global parenchymal volume loss. There are nonspecific areas of hypoattenuation within the periventricular and   subcortical white matter, which likely represent chronic microvascular   ischemic change.       ORBITS: Bilateral lens implants.       SINUSES: The visualized paranasal sinuses and mastoid air cells demonstrate   no acute abnormality.       SOFT TISSUES/SKULL: Right frontal approach ventriculostomy catheter   terminates in the of the left lateral ventricle.           Impression   Stable ventriculomegaly and  shunt.  No acute disease.               CT OF THE HEAD WITHOUT CONTRAST  5/2/2019 2:52 pm       TECHNIQUE:   CT of the head was performed without the administration of intravenous   contrast. Dose modulation, iterative reconstruction, and/or weight based   adjustment of the mA/kV was utilized to reduce the radiation dose to as low   as reasonably achievable.     COMPARISON:   None.       HISTORY:   ORDERING SYSTEM PROVIDED HISTORY: headache   TECHNOLOGIST PROVIDED HISTORY:       Ordering Physician Provided Reason for Exam: C/o headache/dizziness   Acuity: Acute   Type of Exam: Initial       FINDINGS:   BRAIN/VENTRICLES: There is no acute intracranial hemorrhage, mass effect or   midline shift.  No abnormal extra-axial fluid collection.  Ventriculomegaly   is noted and cerebellar tonsillar displacement into the foramen magnum,   extending off the field of view, is noted. Annetta Mimes is also mild cerebral and   cerebellar atrophy and scattered white matter changes in the brain.       ORBITS: The visualized portion of the orbits demonstrate no acute abnormality.       SINUSES: The visualized paranasal sinuses and mastoid air cells demonstrate   no acute abnormality.       SOFT TISSUES/SKULL:  No acute abnormality of the visualized skull or soft   tissues.           Impression   Partially visualized Chiari malformation.  Ventriculomegaly is noted as well   as cerebral and cerebellar cortical atrophy with chronic appearing white   matter changes in the brain.                 VISITING DIAGNOSIS:      ICD-10-CM    1. Cerebral ventriculomegaly  G93.89    2. 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 Acquired cerebral atrophy ([facility] [address]  G31.9    4. Moderate episode of recurrent major depressive disorder ([facility])  F33.1    5. Chronic cerebral ischemia  I67.82    6. S/P  shunt  Z98.2    7. Normal pressure hydrocephalus ([facility])  G91.2    8. Chiari malformation type I ([address]  G93.5    9. Occipital headache  R51.9    10. Dizziness  R42    11. VBI (vertebrobasilar insufficiency)  G45.0    12. Balance problem  R26.89    13. Abnormal CT of the head  R93.0    14.  Tobacco abuse  Z72.0    15. Episodic tension-type headache, not intractable  G44.219               CONCERNS   &   INCREASED   RISK   FOR         * TIA,  CEREBRO  VASCULAR  ISCHEMIA,STROKE     * DIZZINESS,   VERTEBROBASILAR  INSUFFICIENCY ,         *  COMPLICATED  MIGRAINES      *     TENSION  HEADACHES        *  GAIT  DIFFICULTIES  &   BALANCE PROBLMES                     VARIOUS  RISK   FACTORS   WERE  REVIEWED   AND   DISCUSSED. *  PATIENT   HAS  MULTIPLE   MEDICAL, NEUROLOGICAL    PROBLEMS . PATIENT'S   MANAGEMENT  IS  CHALLENGING. PLAN:                    Gia Chong  Of  The  Diagnoses,  The  Management & Treatment  Options            AND    Care  plan  Were          Reviewed and   Discussed   With  patient. * Goals  And  Expectations  Of  The  Therapy  Discussed   And  Reviewed. *   Benefits   And   Side  Effect  Profile  Of  Medication/s   Were   Discussed                * Need   For  Further   Follow up For  The  Various  Problems Were  discussed. * Results  Of  The  Previous  Diagnostic tests were reviewed and questions answered. patient  understand the same. Medical  Decision  Making  Was  Made  Based on the   Complexity  Of  Above  Mentioned  Diagnoses,        Data reviewed   & diagnostic  Tests  Reviewed,       Risk  Of  Significant   Co morbidities and   complicating   Factors. Medical  Decision  Was   High    Complexity   Due   To  The  Patient's  Multiple  Symptoms,      Advancing   Disease,  Complex  Treatment  Regimen,  Multiple medications       and   Risk  Of   Side  Effects,  Difficulty  In  Medication  Management  And  Diagnostic  Challenges       In  View  Of  The  Associated   Co  Morbid  Conditions   And  Problems. * FALL   PRECAUTIONS. THESE  REVIEWED   AND  DISCUSSED      *    SUPERVISED   CARE      *   ABSOLUTELY   NO  DRIVING      *   BE  CAREFUL  WITH  ACTIVITIES            *   ADEQUATE   FLUIDINTAKE   AND  ELECTROLYTE  BALANCE         * KEEP  DAIRY  OF   THE  NEUROLOGICAL  SYMPTOMS        RECORDING THE    DURATION  AND  FREQUENCY.         *  AVOID CONDITIONS  AND  FACTORS   THAT  MAKE                  NEUROLOGICAL  SYMPTOMS  WORSE.           *TO  MAINTAIN  REGULAR  SLEEP  WAKE  CYCLES. *   TO  HAVE  ADEQUATE  REST  AND   SLEEP    HOURS.          *    AVOID  ANY USAGE OF                   TOBACCO,EXCESSIVE  ALCOHOL  AND   ILLEGAL   SUBSTANCES        *   Compliance   With  Medications   And  Instructions      *    MIGRAINE/ HEADACHE    DAIRY   WITH  MONITORING                       OF  DURATION  ANDFREQUENCY. *     The    Antiplatelet  therapy    As   Recommended  Was   Discussed        *    Prophylactic  Use   Of     Vitamin   B   Complex,  Folic  Acid,    Vitamin  B12    Multivitamin,       Calcium  With  magnesium  And  Vit D    Supplementations   Over  The  Counter  Discussed              *  EVALUATIONS  AND  FOLLOW UP:                    * PHYSICAL  THERAPY                                *CARDIOLOGY              *   OPTHALMOLOGY                                *        PATIENT  HAD   VENTRICULAR  DRAIN    PLACEMENT  IN   MAY    2019        AND                                  WAS    IN    NURSING  HOME  REHAB   FOR  TWO  MONTHS . *         PATIENT  RECOVERED   WELL. NO   DIZZINESS. NO  BALANCE  PROBLEMS                                    NO   SEIZURES. NO   HEADACHES . MEMORY    PROBLEMS     ARE  AT  HER  BASELINE                                *          FOLLOW UP  CT  HEAD   June 2019     SHOWED                                   STABLE   VENTRICULOMEGALY    AND    SHUNT                                              *   PATIENT    DENIES     ANY  NEW  NEUROLOGICAL     CONCERNS. PATIENT  APPEAR  NEUROLOGICALLY   STABLE                   *PATIENT   TO  FOLLOW  UP  WITH   PRIMARY  CARE         OTHER  CONSULTANTS  AS  BEFORE.               *  Maintain   Healthy  Life Style    With   Periodic  Monitoring  Of      Any Medical  Conditions  Including   Elevated  Blood  Pressure,  Lipid  Profile,     Blood  Sugar levels  AndHeart  Disease. *   Period   Screening  For  Cancers  Involving  Breast,  Colon,    lungs  And  Other  Organs  As  Applicable,  In consultation   With  Your  Primary Care Providers. *Second  Neurological  Opinion  And  Evaluations  In  [facility]  Setting  If  Patient  Is  Interested. * Please   Contact   Neurology  Clinic   Early   If   Are  Any  New  Neurological   And  Any neurological  Concerns. *  If  The  Patient remains  Neurologically  Stable   Return   To  [facility] Neurology Department   IN   3 -  6     MONTHS  TIME   FOR  FURTHER              FOLLOW UP.                       *   The  Neurological   Findings,  Possible  Diagnosis,  Differential diagnoses   And  Options  For    Further   Investigations                   And  management   Are  Discussed  Comprehensively. Medications   And  Prescription   Risks  And  Side effects  Are   Also  Discussed. *  If   There is  Any  Significant  Worsening   Of  Current  Symptoms  And  Or  If patient  Develops                                  Any additional  New  NeurologicalSymptoms                  Or  Significant  Concerns   Should  Call  911 or  Go  To  Emergency  Department  For  Further  Immediate  Evaluation. The   Above  Were  Reviewed  With  Patient                          questions  Answered  In  Detail. More   Than  50% of face  To face Time   Was  Spent  On  Counseling   And   Coordination  Of  Care                 Of   Patient's  multiple   Neurological  Problems   And   Comorbid  Medical   Conditions.           Electronically signed by Justin Krishna MD.,  [facility][1]       Board Certified in  Neurology &  In  [facility] and Neurology ([employer])      DISCLAIMER:   Although every effort was made to ensure the accuracy of this  electronictranscription, some errors in transcription may have occurred. GENERAL PATIENT INSTRUCTIONS:      A Healthy Lifestyle: Care Instructions   Your Care Instructions   A healthy lifestyle can help you feel good, stay at ahealthy weight, and have plenty of energy for both work and play. A healthy lifestyle is something you can share with your whole family.  A healthy lifestyle also can lower your risk for serious health problems, such ashigh blood pressure, heart disease, and diabetes.  You can follow a few steps listed below to improve your health and the health of your family.  Follow-up careis a key part of your treatment and safety. Be sure to make and go to all appointments, and call your doctor if you are having problems. Its also a good idea to know your test results and keep a list of the medicines you take.  How can you care for yourself at home?  Do not eat too much sugar, fat, or fast foods. You can still have dessert and treats nowand then. The goal is moderation.  Start small to improve your eating habits. Pay attention to portion sizes, drink less juice and soda pop, and eat more fruits and vegetables.  Eat a healthy amount of food. A 3-ounce serving of meat, for example, is about the size of a deck of cards. Fill the rest of your plate with vegetables and whole grains.  Limit theamount of soda and sports drinks you have every day. Drink more water when you are thirsty.  Eat at least 5 servings of fruits and vegetables every day. It may seem like a lot, but it is not hard to reach this goal. Aserving or helping is 1 piece of fruit, 1 cup of vegetables, or 2 cups of leafy, raw vegetables. Have an apple or some carrot sticks as an afternoon snack instead of a candy bar. Try to have fruits and/or vegetables at everymeal.   Make exercise part of your daily routine.  You may want to start watching TV.  Where can you learn more?  Go [email or web address]. org and sign in to your [employer]s account. Enter F794 in the Search HealthInformation box to learn more about \"A Healthy Lifestyle: Care Instructions. \"     If you do not have anaccount, please click on the \"Sign Up Now\" link.  Current as of: July 26, 2016   Content Version: 11.2   © 3591-3872 [employer]. Care instructions adapted under license by [facility] ([facility]). If you have questions about a medical condition or this instruction, always ask your healthcare professional. [employer] disclaims any warranty or liability for your use of this information.

## 2021-07-15 ENCOUNTER — TELEPHONE (OUTPATIENT)
Dept: PULMONOLOGY | Age: 71
End: 2021-07-15

## 2021-07-15 DIAGNOSIS — J44.9 STAGE 2 MODERATE COPD BY GOLD CLASSIFICATION (HCC): Primary | ICD-10-CM

## 2021-07-15 NOTE — TELEPHONE ENCOUNTER
Pt stated that has been on trelegy for about 3-4 months and pt went to get refill of medication and it is now 160 dollars. Pt cannot afford this.  Pt was on breo previously, but wants to know if there is something else that you can send, or just [facility] [place]

## 2021-07-15 NOTE — TELEPHONE ENCOUNTER
Bevespi very similar to Trelegy. Don't know cost. Ordered and she can see.   She can ask pharmacist for cheapest alternatives for all medications contained in [place] or Treelisgy and I will order

## 2021-07-27 ENCOUNTER — TELEPHONE (OUTPATIENT)
Dept: PULMONOLOGY | Age: 71
End: 2021-07-27

## 2021-07-27 RX ORDER — FLUTICASONE FUROATE, UMECLIDINIUM BROMIDE AND VILANTEROL TRIFENATATE 100; 62.5; 25 UG/1; UG/1; UG/1
1 POWDER RESPIRATORY (INHALATION) DAILY
Qty: 3 EACH | Refills: 3 | Status: SHIPPED | OUTPATIENT
Start: 2021-07-27 | End: 2021-08-06 | Stop reason: SDUPTHER

## 2021-07-27 NOTE — TELEPHONE ENCOUNTER
Last Appt:  4/14/2021  Next Appt:   4/13/22 Annual      Patient is requesting a printed script for Trelegy 90 days/3 refills. States her insurance company recently gave her the approval.     If ok for script, patient would like it mailed to her home address. Thank you.

## 2021-08-05 ENCOUNTER — TELEPHONE (OUTPATIENT)
Dept: PULMONOLOGY | Age: 71
End: 2021-08-05

## 2021-08-05 NOTE — TELEPHONE ENCOUNTER
Patient called stating that [address] can get her, her Trelegy for 9.20 a month and they need a prescription sent to [address] that she uses.

## 2021-08-06 RX ORDER — FLUTICASONE FUROATE, UMECLIDINIUM BROMIDE AND VILANTEROL TRIFENATATE 100; 62.5; 25 UG/1; UG/1; UG/1
1 POWDER RESPIRATORY (INHALATION) DAILY
Qty: 3 EACH | Refills: 3 | Status: SHIPPED | OUTPATIENT
Start: 2021-08-06 | End: 2021-12-03

## 2021-11-11 ENCOUNTER — IMMUNIZATION (OUTPATIENT)
Dept: LAB | Age: 71
End: 2021-11-11
Payer: MEDICARE

## 2021-11-11 PROCEDURE — PBSHW COVID-19, MODERNA BOOSTER VACCINE 0.25ML DOSE: Performed by: INTERNAL MEDICINE

## 2021-11-11 PROCEDURE — 91301 COVID-19, MODERNA BOOSTER VACCINE 0.25ML DOSE: CPT | Performed by: INTERNAL MEDICINE

## 2021-11-30 ASSESSMENT — LIFESTYLE VARIABLES
HOW OFTEN DURING THE LAST YEAR HAVE YOU FOUND THAT YOU WERE NOT ABLE TO STOP DRINKING ONCE YOU HAD STARTED: 0
AUDIT TOTAL SCORE: 1
HOW OFTEN DURING THE LAST YEAR HAVE YOU FAILED TO DO WHAT WAS NORMALLY EXPECTED FROM YOU BECAUSE OF DRINKING: NEVER
HAVE YOU OR SOMEONE ELSE BEEN INJURED AS A RESULT OF YOUR DRINKING: NO
HOW OFTEN DO YOU HAVE A DRINK CONTAINING ALCOHOL: MONTHLY OR LESS
HOW OFTEN DURING THE LAST YEAR HAVE YOU FOUND THAT YOU WERE NOT ABLE TO STOP DRINKING ONCE YOU HAD STARTED: NEVER
HOW OFTEN DURING THE LAST YEAR HAVE YOU HAD A FEELING OF GUILT OR REMORSE AFTER DRINKING: 0
HAS A RELATIVE, FRIEND, DOCTOR, OR ANOTHER HEALTH PROFESSIONAL EXPRESSED CONCERN ABOUT YOUR DRINKING OR SUGGESTED YOU CUT DOWN: NO
HOW OFTEN DURING THE LAST YEAR HAVE YOU NEEDED AN ALCOHOLIC DRINK FIRST THING IN THE MORNING TO GET YOURSELF GOING AFTER A NIGHT OF HEAVY DRINKING: 0
HOW OFTEN DURING THE LAST YEAR HAVE YOU NEEDED AN ALCOHOLIC DRINK FIRST THING IN THE MORNING TO GET YOURSELF GOING AFTER A NIGHT OF HEAVY DRINKING: NEVER
HAVE YOU OR SOMEONE ELSE BEEN INJURED AS A RESULT OF YOUR DRINKING: 0
HOW OFTEN DO YOU HAVE SIX OR MORE DRINKS ON ONE OCCASION: NEVER
HOW OFTEN DO YOU HAVE A DRINK CONTAINING ALCOHOL: 1
HOW MANY STANDARD DRINKS CONTAINING ALCOHOL DO YOU HAVE ON A TYPICAL DAY: 0
AUDIT-C TOTAL SCORE: 0
HOW MANY STANDARD DRINKS CONTAINING ALCOHOL DO YOU HAVE ON A TYPICAL DAY: ONE OR TWO
HOW OFTEN DURING THE LAST YEAR HAVE YOU HAD A FEELING OF GUILT OR REMORSE AFTER DRINKING: NEVER
HOW OFTEN DURING THE LAST YEAR HAVE YOU BEEN UNABLE TO REMEMBER WHAT HAPPENED THE NIGHT BEFORE BECAUSE YOU HAD BEEN DRINKING: 0
HOW OFTEN DO YOU HAVE SIX OR MORE DRINKS ON ONE OCCASION: 0
HAS A RELATIVE, FRIEND, DOCTOR, OR ANOTHER HEALTH PROFESSIONAL EXPRESSED CONCERN ABOUT YOUR DRINKING OR SUGGESTED YOU CUT DOWN: 0
HOW OFTEN DURING THE LAST YEAR HAVE YOU FAILED TO DO WHAT WAS NORMALLY EXPECTED FROM YOU BECAUSE OF DRINKING: 0
HOW OFTEN DURING THE LAST YEAR HAVE YOU BEEN UNABLE TO REMEMBER WHAT HAPPENED THE NIGHT BEFORE BECAUSE YOU HAD BEEN DRINKING: NEVER
AUDIT TOTAL SCORE: 0
AUDIT-C TOTAL SCORE: 1

## 2021-11-30 ASSESSMENT — PATIENT HEALTH QUESTIONNAIRE - PHQ9
SUM OF ALL RESPONSES TO PHQ9 QUESTIONS 1 & 2: 0
SUM OF ALL RESPONSES TO PHQ QUESTIONS 1-9: 0
2. FEELING DOWN, DEPRESSED OR HOPELESS: 0
1. LITTLE INTEREST OR PLEASURE IN DOING THINGS: 0

## 2021-12-01 ENCOUNTER — OFFICE VISIT (OUTPATIENT)
Dept: OPTOMETRY | Age: 71
End: 2021-12-01
Payer: COMMERCIAL

## 2021-12-01 DIAGNOSIS — H26.493 BILATERAL POSTERIOR CAPSULAR OPACIFICATION: ICD-10-CM

## 2021-12-01 DIAGNOSIS — H52.203 HYPEROPIA OF BOTH EYES WITH ASTIGMATISM AND PRESBYOPIA: Primary | ICD-10-CM

## 2021-12-01 DIAGNOSIS — H52.03 HYPEROPIA OF BOTH EYES WITH ASTIGMATISM AND PRESBYOPIA: Primary | ICD-10-CM

## 2021-12-01 DIAGNOSIS — Z96.1 PSEUDOPHAKIA OF BOTH EYES: ICD-10-CM

## 2021-12-01 DIAGNOSIS — H52.4 HYPEROPIA OF BOTH EYES WITH ASTIGMATISM AND PRESBYOPIA: Primary | ICD-10-CM

## 2021-12-01 PROCEDURE — 92004 COMPRE OPH EXAM NEW PT 1/>: CPT | Performed by: OPTOMETRIST

## 2021-12-01 ASSESSMENT — KERATOMETRY
METHOD_AUTO_MANUAL: AUTOMATED
OD_AXISANGLE2_DEGREES: 049
OS_AXISANGLE2_DEGREES: 149
OD_AXISANGLE_DEGREES: 139
OD_K1POWER_DIOPTERS: 41.75
OS_AXISANGLE_DEGREES: 059
OS_K2POWER_DIOPTERS: 44.00
OD_K2POWER_DIOPTERS: 43.75
OS_K1POWER_DIOPTERS: 42.25

## 2021-12-01 ASSESSMENT — REFRACTION_WEARINGRX
OS_SPHERE: +1.00
OS_CYLINDER: -0.50
OS_ADD: +2.50
OD_AXIS: 060
SPECS_TYPE: BIFOCAL
OD_SPHERE: +0.50
OD_CYLINDER: -1.25
OD_ADD: +2.50
OS_AXIS: 120

## 2021-12-01 ASSESSMENT — REFRACTION_MANIFEST
OD_AXIS: 060
OD_ADD: +2.50
OD_CYLINDER: -1.50
OD_SPHERE: +0.75
OS_SPHERE: +1.00
OS_CYLINDER: -1.00
OS_ADD: +2.50
OS_AXIS: 120

## 2021-12-01 ASSESSMENT — VISUAL ACUITY
METHOD: SNELLEN - LINEAR
OD_CC: 20/30 OU
CORRECTION_TYPE: GLASSES
OS_CC: 20/70

## 2021-12-01 ASSESSMENT — TONOMETRY
IOP_METHOD: NON-CONTACT AIR PUFF
OS_IOP_MMHG: 13
OD_IOP_MMHG: 14

## 2021-12-01 ASSESSMENT — ENCOUNTER SYMPTOMS
ALLERGIC/IMMUNOLOGIC NEGATIVE: 0
GASTROINTESTINAL NEGATIVE: 0
RESPIRATORY NEGATIVE: 0
EYES NEGATIVE: 0

## 2021-12-01 ASSESSMENT — SLIT LAMP EXAM - LIDS
COMMENTS: NORMAL
COMMENTS: NORMAL

## 2021-12-01 NOTE — PROGRESS NOTES
Gumaro Angel presents today for   Chief Complaint   Patient presents with    Blurred Vision    Vision Exam   .    HPI     Blurred Vision     Laterality: both eyes    Quality: blurred    Context: distance vision and reading              Comments     Last Vision Exam: 2018 [facility]   Last Ophthalmology Exam: 2018 Dr. Nara Robert cat sx ou  Last Filled Glasses Rx: 2018  Insurance: [employer]  Update: Glasses  Distance and reading are getting more blurry  Feels like her cataracts have come back. For about 6 months or so, the vision is worse                Current Outpatient Medications   Medication Sig Dispense Refill    fluticasone-umeclidin-vilant (TRELEGY ELLIPTA) 100-62.5-25 MCG/INH AEPB Inhale 1 puff into the lungs daily 3 each 3    carvedilol (COREG) 3.125 MG tablet Take 1 tablet by mouth 2 times daily (with meals) 180 tablet 1    montelukast (SINGULAIR) 10 MG tablet Take 1 tablet by mouth nightly 90 tablet 1    albuterol sulfate  (90 Base) MCG/ACT inhaler Inhale 2 puffs into the lungs every 4 hours as needed for Wheezing or Shortness of Breath 3 Inhaler 3    Handicap Placard MISC by Does not apply route Expires: 7/26/2024  Dx: R23.89, G93.5 1 each 0    Misc. Devices (ADJUST BATH/SHOWER SEAT/BACK) MISC Use daily with showers 1 each 0    Misc. Devices (SUCTION GRAB BAR) MISC Use in the shower 2 each 0    aspirin 325 MG tablet Take 325 mg by mouth daily      loratadine (CLARITIN) 10 MG tablet Take 10 mg by mouth daily      glycopyrrolate-formoterol (BEVESPI AEROSPHERE) 9-4.8 MCG/ACT AERO Inhale 2 puffs into the lungs 2 times daily 1 Inhaler 11     No current facility-administered medications for this visit.          Family History   Problem Relation Age of Onset    No Known Problems Sister     No Known Problems Daughter     Breast Cancer Paternal Aunt     No Known Problems Sister     No Known Problems Sister     No Known Problems Daughter     No Known Problems Maternal Aunt     No Known file   Intimate Partner Violence:     Fear of Current or Ex-Partner: Not on file    Emotionally Abused: Not on file    Physically Abused: Not on file    Sexually Abused: Not on file   Housing Stability:     Unable to Pay for Housing in the Last Year: Not on file    Number of [place] in the Last Year: Not on file    Unstable Housing in the Last Year: Not on file     Past Medical History:   Diagnosis Date    Chronic obstructive pulmonary disease ([facility] [address]     Coronary artery disease     Dyslipidemia     Obesity     Pneumonia        ROS     Negative for: Constitutional, Gastrointestinal, Neurological, Skin, Genitourinary, Musculoskeletal, HENT, Endocrine, Cardiovascular, Eyes, Respiratory, Psychiatric, Allergic/Imm, Heme/Lymph          Main Ophthalmology Exam     External Exam       Right Left    External Normal Normal          Slit Lamp Exam       Right Left    Lids/Lashes Normal Normal    Conjunctiva/Sclera White and quiet White and quiet    Cornea Clear Clear    Anterior Chamber Deep and quiet Deep and quiet    Iris Round and reactive Round and reactive    Lens 2-3 opacification ; 3+ opacification ;, Posterior chamber intraocular lens    Vitreous Normal Normal          Fundus Exam       Right Left    C/D Ratio poor view  poor view              <div id=\"MAIN_EXAM_REVIEWED\"></div>      Tonometry     Tonometry (Non-contact air puff, 10:22 AM)       Right Left    Pressure 14 13   IOP.0             12.3  CH:  10.7          11.1  WS: 8.7          9.4                 Not recorded       Not recorded         Visual Acuity (Snellen - Linear)       Right Left    Dist cc 20/40 20/70    Near cc 20/30 OU     Correction: Glasses          Pupils     Pupils       Pupils    Right PERRL    Left PERRL              Neuro/Psych     Neuro/Psych     Oriented x3: Yes    Mood/Affect: Normal              Keratometry     Keratometry (Automated)       K1 Axis K2 Axis    Right 41.75 049 43.75 139    Left 42.25 149 44.00 059 Ophthalmology Exam     Wearing Rx       Sphere Cylinder Axis Add    Right +0.50 -1.25 060 +2.50    Left +1.00 -0.50 120 +2.50    Age: 5yrs    Type: Bifocal              Manifest Refraction     Manifest Refraction       Sphere Cylinder Axis Dist VA Add    Right +0.75 -1.50 060 20/30- +2.50    Left +1.00 -1.00 120 20/50 +2.50          Manifest Refraction #2 (Auto)       Sphere Cylinder Axis Dist VA Add    Right +1.00 -2.00 064      Left +0.75 -1.25 146                 Final Rx       Sphere Cylinder Axis Dist VA Add    Right +0.75 -1.50 060 20/30- +2.50    Left +1.00 -1.00 120 20/50 +2.50    Type: do not fill             Orders Placed This Encounter   Procedures    Amb External Referral To Ophthalmology         IMPRESSION:  1. Hyperopia of both eyes with astigmatism and presbyopia    2. Pseudophakia of both eyes    3. Bilateral posterior capsular opacification        PLAN:    1. Hold on glasses because of posterior opacifcation; We will refer back to original surgeon and see back for final check and prescription finalization after that. Patient Instructions   Referral to [facility] or Jarod for possible yag      Return for referral for posterior opacificiaton in both eyes , then one week later with me .

## 2021-12-03 ENCOUNTER — HOSPITAL ENCOUNTER (OUTPATIENT)
Dept: LAB | Age: 71
Discharge: HOME | End: 2021-12-03
Payer: MEDICARE

## 2021-12-03 ENCOUNTER — OFFICE VISIT (OUTPATIENT)
Dept: FAMILY MEDICINE CLINIC | Age: 71
End: 2021-12-03
Payer: MEDICARE

## 2021-12-03 VITALS
SYSTOLIC BLOOD PRESSURE: 110 MMHG | OXYGEN SATURATION: 93 % | WEIGHT: 293 LBS | TEMPERATURE: 96.8 F | HEART RATE: 95 BPM | RESPIRATION RATE: 18 BRPM | HEIGHT: 66 IN | BODY MASS INDEX: 47.09 KG/M2 | DIASTOLIC BLOOD PRESSURE: 80 MMHG

## 2021-12-03 DIAGNOSIS — Z00.00 ROUTINE GENERAL MEDICAL EXAMINATION AT A HEALTH CARE FACILITY: Primary | ICD-10-CM

## 2021-12-03 DIAGNOSIS — E78.5 DYSLIPIDEMIA: ICD-10-CM

## 2021-12-03 DIAGNOSIS — R73.01 IMPAIRED FASTING GLUCOSE: ICD-10-CM

## 2021-12-03 DIAGNOSIS — Z00.00 ROUTINE GENERAL MEDICAL EXAMINATION AT A HEALTH CARE FACILITY: ICD-10-CM

## 2021-12-03 LAB
ANION GAP SERPL CALCULATED.3IONS-SCNC: 10 MMOL/L (ref 9–17)
BUN BLDV-MCNC: 11 MG/DL (ref 8–23)
BUN/CREAT BLD: 12 (ref 9–20)
CALCIUM SERPL-MCNC: 9.8 MG/DL (ref 8.6–10.4)
CHLORIDE BLD-SCNC: 102 MMOL/L (ref 98–107)
CHOLESTEROL/HDL RATIO: 3.5
CHOLESTEROL: 181 MG/DL
CO2: 29 MMOL/L (ref 20–31)
CREAT SERPL-MCNC: 0.89 MG/DL (ref 0.5–0.9)
GFR AFRICAN AMERICAN: >60 ML/MIN
GFR NON-AFRICAN AMERICAN: >60 ML/MIN
GFR SERPL CREATININE-BSD FRML MDRD: ABNORMAL ML/MIN/{1.73_M2}
GFR SERPL CREATININE-BSD FRML MDRD: ABNORMAL ML/MIN/{1.73_M2}
GLUCOSE BLD-MCNC: 109 MG/DL (ref 70–99)
HDLC SERPL-MCNC: 51 MG/DL
LDL CHOLESTEROL: 111 MG/DL (ref 0–130)
POTASSIUM SERPL-SCNC: 4.4 MMOL/L (ref 3.7–5.3)
SODIUM BLD-SCNC: 141 MMOL/L (ref 135–144)
TRIGL SERPL-MCNC: 93 MG/DL
VLDLC SERPL CALC-MCNC: NORMAL MG/DL (ref 1–30)

## 2021-12-03 PROCEDURE — G8400 PT W/DXA NO RESULTS DOC: HCPCS | Performed by: FAMILY MEDICINE

## 2021-12-03 PROCEDURE — G8484 FLU IMMUNIZE NO ADMIN: HCPCS | Performed by: FAMILY MEDICINE

## 2021-12-03 PROCEDURE — G8427 DOCREV CUR MEDS BY ELIG CLIN: HCPCS | Performed by: FAMILY MEDICINE

## 2021-12-03 PROCEDURE — 1123F ACP DISCUSS/DSCN MKR DOCD: CPT | Performed by: FAMILY MEDICINE

## 2021-12-03 PROCEDURE — 4040F PNEUMOC VAC/ADMIN/RCVD: CPT | Performed by: FAMILY MEDICINE

## 2021-12-03 PROCEDURE — 83036 HEMOGLOBIN GLYCOSYLATED A1C: CPT

## 2021-12-03 PROCEDURE — 80048 BASIC METABOLIC PNL TOTAL CA: CPT

## 2021-12-03 PROCEDURE — 1090F PRES/ABSN URINE INCON ASSESS: CPT | Performed by: FAMILY MEDICINE

## 2021-12-03 PROCEDURE — G0463 HOSPITAL OUTPT CLINIC VISIT: HCPCS | Performed by: FAMILY MEDICINE

## 2021-12-03 PROCEDURE — G8417 CALC BMI ABV UP PARAM F/U: HCPCS | Performed by: FAMILY MEDICINE

## 2021-12-03 PROCEDURE — 80061 LIPID PANEL: CPT

## 2021-12-03 PROCEDURE — 1036F TOBACCO NON-USER: CPT | Performed by: FAMILY MEDICINE

## 2021-12-03 PROCEDURE — G0439 PPPS, SUBSEQ VISIT: HCPCS | Performed by: FAMILY MEDICINE

## 2021-12-03 PROCEDURE — 99213 OFFICE O/P EST LOW 20 MIN: CPT | Performed by: FAMILY MEDICINE

## 2021-12-03 PROCEDURE — 99397 PER PM REEVAL EST PAT 65+ YR: CPT | Performed by: FAMILY MEDICINE

## 2021-12-03 PROCEDURE — 3017F COLORECTAL CA SCREEN DOC REV: CPT | Performed by: FAMILY MEDICINE

## 2021-12-03 PROCEDURE — 36415 COLL VENOUS BLD VENIPUNCTURE: CPT

## 2021-12-03 RX ORDER — FLUTICASONE FUROATE, UMECLIDINIUM BROMIDE AND VILANTEROL TRIFENATATE 100; 62.5; 25 UG/1; UG/1; UG/1
1 POWDER RESPIRATORY (INHALATION) DAILY
Qty: 3 EACH | Refills: 3 | OUTPATIENT
Start: 2021-12-03 | End: 2022-06-07

## 2021-12-03 ASSESSMENT — ENCOUNTER SYMPTOMS
SORE THROAT: 0
COUGH: 1
RHINORRHEA: 0
ABDOMINAL PAIN: 0
DIARRHEA: 0
SHORTNESS OF BREATH: 1
CONSTIPATION: 0
VOMITING: 0

## 2021-12-03 NOTE — PATIENT INSTRUCTIONS
Hospital Outpatient Visit on 12/03/2020   Component Date Value Ref Range Status    Hemoglobin A1C 12/03/2020 5.8  4.8 - 5.9 % Final    Estimated Avg Glucose 12/03/2020 120  mg/dL Final    Cholesterol 12/03/2020 193  <200 mg/dL Final    Comment:    Cholesterol Guidelines:      <200  Desirable   200-240  Borderline      >240  Undesirable         HDL 12/03/2020 61  >40 mg/dL Final    Comment:    HDL Guidelines:    <40     Undesirable   40-59    Borderline    >59     Desirable         LDL Cholesterol 12/03/2020 111  0 - 130 mg/dL Final    Comment:    LDL Guidelines:     <100    Desirable   100-129   Near to/above Desirable   130-159   Borderline      >159   Undesirable     Direct (measured) LDL and calculated LDL are not interchangeable tests.  Chol/HDL Ratio 12/03/2020 3.2  <5 Final            Triglycerides 12/03/2020 105  <150 mg/dL Final    Comment:    Triglyceride Guidelines:     <150   Desirable   150-199  Borderline   200-499  High     >499   Very high   Based on AHA Guidelines for fasting triglyceride, October 2012.          VLDL 12/03/2020 NOT REPORTED  1 - 30 mg/dL Final    Glucose 12/03/2020 106* 70 - 99 mg/dL Final    BUN 12/03/2020 19  8 - 23 mg/dL Final    CREATININE 12/03/2020 0.93* 0.50 - 0.90 mg/dL Final    Bun/Cre Ratio 12/03/2020 20  9 - 20 Final    Calcium 12/03/2020 9.6  8.6 - 10.4 mg/dL Final    Sodium 12/03/2020 138  135 - 144 mmol/L Final    Potassium 12/03/2020 4.6  3.7 - 5.3 mmol/L Final    Chloride 12/03/2020 102  98 - 107 mmol/L Final    CO2 12/03/2020 27  20 - 31 mmol/L Final    Anion Gap 12/03/2020 9  9 - 17 mmol/L Final    GFR Non- 12/03/2020 60* >60 mL/min Final    GFR  12/03/2020 >60  >60 mL/min Final    GFR Comment 12/03/2020        Final    Comment: Average GFR for 61-76 years old:   80 mL/min/1.73sq m  Chronic Kidney Disease:   <60 mL/min/1.73sq m  Kidney failure:   <15 mL/min/1.73sq m              eGFR calculated using average adult body mass. Additional eGFR calculator available at:        Jmdedu.com.br            GFR Staging 12/03/2020 NOT REPORTED   Final     Personalized Preventive Plan for Steven Rangel - 12/3/2021  Medicare offers a range of preventive health benefits. Some of the tests and screenings are paid in full while other may be subject to a deductible, co-insurance, and/or copay. Some of these benefits include a comprehensive review of your medical history including lifestyle, illnesses that may run in your family, and various assessments and screenings as appropriate. After reviewing your medical record and screening and assessments performed today your provider may have ordered immunizations, labs, imaging, and/or referrals for you. A list of these orders (if applicable) as well as your Preventive Care list are included within your After Visit Summary for your review. Other Preventive Recommendations:    · A preventive eye exam performed by an eye specialist is recommended every 1-2 years to screen for glaucoma; cataracts, macular degeneration, and other eye disorders. · A preventive dental visit is recommended every 6 months. · Try to get at least 150 minutes of exercise per week or 10,000 steps per day on a pedometer . · Order or download the FREE \"Exercise & Physical Activity: Your Everyday Guide\" from [employer]. Call 5-187.605.5144 or search [employer] online. · You need 8788-1579 mg of calcium and 7569-3449 IU of vitamin D per day. It is possible to meet your calcium requirement with diet alone, but a vitamin D supplement is usually necessary to meet this goal.  · When exposed to the sun, use a sunscreen that protects against both UVA and UVB radiation with an SPF of 30 or greater. Reapply every 2 to 3 hours or after sweating, drying off with a towel, or swimming. · Always wear a seat belt when traveling in a car.  Always wear a helmet when riding a bicycle or motorcycle.

## 2021-12-03 NOTE — PROGRESS NOTES
Medicare Annual Wellness Visit  Name: Taisha Byers Date: 12/3/2021   MRN: [id number] Sex: Female   Age: 73 y.o. Ethnicity: Non- / Non    : 1950 Race: White (non-)      Tonie Smoke is here for Medicare AWV and COPD    Screenings for behavioral, psychosocial and functional/safety risks, and cognitive dysfunction are all negative except as indicated below. These results, as well as other patient data from the [address] form, are documented in Flowsheets linked to this Encounter. Allergies   Allergen Reactions    Vicodin [Hydrocodone-Acetaminophen]          Prior to Visit Medications    Medication Sig Taking? Authorizing Provider   fluticasone-umeclidin-vilant (TRELEGY ELLIPTA) 100-62.5-25 MCG/INH AEPB Inhale 1 puff into the lungs daily Yes Leonardo Masters MD   carvedilol (COREG) 3.125 MG tablet Take 1 tablet by mouth 2 times daily (with meals) Yes Leonardo Masters MD   montelukast (SINGULAIR) 10 MG tablet Take 1 tablet by mouth nightly Yes Leonardo Masters MD   albuterol sulfate  (90 Base) MCG/ACT inhaler Inhale 2 puffs into the lungs every 4 hours as needed for Wheezing or Shortness of Breath Yes JOHNATHAN Workman CNP   Handicap Placard MISC by Does not apply route Expires: 2024  Dx: R23.89, G93.5 Yes JOHNATHAN Barrera CNPc. Devices (ADJUST BATH/SHOWER SEAT/BACK) MISC Use daily with showers Yes Leonardo Masters MD   Misc.  Devices (SUCTION GRAB BAR) MISC Use in the shower Yes Leonardo Masters MD   loratadine (CLARITIN) 10 MG tablet Take 10 mg by mouth daily Yes Historical Provider, MD   glycopyrrolate-formoterol (Rosia Stamford) 9-4.8 MCG/ACT AERO Inhale 2 puffs into the lungs 2 times daily  Shakira Ramos DO   aspirin 325 MG tablet Take 325 mg by mouth daily  Patient not taking: Reported on 12/3/2021  Historical Provider, MD         Past Medical History:   Diagnosis Date    Chronic obstructive pulmonary disease ([facility] [address]     Coronary artery disease     Dyslipidemia     Obesity     Pneumonia        Past Surgical History:   Procedure Laterality Date    CARDIAC CATHETERIZATION      CARDIAC CATHETERIZATION  2012    cath with x1 stent    EYE SURGERY      shar cataract surg x2 y ago    VENTRICULOPERITONEAL SHUNT Right 5/16/2019    VENTRICULAR PERITONEAL SHUNT INSERTION ENDOSCOPIC performed by Primo Portillo MD at [place] History   Problem Relation Age of Onset    No Known Problems Sister     No Known Problems Daughter     Breast Cancer Paternal Aunt     No Known Problems Sister     No Known Problems Sister     No Known Problems Daughter     No Known Problems Maternal Aunt     No Known Problems Maternal Aunt        CareTeam (Including outside providers/suppliers regularly involved in providing care):   Patient Care Team:  Chelsie Mark MD as PCP - General (Family Medicine)  Chelsie Mark MD as PCP - [facility] Empaneled Provider  Primo Portillo MD as Consulting Physician (Neurosurgery)  Caitie Qureshi MD (Ophthalmology)  Tatiana Jackson (Optometry)  Zachery Morrell RN as Nurse Navigator    Wt Readings from Last 3 Encounters:   12/03/21 293 lb (132.9 kg)   07/14/21 297 lb (134.7 kg)   06/03/21 300 lb (136.1 kg)     Vitals:    12/03/21 0959   BP: 110/80   Site: Left Upper Arm   Position: Sitting   Cuff Size: Medium Adult   Pulse: 95   Resp: 18   Temp: 96.8 °F (36 °C)   TempSrc: Temporal   SpO2: 93%   Weight: 293 lb (132.9 kg)   Height: 5' 6\" (1.676 m)     Body mass index is 47.29 kg/m². Based upon direct observation of the patient, evaluation of cognition reveals recent and remote memory intact. Patient's complete Health Risk Assessment and screening values have been reviewed and are found in Flowsheets. The following problems were reviewed today and where indicated follow up appointments were made and/or referrals ordered.     Positive Risk Factor Screenings with Interventions:            General Health and ACP:  General  In general, how would you say your health is?: Good  In the past 7 days, have you experienced any of the following?  New or Increased Pain, New or Increased Fatigue, Loneliness, Social Isolation, Stress or Anger?: None of These  Do you get the social and emotional support that you need?: Yes  Do you have a Living Will?: (!) No  Advance Directives     Power of Cheri Ramirez Will ACP-Advance Directive ACP-Power of     Not on File Not on File Not on File Not on File      General Health Risk Interventions:  · No Living Will: Patient declines ACP discussion/assistance    Health Habits/Nutrition:  Health Habits/Nutrition  Do you exercise for at least 20 minutes 2-3 times per week?: (!) No  Have you lost any weight without trying in the past 3 months?: No  Do you eat only one meal per day?: No  Have you seen the dentist within the past year?: (!) No  Body mass index: (!) 47.29  Health Habits/Nutrition Interventions:  · Inadequate physical activity:  patient is not ready to increase his/her physical activity level at this time  · Dental exam overdue:  patient encouraged to make appointment with his/her dentist    Hearing/Vision:  No exam data present  Hearing/Vision  Do you or your family notice any trouble with your hearing that hasn't been managed with hearing aids?: (!) Yes  Do you have difficulty driving, watching TV, or doing any of your daily activities because of your eyesight?: (!) Yes  Have you had an eye exam within the past year?: Appointment is scheduled  Hearing/Vision Interventions:  · Hearing concerns:  patient declines any further evaluation/treatment for hearing issues      Personalized Preventive Plan   Current Health Maintenance Status  Immunization History   Administered Date(s) Administered    COVID-19, Moderna, Booster, PF, 50mcg/0.25ml 11/11/2021    COVID-19, Moderna, Primary or Immunocompromised, PF, 100mcg/0.5mL 02/12/2021, 03/12/2021    Influenza, Scot Spark, adjuvanted, 65 yrs +, IM, PF (Fluad) 12/03/2020    Influenza, Triv, inactivated, subunit, adjuvanted, IM (Fluad 65 yrs and older) 01/08/2020    Pneumococcal Conjugate 13-valent ([id number]) 05/17/2019    Pneumococcal Polysaccharide ([id number]) 07/02/2012, 06/03/2021        Health Maintenance   Topic Date Due    A1C test (Diabetic or Prediabetic)  12/03/2021    DTaP/Tdap/Td vaccine (1 - Tdap) 12/03/2021 (Originally 10/8/1969)    Shingles Vaccine (1 of 2) 12/03/2021 (Originally 10/8/2002)    Low dose CT lung screening  03/11/2022    Breast cancer screen  06/07/2023    Colon cancer screen fecal DNA test (Cologuard)  03/15/2024    Lipid screen  12/03/2025    DEXA (modify frequency per FRAX score)  Completed    Flu vaccine  Completed    Pneumococcal 65+ years Vaccine  Completed    COVID-19 Vaccine  Completed    Hepatitis A vaccine  Aged Out    Hepatitis B vaccine  Aged Out    Hib vaccine  Aged Out    Meningococcal (ACWY) vaccine  Aged Out    Hepatitis C screen  Discontinued     Recommendations for [employer] Due: see orders and patient instructions/AVS.  . Recommended screening schedule for the next 5-10 years is provided to the patient in written form: see Patient Mars Garland was seen today for medicare awv and copd. Diagnoses and all orders for this visit:    Routine general medical examination at a health care facility  -     Basic Metabolic Panel; Future    Dyslipidemia  -     Lipid Panel; Future    Impaired fasting glucose  -     Hemoglobin A1C; Future    Other orders  -     fluticasone-umeclidin-vilant (TRELEGY ELLIPTA) 100-62.5-25 MCG/INH AEPB; Inhale 1 puff into the lungs daily           Return in 6 months (on 6/3/2022) for COPD follow up.     Merton Bloch, MD  12/3/2021  11:02 AM

## 2021-12-03 NOTE — PROGRESS NOTES
[address]  [address] [address]  Dept: [phone]  Dept Fax: [phone]  Loc: [phone]    Shannan Peralta is a 73 y.o. female who presents today for her medical conditions/complaints as noted below. Shannan Peralta is c/o of   Chief Complaint   Patient presents with    Medicare AWV    COPD       HPI:     HPI Here today for a follow up of her COPD and her [address] COPD: stable; SOB is about the same as usual. Occasional mild cough, non-productive. Uses rescue inhaler after walking, able to climb stairs. Got COVID booster 2 weeks ago. Wednesday saw optometrist, has buildup on back of lens, scheduled for procedure with optho in a couple weeks. Trelegy every morning and albuterol as needed with SOB from exertion.       Past Medical History:   Diagnosis Date    Chronic obstructive pulmonary disease ([facility] [address]     Coronary artery disease     Dyslipidemia     Obesity     Pneumonia           Social History     Tobacco Use    Smoking status: Former Smoker     Packs/day: 1.00     Years: 50.00     Pack years: 50.00     Types: Cigarettes     Start date: 1970     Quit date: 2019     Years since quittin.5    Smokeless tobacco: Never Used   Substance Use Topics    Alcohol use: No     Current Outpatient Medications   Medication Sig Dispense Refill    fluticasone-umeclidin-vilant (TRELEGY ELLIPTA) 100-62.5-25 MCG/INH AEPB Inhale 1 puff into the lungs daily 3 each 3    carvedilol (COREG) 3.125 MG tablet Take 1 tablet by mouth 2 times daily (with meals) 180 tablet 1    montelukast (SINGULAIR) 10 MG tablet Take 1 tablet by mouth nightly 90 tablet 1    albuterol sulfate  (90 Base) MCG/ACT inhaler Inhale 2 puffs into the lungs every 4 hours as needed for Wheezing or Shortness of Breath 3 Inhaler 3    Handicap Placard MISC by Does not apply route Expires: 2024  Dx: R23.89, G93.5 1 each 0    Misc. Devices (ADJUST BATH/SHOWER SEAT/BACK) MISC Use daily with showers 1 each 0    Misc. Devices (SUCTION GRAB BAR) MISC Use in the shower 2 each 0    loratadine (CLARITIN) 10 MG tablet Take 10 mg by mouth daily      glycopyrrolate-formoterol (BEVESPI AEROSPHERE) 9-4.8 MCG/ACT AERO Inhale 2 puffs into the lungs 2 times daily 1 Inhaler 11    aspirin 325 MG tablet Take 325 mg by mouth daily (Patient not taking: Reported on 12/3/2021)       No current facility-administered medications for this visit. Allergies   Allergen Reactions    Vicodin [Hydrocodone-Acetaminophen]        Subjective:     Review of Systems   Constitutional: Negative for chills and fever. HENT: Negative for rhinorrhea and sore throat. Respiratory: Positive for cough and shortness of breath. Cardiovascular: Negative for chest pain and leg swelling. Gastrointestinal: Negative for abdominal pain, constipation, diarrhea and vomiting. Genitourinary: Negative for difficulty urinating, dysuria and frequency. Musculoskeletal: Positive for arthralgias (mild, knees). Negative for neck pain. Skin: Negative for rash. Allergic/Immunologic: Positive for environmental allergies. Neurological: Positive for light-headedness (occasionally with standing). Negative for headaches. Psychiatric/Behavioral: Negative for dysphoric mood. The patient is not nervous/anxious. Objective:      Physical Exam  Vitals and nursing note reviewed. Constitutional:       General: She is not in acute distress. Appearance: She is well-developed. Eyes:      Conjunctiva/sclera: Conjunctivae normal.   Neck:      Thyroid: No thyromegaly. Cardiovascular:      Rate and Rhythm: Normal rate and regular rhythm. Heart sounds: Normal heart sounds. No murmur heard. Pulmonary:      Effort: Pulmonary effort is normal. No respiratory distress. Breath sounds: Normal breath sounds. No wheezing.    Musculoskeletal:      Cervical back: Normal range of motion and neck supple. Lymphadenopathy:      Cervical: No cervical adenopathy. Skin:     General: Skin is warm and dry. Findings: No erythema or rash. Neurological:      Mental Status: She is alert and oriented to person, place, and time. Psychiatric:         Mood and Affect: Mood normal.         Behavior: Behavior normal.         Thought Content: Thought content normal.         Judgment: Judgment normal.       /80 (Site: Left Upper Arm, Position: Sitting, Cuff Size: Medium Adult)   Pulse 95   Temp 96.8 °F (36 °C) (Temporal)   Resp 18   Ht 5' 6\" (1.676 m)   Wt 293 lb (132.9 kg)   LMP 08/01/2000 (Approximate)   SpO2 93%   BMI 47.29 kg/m²     Assessment:       Diagnosis Orders   1. Routine general medical examination at a health care facility  Basic Metabolic Panel   2. Dyslipidemia  Lipid Panel   3. Impaired fasting glucose  Hemoglobin A1C             Plan:        MWV: see other note    COPD: stable; she has been doing well on her trelegy inhaler. She is not having any trouble affording it. She is getting really good relief of her sob and she is coughing rarely. Return in 6 months (on 6/3/2022) for COPD follow up. Orders Placed This Encounter   Procedures    Basic Metabolic Panel     Standing Status:   Future     Standing Expiration Date:   12/3/2022    Lipid Panel     Standing Status:   Future     Standing Expiration Date:   12/3/2022     Order Specific Question:   Is Patient Fasting?/# of Hours     Answer:   0 per dr Dorinda Kinney Hemoglobin A1C     Standing Status:   Future     Standing Expiration Date:   12/3/2022         Patientgiven educational materials - see patient instructions. Discussed use, benefit,and side effects of prescribed medications. All patient questions answered. Ptvoiced understanding. Reviewed health maintenance. Instructed to continue currentmedications, diet and exercise. Patient agreed with treatment plan. Follow up asdirected. Electronically signed by Diana Vázquez MD on 12/3/2021 at 10:45 AM

## 2021-12-04 LAB
ESTIMATED AVERAGE GLUCOSE: 120 MG/DL
HBA1C MFR BLD: 5.8 % (ref 4–6)

## 2021-12-20 DIAGNOSIS — I25.10 CORONARY ARTERY DISEASE INVOLVING NATIVE CORONARY ARTERY OF NATIVE HEART WITHOUT ANGINA PECTORIS: ICD-10-CM

## 2021-12-21 RX ORDER — CARVEDILOL 3.12 MG/1
TABLET ORAL
Qty: 180 TABLET | Refills: 1 | Status: SHIPPED | OUTPATIENT
Start: 2021-12-21 | End: 2022-05-24

## 2021-12-21 RX ORDER — MONTELUKAST SODIUM 10 MG/1
TABLET ORAL
Qty: 90 TABLET | Refills: 1 | Status: SHIPPED | OUTPATIENT
Start: 2021-12-21 | End: 2022-05-24

## 2021-12-21 NOTE — TELEPHONE ENCOUNTER
Micha Or called requesting a refill of the below medication which has been pended for you:     Requested Prescriptions     Pending Prescriptions Disp Refills    carvedilol (COREG) 3.125 MG tablet [Pharmacy Med Name: CARVEDILOL 3.125 MG Tablet] 180 tablet 1     Sig: TAKE 1 TABLET TWICE DAILY WITH MEALS    montelukast (SINGULAIR) 10 MG tablet [Pharmacy Med Name: MONTELUKAST SODIUM 10 MG Tablet] 90 tablet 1     Sig: TAKE 1 TABLET EVERY NIGHT       Last Appointment Date: 12/3/2021  Next Appointment Date: 6/3/2022    Allergies   Allergen Reactions    Vicodin [Hydrocodone-Acetaminophen]

## 2021-12-23 RX ORDER — ALBUTEROL SULFATE 90 UG/1
2 AEROSOL, METERED RESPIRATORY (INHALATION) EVERY 4 HOURS PRN
Qty: 3 EACH | Refills: 0 | Status: SHIPPED | OUTPATIENT
Start: 2021-12-23 | End: 2022-02-01

## 2022-01-18 ENCOUNTER — OFFICE VISIT (OUTPATIENT)
Dept: OPTOMETRY | Age: 72
End: 2022-01-18
Payer: MEDICARE

## 2022-01-18 DIAGNOSIS — H52.203 HYPEROPIA OF BOTH EYES WITH ASTIGMATISM AND PRESBYOPIA: ICD-10-CM

## 2022-01-18 DIAGNOSIS — Z98.890 S/P YAG CAPSULOTOMY, BILATERAL: ICD-10-CM

## 2022-01-18 DIAGNOSIS — H53.8 BLURRED VISION, BILATERAL: Primary | ICD-10-CM

## 2022-01-18 DIAGNOSIS — H52.03 HYPEROPIA OF BOTH EYES WITH ASTIGMATISM AND PRESBYOPIA: ICD-10-CM

## 2022-01-18 DIAGNOSIS — H52.4 HYPEROPIA OF BOTH EYES WITH ASTIGMATISM AND PRESBYOPIA: ICD-10-CM

## 2022-01-18 PROCEDURE — 1123F ACP DISCUSS/DSCN MKR DOCD: CPT | Performed by: OPTOMETRIST

## 2022-01-18 PROCEDURE — 1090F PRES/ABSN URINE INCON ASSESS: CPT | Performed by: OPTOMETRIST

## 2022-01-18 PROCEDURE — 99211 OFF/OP EST MAY X REQ PHY/QHP: CPT

## 2022-01-18 PROCEDURE — 1036F TOBACCO NON-USER: CPT | Performed by: OPTOMETRIST

## 2022-01-18 PROCEDURE — G8400 PT W/DXA NO RESULTS DOC: HCPCS | Performed by: OPTOMETRIST

## 2022-01-18 PROCEDURE — G8484 FLU IMMUNIZE NO ADMIN: HCPCS | Performed by: OPTOMETRIST

## 2022-01-18 PROCEDURE — G8427 DOCREV CUR MEDS BY ELIG CLIN: HCPCS | Performed by: OPTOMETRIST

## 2022-01-18 PROCEDURE — 99213 OFFICE O/P EST LOW 20 MIN: CPT | Performed by: OPTOMETRIST

## 2022-01-18 PROCEDURE — 4040F PNEUMOC VAC/ADMIN/RCVD: CPT | Performed by: OPTOMETRIST

## 2022-01-18 PROCEDURE — G8417 CALC BMI ABV UP PARAM F/U: HCPCS | Performed by: OPTOMETRIST

## 2022-01-18 PROCEDURE — 3017F COLORECTAL CA SCREEN DOC REV: CPT | Performed by: OPTOMETRIST

## 2022-01-18 ASSESSMENT — KERATOMETRY
OD_AXISANGLE2_DEGREES: 052
OS_AXISANGLE2_DEGREES: 146
OD_AXISANGLE_DEGREES: 142
OD_K2POWER_DIOPTERS: 43.50
OS_K2POWER_DIOPTERS: 44.00
OS_AXISANGLE_DEGREES: 056
OD_K1POWER_DIOPTERS: 41.75
OS_K1POWER_DIOPTERS: 42.25
METHOD_AUTO_MANUAL: AUTOMATED

## 2022-01-18 ASSESSMENT — REFRACTION_MANIFEST
OS_SPHERE: +1.00
OD_SPHERE: +0.75
OS_CYLINDER: -1.25
OD_ADD: +2.50
OD_CYLINDER: -1.75
OS_ADD: +2.50
OD_AXIS: 065
OS_AXIS: 140

## 2022-01-18 ASSESSMENT — VISUAL ACUITY
OD_CC: 20/25
OS_CC: 20/20
OS_CC+: -1
OD_CC+: +2
METHOD: SNELLEN - LINEAR
CORRECTION_TYPE: GLASSES

## 2022-01-18 ASSESSMENT — REFRACTION_WEARINGRX
OD_AXIS: 060
OS_AXIS: 120
OD_CYLINDER: -1.50
OS_CYLINDER: -1.00
OS_SPHERE: +1.00
OS_ADD: +2.50
OD_SPHERE: +0.75
OD_ADD: +2.50

## 2022-01-18 ASSESSMENT — ENCOUNTER SYMPTOMS
GASTROINTESTINAL NEGATIVE: 0
RESPIRATORY NEGATIVE: 0
EYES NEGATIVE: 1
ALLERGIC/IMMUNOLOGIC NEGATIVE: 0

## 2022-01-18 ASSESSMENT — TONOMETRY
IOP_METHOD: NON-CONTACT AIR PUFF
OD_IOP_MMHG: 11
OS_IOP_MMHG: 11

## 2022-01-18 ASSESSMENT — SLIT LAMP EXAM - LIDS
COMMENTS: NORMAL
COMMENTS: NORMAL

## 2022-01-18 NOTE — PROGRESS NOTES
Doyle Santiago presents today for   Chief Complaint   Patient presents with    Blurred Vision    2 Week Follow-Up   . HPI     Blurred Vision     Laterality: both eyes              Comments     Follow up after YAG OU with Dr. Karley Long at [facility] 1-04-22  Doing well, very happy with clarity of vision afterwards. We will check the rx again ; No drops needed after the procedure and doing well                Current Outpatient Medications   Medication Sig Dispense Refill    albuterol sulfate  (90 Base) MCG/ACT inhaler INHALE 2 PUFFS INTO THE LUNGS EVERY 4 HOURS AS NEEDED FOR WHEEZING OR SHORTNESS OF BREATH 3 each 0    carvedilol (COREG) 3.125 MG tablet TAKE 1 TABLET TWICE DAILY WITH MEALS 180 tablet 1    montelukast (SINGULAIR) 10 MG tablet TAKE 1 TABLET EVERY NIGHT 90 tablet 1    fluticasone-umeclidin-vilant (TRELEGY ELLIPTA) 100-62.5-25 MCG/INH AEPB Inhale 1 puff into the lungs daily 3 each 3    Handicap Placard MISC by Does not apply route Expires: 7/26/2024  Dx: R23.89, G93.5 1 each 0    Misc. Devices (ADJUST BATH/SHOWER SEAT/BACK) MISC Use daily with showers 1 each 0    Misc. Devices (SUCTION GRAB BAR) MISC Use in the shower 2 each 0    aspirin 325 MG tablet Take 325 mg by mouth daily (Patient not taking: Reported on 12/3/2021)      loratadine (CLARITIN) 10 MG tablet Take 10 mg by mouth daily       No current facility-administered medications for this visit.          Family History   Problem Relation Age of Onset    No Known Problems Sister     No Known Problems Daughter     Breast Cancer Paternal Aunt     No Known Problems Sister     No Known Problems Sister     No Known Problems Daughter     No Known Problems Maternal Aunt     No Known Problems Maternal Aunt      Social History     Socioeconomic History    Marital status:      Spouse name: None    Number of children: 2    Years of education: 15    Highest education level: Associate degree: occupational, technical, or vocational program   Occupational History    Occupation: factory      Comment: retired   Tobacco Use    Smoking status: Former Smoker     Packs/day: 1.00     Years: 50.00     Pack years: 50.00     Types: Cigarettes     Start date: 1970     Quit date: 2019     Years since quittin.6    Smokeless tobacco: Never Used   Vaping Use    Vaping Use: Never used   Substance and Sexual Activity    Alcohol use: No    Drug use: No    Sexual activity: Not Currently     Partners: Male   Other Topics Concern    None   Social History Narrative    No SDOH needs identified. She was given [facility] number for transportation if family unable to transport. Discussed HEAP and PIPP programs. She stated she has used in the past but declines at this time. Social Determinants of Health     Financial Resource Strain: Unknown    Difficulty of Paying Living Expenses: Patient refused   Food Insecurity: Unknown    Worried About Running Out of Food in the Last Year: Patient refused    [address] in the Last Year: Patient refused   Transportation Needs:     Lack of Transportation (Medical): Not on file    Lack of Transportation (Non-Medical):  Not on file   Physical Activity:     Days of Exercise per Week: Not on file    Minutes of Exercise per Session: Not on file   Stress:     Feeling of Stress : Not on file   Social Connections:     Frequency of Communication with Friends and Family: Not on file    Frequency of Social Gatherings with Friends and Family: Not on file    Attends Islam Services: Not on file    Active Member of Clubs or Organizations: Not on file    Attends Club or Organization Meetings: Not on file    Marital Status: Not on file   Intimate Partner Violence:     Fear of Current or Ex-Partner: Not on file    Emotionally Abused: Not on file    Physically Abused: Not on file    Sexually Abused: Not on file   Housing Stability:     Unable to Pay for Housing in the Last Year: Not on file    Number of Places Lived in the Last Year: Not on file    Unstable Housing in the Last Year: Not on file     Past Medical History:   Diagnosis Date    Chronic obstructive pulmonary disease ([address]     Coronary artery disease     Dyslipidemia     Obesity     Pneumonia        ROS     Positive for: Eyes    Negative for: Constitutional, Gastrointestinal, Neurological, Skin, Genitourinary, Musculoskeletal, HENT, Endocrine, Cardiovascular, Respiratory, Psychiatric, Allergic/Imm, Heme/Lymph          Main Ophthalmology Exam     External Exam       Right Left    External Normal Normal          Slit Lamp Exam       Right Left    Lids/Lashes Normal Normal    Conjunctiva/Sclera White and quiet White and quiet    Cornea Clear Clear    Anterior Chamber Deep and quiet Deep and quiet    Iris Round and reactive Round and reactive    Lens Posterior capsular opacification, Open posterior capsule Open posterior capsule, Posterior chamber intraocular lens    Vitreous Central vitreous floaters Central vitreous floaters          Fundus Exam       Right Left    Disc Normal Normal    C/D Ratio wnl  wnl     Macula Normal Normal    Vessels Normal Normal    Periphery Normal Normal             <div id=\"MAIN_EXAM_REVIEWED\"></div>      Tonometry     Tonometry (Non-contact air puff, 10:42 AM)       Right Left    Pressure 11 11      Right / Left  IOPg 10.7 / 11.2  CH 10.9 / 11.6  WS 8.3 / 8.8                   Not recorded       Not recorded         Visual Acuity (Snellen - Linear)       Right Left    Dist cc 20/30 +2 20/20 -1    Near cc 20/25     Correction: Glasses          Not recorded       Not recorded       Keratometry     Keratometry (Automated)       K1 Axis K2 Axis    Right 41.75 052 43.50 142    Left 42.25 146 44.00 056                  Ophthalmology Exam     Wearing Rx       Sphere Cylinder Axis Add    Right +0.75 -1.50 060 +2.50    Left +1.00 -1.00 120 +2.50    Age: 1m    Type: HOLD RX Wearing Rx #2       Sphere Cylinder Axis Add    Right +0.50 -1.25 060 +2.50    Left +1.00 -0.50 120 +2.50    Age: wearing 3years old     Type: Bifocal              Manifest Refraction     Manifest Refraction       Sphere Cylinder Axis Dist VA Add    Right +0.75 -1.75 065 20/20 +2.50    Left +1.00 -1.25 140 20/20 +2.50          Manifest Refraction #2 (Auto)       Sphere Cylinder Axis Dist VA Add    Right +1.25 -2.00 065      Left +1.25 -1.50 143                 Final Rx       Sphere Cylinder Axis Dist VA Add    Right +0.75 -1.75 065 20/20 +2.50    Left +1.00 -1.25 140 20/20 +2.50    Type: PAL    Expiration Date: 1/19/2024            No orders of the defined types were placed in this encounter. IMPRESSION:  1. Blurred vision, bilateral    2. S/P YAG capsulotomy, bilateral    3. Hyperopia of both eyes with astigmatism and presbyopia        PLAN:    1. New glasses recommended  2.  Excellent results 20/20 visual acuity with new rx  3. \"       Patient Instructions   New glasses recommended      Return in about 2 years (around 1/18/2024) for complete eye exam.

## 2022-01-19 ENCOUNTER — OFFICE VISIT (OUTPATIENT)
Dept: NEUROLOGY | Age: 72
End: 2022-01-19
Payer: MEDICARE

## 2022-01-19 VITALS
OXYGEN SATURATION: 92 % | WEIGHT: 293 LBS | DIASTOLIC BLOOD PRESSURE: 76 MMHG | BODY MASS INDEX: 47.09 KG/M2 | HEART RATE: 84 BPM | HEIGHT: 66 IN | SYSTOLIC BLOOD PRESSURE: 122 MMHG

## 2022-01-19 DIAGNOSIS — E66.01 CLASS 2 SEVERE OBESITY DUE TO EXCESS CALORIES WITH SERIOUS COMORBIDITY IN ADULT, UNSPECIFIED BMI (HCC): ICD-10-CM

## 2022-01-19 DIAGNOSIS — R26.89 BALANCE PROBLEM: ICD-10-CM

## 2022-01-19 DIAGNOSIS — Z98.2 S/P VP SHUNT: ICD-10-CM

## 2022-01-19 DIAGNOSIS — G93.89 CEREBRAL VENTRICULOMEGALY: ICD-10-CM

## 2022-01-19 DIAGNOSIS — G31.9 ACQUIRED CEREBRAL ATROPHY (HCC): ICD-10-CM

## 2022-01-19 DIAGNOSIS — G45.0 VBI (VERTEBROBASILAR INSUFFICIENCY): ICD-10-CM

## 2022-01-19 DIAGNOSIS — G93.5 CHIARI MALFORMATION TYPE I (HCC): ICD-10-CM

## 2022-01-19 DIAGNOSIS — R51.9 OCCIPITAL HEADACHE: ICD-10-CM

## 2022-01-19 DIAGNOSIS — I67.82 CHRONIC CEREBRAL ISCHEMIA: ICD-10-CM

## 2022-01-19 DIAGNOSIS — G91.2 NORMAL PRESSURE HYDROCEPHALUS (HCC): Primary | ICD-10-CM

## 2022-01-19 DIAGNOSIS — R42 DIZZINESS: ICD-10-CM

## 2022-01-19 PROCEDURE — G8484 FLU IMMUNIZE NO ADMIN: HCPCS | Performed by: PSYCHIATRY & NEUROLOGY

## 2022-01-19 PROCEDURE — 99213 OFFICE O/P EST LOW 20 MIN: CPT | Performed by: PSYCHIATRY & NEUROLOGY

## 2022-01-19 PROCEDURE — 99214 OFFICE O/P EST MOD 30 MIN: CPT | Performed by: PSYCHIATRY & NEUROLOGY

## 2022-01-19 PROCEDURE — 1036F TOBACCO NON-USER: CPT | Performed by: PSYCHIATRY & NEUROLOGY

## 2022-01-19 PROCEDURE — G8417 CALC BMI ABV UP PARAM F/U: HCPCS | Performed by: PSYCHIATRY & NEUROLOGY

## 2022-01-19 PROCEDURE — 3017F COLORECTAL CA SCREEN DOC REV: CPT | Performed by: PSYCHIATRY & NEUROLOGY

## 2022-01-19 PROCEDURE — 1090F PRES/ABSN URINE INCON ASSESS: CPT | Performed by: PSYCHIATRY & NEUROLOGY

## 2022-01-19 PROCEDURE — 1123F ACP DISCUSS/DSCN MKR DOCD: CPT | Performed by: PSYCHIATRY & NEUROLOGY

## 2022-01-19 PROCEDURE — G8400 PT W/DXA NO RESULTS DOC: HCPCS | Performed by: PSYCHIATRY & NEUROLOGY

## 2022-01-19 PROCEDURE — 4040F PNEUMOC VAC/ADMIN/RCVD: CPT | Performed by: PSYCHIATRY & NEUROLOGY

## 2022-01-19 PROCEDURE — G8427 DOCREV CUR MEDS BY ELIG CLIN: HCPCS | Performed by: PSYCHIATRY & NEUROLOGY

## 2022-01-19 ASSESSMENT — ENCOUNTER SYMPTOMS
EYE REDNESS: 0
SHORTNESS OF BREATH: 0
EYE WATERING: 0
SORE THROAT: 0
COUGH: 0
VOICE CHANGE: 0
SWOLLEN GLANDS: 0
CHOKING: 0
BOWEL INCONTINENCE: 0
NAUSEA: 0
CHANGE IN BOWEL HABIT: 0
WHEEZING: 0
DIARRHEA: 0
VOMITING: 0
SCALP TENDERNESS: 0
BLURRED VISION: 0
BACK PAIN: 0
EYE DISCHARGE: 0
SINUS PRESSURE: 0
FACIAL SWELLING: 0
ABDOMINAL DISTENTION: 0
EYE ITCHING: 0
TROUBLE SWALLOWING: 0
EYE PAIN: 0
COLOR CHANGE: 0
PHOTOPHOBIA: 1
APNEA: 0
VISUAL CHANGE: 0
BLOOD IN STOOL: 0
CHEST TIGHTNESS: 0
CONSTIPATION: 0
FACIAL SWEATING: 0
ABDOMINAL PAIN: 0

## 2022-01-19 NOTE — PROGRESS NOTES
[facility]  [address] [address]  KEKJE:[phone]   Fax: [phone]        SUBJECTIVE:       PATIENT ID:  Tianna Sherwood is a  RIGHT   HANDED 73 y.o. female. Dizziness  This is a recurrent problem. Episode onset: SINCE   EARLY  APRIL 2019. The problem occurs intermittently. The problem has been resolved. Associated symptoms include headaches, numbness and vertigo. Pertinent negatives include no abdominal pain, anorexia, arthralgias, change in bowel habit, chest pain, chills, congestion, coughing, diaphoresis, fatigue, fever, joint swelling, myalgias, nausea, neck pain, rash, sore throat, swollen glands, urinary symptoms, visual change, vomiting or weakness. The symptoms are aggravated by bending and standing. She has tried rest and sleep (  SHUNT  PLACEMENT) for the symptoms. The treatment provided significant relief. Headache   This is a chronic problem. Episode onset: SINCE  APRIL 2019. The problem occurs intermittently. The problem has been waxing and waning. The pain is located in the occipital and frontal region. The pain does not radiate. The pain quality is not similar to prior headaches. The quality of the pain is described as aching and throbbing. The pain is at a severity of 3/10. The pain is mild. Associated symptoms include dizziness, a loss of balance, numbness, phonophobia and photophobia. Pertinent negatives include no abdominal pain, abnormal behavior, anorexia, back pain, blurred vision, coughing, drainage, ear pain, eye pain, eye redness, eye watering, facial sweating, fever, hearing loss, insomnia, muscle aches, nausea, neck pain, scalp tenderness, seizures, sinus pressure, sore throat, swollen glands, tingling, tinnitus, visual change, vomiting, weakness or weight loss. The symptoms are aggravated by coughing, bright light and noise. She has tried acetaminophen for the symptoms. The treatment provided significant relief. Her past medical history is significant for obesity and sinus disease. There is no history of cancer, cluster headaches, hypertension, immunosuppression, migraine headaches, migraines in the family, pseudotumor cerebri, recent head traumas or TMJ. Neurologic Problem  The patient's primary symptoms include clumsiness, a loss of balance and memory loss. The patient's pertinent negatives include no syncope, visual change or weakness. This is a chronic problem. The neurological problem developed insidiously. The problem is unchanged. There was no focality noted. Associated symptoms include dizziness, headaches and vertigo. Pertinent negatives include no abdominal pain, auditory change, aura, back pain, bladder incontinence, bowel incontinence, chest pain, confusion, diaphoresis, fatigue, fever, light-headedness, nausea, neck pain, palpitations, shortness of breath or vomiting. Past treatments include bed rest, medication and sleep. The treatment provided moderate relief. Her past medical history is significant for dementia. There is no history of a bleeding disorder, a clotting disorder, a CVA, head trauma, liver disease, mood changes or seizures. History obtained from  The patient       and other  available medical records were  Also  reviewed. The  Duration,  Quality,  Severity,  Location,  Timing,  Context,  Modifying  Factors   Of   The   Chief   Complaint       AndPresent  Illness   Was   Reviewed   In   Chronological   Manner.                                                PATIENT'S  MAIN  CONCERNS INCLUDE :                       1)     PREVIOUS   H/O       DIZZINESS     INTERMITTENT                                              SINCE      April 2019                                         -     IMPROVED    SIGNIFICANTLY                                       -   ON   ASA     DAILY    PRO PHYLACTICALLY                              2)       PREVIOUS    H/O    GLOBAL  HEADACHES    IN     2019 STARTING  IN  OCCIPITAL   AREA                                                                          -     RESOLVED                                3)    PREVIOUS     H/O   CHRONIC   GAIT  AND  BALANCE  PROBLEMS                                                                           -     IMPROVED    SIGNIFICANTLY                                 4)        NO   H/O   FALLS                                NO   H/O   HEAD  INJURIES                                               5)     PREVIOUS    H/O     CHRONIC  SMOKING                                     PATIENT  STOPPED   SMOKING  SINCE   2019  . 6)     MULTIPLE   CO  MORBID  MEDICAL  CONDITIONS                             BEING  FOLLOWED  BY  HER  PCP. 7)       MRI  BRAIN   WITH  AND  W/O  CONTRAST   DONE                                AT  [facility]                                     DATED   5 /15/ 2019  REVIEWED         SHOWED                                        \"   HYDROCEPHALUS   OF   RECENT  TO  SUBACUTE.                                       ASSOCIATED   TONSILLAR  HERNIATION  AT  FORAMEN  MAGNUM   SUSPECTED  \"    .                                             RECOMMENDED      EMERGENCY    NEURO  SURGICAL EVALUATION                                          FOR      VENTRICULAR  DRAIN                         8)       PATIENT  HAD   VENTRICULAR  DRAIN    PLACEMENT  IN   MAY    2019        AND                                  WAS    IN    NURSING  HOME  REHAB   FOR  TWO  MONTHS . PATIENT  RECOVERED   WELL. NO   DIZZINESS. NO  BALANCE  PROBLEMS                                    NO   SEIZURES. NO   HEADACHES .                                     MEMORY    PROBLEMS     ARE  AT  HER  BASELINE                               9)      FOLLOW UP  CT  HEAD   June 2019 SHOWED                                   STABLE   VENTRICULOMEGALY    AND    SHUNT                               10)      HAD  FOLLOW  NEURO  SURGEON   EVALUATIONS                                                      IN   SEPT. 2019                                11)     PATIENT  LIVES   WITH   HER  DAUGHTER     CURRENTLY. PATIENT      DRIVES  LOCALLY       AS  TOLERATED. 12)      H/O      MILD   MEMORY  PROBLEMS                                        PATIENT  NOT  CONCERNED. REFUSED   MEDICATION   FOR     DEMENTIA                               13)        NO      BALANCE  PROBLEMS. NO   FALLS                                           PATIENT    DENIES     ANY  NEW  NEUROLOGICAL     CONCERNS.                                                      PATIENT  APPEAR  NEUROLOGICALLY   STABLE                               PRECIPITATING  FACTORS: including  fever/infection, exertion/relaxation, position change, stress,                         weather change, medications/alcohol, time of day/darkness/light  Are  Present                                                                MODIFYING  FACTORS:  fever/infection, exertion/relaxation, position change, stress, weather change,                      medications/alcohol, time of day/darkness/light    Are  present           Patient   Indicates   The  Presence   And  The  Absence  Of  The  FollowingAssociated  And   Additional  Neurological    Symptoms:                                Balance  And coordination problems  present           Gait problems     present            Headaches      present              Migraines           present           Memory problems absent             Confusion        absent            Paresthesia numbness          absent           SeizuresAnd  Starring  Episodes           absent Syncope,  Near  syncopal episodes         absent           Speech problems           absent             Swallowing  Problems      absent            Dizziness,  Light headedness           present              Vertigo        present             Generalized   Weakness    absent              focal  Weakness     absent             Tremors         absent              Sleep  Problems     absent             History  Of   RecentHead  Injury     absent             History  Of   Recent  TIA     absent             History  Of   Recent    Stroke     absent             Neck  Pain and  Neck muscle  Spasms  Absent               Radiating  down   And   Weakness           absent            Lower back   Pain  And     Spasms  Absent              Radiating    Down   And   Weakness          absent                H/OFALLS        absent               History  Of   Visual  Symptoms    Absent                  Associated   Diplopia       absent                                             Also   Additional   Symptoms   Present    As  Documented    In   The detailed                  Review  Of  Systems   And    Please   Refer   To    Them for   Additional  Information. Any components  That are either  Unobtainable  Or  Limited  In   HPI, ROS  And/or PFSH   Are                Due   To       Patient's  Medical  Problems,  Clinical  Condition and/or lack of                                other    Alternate resources.               RECORDS   REVIEWED:    historical medical records     INFORMATION   REVIEWED:       MEDICAL   HISTORY,SURGICAL   HISTORY,   MEDICATIONS   LIST,   ALLERGIES AND  DRUG  INTOLERANCES,     FAMILY   HISTORY,  SOCIAL  HISTORY,    PROBLEM  LIST   FOR  PATIENT  CARE   COORDINATION          Past Medical History:   Diagnosis Date    Chronic obstructive pulmonary disease ([address]     Coronary artery disease     Dyslipidemia     Obesity     Pneumonia          Past Surgical History:   Procedure Laterality Date    [address] 46 CATHETERIZATION  2012    cath with x1 stent    EYE SURGERY      shar cataract surg x2 y ago    VENTRICULOPERITONEAL SHUNT Right 5/16/2019    VENTRICULAR PERITONEAL SHUNT INSERTION ENDOSCOPIC performed by Marlo Hernández MD at [facility] CAPSULOTOMY Bilateral 01/04/2022    Dr. Casandra Staley.          Current Outpatient Medications   Medication Sig Dispense Refill    albuterol sulfate  (90 Base) MCG/ACT inhaler INHALE 2 PUFFS INTO THE LUNGS EVERY 4 HOURS AS NEEDED FOR WHEEZING OR SHORTNESS OF BREATH 3 each 0    carvedilol (COREG) 3.125 MG tablet TAKE 1 TABLET TWICE DAILY WITH MEALS 180 tablet 1    montelukast (SINGULAIR) 10 MG tablet TAKE 1 TABLET EVERY NIGHT 90 tablet 1    fluticasone-umeclidin-vilant (TRELEGY ELLIPTA) 100-62.5-25 MCG/INH AEPB Inhale 1 puff into the lungs daily 3 each 3    Handicap Placard MISC by Does not apply route Expires: 7/26/2024  Dx: R23.89, G93.5 1 each 0    Misc. Devices (ADJUST BATH/SHOWER SEAT/BACK) MISC Use daily with showers 1 each 0    Misc. Devices (SUCTION GRAB BAR) MISC Use in the shower 2 each 0    loratadine (CLARITIN) 10 MG tablet Take 10 mg by mouth daily      aspirin 325 MG tablet Take 325 mg by mouth daily (Patient not taking: Reported on 12/3/2021)       No current facility-administered medications for this visit.          Allergies   Allergen Reactions    Vicodin [Hydrocodone-Acetaminophen]          Family History   Problem Relation Age of Onset    No Known Problems Sister     No Known Problems Daughter     Breast Cancer Paternal Aunt     No Known Problems Sister     No Known Problems Sister     No Known Problems Daughter     No Known Problems Maternal Aunt     No Known Problems Maternal Aunt          Social History     Socioeconomic History    Marital status:      Spouse name: Not on file    Number of children: 2    Years of education: 15    Highest education level: Associate degree: occupational, technical, or vocational program   Occupational History    Occupation: factory      Comment: retired   Tobacco Use    Smoking status: Former Smoker     Packs/day: 1.00     Years: 50.00     Pack years: 50.00     Types: Cigarettes     Start date: 1970     Quit date: 2019     Years since quittin.6    Smokeless tobacco: Never Used   Vaping Use    Vaping Use: Never used   Substance and Sexual Activity    Alcohol use: No    Drug use: No    Sexual activity: Not Currently     Partners: Male   Other Topics Concern    Not on file   Social History Narrative    No SDOH needs identified. She was given [facility] number for transportation if family unable to transport. Discussed HEAP and PIPP programs. She stated she has used in the past but declines at this time. Social Determinants of Health     Financial Resource Strain: Unknown    Difficulty of Paying Living Expenses: Patient refused   Food Insecurity: Unknown    Worried About Running Out of Food in the Last Year: Patient refused    [address] in the Last Year: Patient refused   Transportation Needs:     Lack of Transportation (Medical): Not on file    Lack of Transportation (Non-Medical):  Not on file   Physical Activity:     Days of Exercise per Week: Not on file    Minutes of Exercise per Session: Not on file   Stress:     Feeling of Stress : Not on file   Social Connections:     Frequency of Communication with Friends and Family: Not on file    Frequency of Social Gatherings with Friends and Family: Not on file    Attends Caodaism Services: Not on file    Active Member of Clubs or Organizations: Not on file    Attends Club or Organization Meetings: Not on file    Marital Status: Not on file   Intimate Partner Violence:     Fear of Current or Ex-Partner: Not on file    Emotionally Abused: Not on file    Physically Abused: Not on file    Sexually Abused: Not on file   Housing Stability:     Unable to Pay for Housing in the Last Year: Not on file    Number of Places Lived in the Last Year: Not on file    Unstable Housing in the Last Year: Not on file       Vitals:    01/19/22 1125   BP: 122/76   Pulse: 84   SpO2: 92%         Wt Readings from Last 3 Encounters:   01/19/22 297 lb (134.7 kg)   12/03/21 293 lb (132.9 kg)   07/14/21 297 lb (134.7 kg)         BP Readings from Last 3 Encounters:   01/19/22 122/76   12/03/21 110/80   07/14/21 120/70       Hematology and Coagulation  Lab Results   Component Value Date    WBC 10.3 06/12/2019    RBC 5.05 06/12/2019    HGB 15.7 06/12/2019    HCT 47.2 06/12/2019    MCV 93.3 06/12/2019    MCH 31.1 06/12/2019    MCHC 33.3 06/12/2019    RDW 15.3 06/12/2019     06/12/2019    MPV 10.3 06/12/2019       Chemistries  Lab Results   Component Value Date     12/03/2021    K 4.4 12/03/2021     12/03/2021    CO2 29 12/03/2021    BUN 11 12/03/2021    CREATININE 0.89 12/03/2021    CALCIUM 9.8 12/03/2021    PROT 7.3 05/12/2019    LABALBU 4.3 05/12/2019    BILITOT 0.65 05/12/2019    ALKPHOS 77 05/12/2019    AST 9 05/12/2019    ALT 9 05/12/2019     Lab Results   Component Value Date    ALKPHOS 77 05/12/2019    ALT 9 05/12/2019    AST 9 05/12/2019    PROT 7.3 05/12/2019    BILITOT 0.65 05/12/2019    BILIDIR 0.12 07/02/2012    LABALBU 4.3 05/12/2019     Lab Results   Component Value Date    BUN 11 12/03/2021    CREATININE 0.89 12/03/2021     Lab Results   Component Value Date    CALCIUM 9.8 12/03/2021    MG 2.2 07/02/2012     Lab Results   Component Value Date    AST 9 05/12/2019    ALT 9 05/12/2019       Lab Results   Component Value Date    CKTOTAL 28 07/02/2012         Review of Systems   Constitutional: Negative for appetite change, chills, diaphoresis, fatigue, fever, unexpected weight change and weight loss.    HENT: Negative for congestion, dental problem, drooling, ear discharge, ear pain, facial swelling, hearing loss, mouth sores, nosebleeds, postnasal drip, sinus pressure, sore throat, tinnitus, trouble swallowing and voice change. Eyes: Positive for photophobia. Negative for blurred vision, pain, discharge, redness, itching and visual disturbance. Respiratory: Negative for apnea, cough, choking, chest tightness, shortness of breath and wheezing. Cardiovascular: Negative for chest pain, palpitations and leg swelling. Gastrointestinal: Negative for abdominal distention, abdominal pain, anorexia, blood in stool, bowel incontinence, change in bowel habit, constipation, diarrhea, nausea and vomiting. Endocrine: Negative for cold intolerance, heat intolerance, polydipsia, polyphagia and polyuria. Genitourinary: Negative for bladder incontinence. Musculoskeletal: Positive for gait problem. Negative for arthralgias, back pain, joint swelling, myalgias, neck pain and neck stiffness. Skin: Negative for color change, pallor, rash and wound. Allergic/Immunologic: Negative for environmental allergies, food allergies and immunocompromised state. Neurological: Positive for dizziness, vertigo, numbness, headaches and loss of balance. Negative for tingling, tremors, seizures, syncope, facial asymmetry, speech difficulty, weakness and light-headedness. Hematological: Negative for adenopathy. Does not bruise/bleed easily. Psychiatric/Behavioral: Positive for memory loss. Negative for agitation, behavioral problems, confusion, decreased concentration, dysphoric mood, hallucinations, self-injury, sleep disturbance and suicidal ideas. The patient is not nervous/anxious, does not have insomnia and is not hyperactive. OBJECTIVE:    Physical Exam  Constitutional:       Appearance: She is well-developed. HENT:      Head: Normocephalic and atraumatic. No raccoon eyes or Holland's sign.       Right Ear: External ear normal.      Left Ear: External ear normal.      Nose: Nose normal.   Eyes:      Conjunctiva/sclera: Conjunctivae normal.   Neck:      Thyroid: No thyroid mass or thyromegaly. Vascular: No carotid bruit. Trachea: No tracheal deviation. Meningeal: Brudzinski's sign and Kernig's sign absent. Cardiovascular:      Rate and Rhythm: Normal rate and regular rhythm. Pulmonary:      Effort: Pulmonary effort is normal.   Musculoskeletal:         General: No tenderness. Cervical back: Normal range of motion and neck supple. No rigidity. No muscular tenderness. Normal range of motion. Skin:     General: Skin is warm. Coloration: Skin is not pale. Findings: No erythema or rash. Nails: There is no clubbing. Psychiatric:         Attention and Perception: She is attentive. Mood and Affect: Mood is not anxious or depressed. Affect is not labile, blunt or inappropriate. Behavior: Behavior is slowed. Behavior is not agitated, aggressive, withdrawn, hyperactive or combative. Behavior is cooperative. Thought Content: Thought content is not paranoid or delusional. Thought content does not include homicidal or suicidal ideation. Thought content does not include homicidal or suicidal plan. Cognition and Memory: Cognition is impaired. Memory is impaired. Judgment: Judgment is not impulsive or inappropriate. Neurologic Exam      NEUROLOGICALEXAMINATION :      A) MENTAL STATUS:                   Alert and  oriented  To time, place  And  Person. No Aphasia. No  Dysarthria. Able   To  Follow   SIMPLE   commands without   Any  Difficulty. No right  To left confusion. SLOW   Speech  And language function.                    Insight and  Judgment ,Fund  Of  Knowledge   within normal limits                Recent  And  Remote memory    DECREASED                Attention &Concentration are    DECREASED                                                  B) CRANIAL NERVES :      CN : Visual  Acuity  And Visual fields  within normal limits               Fundi  Could  Not  Be  Could  Not  Be  Evaluated. 3,4,6 CN : Both  Pupils are   Reactive and  Equal.  Movements  Are  Intact. No  Nystagmus. No  RHONDA. No  Afferent  Pupillary  Defect noted. 5 CN :  Normal  Facial sensations and Corneal  Reflexes           7 CN:  Normal  Facial  Symmetry  And  Strength. No facial  Weakness. 8 CN :  Hearing  Appears within normal limits          9, 10 CN: Normal spontaneous, reflex palate movements         11 CN:   Normal  Shouldershrug and  strength         12 CN :   Normal  Tongue movements and  Tongue  In midline                        No tongue   Fasciculations or atrophy       C) MOTOR  EXAM:                 Strength  In upper  AndLower extremities   within normal limits             Rapid alternating  And  repetitions  Movements  within normal limits               Muscle  Tone  In upper  And  LowerExtremities  normal                No rigidity. No  Spasticity. Bradykinesia   absent               No  Asterixis. Sustention  Tremor , Resting  Tremor   absent                    No other  Abnormal  Movements noted           D) SENSORY :               light touch, pinprick, position  And  Vibration  within normal limits        E) REFLEXES:                   Deep  Tendon  Reflexes normal                  No  pathological  Reflexes  Bilaterally.                                   F) COORDINATION  AND  GAIT :                                Station and  Gait    SLOW                             Romberg 's test   POSITIVE                             Ataxia negative          ASSESSMENT:    Patient Active Problem List   Diagnosis    Tobacco abuse    Environmental allergies    Obesity    Dyslipidemia    Coronary artery disease    Chronic obstructive pulmonary disease (HCC)    Occipital headache    Dizziness    Cerebral ventriculomegaly    Acquired cerebral atrophy ([address]    Chronic cerebral ischemia    Chiari malformation type I ([address]    Balance problem    VBI (vertebrobasilar insufficiency)    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 Dose modulation, iterative reconstruction, and/or weight based   adjustment of the mA/kV was utilized to reduce the radiation dose to as low   as reasonably achievable.       COMPARISON:   June 7, 2019       HISTORY:   ORDERING SYSTEM PROVIDED HISTORY: Memory loss with history of cerebral shunt   placement   TECHNOLOGIST PROVIDED HISTORY:       Ordering Physician Provided Reason for Exam: Dizzy, fall today hit head, pt   has recently placed shunt   Acuity: Acute   Type of Exam: Initial       FINDINGS:   BRAIN/VENTRICLES: There is no acute intracranial hemorrhage, mass effect or   midline shift. No abnormal extra-axial fluid collection.  The gray-white   differentiation is maintained without evidence of an acute infarct. There is   prominence of the ventricles and sulci due to global parenchymal volume loss.    There are nonspecific areas of hypoattenuation within the periventricular and   subcortical white matter, which likely represent chronic microvascular   ischemic change.       ORBITS: Bilateral lens implants.       SINUSES: The visualized paranasal sinuses and mastoid air cells demonstrate   no acute abnormality.       SOFT TISSUES/SKULL: Right frontal approach ventriculostomy catheter   terminates in the of the left lateral ventricle.           Impression   Stable ventriculomegaly and  shunt.  No acute disease.               CT OF THE HEAD WITHOUT CONTRAST  5/2/2019 2:52 pm       TECHNIQUE:   CT of the head was performed without the administration of intravenous   contrast. Dose modulation, iterative reconstruction, and/or weight based   adjustment of the mA/kV was utilized to reduce the radiation dose to as low   as reasonably achievable.       COMPARISON:   None.       HISTORY:   ORDERING SYSTEM PROVIDED HISTORY: headache   TECHNOLOGIST PROVIDED HISTORY:       Ordering Physician Provided Reason for Exam: C/o headache/dizziness   Acuity: Acute   Type of Exam: Initial       FINDINGS:   BRAIN/VENTRICLES: There is no acute intracranial hemorrhage, mass effect or   midline shift.  No abnormal extra-axial fluid collection.  Ventriculomegaly   is noted and cerebellar tonsillar displacement into the foramen magnum,   extending off the field of view, is noted. Eliud Mention is also mild cerebral and   cerebellar atrophy and scattered white matter changes in the brain.       ORBITS: The visualized portion of the orbits demonstrate no acute abnormality.       SINUSES: The visualized paranasal sinuses and mastoid air cells demonstrate   no acute abnormality.       SOFT TISSUES/SKULL:  No acute abnormality of the visualized skull or soft   tissues.           Impression   Partially visualized Chiari malformation.  Ventriculomegaly is noted as well   as cerebral and cerebellar cortical atrophy with chronic appearing white   matter changes in the brain.                 VISITING DIAGNOSIS:      ICD-10-CM    1. Normal pressure hydrocephalus (HCC)  G91.2    2. Occipital headache  R51.9    3. Cerebral ventriculomegaly  G93.89    4. Acquired cerebral atrophy ([address]  G31.9    5. Dizziness  R42    6. Class 2 severe obesity due to excess calories with serious comorbidity in adult, unspecified BMI ([facility])  E66.01    7. S/P  shunt  Z98.2    8. Chronic cerebral ischemia  I67.82    9. Balance problem  R26.89    10. VBI (vertebrobasilar insufficiency)  G45.0    11.  Chiari malformation type I ([address] G93.5               CONCERNS   &   INCREASED   RISK   FOR         * TIA,  CEREBRO  VASCULAR  ISCHEMIA,STROKE     *   DIZZINESS,   VERTEBROBASILAR  INSUFFICIENCY ,         *  COMPLICATED  MIGRAINES      *     TENSION  HEADACHES        *  GAIT  DIFFICULTIES  &   BALANCE PROBLMES                     VARIOUS  RISK   FACTORS   WERE  REVIEWED   AND   DISCUSSED. *  PATIENT   HAS  MULTIPLE   MEDICAL, NEUROLOGICAL    PROBLEMS . PATIENT'S   MANAGEMENT  IS  CHALLENGING. PLAN:                    Lynda Elliott  Of  The  Diagnoses,  The  Management & Treatment  Options            AND    Care  plan  Were          Reviewed and   Discussed   With  patient. * Goals  And  Expectations  Of  The  Therapy  Discussed   And  Reviewed. *   Benefits   And   Side  Effect  Profile  Of  Medication/s   Were   Discussed                * Need   For  Further   Follow up For  The  Various  Problems Were  discussed. * Results  Of  The  Previous  Diagnostic tests were reviewed and questions answered. patient  understand the same. Medical  Decision  Making  Was  Made  Based on the   Complexity  Of  Above  Mentioned  Diagnoses,        Data reviewed   & diagnostic  Tests  Reviewed,       Risk  Of  Significant   Co morbidities and   complicating   Factors. Medical  Decision  Was   High    Complexity   Due   To  The  Patient's  Multiple  Symptoms,      Advancing   Disease,  Complex  Treatment  Regimen,  Multiple medications       and   Risk  Of   Side  Effects,  Difficulty  In  Medication  Management  And  Diagnostic  Challenges       In  View  Of  The  Associated   Co  Morbid  Conditions   And  Problems. * FALL   PRECAUTIONS.       THESE  REVIEWED   AND  DISCUSSED      *    SUPERVISED   CARE      *   ABSOLUTELY   NO  DRIVING      *   BE  CAREFUL  WITH  ACTIVITIES            *   ADEQUATE   FLUIDINTAKE   AND  ELECTROLYTE  BALANCE         * KEEP  DAIRY  OF   THE  NEUROLOGICAL  SYMPTOMS        RECORDING THE    DURATION  AND  FREQUENCY. *  AVOID    CONDITIONS  AND  FACTORS   THAT  MAKE                  NEUROLOGICAL  SYMPTOMS  WORSE.           *TO  MAINTAIN  REGULAR  SLEEP  WAKE  CYCLES. *   TO  HAVE  ADEQUATE  REST  AND   SLEEP    HOURS.          *    AVOID  ANY USAGE OF                   TOBACCO,EXCESSIVE  ALCOHOL  AND   ILLEGAL   SUBSTANCES        *   Compliance   With  Medications   And  Instructions      *    MIGRAINE/ HEADACHE    DAIRY   WITH  MONITORING                       OF  DURATION  ANDFREQUENCY. *     The    Antiplatelet  therapy    As   Recommended  Was   Discussed        *    Prophylactic  Use   Of     Vitamin   B   Complex,  Folic  Acid,    Vitamin  B12    Multivitamin,       Calcium  With  magnesium  And  Vit D    Supplementations   Over  The  Counter  Discussed              *  EVALUATIONS  AND  FOLLOW UP:                    * PHYSICAL  THERAPY                                *CARDIOLOGY              *   OPTHALMOLOGY                                *        PATIENT  HAD   VENTRICULAR  DRAIN    PLACEMENT  IN   MAY    2019        AND                                  WAS    IN    NURSING  HOME  REHAB   FOR  TWO  MONTHS . *         PATIENT  RECOVERED   WELL. NO   DIZZINESS. NO  BALANCE  PROBLEMS                                    NO   SEIZURES. NO   HEADACHES . *          FOLLOW UP  CT  HEAD   June 2019     SHOWED                                   STABLE   VENTRICULOMEGALY    AND    SHUNT                                              *   PATIENT    DENIES     ANY  NEW  NEUROLOGICAL     CONCERNS. PATIENT  APPEAR  NEUROLOGICALLY   STABLE                   *PATIENT   TO  FOLLOW  UP  WITH   PRIMARY  CARE         OTHER  CONSULTANTS  AS  BEFORE.               *  Maintain   Healthy  Life Style With   Periodic  Monitoring  Of      Any  Medical  Conditions  Including   Elevated  Blood  Pressure,  Lipid  Profile,     Blood  Sugar levels  AndHeart  Disease. *   Period   Screening  For  Cancers  Involving  Breast,  Colon,    lungs  And  Other  Organs  As  Applicable,  In consultation   With  Your  Primary Care Providers. *Second  Neurological  Opinion  And  Evaluations  In  [facility]  Setting  If  Patient  Is  Interested. * Please   Contact   Neurology  Clinic   Early   If   Are  Any  New  Neurological   And  Any neurological  Concerns. *  If  The  Patient remains  Neurologically  Stable   Return   To  [facility] Neurology Department   IN   3 -  6     MONTHS  TIME   FOR  FURTHER              FOLLOW UP.                       *   The  Neurological   Findings,  Possible  Diagnosis,  Differential diagnoses   And  Options  For    Further   Investigations                   And  management   Are  Discussed  Comprehensively. Medications   And  Prescription   Risks  And  Side effects  Are   Also  Discussed. *  If   There is  Any  Significant  Worsening   Of  Current  Symptoms  And  Or  If patient  Develops                                  Any additional  New  NeurologicalSymptoms                  Or  Significant  Concerns   Should  Call  911 or  Go  To  Emergency  Department                            For  Further  Immediate  Evaluation. The   Above  Were  Reviewed  With  Patient                          questions  Answered  In  Detail. More   Than  50% of face  To face Time   Was  Spent  On  Counseling   And   Coordination  Of  Care                 Of   Patient's  multiple   Neurological  Problems   And   Comorbid  Medical   Conditions.           Electronically signed by Marilyn Reid MD.,  [facility]       Board Certified in  Neurology &  In Brain Injury Medicine   American Board of Psychiatry and Neurology (ABPN)      DISCLAIMER:   Although every effort was made to ensure the accuracy of this  electronictranscription, some errors in transcription may have occurred. GENERAL PATIENT INSTRUCTIONS:      A Healthy Lifestyle: Care Instructions   Your Care Instructions   A healthy lifestyle can help you feel good, stay at ahealthy weight, and have plenty of energy for both work and play. A healthy lifestyle is something you can share with your whole family.  A healthy lifestyle also can lower your risk for serious health problems, such ashigh blood pressure, heart disease, and diabetes.  You can follow a few steps listed below to improve your health and the health of your family.  Follow-up careis a key part of your treatment and safety. Be sure to make and go to all appointments, and call your doctor if you are having problems. Its also a good idea to know your test results and keep a list of the medicines you take.  How can you care for yourself at home?  Do not eat too much sugar, fat, or fast foods. You can still have dessert and treats nowand then. The goal is moderation.  Start small to improve your eating habits. Pay attention to portion sizes, drink less juice and soda pop, and eat more fruits and vegetables.  Eat a healthy amount of food. A 3-ounce serving of meat, for example, is about the size of a deck of cards. Fill the rest of your plate with vegetables and whole grains.  Limit theamount of soda and sports drinks you have every day. Drink more water when you are thirsty.  Eat at least 5 servings of fruits and vegetables every day. It may seem like a lot, but it is not hard to reach this goal. Aserving or helping is 1 piece of fruit, 1 cup of vegetables, or 2 cups of leafy, raw vegetables. Have an apple or some carrot sticks as an afternoon snack instead of a candy bar.  Try to have fruits and/or vegetables at everymeal.   Make exercise part of your daily routine. You may want to start with simple activities, such as walking, bicycling, or slow swimming. Try akua active 30 to 60 minutes every day. You do not need to do all 30 to 60 minutes all at once. For example, you can exercise 3 times a day for 10 or 20 minutes. Moderate exercise is safe for most people, but it is always agood idea to talk to your doctor before starting an exercise program.   Keep moving. Rosebud Lucia the lawn, work in the garden, or [employer]. Take the stairs instead of the elevator at work.  If you smoke, quit. Peoplewho smoke have an increased risk for heart attack, stroke, cancer, and other lung illnesses. Quitting is hard, but there are ways to boost your chance of quitting tobacco for good.  Use nicotine gum, patches, or lozenges.  Ask your doctor about stop-smoking programs and medicines.  Keep trying.  In addition to reducing your risk of diseases in the future, you will notice some benefits soon after you stop using tobacco. If you have shortness of breath or asthma symptoms, they will likely getbetter within a few weeks after you quit.  Limit how much alcohol you drink. Moderate amounts of alcohol (up to 2 drinks a day for men, 1drink a day for women) are okay. But drinking too much can lead to liver problems, high blood pressure, and other health problems.  health   If you have a family, there are many things you can do together to improve your health.  Eat meals together as a family as often as possible.  Eat healthy foods. This includes fruits, vegetables, lean meats and dairy, and whole grains.  Include your family in your fitness plan. Most peoplethink of activities such as jogging or tennis as the way to fitness, but there are many ways you and your family can be more active. Anything that makes you breathe hard and gets your heart pumping is exercise.  Here are sometips:   Walk to do errands or to take your child to school or the bus.  Go for a family bike ride after dinner instead of watching TV.  Where can you learn more?  Go toCheftps://UnFlete.comkarineeb.Apto. org and sign in to your [employer] account. Enter J970 in the Search HealthInformation box to learn more about \"A Healthy Lifestyle: Care Instructions. \"     If you do not have anaccount, please click on the \"Sign Up Now\" link.  Current as of: July 26, 2016   Content Version: 11.2   © 1654-8839 [employer]. Care instructions adapted under license by [facility] ([facility]). If you have questions about a medical condition or this instruction, always ask your healthcare professional. [employer] disclaims any warranty or liability for your use of this information.

## 2022-02-01 RX ORDER — ALBUTEROL SULFATE 90 UG/1
2 AEROSOL, METERED RESPIRATORY (INHALATION) EVERY 4 HOURS PRN
Qty: 3 EACH | Refills: 0 | Status: SHIPPED | OUTPATIENT
Start: 2022-02-01 | End: 2022-03-15

## 2022-02-01 NOTE — TELEPHONE ENCOUNTER
Last Appt:  4/14/2021  Next Appt:  4/13/2022  Med verified in [facility] [address]
Mucous membranes moist and pink without lesions; alveolar ridge smooth and edentulous; lip, palate and uvula with acceptable anatomic shape; normal tongue, frenulum and cheek exam; mandible size acceptable.

## 2022-03-07 ENCOUNTER — TELEPHONE (OUTPATIENT)
Dept: ONCOLOGY | Age: 72
End: 2022-03-07

## 2022-03-08 NOTE — PROGRESS NOTES
POCHCO3, [id number], HCO3, NBEA, PBEA, BEART, BE, THGBART, THB, [id number], [id number], [id number], O2SAT, FIO2    Radiology:    No results found.     Physical Examination:        General appearance:  alert, cooperative and no distress  Mental Status:  oriented to person, place and time and normal affect  Lungs:  clear to auscultation bilaterally, normal effort  Heart:  regular rate and rhythm  Abdomen:  soft, nontender, nondistended, normal bowel sounds  Extremities:  no edema, redness, tenderness in the calves  Skin:  no gross lesions, rashes, induration    Assessment:        Primary Problem  Hydrocephalus    Active Hospital Problems    Diagnosis Date Noted    Hydrocephalus [G91.9] 05/15/2019    Obesity [E66.9] 05/07/2019    [facility] [J44.9] 05/07/2019    Tobacco abuse [Z72.0] 03/21/2018    CAD (coronary artery disease) [I25.10] 08/18/2013       Plan:        - Neurosurgery following - s/p shunt placement (5/17) - resume asa 1 week post op  - Continue home medications  - Pain control  - PT/OT  - SW/CM for dc planning - working on placement    Maris Ace MD  5/20/2019  8:21 AM Anesthesia Volume In Cc: 12

## 2022-03-14 ENCOUNTER — HOSPITAL ENCOUNTER (OUTPATIENT)
Dept: CT IMAGING | Age: 72
Discharge: HOME | End: 2022-03-16
Payer: MEDICARE

## 2022-03-14 DIAGNOSIS — Z87.891 PERSONAL HISTORY OF TOBACCO USE: ICD-10-CM

## 2022-03-14 PROCEDURE — 71271 CT THORAX LUNG CANCER SCR C-: CPT

## 2022-03-15 RX ORDER — ALBUTEROL SULFATE 90 UG/1
2 AEROSOL, METERED RESPIRATORY (INHALATION) EVERY 4 HOURS PRN
Qty: 3 EACH | Refills: 0 | Status: SHIPPED | OUTPATIENT
Start: 2022-03-15 | End: 2022-04-21 | Stop reason: SDUPTHER

## 2022-04-18 NOTE — TELEPHONE ENCOUNTER
(Surgical) Visually Significant (secondary to glare), discussed the risks, benefits, alternatives, and limitations of cataract surgery. The patient stated a full understanding and a desire to proceed with the procedure. The patient will need to return for pre-op appointment with cataract measurements and to have any additional questions answered, and start pre-operative eye drops as directed. Pt elects to proceed with CE OD first and OS after. Qualifies for Toric OU, discussed benefits with patient & need for reading glasses. Discussed Stand/Trad & need for bifocals. Discussed lenSx vs Trad. Pt states she will schedule surgery & make lens decision & inform surgery dept prior to surgery. Undecided today. Dextenza OU +. PATIENT   TO  BE  TAKEN   TO  ER    IMMEDIATELY      AND  PATIENT  TO  BE   EVALUATED  BY     HER  SURGEON        IN  VIEW   OF  HER  MULTIPLE   NEUROLOGICAL  SYMPTOMS.

## 2022-04-21 RX ORDER — ALBUTEROL SULFATE 90 UG/1
2 AEROSOL, METERED RESPIRATORY (INHALATION) EVERY 4 HOURS PRN
Qty: 3 EACH | Refills: 0 | Status: SHIPPED | OUTPATIENT
Start: 2022-04-21 | End: 2022-05-31

## 2022-04-21 NOTE — TELEPHONE ENCOUNTER
Last Appt:  4/14/2021  Next Appt:  4/26/2022  Med verified in [facility] [address]

## 2022-04-26 NOTE — PROGRESS NOTES
Subjective:      Patient ID: Chris Charles is a 73 y.o. female. HPI  Patient here for follow-up for COPD and lung nodules. Patient was last seen in the office in April 2021 per me and in January 2020 per Dr. Cristian Mae. Patient is doing well. She does endorse occasional shortness of breath no significant cough or sputum production. Occasionally has to use her albuterol more often than she would like. Typically when she uses her albuterol rescue inhaler she is only using 1 puff. Discussed with patient that it is ordered 2 puffs every 6 hours as needed and that may help with reducing her necessity to use it more often. Patient remain smoke-free, quit smoking 3 years ago.'s continues to be eligible for ongoing CT lung screening. Next to be due in March 2023. Ordered today. Medications:   Trelegy  Albuterol HFA:        PRIOR WORKUP:  Prior work-up:  PFT 1/6/2020: FEV1 50% of predicted with a 17% bronchodilator change, FVC 64% with a 28% bronchodilator change. FEV1/FVC ratio 60. Lung volume by body box notes total lung capacity 138% of predicted, RV of 227% of predicted consistent with air trapping and hyperventilation. Diffusion capacity is 52% of predicted uncorrected. Final impression: Stage II COPD with significant bronchodilator response, hyperinflation, air trapping and moderately decreased diffusion capacity due to emphysema versus anemia versus interstitial lung disease. Medications:   Trelegy  Albuterol HFA:        PRIOR WORKUP:  Prior work-up:  PFT 1/6/2020: FEV1 50% of predicted with a 17% bronchodilator change, FVC 64% with a 28% bronchodilator change. FEV1/FVC ratio 60. Lung volume by body box notes total lung capacity 138% of predicted, RV of 227% of predicted consistent with air trapping and hyperventilation. Diffusion capacity is 52% of predicted uncorrected.   Final impression: Stage II COPD with significant bronchodilator response, hyperinflation, air trapping and moderately decreased diffusion capacity due to emphysema versus anemia versus interstitial lung disease.     CT chest 3/11/2021: Emphysematous changes with bibasilar atelectasis/scarring. No focal consolidation, pneumothorax or pleural effusion. 8 mm solid noncalcified pulmonary nodule in the right lower lobe, stable from prior imaging. 3 mm solid noncalcified pulmonary nodule left lower lobe, somewhat nodular scarring is seen involving the right upper lobe also unchanged. Stable 3 mm or less nodules are seen within the left upper lobe. No new or enlarging suspicious pulmonary nodule.     CT chest 3/3/2020: No mediastinal adenopathy, no changes in the left lobe nodule measuring 2 to 3 mm, prior 6 mm right lower lobe nodule unchanged.  No new pulmonary nodules, consolidations or effusions noted.  No significant change from prior studies.  Recommendation is to continue annual CT lung screening.       CT lung screening 11/26/2019: Several scattered pulmonary nodules in the left lower lobe largest measuring 4 mm.  2 left upper lobe nodules measuring greater than 3 mm.  Right upper lobe nodule measuring 4 mm.  Largest nodules in the posterior aspect of the superior segment of the right lower lobe measuring 8 mm.     PFT 1/6/2020: FEV1 50% predicted with a 17% bronchodilator change.  FVC 64% predicted with a 28% bronchodilator change, FEV1/FVC ratio 60.  % predicted, % predicted.  Diffusion capacity 52% predicted.  Impression: Stage II COPD with significant bronchodilator response.  Moderately decreased diffusion capacity due to emphysema versus anemia versus interstitial lung disease.         CT chest 3/11/2021: Emphysematous changes with bibasilar atelectasis/scarring. No focal consolidation, pneumothorax or pleural effusion. 8 mm solid noncalcified pulmonary nodule in the right lower lobe, stable from prior imaging.   3 mm solid noncalcified pulmonary nodule left lower lobe, somewhat nodular scarring is seen involving the right upper lobe also unchanged. Stable 3 mm or less nodules are seen within the left upper lobe. No new or enlarging suspicious pulmonary nodule.     CT chest 3/3/2020: No mediastinal adenopathy, no changes in the left lobe nodule measuring 2 to 3 mm, prior 6 mm right lower lobe nodule unchanged.  No new pulmonary nodules, consolidations or effusions noted.  No significant change from prior studies.  Recommendation is to continue annual CT lung screening.       CT lung screening 11/26/2019: Several scattered pulmonary nodules in the left lower lobe largest measuring 4 mm.  2 left upper lobe nodules measuring greater than 3 mm.  Right upper lobe nodule measuring 4 mm.  Largest nodules in the posterior aspect of the superior segment of the right lower lobe measuring 8 mm.     PFT 1/6/2020: FEV1 50% predicted with a 17% bronchodilator change.  FVC 64% predicted with a 28% bronchodilator change, FEV1/FVC ratio 60.  % predicted, % predicted.  Diffusion capacity 52% predicted.  Impression: Stage II COPD with significant bronchodilator response.  Moderately decreased diffusion capacity due to emphysema versus anemia versus interstitial lung disease.        Immunizations:   Immunization History   Administered Date(s) Administered    COVID-19, Moderna, Booster, PF, 50mcg/0.25ml 11/11/2021    COVID-19, Moderna, Primary or Immunocompromised, PF, 100mcg/0.5mL 02/12/2021, 03/12/2021    Influenza, Quadv, adjuvanted, 65 yrs +, IM, PF (Fluad) 12/03/2020    Influenza, Triv, inactivated, subunit, adjuvanted, IM (Fluad 65 yrs and older) 01/08/2020    Pneumococcal Conjugate 13-valent (Uicoima12) 05/17/2019    Pneumococcal Polysaccharide ([id number]) 07/02/2012, 06/03/2021        No flowsheet data found.     /76 (Site: Left Upper Arm, Position: Sitting, Cuff Size: Large Adult)   Pulse 85   Temp 97.5 °F (36.4 °C)   Ht 5' 6\" (1.676 m)   Wt 300 lb (136.1 kg)   LMP 08/01/2000 (Approximate) SpO2 94%   BMI 48.42 kg/m²     Past Medical History:   Diagnosis Date    Chronic obstructive pulmonary disease ([address]     Coronary artery disease     Dyslipidemia     Obesity     Pneumonia      Past Surgical History:   Procedure Laterality Date    CARDIAC CATHETERIZATION      CARDIAC CATHETERIZATION      cath with x1 stent    EYE SURGERY      shar cataract surg x2 y ago    VENTRICULOPERITONEAL SHUNT Right 2019    VENTRICULAR PERITONEAL SHUNT INSERTION ENDOSCOPIC performed by Claire Garibay MD at [address] Bilateral 2022    Dr. Maggie Hernandez.      Family History   Problem Relation Age of Onset    No Known Problems Sister     No Known Problems Daughter     Breast Cancer Paternal Aunt     No Known Problems Sister     No Known Problems Sister     No Known Problems Daughter     No Known Problems Maternal Aunt     No Known Problems Maternal Aunt        Social History     Socioeconomic History    Marital status:      Spouse name: Not on file    Number of children: 2    Years of education: 15    Highest education level: Associate degree: occupational, technical, or vocational program   Occupational History    Occupation: factory      Comment: retired   Tobacco Use    Smoking status: Former Smoker     Packs/day: 1.00     Years: 50.00     Pack years: 50.00     Types: Cigarettes     Start date: 1970     Quit date: 2019     Years since quittin.9    Smokeless tobacco: Never Used   Vaping Use    Vaping Use: Never used   Substance and Sexual Activity    Alcohol use: No    Drug use: No    Sexual activity: Not Currently     Partners: Male   Other Topics Concern    Not on file   Social History Narrative    No SDOH needs identified. She was given [facility] number for transportation if family unable to transport. Discussed HEAP and PIPP programs. She stated she has used in the past but declines at this time. Social Determinants of Health     Financial Resource Strain: Unknown    Difficulty of Paying Living Expenses: Patient refused   Food Insecurity: Unknown    Worried About Running Out of Food in the Last Year: Patient refused    [address] in the Last Year: Patient refused   Transportation Needs:     Lack of Transportation (Medical): Not on file    Lack of Transportation (Non-Medical): Not on file   Physical Activity:     Days of Exercise per Week: Not on file    Minutes of Exercise per Session: Not on file   Stress:     Feeling of Stress : Not on file   Social Connections:     Frequency of Communication with Friends and Family: Not on file    Frequency of Social Gatherings with Friends and Family: Not on file    Attends Jew Services: Not on file    Active Member of [address] [place] [address] or Organizations: Not on file    Attends Club or Organization Meetings: Not on file    Marital Status: Not on file   Intimate Partner Violence:     Fear of Current or Ex-Partner: Not on file    Emotionally Abused: Not on file    Physically Abused: Not on file    Sexually Abused: Not on file   Housing Stability:     Unable to Pay for Housing in the Last Year: Not on file    Number of [place] in the Last Year: Not on file    Unstable Housing in the Last Year: Not on file       Review of Systems   Constitutional: Negative for fatigue and fever. HENT: Negative for postnasal drip, rhinorrhea, sinus pressure and sinus pain. Respiratory: Negative for cough, chest tightness, wheezing and stridor. Continues to endorse exertional dyspnea. Cardiovascular: Negative for chest pain and leg swelling. Gastrointestinal: Negative for constipation, diarrhea, nausea and vomiting. Genitourinary: Negative for dysuria, frequency and urgency. Musculoskeletal: Negative for arthralgias, joint swelling and myalgias. Skin: Negative for rash. Neurological: Negative for dizziness, speech difficulty and headaches. Hematological: Does not bruise/bleed easily. Psychiatric/Behavioral: Negative for agitation, hallucinations, sleep disturbance and suicidal ideas.        Objective:       Physical Exam  General appearance - alert, well appearing, and in no distress, oriented to person, place, and time and overweight  Mental status - alert, oriented to person, place, and time, normal mood, behavior, speech, dress, motor activity, and thought processes  Eyes - pupils equal and reactive, extraocular eye movements intact  Ears - not examined  Nose - normal and patent, no erythema, discharge or polyps  Mouth - mucous membranes moist, pharynx normal without lesions  Neck - supple, no significant adenopathy  Chest - clear to auscultation, no wheezes, rales or rhonchi, symmetric air entry  Heart -normal rate, regular rhythm, normal S1, S2, no murmurs, rubs, clicks or gallops  Abdomen - soft, nontender, nondistended, no masses or organomegaly  Neuro- alert, oriented, normal speech, no focal findings or movement disorder noted}  Extremities - peripheral pulses normal, no pedal edema, no clubbing or cyanosis  Skin - normal coloration and turgor, no rashes, no suspicious skin lesions noted     Wt Readings from Last 3 Encounters:   05/11/22 300 lb (136.1 kg)   01/19/22 297 lb (134.7 kg)   12/03/21 293 lb (132.9 kg)       Results for orders placed or performed during the hospital encounter of 12/03/21   Hemoglobin A1C   Result Value Ref Range    Hemoglobin A1C 5.8 4.0 - 6.0 %    Estimated Avg Glucose 120 mg/dL   Lipid Panel   Result Value Ref Range    Cholesterol 181 <200 mg/dL    HDL 51 >40 mg/dL    LDL Cholesterol 111 0 - 130 mg/dL    Chol/HDL Ratio 3.5 <5    Triglycerides 93 <150 mg/dL    VLDL NOT REPORTED 1 - 30 mg/dL   Basic Metabolic Panel   Result Value Ref Range    Glucose 109 (H) 70 - 99 mg/dL    BUN 11 8 - 23 mg/dL    CREATININE 0.89 0.50 - 0.90 mg/dL    Bun/Cre Ratio 12 9 - 20    Calcium 9.8 8.6 - 10.4 mg/dL    Sodium 141 135 - 144 mmol/L    Potassium 4.4 3.7 - 5.3 mmol/L    Chloride 102 98 - 107 mmol/L    CO2 29 20 - 31 mmol/L    Anion Gap 10 9 - 17 mmol/L    GFR Non-African American >60 >60 mL/min    GFR African American >60 >60 mL/min    GFR Comment          GFR Staging NOT REPORTED        No results found. Assessment:      1. Multiple lung nodules on CT    2. Stage 2 moderate COPD by GOLD classification (HCC)    3. Stopped smoking with greater than 40 pack year history    4. Cough    5. Personal history of tobacco use    6. BMI 45.0-49.9, adult ([facility] [address]          Plan:      1. Medications reviewed, continues on Trelegy Ellipta and albuterol HFA  2. Educated and clarified the medication use. 3. Recommend flu vaccination in the fall annually. 4. Patient is up-to-date with pneumococcal vaccinations from pulmonary perspective. 5. Patient has received COVID-vaccine and third booster. She is eligible for the fourth booster however reluctant to obtain. Discussed with patient, unclear if persuaded. Discussed strategies to protect against Covid 19.   6. Maintain an active lifestyle. 7. Patient's questions were answered to her satisfaction. 8. Ongoing smoking cessation strongly advised. Patient quit smoking in 2019.  9. Pulmonary function tests were reviewed   10. CT scan of the chest was reviewed  11. We'll see the patient back in 12 months with CT scan done in March 2023 prior.   Next appointment needs to be with physician          Electronically signed by JOHNATHAN Vaz CNP on 5/11/2022 at 11:53 AM  Low Dose CT (LDCT) Lung Screening criteria met:     Age 50-77(Medicare) or 50-80 (USPSTF)   Pack year smoking >20   Still smoking or less than 15 year since quit   No sign or symptoms of lung cancer   > 11 months since last LDCT     Risks and benefits of lung cancer screening with LDCT scans discussed:    Significance of positive screen - False-positive LDCT results often occur. 95% of all positive results do not lead to a diagnosis of cancer. Usually further imaging can resolve most false-positive results; however, some patients may require invasive procedures. Over diagnosis risk - 10% to 12% of screen-detected lung cancer cases are over diagnosedthat is, the cancer would not have been detected in the patient's lifetime without the screening. Need for follow up screens annually to continue lung cancer screening effectiveness     Risks associated with radiation from annual LDCT- Radiation exposure is about the same as for a mammogram, which is about 1/3 of the annual background radiation exposure from everyday life. Starting screening at age 54 is not likely to increase cancer risk from radiation exposure. Patients with comorbidities resulting in life expectancy of < 10 years, or that would preclude treatment of an abnormality identified on CT, should not be screened due to lack of benefit.     To obtain maximal benefit from this screening, smoking cessation and long-term abstinence from smoking is critical

## 2022-05-11 ENCOUNTER — OFFICE VISIT (OUTPATIENT)
Dept: PULMONOLOGY | Age: 72
End: 2022-05-11
Payer: MEDICARE

## 2022-05-11 VITALS
TEMPERATURE: 97.5 F | SYSTOLIC BLOOD PRESSURE: 126 MMHG | DIASTOLIC BLOOD PRESSURE: 76 MMHG | OXYGEN SATURATION: 94 % | BODY MASS INDEX: 47.09 KG/M2 | WEIGHT: 293 LBS | HEIGHT: 66 IN | HEART RATE: 85 BPM

## 2022-05-11 DIAGNOSIS — Z87.891 STOPPED SMOKING WITH GREATER THAN 40 PACK YEAR HISTORY: ICD-10-CM

## 2022-05-11 DIAGNOSIS — R91.8 MULTIPLE LUNG NODULES ON CT: Primary | ICD-10-CM

## 2022-05-11 DIAGNOSIS — Z87.891 PERSONAL HISTORY OF TOBACCO USE: ICD-10-CM

## 2022-05-11 DIAGNOSIS — R05.9 COUGH: ICD-10-CM

## 2022-05-11 DIAGNOSIS — J44.9 STAGE 2 MODERATE COPD BY GOLD CLASSIFICATION (HCC): ICD-10-CM

## 2022-05-11 PROCEDURE — 1123F ACP DISCUSS/DSCN MKR DOCD: CPT | Performed by: NURSE PRACTITIONER

## 2022-05-11 PROCEDURE — G0296 VISIT TO DETERM LDCT ELIG: HCPCS | Performed by: NURSE PRACTITIONER

## 2022-05-11 PROCEDURE — 99213 OFFICE O/P EST LOW 20 MIN: CPT | Performed by: NURSE PRACTITIONER

## 2022-05-11 PROCEDURE — 99214 OFFICE O/P EST MOD 30 MIN: CPT | Performed by: NURSE PRACTITIONER

## 2022-05-11 PROCEDURE — G8427 DOCREV CUR MEDS BY ELIG CLIN: HCPCS | Performed by: NURSE PRACTITIONER

## 2022-05-11 PROCEDURE — 3023F SPIROM DOC REV: CPT | Performed by: NURSE PRACTITIONER

## 2022-05-11 PROCEDURE — 1036F TOBACCO NON-USER: CPT | Performed by: NURSE PRACTITIONER

## 2022-05-11 PROCEDURE — 3017F COLORECTAL CA SCREEN DOC REV: CPT | Performed by: NURSE PRACTITIONER

## 2022-05-11 PROCEDURE — 1090F PRES/ABSN URINE INCON ASSESS: CPT | Performed by: NURSE PRACTITIONER

## 2022-05-11 PROCEDURE — G8417 CALC BMI ABV UP PARAM F/U: HCPCS | Performed by: NURSE PRACTITIONER

## 2022-05-11 PROCEDURE — G8400 PT W/DXA NO RESULTS DOC: HCPCS | Performed by: NURSE PRACTITIONER

## 2022-05-11 PROCEDURE — 4040F PNEUMOC VAC/ADMIN/RCVD: CPT | Performed by: NURSE PRACTITIONER

## 2022-05-11 ASSESSMENT — PATIENT HEALTH QUESTIONNAIRE - PHQ9
SUM OF ALL RESPONSES TO PHQ QUESTIONS 1-9: 0
SUM OF ALL RESPONSES TO PHQ9 QUESTIONS 1 & 2: 0
1. LITTLE INTEREST OR PLEASURE IN DOING THINGS: 0
SUM OF ALL RESPONSES TO PHQ QUESTIONS 1-9: 0
2. FEELING DOWN, DEPRESSED OR HOPELESS: 0

## 2022-05-11 ASSESSMENT — ENCOUNTER SYMPTOMS
CHEST TIGHTNESS: 0
SINUS PRESSURE: 0
NAUSEA: 0
DIARRHEA: 0
VOMITING: 0
WHEEZING: 0
COUGH: 0
CONSTIPATION: 0
SINUS PAIN: 0
STRIDOR: 0
RHINORRHEA: 0

## 2022-05-23 DIAGNOSIS — I25.10 CORONARY ARTERY DISEASE INVOLVING NATIVE CORONARY ARTERY OF NATIVE HEART WITHOUT ANGINA PECTORIS: ICD-10-CM

## 2022-05-24 RX ORDER — MONTELUKAST SODIUM 10 MG/1
TABLET ORAL
Qty: 90 TABLET | Refills: 1 | Status: SHIPPED | OUTPATIENT
Start: 2022-05-24

## 2022-05-24 RX ORDER — CARVEDILOL 3.12 MG/1
TABLET ORAL
Qty: 180 TABLET | Refills: 1 | Status: SHIPPED | OUTPATIENT
Start: 2022-05-24

## 2022-05-24 NOTE — TELEPHONE ENCOUNTER
Laura Justin called requesting a refill of the below medication which has been pended for you:     Requested Prescriptions     Pending Prescriptions Disp Refills    montelukast (SINGULAIR) 10 MG tablet [Pharmacy Med Name: MONTELUKAST SODIUM 10 MG Tablet] 90 tablet 1     Sig: TAKE 1 TABLET EVERY NIGHT    carvedilol (COREG) 3.125 MG tablet [Pharmacy Med Name: CARVEDILOL 3.125 MG Tablet] 180 tablet 1     Sig: TAKE 1 TABLET TWICE DAILY WITH MEALS       Last Appointment Date: 12/3/2021  Next Appointment Date: 6/6/2022    Allergies   Allergen Reactions    Vicodin [Hydrocodone-Acetaminophen]

## 2022-05-31 RX ORDER — ALBUTEROL SULFATE 90 UG/1
2 AEROSOL, METERED RESPIRATORY (INHALATION) EVERY 4 HOURS PRN
Qty: 3 EACH | Refills: 0 | Status: SHIPPED | OUTPATIENT
Start: 2022-05-31 | End: 2022-08-18 | Stop reason: SDUPTHER

## 2022-06-07 RX ORDER — FLUTICASONE FUROATE, UMECLIDINIUM BROMIDE AND VILANTEROL TRIFENATATE 100; 62.5; 25 UG/1; UG/1; UG/1
1 POWDER RESPIRATORY (INHALATION) DAILY
Qty: 1 EACH | Refills: 11 | Status: SHIPPED | OUTPATIENT
Start: 2022-06-07

## 2022-08-10 ENCOUNTER — OFFICE VISIT (OUTPATIENT)
Dept: NEUROLOGY | Age: 72
End: 2022-08-10
Payer: MEDICARE

## 2022-08-10 VITALS
HEART RATE: 87 BPM | HEIGHT: 66 IN | SYSTOLIC BLOOD PRESSURE: 122 MMHG | WEIGHT: 293 LBS | OXYGEN SATURATION: 92 % | BODY MASS INDEX: 47.09 KG/M2 | DIASTOLIC BLOOD PRESSURE: 70 MMHG

## 2022-08-10 DIAGNOSIS — R42 DIZZINESS: Primary | ICD-10-CM

## 2022-08-10 DIAGNOSIS — E66.01 CLASS 2 SEVERE OBESITY DUE TO EXCESS CALORIES WITH SERIOUS COMORBIDITY IN ADULT, UNSPECIFIED BMI (HCC): ICD-10-CM

## 2022-08-10 DIAGNOSIS — I63.10 CEREBRAL INFARCTION DUE TO EMBOLISM OF PRECEREBRAL ARTERY (HCC): ICD-10-CM

## 2022-08-10 DIAGNOSIS — I67.82 CHRONIC CEREBRAL ISCHEMIA: ICD-10-CM

## 2022-08-10 DIAGNOSIS — Z98.2 S/P VP SHUNT: ICD-10-CM

## 2022-08-10 DIAGNOSIS — G93.89 CEREBRAL VENTRICULOMEGALY: ICD-10-CM

## 2022-08-10 DIAGNOSIS — G45.0 VBI (VERTEBROBASILAR INSUFFICIENCY): ICD-10-CM

## 2022-08-10 DIAGNOSIS — R26.89 BALANCE PROBLEM: ICD-10-CM

## 2022-08-10 DIAGNOSIS — G31.9 ACQUIRED CEREBRAL ATROPHY (HCC): ICD-10-CM

## 2022-08-10 PROCEDURE — 99214 OFFICE O/P EST MOD 30 MIN: CPT | Performed by: PSYCHIATRY & NEUROLOGY

## 2022-08-10 PROCEDURE — 99215 OFFICE O/P EST HI 40 MIN: CPT | Performed by: PSYCHIATRY & NEUROLOGY

## 2022-08-10 PROCEDURE — 1123F ACP DISCUSS/DSCN MKR DOCD: CPT | Performed by: PSYCHIATRY & NEUROLOGY

## 2022-08-10 ASSESSMENT — ENCOUNTER SYMPTOMS
BLOOD IN STOOL: 0
EYE DISCHARGE: 0
BOWEL INCONTINENCE: 0
EYE REDNESS: 0
CHOKING: 0
SCALP TENDERNESS: 0
FACIAL SWELLING: 0
COLOR CHANGE: 0
BLURRED VISION: 0
SHORTNESS OF BREATH: 0
EYE WATERING: 0
CONSTIPATION: 0
PHOTOPHOBIA: 1
TROUBLE SWALLOWING: 0
EYE PAIN: 0
WHEEZING: 0
BACK PAIN: 0
FACIAL SWEATING: 0
VOICE CHANGE: 0
ABDOMINAL DISTENTION: 0
DIARRHEA: 0
EYE ITCHING: 0
APNEA: 0
SINUS PRESSURE: 0
CHEST TIGHTNESS: 0

## 2022-08-10 NOTE — PROGRESS NOTES
[facility]  Neurology  [address]:[phone]   Fax: [phone]        SUBJECTIVE:       PATIENT ID:  Gin Rosa is a  RIGHT   HANDED 73 y.o. female. Dizziness  This is a recurrent problem. Episode onset: SINCE   EARLY  APRIL 2019. The problem occurs intermittently. The problem has been resolved. Associated symptoms include headaches. The symptoms are aggravated by bending and standing. She has tried rest and sleep (  SHUNT  PLACEMENT) for the symptoms. The treatment provided significant relief. Headache   This is a chronic problem. Episode onset: SINCE  APRIL 2019. The problem occurs intermittently. The problem has been waxing and waning. The pain is located in the Occipital and frontal region. The pain does not radiate. The pain quality is not similar to prior headaches. The quality of the pain is described as aching and throbbing. The pain is at a severity of 3/10. The pain is mild. Associated symptoms include dizziness, a loss of balance, phonophobia and photophobia. Pertinent negatives include no abnormal behavior, back pain, blurred vision, drainage, ear pain, eye pain, eye redness, eye watering, facial sweating, hearing loss, insomnia, muscle aches, scalp tenderness, seizures, sinus pressure, tingling, tinnitus or weight loss. The symptoms are aggravated by coughing, bright light and noise. She has tried acetaminophen for the symptoms. The treatment provided significant relief. Her past medical history is significant for obesity and sinus disease. There is no history of cancer, cluster headaches, hypertension, immunosuppression, migraine headaches, migraines in the family, pseudotumor cerebri, recent head traumas or TMJ. Neurologic Problem  The patient's primary symptoms include clumsiness, a loss of balance and memory loss. The patient's pertinent negatives include no syncope. This is a chronic problem.  The neurological problem developed insidiously. The problem is unchanged. There was no focality noted. Associated symptoms include dizziness and headaches. Pertinent negatives include no auditory change, aura, back pain, bladder incontinence, bowel incontinence, confusion, light-headedness, palpitations or shortness of breath. Past treatments include bed rest, medication and sleep. The treatment provided moderate relief. Her past medical history is significant for dementia. There is no history of a bleeding disorder, a clotting disorder, a CVA, head trauma, liver disease, mood changes or seizures. History obtained from  The patient       and other  available medical records were  Also  reviewed. The  Duration,  Quality,  Severity,  Location,  Timing,  Context,  Modifying  Factors   Of   The   Chief   Complaint       AndPresent  Illness   Was   Reviewed   In   Chronological   Manner.                                                PATIENT'S  MAIN  CONCERNS INCLUDE :                       1)     PREVIOUS   H/O   CHRONIC      DIZZINESS     INTERMITTENT                                        SINCE      April 2019                                         -     IMPROVED    SIGNIFICANTLY                                       -   ON   ASA     DAILY    PRO PHYLACTICALLY                              2)       PREVIOUS    H/O    GLOBAL  HEADACHES    IN     2019                                        MOSTLY     OCCIPITAL   AREA                                                  -     RESOLVED                                3)    PREVIOUS     H/O   CHRONIC   GAIT  AND  BALANCE  PROBLEMS                                                                           -     IMPROVED    SIGNIFICANTLY                                       ADVISED    TO  USE  QUAD  CANE                            4)        NO   H/O   FALLS                                NO   H/O   HEAD  INJURIES                                               5)     PREVIOUS H/O     CHRONIC  SMOKING                                     PATIENT  STOPPED   SMOKING  SINCE   2019  . 6)     MULTIPLE   CO  MORBID  MEDICAL  CONDITIONS                             BEING  FOLLOWED  BY  HER  PCP. 7)       MRI  BRAIN   WITH  AND  W/O  CONTRAST   DONE                                AT  [facility]                                     DATED   5 /15/ 2019  REVIEWED         SHOWED                                        \"   HYDROCEPHALUS   OF   RECENT  TO  SUBACUTE.                                       ASSOCIATED   TONSILLAR  HERNIATION  AT  FORAMEN  MAGNUM   SUSPECTED  \"    .                                       RECOMMENDED      EMERGENCY    NEURO  SURGICAL EVALUATION                                          FOR      VENTRICULAR  DRAIN   IN   MAY  2019                        8)       PATIENT  HAD   VENTRICULAR  DRAIN    PLACEMENT  IN   MAY    2019        AND                                  WAS    IN    NURSING  HOME  REHAB   FOR  TWO  MONTHS . PATIENT  RECOVERED   WELL. NO   DIZZINESS. NO  BALANCE  PROBLEMS                                    NO   SEIZURES. NO   HEADACHES . MEMORY    PROBLEMS     ARE  AT  HER  BASELINE                               9)      FOLLOW UP  CT  HEAD   June 2019     SHOWED                                   STABLE   VENTRICULOMEGALY    AND    SHUNT                               10)      HAD  FOLLOW  NEURO  SURGEON   EVALUATIONS                                                      IN   SEPT. 2019                                11)     PATIENT  LIVES   WITH   HER  DAUGHTER     CURRENTLY. PATIENT      DRIVES  LOCALLY       AS  TOLERATED.                                   12)      H/O      MILD   CHRONIC    MEMORY PROBLEMS                                        PATIENT  NOT  CONCERNED. REFUSED   MEDICATION   FOR     DEMENTIA                               13)      PATIENT  DENIES     ANY   FALLS. H/O     MILD   BALANCE  PROBLEMS.                                       H/O     MILD  BRIEF   POSITIONAL  DIZZINESS                            14)     RECOMMENDED:                                     A)    FALL  PRECAUTIONS                                   B)    USE  QUAD  CANE                                      C)     ASA   81   MG  PO  DAILY                                     D)    FOLLOW   UP   CT   HEAD ,                                    E)   CAROTID  DOPPLER  AND  TCD                                          PRECIPITATING  FACTORS: including  fever/infection, exertion/relaxation, position change, stress,                         weather change, medications/alcohol, time of day/darkness/light  Are  Present                                                                MODIFYING  FACTORS:  fever/infection, exertion/relaxation, position change, stress, weather change,                      medications/alcohol, time of day/darkness/light    Are  present           Patient   Indicates   The  Presence   And  The  Absence  Of  The  FollowingAssociated  And   Additional  Neurological    Symptoms:                                Balance  And coordination problems  present           Gait problems     present            Headaches      present              Migraines           present           Memory problems absent             Confusion        absent            Paresthesia numbness          absent           SeizuresAnd  Starring  Episodes           absent           Syncope,  Near  syncopal episodes         absent           Speech problems           absent             Swallowing  Problems      absent            Dizziness,  Light headedness           present Vertigo        present             Generalized   Weakness    absent              focal  Weakness     absent             Tremors         absent              Sleep  Problems     absent             History  Of   RecentHead  Injury     absent             History  Of   Recent  TIA     absent             History  Of   Recent    Stroke     absent             Neck  Pain and  Neck muscle  Spasms  Absent               Radiating  down   And   Weakness           absent            Lower back   Pain  And     Spasms  Absent              Radiating    Down   And   Weakness          absent                H/OFALLS        absent               History  Of   Visual  Symptoms    Absent                  Associated   Diplopia       absent                                             Also   Additional   Symptoms   Present    As  Documented    In   The detailed                  Review  Of  Systems   And    Please   Refer   To    Them for   Additional  Information. Any components  That are either  Unobtainable  Or  Limited  In   HPI, ROS  And/or PFSH   Are                Due   To       Patient's  Medical  Problems,  Clinical  Condition and/or lack of                                other    Alternate resources.               RECORDS   REVIEWED:    historical medical records     INFORMATION   REVIEWED:       MEDICAL   HISTORY,SURGICAL   HISTORY,   MEDICATIONS   LIST,   ALLERGIES AND  DRUG  INTOLERANCES,     FAMILY   HISTORY,  SOCIAL  HISTORY,    PROBLEM  LIST   FOR  PATIENT  CARE   COORDINATION          Past Medical History:   Diagnosis Date    Chronic obstructive pulmonary disease ([address]     Coronary artery disease     Dyslipidemia     Obesity     Pneumonia          Past Surgical History:   Procedure Laterality Date    CARDIAC CATHETERIZATION      CARDIAC CATHETERIZATION  2012    cath with x1 stent    EYE SURGERY      shar cataract surg x2 y ago    VENTRICULOPERITONEAL SHUNT Right 5/16/2019    VENTRICULAR PERITONEAL SHUNT INSERTION ENDOSCOPIC performed by Rosette Blanca MD at [facility] CAPSULOTOMY Bilateral 01/04/2022    Dr. Anuradha Simpson.          Current Outpatient Medications   Medication Sig Dispense Refill    Alo Jum[address] 100-62.5-25 MCG/INH AEPB INHALE 1 PUFF INTO THE LUNGS DAILY 1 each 11    albuterol sulfate  (90 Base) MCG/ACT inhaler INHALE 2 PUFFS INTO THE LUNGS EVERY 4 HOURS AS NEEDED FOR WHEEZING OR SHORTNESS OF BREATH 3 each 0    montelukast (SINGULAIR) 10 MG tablet TAKE 1 TABLET EVERY NIGHT 90 tablet 1    carvedilol (COREG) 3.125 MG tablet TAKE 1 TABLET TWICE DAILY WITH MEALS 180 tablet 1    Handicap Placard MISC by Does not apply route Expires: 7/26/2024  Dx: R23.89, G93.5 1 each 0    Misc. Devices (ADJUST BATH/SHOWER SEAT/BACK) MISC Use daily with showers 1 each 0    Misc. Devices (SUCTION GRAB BAR) MISC Use in the shower 2 each 0    loratadine (CLARITIN) 10 MG tablet Take 10 mg by mouth daily      aspirin 325 MG tablet Take 325 mg by mouth daily (Patient not taking: No sig reported)       No current facility-administered medications for this visit.          Allergies   Allergen Reactions    Vicodin [Hydrocodone-Acetaminophen]          Family History   Problem Relation Age of Onset    No Known Problems Sister     No Known Problems Daughter     Breast Cancer Paternal Aunt     No Known Problems Sister     No Known Problems Sister     No Known Problems Daughter     No Known Problems Maternal Aunt     No Known Problems Maternal Aunt          Social History     Socioeconomic History    Marital status:      Spouse name: Not on file    Number of children: 2    Years of education: 14    Highest education level: Associate degree: occupational, technical, or vocational program   Occupational History    Occupation: factory      Comment: retired   Tobacco Use    Smoking status: Former     Packs/day: 1.00     Years: 50.00     Pack years: 50.00     Types: Cigarettes     Start date: 1/1/1970 Quit date: 5/16/2019     Years since quitting: 3.2    Smokeless tobacco: Never   Vaping Use    Vaping Use: Never used   Substance and Sexual Activity    Alcohol use: No    Drug use: No    Sexual activity: Not Currently     Partners: Male   Other Topics Concern    Not on file   Social History Narrative    No SDOH needs identified. She was given [facility] number for transportation if family unable to transport. Discussed HEAP and PIPP programs. She stated she has used in the past but declines at this time.      Social Determinants of Health     Financial Resource Strain: Not on file   Food Insecurity: Not on file   Transportation Needs: Not on file   Physical Activity: Not on file   Stress: Not on file   Social Connections: Not on file   Intimate Partner Violence: Not on file   Housing Stability: Not on file       Vitals:    08/10/22 1053   BP: 122/70   Pulse: 87   SpO2: 92%         Wt Readings from Last 3 Encounters:   08/10/22 295 lb (133.8 kg)   05/11/22 300 lb (136.1 kg)   01/19/22 297 lb (134.7 kg)         BP Readings from Last 3 Encounters:   08/10/22 122/70   05/11/22 126/76   01/19/22 122/76       Hematology and Coagulation  Lab Results   Component Value Date/Time    WBC 10.3 06/12/2019 07:18 PM    RBC 5.05 06/12/2019 07:18 PM    HGB 15.7 06/12/2019 07:18 PM    HCT 47.2 06/12/2019 07:18 PM    MCV 93.3 06/12/2019 07:18 PM    MCH 31.1 06/12/2019 07:18 PM    MCHC 33.3 06/12/2019 07:18 PM    RDW 15.3 06/12/2019 07:18 PM     06/12/2019 07:18 PM    MPV 10.3 06/12/2019 07:18 PM       Chemistries  Lab Results   Component Value Date/Time     12/03/2021 10:56 AM    K 4.4 12/03/2021 10:56 AM     12/03/2021 10:56 AM    CO2 29 12/03/2021 10:56 AM    BUN 11 12/03/2021 10:56 AM    CREATININE 0.89 12/03/2021 10:56 AM    CALCIUM 9.8 12/03/2021 10:56 AM    PROT 7.3 05/12/2019 05:53 PM    LABALBU 4.3 05/12/2019 05:53 PM    BILITOT 0.65 05/12/2019 05:53 PM    ALKPHOS 77 05/12/2019 05:53 PM    AST 9 05/12/2019 05:53 PM    ALT 9 05/12/2019 05:53 PM     Lab Results   Component Value Date/Time    ALKPHOS 77 05/12/2019 05:53 PM    ALT 9 05/12/2019 05:53 PM    AST 9 05/12/2019 05:53 PM    PROT 7.3 05/12/2019 05:53 PM    BILITOT 0.65 05/12/2019 05:53 PM    BILIDIR 0.12 07/02/2012 03:11 AM    LABALBU 4.3 05/12/2019 05:53 PM     Lab Results   Component Value Date/Time    BUN 11 12/03/2021 10:56 AM    CREATININE 0.89 12/03/2021 10:56 AM     Lab Results   Component Value Date/Time    CALCIUM 9.8 12/03/2021 10:56 AM    MG 2.2 07/02/2012 03:11 AM     Lab Results   Component Value Date/Time    AST 9 05/12/2019 05:53 PM    ALT 9 05/12/2019 05:53 PM       Lab Results   Component Value Date/Time    CKTOTAL 28 07/02/2012 10:25 AM         Review of Systems   Constitutional:  Negative for appetite change, unexpected weight change and weight loss. HENT:  Negative for dental problem, drooling, ear discharge, ear pain, facial swelling, hearing loss, mouth sores, nosebleeds, postnasal drip, sinus pressure, tinnitus, trouble swallowing and voice change. Eyes:  Positive for photophobia. Negative for blurred vision, pain, discharge, redness, itching and visual disturbance. Respiratory:  Negative for apnea, choking, chest tightness, shortness of breath and wheezing. Cardiovascular:  Negative for palpitations and leg swelling. Gastrointestinal:  Negative for abdominal distention, blood in stool, bowel incontinence, constipation and diarrhea. Endocrine: Negative for cold intolerance, heat intolerance, polydipsia, polyphagia and polyuria. Genitourinary:  Negative for bladder incontinence. Musculoskeletal:  Positive for gait problem. Negative for back pain and neck stiffness. Skin:  Negative for color change, pallor and wound. Allergic/Immunologic: Negative for environmental allergies, food allergies and immunocompromised state. Neurological:  Positive for dizziness, headaches and loss of balance.  Negative for tingling, tremors, seizures, syncope, facial asymmetry, speech difficulty and light-headedness. Hematological:  Negative for adenopathy. Does not bruise/bleed easily. Psychiatric/Behavioral:  Positive for memory loss. Negative for agitation, behavioral problems, confusion, decreased concentration, dysphoric mood, hallucinations, self-injury, sleep disturbance and suicidal ideas. The patient is not nervous/anxious, does not have insomnia and is not hyperactive. OBJECTIVE:    Physical Exam  Constitutional:       Appearance: She is well-developed. HENT:      Head: Normocephalic and atraumatic. No raccoon eyes or Holland's sign. Right Ear: External ear normal.      Left Ear: External ear normal.      Nose: Nose normal.   Eyes:      Conjunctiva/sclera: Conjunctivae normal.   Neck:      Thyroid: No thyroid mass or thyromegaly. Vascular: No carotid bruit. Trachea: No tracheal deviation. Meningeal: Brudzinski's sign and Kernig's sign absent. Cardiovascular:      Rate and Rhythm: Normal rate and regular rhythm. Pulmonary:      Effort: Pulmonary effort is normal.   Musculoskeletal:         General: No tenderness. Cervical back: Normal range of motion and neck supple. No rigidity. No muscular tenderness. Normal range of motion. Skin:     General: Skin is warm. Coloration: Skin is not pale. Findings: No erythema or rash. Nails: There is no clubbing. Psychiatric:         Attention and Perception: She is attentive. Mood and Affect: Mood is not anxious or depressed. Affect is not labile, blunt or inappropriate. Behavior: Behavior is slowed. Behavior is not agitated, aggressive, withdrawn, hyperactive or combative. Behavior is cooperative. Thought Content: Thought content is not paranoid or delusional. Thought content does not include homicidal or suicidal ideation. Thought content does not include homicidal or suicidal plan.          Cognition and Memory: Cognition is impaired. Memory is impaired. Judgment: Judgment is not impulsive or inappropriate. Neurologic Exam      NEUROLOGICALEXAMINATION :      A) MENTAL STATUS:                   Alert and  oriented  To time, place  And  Person. No Aphasia. No  Dysarthria. Able   To  Follow   SIMPLE   commands without   Any  Difficulty. No right  To left confusion. SLOW   Speech  And language function. Insight and  Judgment ,Fund  Of  Knowledge   within normal limits                Recent  And  Remote memory    DECREASED                Attention &Concentration are    DECREASED                                                  B) CRANIAL NERVES :      CN : Visual  Acuity  And  Visual fields  within normal limits               Fundi  Could  Not  Be  Could  Not  Be  Evaluated. 3,4,6 CN : Both  Pupils are   Reactive and  Equal.  Movements  Are  Intact. No  Nystagmus. No  RHONDA. No  Afferent  Pupillary  Defect noted. 5 CN :  Normal  Facial sensations and Corneal  Reflexes           7 CN:  Normal  Facial  Symmetry  And  Strength. No facial  Weakness. 8 CN :  Hearing  Appears within normal limits          9, 10 CN: Normal spontaneous, reflex palate movements         11 CN:   Normal  Shouldershrug and  strength         12 CN :   Normal  Tongue movements and  Tongue  In midline                        No tongue   Fasciculations or atrophy       C) MOTOR  EXAM:                 Strength  In upper  AndLower extremities   within normal limits             Rapid alternating  And  repetitions  Movements  within normal limits               Muscle  Tone  In upper  And  LowerExtremities  normal                No rigidity. No  Spasticity. Bradykinesia   absent               No  Asterixis.               Sustention  Tremor , Resting  Tremor   absent No other  Abnormal  Movements noted           D) SENSORY :               light touch, pinprick, position  And  Vibration  within normal limits        E) REFLEXES:                   Deep  Tendon  Reflexes normal                  No  pathological  Reflexes  Bilaterally. F) COORDINATION  AND  GAIT :                                Station and  Gait    SLOW                             Romberg 's test   POSITIVE                             Ataxia negative          ASSESSMENT:    Patient Active Problem List   Diagnosis    Tobacco abuse    Environmental allergies    Obesity    Dyslipidemia    Coronary artery disease    Chronic obstructive pulmonary disease (HCC)    Occipital headache    Dizziness    Cerebral ventriculomegaly    Acquired cerebral atrophy (HCC)    Chronic cerebral ischemia    Chiari malformation type I (HCC)    Balance problem    VBI (vertebrobasilar insufficiency)    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 Dose modulation, iterative reconstruction, and/or weight based   adjustment of the mA/kV was utilized to reduce the radiation dose to as low   as reasonably achievable. COMPARISON:   June 7, 2019       HISTORY:   ORDERING SYSTEM PROVIDED HISTORY: Memory loss with history of cerebral shunt   placement   TECHNOLOGIST PROVIDED HISTORY:       Ordering Physician Provided Reason for Exam: Dizzy, fall today hit head, pt   has recently placed shunt   Acuity: Acute   Type of Exam: Initial       FINDINGS:   BRAIN/VENTRICLES: There is no acute intracranial hemorrhage, mass effect or   midline shift.  No abnormal extra-axial fluid collection. The gray-white   differentiation is maintained without evidence of an acute infarct. There is   prominence of the ventricles and sulci due to global parenchymal volume loss. There are nonspecific areas of hypoattenuation within the periventricular and   subcortical white matter, which likely represent chronic microvascular   ischemic change. ORBITS: Bilateral lens implants. SINUSES: The visualized paranasal sinuses and mastoid air cells demonstrate   no acute abnormality. SOFT TISSUES/SKULL: Right frontal approach ventriculostomy catheter   terminates in the of the left lateral ventricle. Impression   Stable ventriculomegaly and  shunt. No acute disease. CT OF THE HEAD WITHOUT CONTRAST  5/2/2019 2:52 pm       TECHNIQUE:   CT of the head was performed without the administration of intravenous   contrast. Dose modulation, iterative reconstruction, and/or weight based   adjustment of the mA/kV was utilized to reduce the radiation dose to as low   as reasonably achievable. COMPARISON:   None. HISTORY:   ORDERING SYSTEM PROVIDED HISTORY: headache   TECHNOLOGIST PROVIDED HISTORY:       Ordering Physician Provided Reason for Exam: C/o headache/dizziness   Acuity: Acute   Type of Exam: Initial       FINDINGS:   BRAIN/VENTRICLES: There is no acute intracranial hemorrhage, mass effect or   midline shift. No abnormal extra-axial fluid collection. Ventriculomegaly   is noted and cerebellar tonsillar displacement into the foramen magnum,   extending off the field of view, is noted. There is also mild cerebral and   cerebellar atrophy and scattered white matter changes in the brain. ORBITS: The visualized portion of the orbits demonstrate no acute abnormality. SINUSES: The visualized paranasal sinuses and mastoid air cells demonstrate   no acute abnormality.        SOFT TISSUES/SKULL:  No acute abnormality of the visualized skull or soft tissues. Impression   Partially visualized Chiari malformation. Ventriculomegaly is noted as well   as cerebral and cerebellar cortical atrophy with chronic appearing white   matter changes in the brain. VISITING DIAGNOSIS:      ICD-10-CM    1. Cerebral ventriculomegaly  G93.89       2. Acquired cerebral atrophy ([address]  G31.9       3. S/P  shunt  Z98.2       4. 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 Chronic cerebral ischemia  I67.82       6. Dizziness  R42       7. Class 2 severe obesity due to excess calories with serious comorbidity in adult, unspecified BMI ([facility])  E66.01       8. Occipital headache  R51.9       9. Balance problem  R26.89       10. VBI (vertebrobasilar insufficiency)  G45.0       11. Abnormal CT of the head  R93.0       12. Moderate episode of recurrent major depressive disorder ([facility])  F33.1                  CONCERNS   &   INCREASED   RISK   FOR         * TIA,  CEREBRO  VASCULAR  ISCHEMIA,STROKE     *   DIZZINESS,   VERTEBROBASILAR  INSUFFICIENCY ,         *  COMPLICATED  MIGRAINES      *     TENSION  HEADACHES        *  GAIT  DIFFICULTIES  &   BALANCE PROBLMES                     VARIOUS  RISK   FACTORS   WERE  REVIEWED   AND   DISCUSSED. *  PATIENT   HAS  MULTIPLE   MEDICAL, NEUROLOGICAL    PROBLEMS . PATIENT'S   MANAGEMENT  IS  CHALLENGING. PLAN:                    Sae Lamas  Of  The  Diagnoses,  The  Management & Treatment  Options            AND    Care  plan  Were          Reviewed and   Discussed   With  patient. * Goals  And  Expectations  Of  The  Therapy  Discussed   And  Reviewed. *   Benefits   And   Side  Effect  Profile  Of  Medication/s   Were   Discussed                * Need   For  Further   Follow up For  The  Various  Problems Were  discussed. * Results  Of  The  Previous  Diagnostic tests were reviewed and questions answered.                    patient understand the same. Medical  Decision  Making  Was  Made  Based on the   Complexity  Of  Above  Mentioned  Diagnoses,        Data reviewed   & diagnostic  Tests  Reviewed,       Risk  Of  Significant   Co morbidities and   complicating   Factors. Medical  Decision  Was   High    Complexity   Due   To  The  Patient's  Multiple  Symptoms,      Advancing   Disease,  Complex  Treatment  Regimen,  Multiple medications       and   Risk  Of   Side  Effects,  Difficulty  In  Medication  Management  And  Diagnostic  Challenges       In  View  Of  The  Associated   Co  Morbid  Conditions   And  Problems. * FALL   PRECAUTIONS. THESE  REVIEWED   AND  DISCUSSED      *    SUPERVISED   CARE        *   BE  CAREFUL  WITH  ACTIVITIES            *   ADEQUATE   FLUIDINTAKE   AND  ELECTROLYTE  BALANCE             * KEEP  DAIRY  OF   THE  NEUROLOGICAL  SYMPTOMS        RECORDING THE    DURATION  AND  FREQUENCY. *  AVOID    CONDITIONS  AND  FACTORS   THAT  MAKE                  NEUROLOGICAL  SYMPTOMS  WORSE.           *TO  MAINTAIN  REGULAR  SLEEP  WAKE  CYCLES. *   TO  HAVE  ADEQUATE  REST  AND   SLEEP    HOURS.          *    AVOID  ANY USAGE OF                   TOBACCO,EXCESSIVE  ALCOHOL  AND   ILLEGAL   SUBSTANCES        *   Compliance   With  Medications   And  Instructions      *    MIGRAINE/ HEADACHE    DAIRY   WITH  MONITORING                       OF  DURATION  ANDFREQUENCY.             *     The    Antiplatelet  therapy    As   Recommended  Was   Discussed        *    Prophylactic  Use   Of     Vitamin   B   Complex,  Folic  Acid,    Vitamin  B12    Multivitamin,       Calcium  With  magnesium  And  Vit D    Supplementations   Over  The  Counter  Discussed              *  EVALUATIONS  AND  FOLLOW UP:                    * PHYSICAL  THERAPY                                *CARDIOLOGY              *   OPTHALMOLOGY                                *        PATIENT HAD   VENTRICULAR  DRAIN    PLACEMENT  IN   MAY    2019        AND                                  WAS    IN    NURSING  HOME  REHAB   FOR  TWO  MONTHS . *         PATIENT  RECOVERED   WELL. NO   SEIZURES. NO   HEADACHES . *     RECOMMENDED:                                     A)    FALL  PRECAUTIONS                                   B)    USE  QUAD  CANE                                      C)     ASA   81   MG  PO  DAILY                                     D)    FOLLOW   UP   CT   HEAD ,                                    E)   CAROTID  DOPPLER  AND  TCD                  Orders Placed This Encounter   Procedures    CT HEAD WO CONTRAST    VL DUP CAROTID BILATERAL    VL TRANSCRANIAL DOPPLER COMPLETE           *PATIENT   TO  FOLLOW  UP  WITH   PRIMARY  CARE         OTHER  CONSULTANTS  AS  BEFORE. *  Maintain   Healthy  Life Style    With   Periodic  Monitoring  Of      Any  Medical  Conditions  Including   Elevated  Blood  Pressure,  Lipid  Profile,     Blood  Sugar levels  AndHeart  Disease. *   Period   Screening  For  Cancers  Involving  Breast,  Colon,    lungs  And  Other  Organs  As  Applicable,  In consultation   With  Your  Primary Care Providers. *Second  Neurological  Opinion  And  Evaluations  In  [facility]  Setting  If  Patient  Is  Interested. * Please   Contact   Neurology  Clinic   Early   If   Are  Any  New  Neurological   And  Any neurological  Concerns.                     *  If  The  Patient remains  Neurologically  Stable   Return   To  [facility] Neurology Department   IN   3 -  6     MONTHS  TIME   FOR  FURTHER              FOLLOW UP.                       *   The  Neurological   Findings,  Possible  Diagnosis,  Differential diagnoses   And  Options                     For    Further Investigations                       And  management   Are  Discussed  Comprehensively. *  If   There is  Any  Significant  Worsening   Of  Current  Symptoms  And  Or  If patient  Develops                                  Any additional  New  NeurologicalSymptoms                  Or  Significant  Concerns   Should  Call  911 or  Go  To  Emergency  Department                            For  Further  Immediate  Evaluation. The   Above  Were  Reviewed  With  Patient                          questions  Answered  In  Detail. More   Than  50% of face  To face Time   Was  Spent  On  Counseling   And   Coordination  Of  Care                 Of   Patient's  multiple   Neurological  Problems   And   Comorbid  Medical   Conditions. TOTAL   TIME     SPENT :                On this date 8/10/2022 I have spent    40  minutes   reviewing previous notes, test results               and face to face time with the patient including   discussing the diagnosis and importance of compliance               with the treatment plan  as well as documenting on the day of the visit. Electronically signed by Heber Perez MD.,  Rossy Lopez       Board Certified in  Neurology &  In  [facility] ([employer])      DISCLAIMER:   Although every effort was made to ensure the accuracy of this  electronictranscription, some errors in transcription may have occurred. GENERAL PATIENT INSTRUCTIONS:     A Healthy Lifestyle: Care Instructions  Your Care Instructions  A healthy lifestyle can help you feel good, stay at ahealthy weight, and have plenty of energy for both work and play. A healthy lifestyle is something you can share with your whole family. A healthy lifestyle also can lower your risk for serious health problems, such ashigh blood pressure, heart disease, and diabetes.   You can follow a few steps listed below to improve your health and the health of your family. Follow-up careis a key part of your treatment and safety. Be sure to make and go to all appointments, and call your doctor if you are having problems. Its also a good idea to know your test results and keep a list of the medicines you take. How can you care for yourself at home? Do not eat too much sugar, fat, or fast foods. You can still have dessert and treats nowand then. The goal is moderation. Start small to improve your eating habits. Pay attention to portion sizes, drink less juice and soda pop, and eat more fruits and vegetables. Eat a healthy amount of food. A 3-ounce serving of meat, for example, is about the size of a deck of cards. Fill the rest of your plate with vegetables and whole grains. Limit theamount of soda and sports drinks you have every day. Drink more water when you are thirsty. Eat at least 5 servings of fruits and vegetables every day. It may seem like a lot, but it is not hard to reach this goal. Aserving or helping is 1 piece of fruit, 1 cup of vegetables, or 2 cups of leafy, raw vegetables. Have an apple or some carrot sticks as an afternoon snack instead of a candy bar. Try to have fruits and/or vegetables at everymeal.  Make exercise part of your daily routine. You may want to start with simple activities, such as walking, bicycling, or slow swimming. Try akua active 30 to 60 minutes every day. You do not need to do all 30 to 60 minutes all at once. For example, you can exercise 3 times a day for 10 or 20 minutes. Moderate exercise is safe for most people, but it is always agood idea to talk to your doctor before starting an exercise program.  Keep moving. Shira Plaster the lawn, work in the garden, or Callision. Take the stairs instead of the elevator at work. If you smoke, quit. Peoplewho smoke have an increased risk for heart attack, stroke, cancer, and other lung illnesses.  Quitting is hard, but there are ways to boost your chance of quitting tobacco for good. Use nicotine gum, patches, or lozenges. Ask your doctor about stop-smoking programs and medicines. Keep trying. In addition to reducing your risk of diseases in the future, you will notice some benefits soon after you stop using tobacco. If you have shortness of breath or asthma symptoms, they will likely getbetter within a few weeks after you quit. Limit how much alcohol you drink. Moderate amounts of alcohol (up to 2 drinks a day for men, 1drink a day for women) are okay. But drinking too much can lead to liver problems, high blood pressure, and other health problems. health  If you have a family, there are many things you can do together to improve your health. Eat meals together as a family as often as possible. Eat healthy foods. This includes fruits, vegetables, lean meats and dairy, and whole grains. Include your family in your fitness plan. Most peoplethink of activities such as jogging or tennis as the way to fitness, but there are many ways you and your family can be more active. Anything that makes you breathe hard and gets your heart pumping is exercise. Here are sometips:  Walk to do errands or to take your child to school or the bus. Go for a family bike ride after dinner instead of watching TV. Where can you learn more? Go toReferMetps://DossierViewkarineeb.Brandizi. org and sign in to your 7-bites account. Enter C356 in the Search HealthInformation box to learn more about \"A Healthy Lifestyle: Care Instructions. \"     If you do not have anaccount, please click on the \"Sign Up Now\" link. Current as of: July 26, 2016  Content Version: 11.2  © 0338-7071 [employer]. Care instructions adapted under license by [facility] [place] ([facility]).  If you have questions about a medical condition or this instruction, always ask your healthcare professional. [employer] disclaims any warranty or liability for your use of this information.

## 2022-08-10 NOTE — PATIENT INSTRUCTIONS
* FALL   PRECAUTIONS. *  USE   WALKING  ASSISTANCE  DEVICES     QUAD  CANE  /   Daneil Flies        *    TO   AVOID   TO  SLEEP  IN   SUPINE  POSITION. *      WEIGHT   LOSS. *   ADEQUATE   FLUID  INTAKE   AND  ELECTROLYTE  BALANCE             * KEEP  DAIRY  OF   THE  NEUROLOGICAL  SYMPTOMS          *  TO  MAINTAIN  REGULAR  SLEEP  WAKE  CYCLES. *   TO  HAVE  ADEQUATE  REST  AND   SLEEP    HOURS.          *    AVOID  USAGE OF   TOBACCO,  EXCESSIVE  ALCOHOL                AND   ILLEGAL   SUBSTANCES,  IF  ANY          *  Maintain   Healthy  Life Style    With   Periodic  Monitoring  Of         Any  Medical  Conditions  Including   Elevated  Blood  Pressure,  Lipid  Profile,       Blood  Sugar levels  And   Heart  Disease. *   Period   Screening  For  Cancers  Involving  Breast,  Colon,         Lungs  And  Other  Organs  As  Applicable,           In consultation   With  Your  Primary Care Providers. *  If   There is  Any  Significant  Worsening   Of  Current  Symptoms  And             Or  If    Any additional  New  Neurological  Symptoms  and          Significant  Concerns   Should  Call  911 or  Go  To  Emergency  Department            For  Further  Immediate  Evaluation.

## 2022-08-18 RX ORDER — ALBUTEROL SULFATE 90 UG/1
2 AEROSOL, METERED RESPIRATORY (INHALATION) EVERY 4 HOURS PRN
Qty: 3 EACH | Refills: 0 | Status: SHIPPED | OUTPATIENT
Start: 2022-08-18

## 2022-08-18 NOTE — TELEPHONE ENCOUNTER
Last Appt:  5/11/2022  Next Appt:  5/10/2023  Med verified in [facility] [address]

## 2022-09-08 ENCOUNTER — OFFICE VISIT (OUTPATIENT)
Dept: FAMILY MEDICINE CLINIC | Age: 72
End: 2022-09-08
Payer: MEDICARE

## 2022-09-08 VITALS
HEART RATE: 80 BPM | OXYGEN SATURATION: 91 % | DIASTOLIC BLOOD PRESSURE: 78 MMHG | SYSTOLIC BLOOD PRESSURE: 118 MMHG | BODY MASS INDEX: 47.09 KG/M2 | TEMPERATURE: 98.9 F | HEIGHT: 66 IN | WEIGHT: 293 LBS

## 2022-09-08 DIAGNOSIS — Z23 FLU VACCINE NEED: ICD-10-CM

## 2022-09-08 DIAGNOSIS — G91.2 NORMAL PRESSURE HYDROCEPHALUS (HCC): ICD-10-CM

## 2022-09-08 DIAGNOSIS — J44.9 CHRONIC OBSTRUCTIVE PULMONARY DISEASE, UNSPECIFIED COPD TYPE (HCC): Primary | ICD-10-CM

## 2022-09-08 PROCEDURE — 99214 OFFICE O/P EST MOD 30 MIN: CPT | Performed by: FAMILY MEDICINE

## 2022-09-08 PROCEDURE — G8417 CALC BMI ABV UP PARAM F/U: HCPCS | Performed by: FAMILY MEDICINE

## 2022-09-08 PROCEDURE — 1090F PRES/ABSN URINE INCON ASSESS: CPT | Performed by: FAMILY MEDICINE

## 2022-09-08 PROCEDURE — 3017F COLORECTAL CA SCREEN DOC REV: CPT | Performed by: FAMILY MEDICINE

## 2022-09-08 PROCEDURE — PBSHW INFLUENZA, FLUAD, (AGE 65 Y+), IM, PF, 0.5 ML: Performed by: FAMILY MEDICINE

## 2022-09-08 PROCEDURE — G8400 PT W/DXA NO RESULTS DOC: HCPCS | Performed by: FAMILY MEDICINE

## 2022-09-08 PROCEDURE — G0008 ADMIN INFLUENZA VIRUS VAC: HCPCS | Performed by: FAMILY MEDICINE

## 2022-09-08 PROCEDURE — G8427 DOCREV CUR MEDS BY ELIG CLIN: HCPCS | Performed by: FAMILY MEDICINE

## 2022-09-08 PROCEDURE — 1123F ACP DISCUSS/DSCN MKR DOCD: CPT | Performed by: FAMILY MEDICINE

## 2022-09-08 PROCEDURE — 1036F TOBACCO NON-USER: CPT | Performed by: FAMILY MEDICINE

## 2022-09-08 PROCEDURE — 3023F SPIROM DOC REV: CPT | Performed by: FAMILY MEDICINE

## 2022-09-08 SDOH — ECONOMIC STABILITY: FOOD INSECURITY: WITHIN THE PAST 12 MONTHS, YOU WORRIED THAT YOUR FOOD WOULD RUN OUT BEFORE YOU GOT MONEY TO BUY MORE.: PATIENT DECLINED

## 2022-09-08 SDOH — ECONOMIC STABILITY: FOOD INSECURITY: WITHIN THE PAST 12 MONTHS, THE FOOD YOU BOUGHT JUST DIDN'T LAST AND YOU DIDN'T HAVE MONEY TO GET MORE.: PATIENT DECLINED

## 2022-09-08 ASSESSMENT — ENCOUNTER SYMPTOMS
CHEST TIGHTNESS: 0
DIARRHEA: 0
CONSTIPATION: 0
ABDOMINAL PAIN: 0
NAUSEA: 0

## 2022-09-08 ASSESSMENT — SOCIAL DETERMINANTS OF HEALTH (SDOH): HOW HARD IS IT FOR YOU TO PAY FOR THE VERY BASICS LIKE FOOD, HOUSING, MEDICAL CARE, AND HEATING?: PATIENT DECLINED

## 2022-09-08 NOTE — PROGRESS NOTES
[address] [address]  [address] [place] [address]  Dept: [phone]  Dept Fax: [phone]  Loc: [phone]    Gabriel Trevino is a 73 y.o. female who presents today for her medical conditions/complaints as noted below. Gabriel Trevino is c/o of   Chief Complaint   Patient presents with    COPD     6 month       HPI:     HPI Here today for a follow up of her COPD and balance issues. COPD: stable; she has been doing well. She has not had too much trouble with shortness of breath. If she really active she tends to use her albuterol more. She does not cough much. She has been off of cigarettes for 3 years. No hemoptysis. She is still taking the trelegy    She has been losing her balance. She had one fall. She was on porch and she lost her balance. She did not feel dizzy at the time. She feels like she needs a cane. She has had some headaches but they resolve quickly with asa. She had an eye appointment recently and her script was updated but she can't afford new glasses right now.        Past Medical History:   Diagnosis Date    Chronic obstructive pulmonary disease ([address]     Coronary artery disease     Dyslipidemia     Obesity     Pneumonia           Social History     Tobacco Use    Smoking status: Former     Packs/day: 1.00     Years: 50.00     Pack years: 50.00     Types: Cigarettes     Start date: 1/1/1970     Quit date: 5/16/2019     Years since quitting: 3.3    Smokeless tobacco: Never   Substance Use Topics    Alcohol use: No     Current Outpatient Medications   Medication Sig Dispense Refill    albuterol sulfate HFA (PROVENTIL;VENTOLIN;PROAIR) 108 (90 Base) MCG/ACT inhaler INHALE 2 PUFFS INTO THE LUNGS EVERY 4 HOURS AS NEEDED FOR WHEEZING OR SHORTNESS OF BREATH 3 each 0    TRELEGY ELLIPTA 100-62.5-25 MCG/INH AEPB INHALE 1 PUFF INTO THE LUNGS DAILY 1 each 11    montelukast (SINGULAIR) 10 MG tablet TAKE 1 TABLET EVERY NIGHT 90 tablet 1    carvedilol (COREG) 3.125 MG tablet TAKE 1 TABLET TWICE DAILY WITH MEALS 180 tablet 1    Handicap Placard MISC by Does not apply route Expires: 7/26/2024  Dx: R23.89, G93.5 1 each 0    Misc. Devices (ADJUST BATH/SHOWER SEAT/BACK) MISC Use daily with showers 1 each 0    Misc. Devices (SUCTION GRAB BAR) MISC Use in the shower 2 each 0    loratadine (CLARITIN) 10 MG tablet Take 10 mg by mouth daily       No current facility-administered medications for this visit. Allergies   Allergen Reactions    Vicodin [Hydrocodone-Acetaminophen]        Subjective:     Review of Systems   Constitutional:  Negative for activity change, chills and fatigue. Eyes:  Negative for visual disturbance. Respiratory:  Negative for chest tightness. Cardiovascular:  Negative for palpitations and leg swelling. Gastrointestinal:  Negative for abdominal pain, constipation, diarrhea and nausea. Genitourinary:  Negative for difficulty urinating. Neurological:  Negative for dizziness, syncope, weakness and light-headedness. Psychiatric/Behavioral:  Negative for decreased concentration, dysphoric mood and sleep disturbance. The patient is not nervous/anxious. Objective:      Physical Exam  Vitals and nursing note reviewed. Constitutional:       General: She is not in acute distress. Appearance: She is well-developed. Eyes:      Conjunctiva/sclera: Conjunctivae normal.   Neck:      Thyroid: No thyromegaly. Cardiovascular:      Rate and Rhythm: Normal rate and regular rhythm. Heart sounds: Normal heart sounds. No murmur heard. Pulmonary:      Effort: Pulmonary effort is normal. No respiratory distress. Breath sounds: Normal breath sounds. No wheezing. Musculoskeletal:      Cervical back: Normal range of motion and neck supple. Lymphadenopathy:      Cervical: No cervical adenopathy. Skin:     General: Skin is warm and dry. Findings: No erythema or rash.    Neurological: Mental Status: She is alert and oriented to person, place, and time. Psychiatric:         Mood and Affect: Mood normal.         Behavior: Behavior normal.         Thought Content: Thought content normal.         Judgment: Judgment normal.     /78   Pulse 80   Temp 98.9 °F (37.2 °C)   Ht 5' 6\" (1.676 m)   Wt 295 lb (133.8 kg)   LMP 08/01/2000 (Approximate)   SpO2 91%   BMI 47.61 kg/m²     Assessment:       Diagnosis Orders   1. Chronic obstructive pulmonary disease, unspecified COPD type ([facility] [address]        2. Normal pressure hydrocephalus (HCC)        3. Flu vaccine need  Influenza, FLUAD, (age 72 y+), IM, PF, 0.5 mL                Plan:       COPD: stable; she has been doing pretty well overall. She is getting good relief from the trelgy. NPH: stable; she has been doing pretty well. She has been more off balance though and had a fall so she is planning to get cane soon. I advised her to come in asap if anyone in her family starts noticing increased confusion. Return in about 6 months (around 3/8/2023) for COPD follow up. Orders Placed This Encounter   Procedures    Influenza, FLUAD, (age 72 y+), IM, PF, 0.5 mL         Patientgiven educational materials - see patient instructions. Discussed use, benefit,and side effects of prescribed medications. All patient questions answered. Ptvoiced understanding. Reviewed health maintenance. Instructed to continue currentmedications, diet and exercise. Patient agreed with treatment plan. Follow up asdirected.      Electronically signed by Dav Macdonald MD on 9/8/2022 at 11:06 AM

## 2022-09-28 ENCOUNTER — IMMUNIZATION (OUTPATIENT)
Dept: LAB | Age: 72
End: 2022-09-28
Payer: MEDICARE

## 2022-09-28 PROCEDURE — PBSHW COVID-19, MODERNA BIVALENT BOOSTER, (AGE 18Y+), IM, 50 MCG/0.5 ML: Performed by: FAMILY MEDICINE

## 2022-09-28 PROCEDURE — 91313 COVID-19, MODERNA BIVALENT BOOSTER, (AGE 18Y+), IM, 50 MCG/0.5 ML: CPT | Performed by: FAMILY MEDICINE

## 2022-12-27 ENCOUNTER — TELEMEDICINE (OUTPATIENT)
Dept: FAMILY MEDICINE CLINIC | Age: 72
End: 2022-12-27
Payer: MEDICARE

## 2022-12-27 DIAGNOSIS — Z00.00 MEDICARE ANNUAL WELLNESS VISIT, SUBSEQUENT: Primary | ICD-10-CM

## 2022-12-27 PROCEDURE — 1123F ACP DISCUSS/DSCN MKR DOCD: CPT | Performed by: FAMILY MEDICINE

## 2022-12-27 PROCEDURE — 3017F COLORECTAL CA SCREEN DOC REV: CPT | Performed by: FAMILY MEDICINE

## 2022-12-27 PROCEDURE — G8484 FLU IMMUNIZE NO ADMIN: HCPCS | Performed by: FAMILY MEDICINE

## 2022-12-27 PROCEDURE — G0439 PPPS, SUBSEQ VISIT: HCPCS | Performed by: FAMILY MEDICINE

## 2022-12-27 ASSESSMENT — LIFESTYLE VARIABLES
HOW MANY STANDARD DRINKS CONTAINING ALCOHOL DO YOU HAVE ON A TYPICAL DAY: PATIENT DOES NOT DRINK
HOW OFTEN DO YOU HAVE A DRINK CONTAINING ALCOHOL: NEVER

## 2022-12-27 ASSESSMENT — PATIENT HEALTH QUESTIONNAIRE - PHQ9
SUM OF ALL RESPONSES TO PHQ QUESTIONS 1-9: 0
1. LITTLE INTEREST OR PLEASURE IN DOING THINGS: 0
SUM OF ALL RESPONSES TO PHQ QUESTIONS 1-9: 0
SUM OF ALL RESPONSES TO PHQ9 QUESTIONS 1 & 2: 0
7. TROUBLE CONCENTRATING ON THINGS, SUCH AS READING THE NEWSPAPER OR WATCHING TELEVISION: 0
10. IF YOU CHECKED OFF ANY PROBLEMS, HOW DIFFICULT HAVE THESE PROBLEMS MADE IT FOR YOU TO DO YOUR WORK, TAKE CARE OF THINGS AT HOME, OR GET ALONG WITH OTHER PEOPLE: 0
8. MOVING OR SPEAKING SO SLOWLY THAT OTHER PEOPLE COULD HAVE NOTICED. OR THE OPPOSITE, BEING SO FIGETY OR RESTLESS THAT YOU HAVE BEEN MOVING AROUND A LOT MORE THAN USUAL: 0
9. THOUGHTS THAT YOU WOULD BE BETTER OFF DEAD, OR OF HURTING YOURSELF: 0
2. FEELING DOWN, DEPRESSED OR HOPELESS: 0
5. POOR APPETITE OR OVEREATING: 0
6. FEELING BAD ABOUT YOURSELF - OR THAT YOU ARE A FAILURE OR HAVE LET YOURSELF OR YOUR FAMILY DOWN: 0
SUM OF ALL RESPONSES TO PHQ QUESTIONS 1-9: 0
3. TROUBLE FALLING OR STAYING ASLEEP: 0
SUM OF ALL RESPONSES TO PHQ QUESTIONS 1-9: 0
4. FEELING TIRED OR HAVING LITTLE ENERGY: 0

## 2022-12-27 NOTE — PATIENT INSTRUCTIONS
Personalized Preventive Plan for Sae Cook - 12/27/2022  Medicare offers a range of preventive health benefits. Some of the tests and screenings are paid in full while other may be subject to a deductible, co-insurance, and/or copay. Some of these benefits include a comprehensive review of your medical history including lifestyle, illnesses that may run in your family, and various assessments and screenings as appropriate. After reviewing your medical record and screening and assessments performed today your provider may have ordered immunizations, labs, imaging, and/or referrals for you. A list of these orders (if applicable) as well as your Preventive Care list are included within your After Visit Summary for your review. Other Preventive Recommendations:    A preventive eye exam performed by an eye specialist is recommended every 1-2 years to screen for glaucoma; cataracts, macular degeneration, and other eye disorders. A preventive dental visit is recommended every 6 months. Try to get at least 150 minutes of exercise per week or 10,000 steps per day on a pedometer . Order or download the FREE \"Exercise & Physical Activity: Your Everyday Guide\" from [employer]. Call 4-334.932.5801 or search [employer] online. You need 4700-6301 mg of calcium and 0873-7016 IU of vitamin D per day. It is possible to meet your calcium requirement with diet alone, but a vitamin D supplement is usually necessary to meet this goal.  When exposed to the sun, use a sunscreen that protects against both UVA and UVB radiation with an SPF of 30 or greater. Reapply every 2 to 3 hours or after sweating, drying off with a towel, or swimming. Always wear a seat belt when traveling in a car. Always wear a helmet when riding a bicycle or motorcycle. Heart-Healthy Diet   Sodium, Fat, and Cholesterol Controlled Diet       What Is a Heart Healthy Diet?    A heart-healthy diet is one that limits sodium , certain types of fat , and cholesterol . This type of diet is recommended for:   People with any form of cardiovascular disease (eg, coronary heart disease , peripheral vascular disease , previous heart attack , previous stroke )   People with risk factors for cardiovascular disease, such as high blood pressure , high cholesterol , or diabetes   Anyone who wants to lower their risk of developing cardiovascular disease   Sodium    Sodium is a mineral found in many foods. In general, most people consume much more sodium than they need. Diets high in sodium can increase blood pressure and lead to edema (water retention). On a heart-healthy diet, you should consume no more than 2,300 mg (milligrams) of sodium per dayabout the amount in one teaspoon of table salt. The foods highest in sodium include table salt (about 50% sodium), processed foods, convenience foods, and preserved foods. Cholesterol    Cholesterol is a fat-like, waxy substance in your blood. Our bodies make some cholesterol. It is also found in animal products, with the highest amounts in fatty meat, egg yolks, whole milk, cheese, shellfish, and organ meats. On a heart-healthy diet, you should limit your cholesterol intake to less than 200 mg per day. It is normal and important to have some cholesterol in your bloodstream. But too much cholesterol can cause plaque to build up within your arteries, which can eventually lead to a heart attack or stroke. The two types of cholesterol that are most commonly referred to are:   Low-density lipoprotein (LDL) cholesterol  Also known as bad cholesterol, this is the cholesterol that tends to build up along your arteries. Bad cholesterol levels are increased by eating fats that are saturated or hydrogenated. Optimal level of this cholesterol is less than 100. Over 130 starts to get risky for heart disease.    High-density lipoprotein (HDL) cholesterol  Also known as good cholesterol, this type of cholesterol actually carries cholesterol away from your arteries and may, therefore, help lower your risk of having a heart attack. You want this level to be high (ideally greater than 60). It is a risk to have a level less than 40. You can raise this good cholesterol by eating olive oil, canola oil, avocados, or nuts. Exercise raises this level, too. Fat    Fat is calorie dense and packs a lot of calories into a small amount of food. Even though fats should be limited due to their high calorie content, not all fats are bad. In fact, some fats are quite healthful. Fat can be broken down into four main types. The good-for-you fats are:   Monounsaturated fat  found in oils such as olive and canola, avocados, and nuts and natural nut butters; can decrease cholesterol levels, while keeping levels of HDL cholesterol high   Polyunsaturated fat  found in oils such as safflower, sunflower, soybean, corn, and sesame; can decrease total cholesterol and LDL cholesterol   Omega-3 fatty acids  particularly those found in fatty fish (such as salmon, trout, tuna, mackerel, herring, and sardines); can decrease risk of arrhythmias, decrease triglyceride levels, and slightly lower blood pressure   The fats that you want to limit are:   Saturated fat  found in animal products, many fast foods, and a few vegetables; increases total blood cholesterol, including LDL levels   Animal fats that are saturated include: butter, lard, whole-milk dairy products, meat fat, and poultry skin   Vegetable fats that are saturated include: hydrogenated shortening, palm oil, coconut oil, cocoa butter   Hydrogenated or trans fat  found in margarine and vegetable shortening, most shelf stable snack foods, and fried foods; increases LDL and decreases HDL     It is generally recommended that you limit your total fat for the day to less than 30% of your total calories.  If you follow an 1800-calorie heart healthy diet, for example, this would mean 60 grams of fat or less per day. Saturated fat and trans fat in your diet raises your blood cholesterol the most, much more than dietary cholesterol does. For this reason, on a heart-healthy diet, less than 7% of your calories should come from saturated fat and ideally 0% from trans fat. On an 1800-calorie diet, this translates into less than 14 grams of saturated fat per day, leaving 46 grams of fat to come from mono- and polyunsaturated fats.    Food Choices on a Heart Healthy Diet   Food Category   Foods Recommended   Foods to Avoid   Grains   Breads and rolls without salted tops Most dry and cooked cereals Unsalted crackers and breadsticks Low-sodium or homemade breadcrumbs or stuffing All rice and pastas   Breads, rolls, and crackers with salted tops High-fat baked goods (eg, muffins, donuts, pastries) Quick breads, self-rising flour, and biscuit mixes Regular bread crumbs Instant hot cereals Commercially prepared rice, pasta, or stuffing mixes   Vegetables   Most fresh, frozen, and low-sodium canned vegetables Low-sodium and salt-free vegetable juices Canned vegetables if unsalted or rinsed   Regular canned vegetables and juices, including sauerkraut and pickled vegetables Frozen vegetables with sauces Commercially prepared potato and vegetable mixes   Fruits   Most fresh, frozen, and canned fruits All fruit juices   Fruits processed with salt or sodium   Milk   Nonfat or low-fat (1%) milk Nonfat or low-fat yogurt Cottage cheese, low-fat ricotta, cheeses labeled as low-fat and low-sodium   Whole milk Reduced-fat (2%) milk Malted and chocolate milk Full fat yogurt Most cheeses (unless low-fat and low salt) Buttermilk (no more than 1 cup per week)   Meats and Beans   Lean cuts of fresh or frozen beef, veal, lamb, or pork (look for the word loin) Fresh or frozen poultry without the skin Fresh or frozen fish and some shellfish Egg whites and egg substitutes (Limit whole eggs to three per week) Tofu Nuts or seeds (unsalted, dry-roasted), low-sodium peanut butter Dried peas, beans, and lentils   Any smoked, cured, salted, or canned meat, fish, or poultry (including holguin, chipped beef, cold cuts, hot dogs, sausages, sardines, and anchovies) Poultry skins Breaded and/or fried fish or meats Canned peas, beans, and lentils Salted nuts   Fats and Oils   Olive oil and canola oil Low-sodium, low-fat salad dressings and mayonnaise   Butter, margarine, coconut and palm oils, holguin fat   Snacks, Sweets, and Condiments   Low-sodium or unsalted versions of broths, soups, soy sauce, and condiments Pepper, herbs, and spices; vinegar, lemon, or lime juice Low-fat frozen desserts (yogurt, sherbet, fruit bars) Sugar, cocoa powder, honey, syrup, jam, and preserves Low-fat, trans-fat free cookies, cakes, and pies Ace and animal crackers, fig bars, gely snaps   High-fat desserts Broth, soups, gravies, and sauces, made from instant mixes or other high-sodium ingredients Salted snack foods Canned olives Meat tenderizers, seasoning salt, and most flavored vinegars   Beverages   Low-sodium carbonated beverages Tea and coffee in moderation Soy milk   Commercially softened water   Suggestions   Make whole grains, fruits, and vegetables the base of your diet. Choose heart-healthy fats such as canola, olive, and flaxseed oil, and foods high in heart-healthy fats, such as nuts, seeds, soybeans, tofu, and fish. Eat fish at least twice per week; the fish highest in omega-3 fatty acids and lowest in mercury include salmon, herring, mackerel, sardines, and canned chunk light tuna. If you eat fish less than twice per week or have high triglycerides, talk to your doctor about taking fish oil supplements. Read food labels. For products low in fat and cholesterol, look for fat free, low-fat, cholesterol free, saturated fat free, and trans fat freeAlso scan the Nutrition Facts Label, which lists saturated fat, trans fat, and cholesterol amounts. For products low in sodium, look for sodium free, very low sodium, low sodium, no added salt, and unsalted   Skip the salt when cooking or at the table; if food needs more flavor, get creative and try out different herbs and spices. Garlic and onion also add substantial flavor to foods. Trim any visible fat off meat and poultry before cooking, and drain the fat off after thrasher. Use cooking methods that require little or no added fat, such as grilling, boiling, baking, poaching, broiling, roasting, steaming, stir-frying, and sauting. Avoid fast food and convenience food. They tend to be high in saturated and trans fat and have a lot of added salt. Talk to a registered dietitian for individualized diet advice. Last Reviewed: March 2011 Benja Jackson MS, MPH, RD   Updated: 3/29/2011     Patient information: Weight loss treatments    INTRODUCTION -- Obesity is a major international problem, and Americans are among the heaviest people in the world. The percentage of obese people in the [place] has risen steadily. Many people find that although they initially lose weight by dieting, they quickly regain the weight after the diet ends. Because it so hard to keep weight off over time, it is important to have as much information and support as possible before starting a diet. You are most likely to be successful in losing weight and keeping it off when you believe that your body weight can be controlled. STARTING A WEIGHT LOSS PROGRAM -- Some people like to talk to their doctor or nurse to get help choosing the best plan, monitoring progress, and getting advice and support along the way. To know what treatment (or combination of treatments) will work best, determine your body mass index (BMI) and waist circumference (measurement). The BMI is calculated from your height and weight.   A person with a BMI between 25 and 29.9 is considered overweight   A person with a BMI of 30 or greater is considered to be obese  A waist circumference greater than 35 inches (88 cm) in women and 40 inches (102 cm) in men increases the risk of obesity-related complications, such as heart disease and diabetes. People who are obese and who have a larger waist size may need more aggressive weight loss treatment than others. Talk to your doctor or nurse for advice. Types of treatment -- Based on your measurements and your medical history, your doctor or nurse can determine what combination of weight loss treatments would work best for you. Treatments may include changes in lifestyle, exercise, dieting, and, in some cases, weight loss medicines or weight loss surgery. Weight loss surgery, also called bariatric surgery, is reserved for people with severe obesity who have not responded to other weight loss treatments. SETTING A WEIGHT LOSS GOAL -- It is important to set a realistic weight loss goal. Your first goal should be to avoid gaining more weight and staying at your current weight (or within 5 percent). Many people have a \"dream\" weight that is difficult or impossible to achieve. People at high risk of developing diabetes who are able to lose 5 percent of their body weight and maintain this weight will reduce their risk of developing diabetes by about 50 percent and reduce their blood pressure. This is a success. Losing more than 15 percent of your body weight and staying at this weight is an extremely good result, even if you never reach your \"dream\" or \"ideal\" weight. LIFESTYLE CHANGES -- Programs that help you to change your lifestyle are usually run by psychologists or other professionals. The goals of lifestyle changes are to help you change your eating habits, become more active, and be more aware of how much you eat and exercise, helping you to make healthier choices. This type of treatment can be broken down into three steps:   The triggers that make you want to eat   Eating   What happens after you eat  Triggers to eat -- Determining what triggers you to eat involves figuring out what foods you eat and where and when you eat. To figure out what triggers you to eat, keep a record for a few days of everything you eat, the places where you eat, how often you eat, and the emotions you were feeling when you ate. For some people, the trigger is related to a certain time of day or night. For others, the trigger is related to a certain place, like sitting at a desk working. Eating -- You can change your eating habits by breaking the chain of events between the trigger for eating and eating itself. There are many ways to do this. For instance, you can:  Limit where you eat to a few places (eg, dining room)   Restrict the number of utensils (eg, only a fork) used for eating   Drink a sip of water between each bite   Chew your food a certain number of times   Get up and stop eating every few minutes  What happens after you eat -- Rewarding yourself for good eating behaviors can help you to develop better habits. This is not a reward for weight loss; instead, it is a reward for changing unhealthy behaviors. Do not use food as a reward. Some people find money, clothing, or personal care (eg, a hair cut, manicure, or massage) to be effective rewards. Treat yourself immediately after making better eating choices to reinforce the value of the good behavior. You need to have clear behavior goals, and you must have a time frame for reaching your goals. Reward small changes along the way to your final goal.  Other factors that contribute to successful weight loss -- Changing your behavior involves more than just changing unhealthy eating habits; it also involves finding people around you to support your weight loss, reducing stress, and learning to be strong when tempted by food. Establish a \"ryan\" system -- Having a friend or family member available to provide support and reinforce good behavior is very helpful.  The support person needs to understand your goals. Learn to be strong -- Learning to be strong when tempted by food is an important part of losing weight. As an example, you will need to learn how to say \"no\" and continue to say no when urged to eat at parties and social gatherings. Develop strategies for events before you go, such as eating before you go or taking low-calorie snacks and drinks with you. Develop a support system -- Having a support system is helpful when losing weight. This is why many [employer] groups are successful. Family support is also essential; if your family does not support your efforts to lose weight, this can slow your progress or even keep you from losing weight. Positive thinking -- People often have conversations with themselves in their head; these conversations can be positive or negative. If you eat a piece of cake that was not planned, you may respond by thinking, \"Oh, you stupid idiot, you've blown your diet! \" and as a result, you may eat more cake. A positive thought for the same event could be, \"Well, I ate cake when it was not on my plan. Now I should do something to get back on track. \" A positive approach is much more likely to be successful than a negative one. Reduce stress -- Although stress is a part of everyday life, it can trigger uncontrolled eating in some people. It is important to find a way to get through these difficult times without eating or by eating low-calorie food, like raw vegetables. It may be helpful to imagine a relaxing place that allows you to temporarily escape from stress. With deep breaths and closed eyes, you can imagine this relaxing place for a few minutes. Self-help programs -- Self-help programs like [employer]chers®, Overeaters Anonymous®, and Take Off [place] (TOPS)© work for some people.  As with all weight loss programs, you are most likely to be successful with these plans if you make long-term changes in how you eat.  CHOOSING A DIET -- A calorie is a unit of energy found in food. Your body needs calories to function. The goal of any diet is to burn up more calories than you eat. How quickly you lose weight depends upon several factors, such as your age, gender, and starting weight. Older people have a slower metabolism than young people, so they lose weight more slowly. Men lose more weight than women of similar height and weight when dieting because they use more energy. People who are extremely overweight lose weight more quickly than those who are only mildly overweight. Try not to drink alcohol or drinks with added sugar, and most sweets (candy, cakes, cookies), since they rarely contain important nutrients. Portion-controlled diets -- One simple way to diet is to buy packaged foods, like frozen low-calorie meals or meal-replacement canned drinks. A typical meal plan for one day may include:  A meal-replacement drink or breakfast bar for breakfast   A meal-replacement drink or a frozen low-calorie (250 to 350 calories) meal for lunch   A frozen low-calorie meal or other prepackaged, calorie-controlled meal, along with extra vegetables for dinner  This would give you 1000 to 1500 calories per day. Low-fat diet -- To reduce the amount of fat in your diet, you can:  Eat low-fat foods. Low-fat foods are those that contain less than 30 percent of calories from fat. Fat is listed on the food facts label (figure 1). Count fat grams. For a 1500 calorie diet, this would mean about 45 g or fewer of fat per day. Low-carbohydrate diet -- Low- and very-low-carbohydrate diets (eg, Atkins diet, [employer]) have become popular ways to lose weight quickly.   With a very-low-carbohydrate diet, you eat between 0 and 60 grams of carbohydrates per day (a standard diet contains 200 to 300 grams of carbohydrates)   With a low-carbohydrate diet, you eat between 60 and 130 grams of carbohydrates per day  Carbohydrates are found in fruits, vegetables, and grains (including breads, rice, pasta, and cereal), alcoholic beverages, and in dairy products. Meat and fish do not contain carbohydrates. Side effects of very-low-carbohydrate diets can include constipation, headache, bad breath, muscle cramps, diarrhea, and weakness. Mediterranean diet -- The term \"Mediterranean diet\" refers to a way of eating that is common in [place] regions around the [facility]. Although there is some variation in Mediterranean diets, there are some similarities. Most Mediterranean diets include:  A high level of monounsaturated fats (from olive or canola oil, walnuts, pecans, almonds) and a low level of saturated fats (from butter)   A high amount of vegetables, fruits, legumes, and grains (7 to 10 servings of fruits and vegetables per day)   A moderate amount of milk and dairy products, mostly in the form of cheese. Use low-fat dairy products (skim milk, fat-free yogurt, low-fat cheese). A relatively low amount of red meat and meat products. Substitute fish or poultry for red meat. For those who drink alcohol, a modest amount (mainly as red wine) may help to protect against cardiovascular disease. A modest amount is up to one (4 ounce) glass per day for women and up to two glasses per day for men. Which diet is best? -- Studies have compared different diets, including:  Very-low-carbohydrate (Atkins)   Macronutrient balance controlling glycemic load (Zone®)   Reduced-calorie (Weight Watchers®)   Very-low-fat ([place])  No one diet is \"best\" for weight loss. Any diet will help you to lose weight if you stick with the diet. Therefore, it is important to choose a diet that includes foods you like. Fad diets -- Fad diets often promise quick weight loss (more than 1 to 2 pounds per week) and may claim that you do not need to exercise or give up favorite foods. Some fad diets cost a lot of money, because you have to pay for seminars or pills. Fad diets generally lack any scientific evidence that they are safe and effective, but instead rely on \"before\" and \"after\" photos or testimonials. Diets that sound too good to be true usually are. These plans are a waste of time and money and are not recommended. A doctor, nurse, or nutritionist can help you find a safe and effective way to lose weight and keep it off. WEIGHT LOSS MEDICINES -- Taking a weight loss medicine may be helpful when used in combination with diet, exercise, and lifestyle changes. However, it is important to understand the risks and benefits of these medicines. It is also important to be realistic about your goal weight using a weight loss medicine; you may not reach your \"dream\" weight, but you may be able to reduce your risk of diabetes or heart disease. Weight loss medicines may be recommended for people who have not been able to lose weight with diet and exercise who have a:  BMI of 30 or more    BMI between 27 and 29.9 and have other medical problems, such as diabetes, high cholesterol, or high blood pressure  Two weight loss medicines are approved in the [place] for long-term use. These are sibutramine and orlistat. Other weight loss medicines (phentermine, diethylpropion) are available but are only approved for short-term use (up to 12 weeks). Sibutramine -- Sibutramine (Meridia®, Reductil®) is a medicine that reduces your appetite. In people who take the medicine for one year, the average weight loss is 10 percent of the initial body weight (5 percent more than those who took a placebo treatment). Side effects of sibutramine include insomnia, dry mouth, and constipation. Increases in blood pressure can occur. Therefore, blood pressure is usually monitored during treatment. There is no evidence that sibutramine causes heart or lung problems (like dexfenfluramine and fenfluramine (Phen/Fen)).  However, experts agree that sibutramine should not used by people with coronary heart disease, heart failure, uncontrolled hypertension, stroke, irregular heart rhythms, or peripheral vascular disease (poor circulation in the legs). Orlistat -- Orlistat (Xenical® 120 mg capsules) is a medicine that reduces the amount of fat your body absorbs from the foods you eat. A lower-dose version is now available without a prescription (Zeke® 60 mg capsules) in many countries, including the [place]. The medicine is recommended three times per day, taken with a meal; you can skip a dose if you skip a meal or if the meal contains no fat. After one year of treatment with orlistat, the average weight loss is approximately 8 to 10 percent of initial body weight (4 percent more than in those who took a placebo). Cholesterol levels often improve, and blood pressure sometimes falls. In people with diabetes, orlistat may help control blood sugar levels. Side effects occur in 15 to 10 percent of people and may include stomach cramps, gas, diarrhea, leakage of stool, or oily stools. These problems are more likely when you take orlistat with a high-fat meal (if more than 30 percent of calories in the meal are from fat). Side effects usually improve as you learn to avoid high-fat foods. Severe liver injury has been reported rarely in patients taking orlistat, but it is not known if orlistat caused the liver problems. Diet supplements -- Diet supplements are widely used by people who are trying to lose weight, although the safety and efficacy of these supplements are often unproven. A few of the more common diet supplements are discussed below; none of these are recommended because they have not been studied carefully, and there is no proof they are safe or effective. Chitosan and wheat dextrin are ineffective for weight loss, and their use is not recommended. Ephedra, a compound related to ephedrine, is no longer available in the [place] due to safety concerns.  Many nonprescription diet pills previously contained ephedra. Although some studies have shown that ephedra helps with weight loss, there can be serious side effects (psychiatric symptoms, palpitations, and stomach upset), including death. There are not enough data about the safety and efficacy of chromium, ginseng, glucomannan, green tea, hydroxycitric acid, L carnitine, psyllium, pyruvate supplements, [place] wort, and conjugated linoleic acid. Two supplements from [facility], [address] [place] (also known as the [place] 15 pill) and Herbathin dietary supplement, have been shown to contain prescription drugs. Hoodia gordonii is a dietary supplement derived from a plant in [place]. It is not recommended because there is no proof that it is safe or effective. Bitter orange (Citrus aurantium) can increase your heart rate and blood pressure and is not recommended. SHOULD I HAVE SURGERY TO LOSE WEIGHT? -- Weight loss surgery is recommended ONLY for people with one of the following:  Severe obesity (body mass index above 40) (calculator 1 and calculator 2) who have not responded to diet, exercise, or weight loss medicines   Body mass index between 35 and 40, along with a serious medical problem (including diabetes, severe joint pain, or sleep apnea) that would improve with weight loss  You should be sure that you understand the potential risks and benefits of weight loss surgery. You must be motivated and willing to make lifelong changes in how you eat to reach and maintain a healthier weight after surgery. You must also be realistic about weight loss after surgery (see 'Effectiveness of weight loss surgery' below). PREPARING FOR WEIGHT LOSS SURGERY -- Most people who have weight loss surgery will meet with several specialists before surgery is scheduled. This often includes a dietitian, mental health counselor, a doctor who specializes in care of obese people, and a surgeon who performs weight loss surgery (bariatric surgeon).  You may need to work with these providers for several weeks or months before surgery. The nutritionist will explain what and how much you will be able to eat after surgery. You may also need to lose a small amount of weight before surgery. The mental health specialist will help you to cope with stress and other factors that can make it harder to lose weight or trigger you to eat   The medical doctor will determine whether you need other tests, counseling, or treatment before surgery. He or she might also help you begin a medical weight loss program so that you can lose some weight before surgery. The bariatric surgeon will meet with you to discuss the surgeries available to treat obesity. He or she will also make sure you are a good candidate for surgery. TYPES OF WEIGHT LOSS SURGERY -- There are several types of weight loss surgeries, the most common being lap banding, gastric bypass, and gastric sleeve. Lap banding -- Laparoscopic adjustable gastric banding (LAGB), or lap banding, is a surgery that uses an adjustable band around the opening to the stomach (figure 1). This reduces the amount of food that you can eat at one time. Lap banding is done through small incisions, with a laparoscope. The band can be adjusted after surgery, allowing you to eat more or less food. Adjustments to the size and tightness of the band are made by using a needle to add or remove fluid from a port (a small container under the skin that is connected to the band). Adding fluid to the band makes it tighter which restricts the amount of food you can eat and may help you to lose more weight. Lap banding is a popular choice because it is relatively simple to perform, can be adjusted or removed, and has a low risk of serious complications immediately after surgery. However, weight loss with the lap band depends on your ability to follow the program closely.   You will need to prepare nutritious meals that \"work with\" the band, not against it. For example, the lap band will not work well if you eat or drink a large amount of liquid calories (like ice cream). The band will not help you to feel full when you eat/drink liquid calories. Weight loss ranges from 45 to 75 percent after two years. As an example, a person who is 120 pounds overweight could expect to lose approximately 54 to 90 pounds in the two years after lap banding. Gastric bypass -- Damien-en-Y gastric bypass, also called gastric bypass, helps you to lose weight by reducing the amount of food you can eat and reducing the number of calories and nutrients you absorb from the food you eat. To perform gastric bypass, a surgeon creates a small stomach pouch by dividing the stomach and attaching it to the small intestine. This helps you to lose weight in two ways: The smaller stomach can hold less food than before surgery. This causes you to feel full after eating a very small amount of food or liquid. Over time, the pouch might stretch, allowing you to eat more food. The body absorbs fewer calories, since food bypasses most of the stomach as well as the upper small intestine. This new arrangement seems to decrease your appetite and change how you break down foods by changing the release of various hormones. Gastric bypass can be performed as open surgery (through an incision on the abdomen) or laparoscopically, which uses smaller incisions and smaller instruments. Both the laparoscopic and open techniques have risks and benefits. You and your surgeon should work together to decide which surgery, if any, is right for you. Gastric bypass has a high success rate, and people lose an average of 62 to 68 percent of their excess body weight in the first year. Weight loss typically levels off after one to two years, with an overall excess weight loss between 50 and 75 percent. For a person who is 120 pounds overweight, an average of 60 to 90 pounds of weight loss would be expected.   Gastric sleeve -- Gastric sleeve, also known as sleeve gastrectomy, is a surgery that reduces the size of the stomach and makes it into a narrow tube (figure 3). The new stomach is much smaller and produces less of the hormone (ghrelin) that causes hunger, helping you feel satisfied with less food. Sleeve gastrectomy is safer than gastric bypass because the intestines are not rearranged, and there is less chance of malnutrition. It also appears to control hunger better than lap banding. It might be safer than the lap banding because no foreign materials are used. The gastric sleeve has a good success rate, and people lose an average of 33 percent of their excess body weight in the first year. For a person who is 120 pounds overweight, this would mean losing about 40 pounds in the first year. WEIGHT LOSS SURGERY COMPLICATIONS -- A variety of complications can occur with weight loss surgery. The risks of surgery depend upon which surgery you have and any medical problems you had before surgery. Some of the more common early surgical complications (one to six weeks after surgery) include:  Bleeding   Infection   Blockage or tear in the bowels   Need for further surgery  Important medical complications after surgery can include blood clots in the legs or lungs, heart attack, pneumonia, and urinary tract infection. Complications are less likely when surgery is performed in centers that are experienced in weight loss surgery. In general, centers with experience in weight loss surgery have:  Board-certified doctors and surgeons   A team of support staff (dietitians, counselors, nurses)   Long-term follow-up after surgery   Hospital staff experienced with the care of weight loss patients. This includes nurses who are trained in the care of patients immediately after surgery and anesthesiologists who are experienced in caring for the morbidly obese.   EFFECTIVENESS OF WEIGHT LOSS SURGERY -- The goal of weight loss surgery is to reduce the risk of illness or death associated with obesity. Weight loss surgery can also help you to feel and look better, reduce the amount of money you spend on medicines, and cut down on sick days. As an example, weight loss surgery can improve health problems related to obesity (diabetes, high blood pressure, high cholesterol, sleep apnea) to the point that you need less or no medicine. Finally, weight loss surgery might reduce your risk of developing heart disease, cancer, and certain infections. AFTER WEIGHT LOSS SURGERY -- You will need to stay in the hospital until your team feels that it is safe for you to leave (on average, one to three days). Do not drive if you are taking prescription pain medicine. Begin exercising as soon as possible once you have healed; most weight loss centers will design an exercise program for you. Once you are home, it is important to eat and drink exactly what your doctor and dietitian recommend. You will see your doctor, nurse, and dietitian on a regular basis after surgery to monitor your health, diet, and weight loss. You will be able to slowly increase how much you eat over time, although it will always be important to:  Eat small, frequent meals and not skip meals   Chew your food slowly and completely   Avoid eating while \"distracted\" (such as eating while watching TV)   Stop eating when you feel full   Drink liquids at least 30 minutes before or after eating   Avoid foods high in fat or sugar   Take vitamin supplements, as recommended  It can take several months to learn to listen to your body so that you know when you are hungry and when you are full. You may dislike foods you previously loved, and you may begin to prefer new foods. This can be a frustrating process for some people, so talk to your dietitian if you are having trouble. It usually takes between one and two years to lose weight after surgery.  After reaching their goal weight, some people have plastic surgery (called \"body contouring\") to remove excess skin from the body, particularly in the abdominal area. Before you decide to have weight loss surgery, you must commit to staying healthy for life. This includes following up with your healthcare team, exercising most days of the week, and eating a sensible diet every day. It can be difficult to develop new eating and exercise habits after weight loss surgery, and you will have to work hard to stick to your goals. Recovering from surgery and losing weight can be stressful and emotional, and it is important to have the support of family and friends. Working with a , therapist, or support group can help you through the ups and downs. WHERE TO GET MORE INFORMATION -- Your healthcare provider is the best source of information for questions and concerns related to your medical problem. This article will be updated as needed every four months on our Web site ([email or web address])    [address] [facility] [address] [facility]       As we get older, changes in balance, gait, strength, vision, hearing, and cognition make even the most youthful senior more prone to accidents. Falls are one of the leading health risks for older people. This increased risk of falling is related to:   Aging process (eg, decreased muscle strength, slowed reflexes)   Higher incidence of chronic health problems (eg, arthritis, diabetes) that may limit mobility, agility or sensory awareness   Side effects of medicine (eg, dizziness, blurred vision)especially medicines like prescription pain medicines and drugs used to treat mental health conditions   Depending on the brittleness of your bones, the consequences of a fall can be serious and long lasting. Home Life   Research by the Association of Aging [facility]) shows that some home accidents among older adults can be prevented by making simple lifestyle changes and basic modifications and repairs to the home environment.  Here are some lifestyle changes that experts recommend:   Have your hearing and vision checked regularly. Be sure to wear prescription glasses that are right for you. Speak to your doctor or pharmacist about the possible side effects of your medicines. A number of medicines can cause dizziness. If you have problems with sleep, talk to your doctor. Limit your intake of alcohol. If necessary, use a cane or walker to help maintain your balance. Wear supportive, rubber-soled shoes, even at home. If you live in a region that gets wintry weather, you may want to put special cleats on your shoes to prevent you from slipping on the snow and ice. Exercise regularly to help maintain muscle tone, agility, and balance. Always hold the banister when going up or down stairs. Also, use  bars when getting in or out of the bath or shower, or using the toilet. To avoid dizziness, get up slowly from a lying down position. Sit up first, dangling your legs for a minute or two before rising to a standing position. Overall Home Safety Check   According to the Consumer Product Safety Commision's \"Older Consumer Home Safety Checklist,\" it is important to check for potential hazards in each room. And remember, proper lighting is an essential factor in home safety. If you cannot see clearly, you are more likely to fall. Important questions to ask yourself include:   Are lamp, electric, extension, and telephone cords placed out of the flow of traffic and maintained in good condition? Have frayed cords been replaced? Are all small rugs and runners slip resistant? If not, you can secure them to the floor with a special double-sided carpet tape. Are smoke detectors properly locatedone on every floor of your home and one outside of every sleeping area? Are they in good working order? Are batteries replaced at least once a year? Do you have a well-maintained carbon monoxide detector outside every sleeping are in your home?    Does your furniture layout leave plenty of space to maneuver between and around chairs, tables, beds, and sofas? Are hallways, stairs and passages between rooms well lit? Can you reach a lamp without getting out of bed? Are floor surfaces well maintained? Shag rugs, high-pile carpeting, tile floors, and polished wood floors can be particularly slippery. Stairs should always have handrails and be carpeted or fitted with a non-skid tread. Is your telephone easily reachable. Is the cord safely tucked away? Room by Room   According to the Association of Aging, bathrooms and mikala are the two most potentially hazardous rooms in your home. In the Kitchen    Be sure your stove is in proper working order and always make sure burners and the oven are off before you go out or go to sleep. Keep pots on the back burners, turn handles away from the front of the stove, and keep stove clean and free of grease build-up. Kitchen ventilation systems and range exhausts should be working properly. Keep flammable objects such as towels and pot holders away from the cooking area except when in use. Make sure kitchen curtains are tied back. Move cords and appliances away from the sink and hot surfaces. If extension cords are needed, install wiring guides so they do not hang over the sink, range, or working areas. Look for coffee pots, kettles and toaster ovens with automatic shut-offs. Keep a mop handy in the kitchen so you can wipe up spills instantly. You should also have a small fire extinguisher. Arrange your kitchen with frequently used items on lower shelves to avoid the need to stand on a stepstool to reach them. Make sure countertops are well-lit to avoid injuries while cutting and preparing food. In the Bathroom    Use a non-slip mat or decals in the tub and shower, since wet, soapy tile or porcelain surfaces are extremely slippery.     Make sure bathroom rugs are non-skid or tape them firmly to the floor. Bathtubs should have at least one, preferably two, grab bars, firmly attached to structural supports in the wall. (Do not use built-in soap holders or glass shower doors as grab bars.)    Tub seats fitted with non-slip material on the legs allow you to wash sitting down. For people with limited mobility, bathtub transfer benches allow you to slide safely into the tub. Raised toilet seats and toilet safety rails are helpful for those with knee or hip problems. In the [facility]    Make sure you use a nightlight and that the area around your bed is clear of potential obstacles. Be careful with electric blankets and never go to sleep with a heating pad, which can cause serious burns even if on a low setting. Use fire-resistant mattress covers and pillows, and NEVER smoke in bed. Keep a phone next to the bed that is programmed to dial 911 at the push of a button. If you have a chronic condition, you may want to sign on with an automatic call-in service. Typically the system includes a small pendant that connects directly to an emergency medical voice-response system. You should also make arrangements to stay in contact with someonefriend, neighbor, family memberon a regular schedule. Fire Prevention   According to the [employer]. (Smoke Alarms for Every) [address] [place] [address], senior citizens are one of the two highest risk groups for death and serious injuries due to residential fires. When cooking, wear short-sleeved items, never a bulky long-sleeved robe. The [facility]'s Safety Checklist for Older Consumers emphasizes the importance of checking basements, garages, workshops and storage areas for fire hazards, such as volatile liquids, piles of old rags or clothing and overloaded circuits. Never smoke in bed or when lying down on a couch or recliner chair. Small portable electric or kerosene heaters are responsible for many home fires and should be used cautiously if at all.  If you do use one, be sure to keep them away from flammable materials. In case of fire, make sure you have a pre-established emergency exit plan. Have a professional check your fireplace and other fuel-burning appliances yearly. [employer]   Baby boomers entering the palomino years will continue to see the development of new products to help older adults live safely and independently in spite of age-related changes. Making Life More Livable  , by Stephanie Oonywilli, lists over 1,000 products for \"living well in the mature years,\" such as bathing and mobility aids, household security devices, ergonomically designed knives and peelers, and faucet valves and knobs for temperature control. Medical supply stores and organizations are good sources of information about products that improve your quality of life and insure your safety.      Last Reviewed: November 2009 Cristiano Ospina MD   Updated: 3/7/2011

## 2022-12-27 NOTE — PROGRESS NOTES
Medicare Annual Wellness Visit    Tesha Deluca is here for Medicare AWV (Subsequent; last 12/3/2021)    Assessment & Plan    Recommendations for Preventive Services Due: see orders and patient instructions/AVS.  Recommended screening schedule for the next 5-10 years is provided to the patient in written form: see Patient Instructions/AVS.     No follow-ups on file. Subjective       Patient's complete Health Risk Assessment and screening values have been reviewed and are found in Flowsheets. The following problems were reviewed today and where indicated follow up appointments were made and/or referrals ordered. Positive Risk Factor Screenings with Interventions:    Fall Risk:  Do you feel unsteady or are you worried about falling? : (!) yes (feels like she is more unsteady than before)  2 or more falls in past year?: (!) yes  Fall with injury in past year?: no     Interventions:    See AVS for additional education material              Weight and Activity:  Physical Activity: Inactive    Days of Exercise per Week: 0 days    Minutes of Exercise per Session: 0 min     On average, how many days per week do you engage in moderate to strenuous exercise (like a brisk walk)?: 0 days  Have you lost any weight without trying in the past 3 months?: No       Inactivity Interventions:  See AVS for additional education material  Obesity Interventions:  See AVS for additional education material          Dentist Screen:  Have you seen the dentist within the past year?: (!) No    Intervention:  Advised to schedule with their dentist      Safety:  Do you have working smoke detectors?: (!) No  Interventions:  See AVS for additional education material                     Objective      Patient-Reported Vitals  Patient-Reported Weight: 290lb  Patient-Reported Height: 5'6.5\"            Allergies   Allergen Reactions    Vicodin [Hydrocodone-Acetaminophen]      Prior to Visit Medications    Medication Sig Taking?  Authorizing Provider   albuterol sulfate HFA (PROVENTIL;VENTOLIN;PROAIR) 108 (90 Base) MCG/ACT inhaler INHALE 2 PUFFS INTO THE LUNGS EVERY 4 HOURS AS NEEDED FOR WHEEZING OR SHORTNESS OF BREATH Yes JOHNATHAN Joyce CNP   TRELEGY ELLIPTA 100-62.5-25 MCG/INH AEPB INHALE 1 PUFF INTO THE LUNGS DAILY Yes JOHNATHAN Joyce CNP   montelukast (SINGULAIR) 10 MG tablet TAKE 1 TABLET EVERY NIGHT Yes Onelia Jara MD   carvedilol (COREG) 3.125 MG tablet TAKE 1 TABLET TWICE DAILY WITH MEALS Yes Onelia Jara MD   Carlo Mayers MISC by Does not apply route Expires: 7/26/2024  Dx: R23.89, G93.5 Yes Shana JOHNATHAN CNP   Misc. Devices (ADJUST BATH/SHOWER SEAT/BACK) MISC Use daily with showers Yes Onelia Jara MD   Misc. Devices (SUCTION GRAB BAR) MISC Use in the shower Yes Onelia Jara MD   loratadine (CLARITIN) 10 MG tablet Take 10 mg by mouth daily Yes Historical Provider, MD Marina (Including outside providers/suppliers regularly involved in providing care):   Patient Care Team:  Onelia Jara MD as PCP - General (Family Medicine)  Onelia Jara MD [facility] PCP [facility]  Kaylan Armas MD as Consulting Physician (Neurosurgery)  Mckenzie Blackwood MD (Ophthalmology)  Brian Lee (Optometry)  Antonino Stack MD as Consulting Physician (Neurology)  Kd Maynard DO as Consulting Physician (Pulmonary Disease)     Reviewed and updated this visit:  Tobacco  Allergies  Meds  Med Hx  Surg Hx  Soc Hx  Fam Hx      This encounter was performed under myOnelia MDs, direct supervision, 12/27/2022. Kd Gallagher, was evaluated through a synchronous (real-time) audio encounter. The patient (or guardian if applicable) is aware that this is a billable service, which includes applicable co-pays. This Virtual Visit was conducted with patient's (and/or legal guardian's) consent.  The visit was conducted pursuant to the emergency declaration under the [address] and the [employer] Act, [facility] waiver authority and the [employer] and [employer] Act. Patient identification was verified, and a caregiver was present when appropriate. The patient was located at Home: [address] [address]. Provider was located at [facility] ([address] [place] [address]): [address] [place] [address],  [phone].

## 2023-01-09 DIAGNOSIS — I25.10 CORONARY ARTERY DISEASE INVOLVING NATIVE CORONARY ARTERY OF NATIVE HEART WITHOUT ANGINA PECTORIS: ICD-10-CM

## 2023-01-09 RX ORDER — CARVEDILOL 3.12 MG/1
TABLET ORAL
Qty: 180 TABLET | Refills: 0 | Status: SHIPPED | OUTPATIENT
Start: 2023-01-09

## 2023-01-09 RX ORDER — MONTELUKAST SODIUM 10 MG/1
TABLET ORAL
Qty: 90 TABLET | Refills: 0 | Status: SHIPPED | OUTPATIENT
Start: 2023-01-09

## 2023-01-09 NOTE — TELEPHONE ENCOUNTER
Daryl Pepe called requesting a refill of the below medication which has been pended for you:     Requested Prescriptions     Pending Prescriptions Disp Refills    carvedilol (COREG) 3.125 MG tablet [Pharmacy Med Name: CARVEDILOL 3.125 MG Tablet] 180 tablet 0     Sig: TAKE 1 TABLET TWICE DAILY WITH MEALS    montelukast (SINGULAIR) 10 MG tablet [Pharmacy Med Name: MONTELUKAST SODIUM 10 MG Tablet] 90 tablet 0     Sig: TAKE 1 TABLET EVERY NIGHT       Last Appointment Date: 12/27/2022  Next Appointment Date: 3/9/2023    Allergies   Allergen Reactions    Vicodin [Hydrocodone-Acetaminophen]

## 2023-02-03 ENCOUNTER — HOSPITAL ENCOUNTER (OUTPATIENT)
Dept: INTERVENTIONAL RADIOLOGY/VASCULAR | Age: 73
End: 2023-02-03
Payer: MEDICARE

## 2023-02-03 ENCOUNTER — HOSPITAL ENCOUNTER (OUTPATIENT)
Dept: NEUROLOGY | Age: 73
Discharge: HOME | End: 2023-02-03
Payer: MEDICARE

## 2023-02-03 ENCOUNTER — HOSPITAL ENCOUNTER (OUTPATIENT)
Dept: CT IMAGING | Age: 73
End: 2023-02-03
Payer: MEDICARE

## 2023-02-03 DIAGNOSIS — G93.89 CEREBRAL VENTRICULOMEGALY: ICD-10-CM

## 2023-02-03 DIAGNOSIS — I67.82 CHRONIC CEREBRAL ISCHEMIA: ICD-10-CM

## 2023-02-03 DIAGNOSIS — I63.10 CEREBRAL INFARCTION DUE TO EMBOLISM OF PRECEREBRAL ARTERY (HCC): ICD-10-CM

## 2023-02-03 PROCEDURE — 93880 EXTRACRANIAL BILAT STUDY: CPT

## 2023-02-03 PROCEDURE — 93892 TCD EMBOLI DETECT W/O INJ: CPT

## 2023-02-03 PROCEDURE — 70450 CT HEAD/BRAIN W/O DYE: CPT

## 2023-02-03 PROCEDURE — 93886 INTRACRANIAL COMPLETE STUDY: CPT

## 2023-02-03 NOTE — PROGRESS NOTES
TCD Completed with [facility]. PCP: Marielle Montenegro MD    Ordering: Lena Lundberg Neurologist    Interpreting Physician: Lena Jiménez.  Jones Lundberg    Electronically signed by Miki Finley RN on 2/3/2023 at 10:45 AM

## 2023-02-08 ENCOUNTER — OFFICE VISIT (OUTPATIENT)
Dept: NEUROLOGY | Age: 73
End: 2023-02-08
Payer: MEDICARE

## 2023-02-08 VITALS
WEIGHT: 292 LBS | DIASTOLIC BLOOD PRESSURE: 78 MMHG | HEART RATE: 75 BPM | SYSTOLIC BLOOD PRESSURE: 132 MMHG | BODY MASS INDEX: 47.13 KG/M2 | OXYGEN SATURATION: 95 % | RESPIRATION RATE: 16 BRPM

## 2023-02-08 DIAGNOSIS — G31.9 ACQUIRED CEREBRAL ATROPHY (HCC): ICD-10-CM

## 2023-02-08 DIAGNOSIS — G93.5 CHIARI MALFORMATION TYPE I (HCC): ICD-10-CM

## 2023-02-08 DIAGNOSIS — Z98.2 S/P VP SHUNT: ICD-10-CM

## 2023-02-08 DIAGNOSIS — R42 DIZZINESS: Primary | ICD-10-CM

## 2023-02-08 DIAGNOSIS — E66.01 CLASS 2 SEVERE OBESITY DUE TO EXCESS CALORIES WITH SERIOUS COMORBIDITY IN ADULT, UNSPECIFIED BMI (HCC): ICD-10-CM

## 2023-02-08 DIAGNOSIS — R93.0 ABNORMAL CT OF THE HEAD: ICD-10-CM

## 2023-02-08 DIAGNOSIS — R51.9 OCCIPITAL HEADACHE: ICD-10-CM

## 2023-02-08 DIAGNOSIS — G45.0 VBI (VERTEBROBASILAR INSUFFICIENCY): ICD-10-CM

## 2023-02-08 DIAGNOSIS — G91.2 NORMAL PRESSURE HYDROCEPHALUS (HCC): ICD-10-CM

## 2023-02-08 DIAGNOSIS — G44.219 EPISODIC TENSION-TYPE HEADACHE, NOT INTRACTABLE: ICD-10-CM

## 2023-02-08 DIAGNOSIS — Z72.0 TOBACCO ABUSE: ICD-10-CM

## 2023-02-08 DIAGNOSIS — I67.82 CHRONIC CEREBRAL ISCHEMIA: ICD-10-CM

## 2023-02-08 DIAGNOSIS — G93.89 CEREBRAL VENTRICULOMEGALY: ICD-10-CM

## 2023-02-08 DIAGNOSIS — R26.89 BALANCE PROBLEM: ICD-10-CM

## 2023-02-08 PROCEDURE — 99214 OFFICE O/P EST MOD 30 MIN: CPT | Performed by: PSYCHIATRY & NEUROLOGY

## 2023-02-08 PROCEDURE — 1036F TOBACCO NON-USER: CPT | Performed by: PSYCHIATRY & NEUROLOGY

## 2023-02-08 PROCEDURE — 1123F ACP DISCUSS/DSCN MKR DOCD: CPT | Performed by: PSYCHIATRY & NEUROLOGY

## 2023-02-08 PROCEDURE — G8417 CALC BMI ABV UP PARAM F/U: HCPCS | Performed by: PSYCHIATRY & NEUROLOGY

## 2023-02-08 PROCEDURE — 1090F PRES/ABSN URINE INCON ASSESS: CPT | Performed by: PSYCHIATRY & NEUROLOGY

## 2023-02-08 PROCEDURE — G8427 DOCREV CUR MEDS BY ELIG CLIN: HCPCS | Performed by: PSYCHIATRY & NEUROLOGY

## 2023-02-08 PROCEDURE — G8484 FLU IMMUNIZE NO ADMIN: HCPCS | Performed by: PSYCHIATRY & NEUROLOGY

## 2023-02-08 PROCEDURE — G8400 PT W/DXA NO RESULTS DOC: HCPCS | Performed by: PSYCHIATRY & NEUROLOGY

## 2023-02-08 PROCEDURE — 3017F COLORECTAL CA SCREEN DOC REV: CPT | Performed by: PSYCHIATRY & NEUROLOGY

## 2023-02-08 PROCEDURE — 99213 OFFICE O/P EST LOW 20 MIN: CPT | Performed by: PSYCHIATRY & NEUROLOGY

## 2023-02-08 ASSESSMENT — PATIENT HEALTH QUESTIONNAIRE - PHQ9
SUM OF ALL RESPONSES TO PHQ9 QUESTIONS 1 & 2: 0
SUM OF ALL RESPONSES TO PHQ QUESTIONS 1-9: 0
2. FEELING DOWN, DEPRESSED OR HOPELESS: 0
SUM OF ALL RESPONSES TO PHQ QUESTIONS 1-9: 0
SUM OF ALL RESPONSES TO PHQ QUESTIONS 1-9: 0
1. LITTLE INTEREST OR PLEASURE IN DOING THINGS: 0
SUM OF ALL RESPONSES TO PHQ QUESTIONS 1-9: 0

## 2023-02-08 ASSESSMENT — ENCOUNTER SYMPTOMS
ABDOMINAL DISTENTION: 0
WHEEZING: 0
CHEST TIGHTNESS: 0
SCALP TENDERNESS: 0
EYE REDNESS: 0
TROUBLE SWALLOWING: 0
EYE DISCHARGE: 0
APNEA: 0
CHOKING: 0
SHORTNESS OF BREATH: 0
BOWEL INCONTINENCE: 0
FACIAL SWEATING: 0
CONSTIPATION: 0
BACK PAIN: 0
BLURRED VISION: 0
SINUS PRESSURE: 0
EYE ITCHING: 0
VOICE CHANGE: 0
EYE PAIN: 0
BLOOD IN STOOL: 0
EYE WATERING: 0
FACIAL SWELLING: 0
COLOR CHANGE: 0
DIARRHEA: 0
PHOTOPHOBIA: 1

## 2023-02-08 NOTE — PROGRESS NOTES
[facility]  Neurology  [address] [place] [address]  AYWJY:[phone]   Fax: [phone]        SUBJECTIVE:       PATIENT ID:  Senaida Gilford is a  RIGHT   HANDED 81 y.o. female. Dizziness  This is a recurrent problem. Episode onset: SINCE   EARLY  APRIL 2019. The problem occurs intermittently. The problem has been resolved. Associated symptoms include headaches. The symptoms are aggravated by bending and standing. She has tried rest and sleep (  SHUNT  PLACEMENT) for the symptoms. The treatment provided significant relief. Headache   This is a chronic problem. Episode onset: SINCE  APRIL 2019. The problem occurs intermittently. The problem has been waxing and waning. The pain is located in the Occipital and frontal region. The pain does not radiate. The pain quality is not similar to prior headaches. The quality of the pain is described as aching and throbbing. The pain is at a severity of 3/10. The pain is mild. Associated symptoms include dizziness, a loss of balance, phonophobia and photophobia. Pertinent negatives include no abnormal behavior, back pain, blurred vision, drainage, ear pain, eye pain, eye redness, eye watering, facial sweating, hearing loss, insomnia, muscle aches, scalp tenderness, seizures, sinus pressure, tingling, tinnitus or weight loss. The symptoms are aggravated by coughing, bright light and noise. She has tried acetaminophen for the symptoms. The treatment provided significant relief. Her past medical history is significant for obesity and sinus disease. There is no history of cancer, cluster headaches, hypertension, immunosuppression, migraine headaches, migraines in the family, pseudotumor cerebri, recent head traumas or TMJ. Neurologic Problem  The patient's primary symptoms include clumsiness, a loss of balance and memory loss. The patient's pertinent negatives include no syncope. This is a chronic problem.  The neurological problem developed insidiously. The problem is unchanged. There was no focality noted. Associated symptoms include dizziness and headaches. Pertinent negatives include no auditory change, aura, back pain, bladder incontinence, bowel incontinence, confusion, light-headedness, palpitations or shortness of breath. Past treatments include bed rest, medication and sleep. The treatment provided moderate relief. Her past medical history is significant for dementia. There is no history of a bleeding disorder, a clotting disorder, a CVA, head trauma, liver disease, mood changes or seizures. History obtained from  The patient       and other  available medical records were  Also  reviewed. The  Duration,  Quality,  Severity,  Location,  Timing,  Context,  Modifying  Factors   Of   The   Chief   Complaint       AndPresent  Illness   Was   Reviewed   In   Chronological   Manner.                                                PATIENT'S  MAIN  CONCERNS INCLUDE :                       1)     PREVIOUS   H/O   CHRONIC      DIZZINESS     INTERMITTENT                                        SINCE      April 2019                                         -     IMPROVED    SIGNIFICANTLY                                         -   ON   ASA     DAILY    PRO PHYLACTICALLY                              2)       PREVIOUS    H/O    GLOBAL  HEADACHES    IN     2019                                        MOSTLY     OCCIPITAL   AREA                                                  -     RESOLVED                                3)    PREVIOUS     H/O   CHRONIC   GAIT  AND  BALANCE  PROBLEMS                                                                        -     IMPROVED    SIGNIFICANTLY                                                 PATIENT   USING   QUAD  CANE                            4)        NO   H/O   FALLS                                NO   H/O   HEAD  INJURIES                                               5) PREVIOUS    H/O     CHRONIC  SMOKING                                     PATIENT  STOPPED   SMOKING  SINCE   2019  . 6)     MULTIPLE   CO  MORBID  MEDICAL  CONDITIONS                             BEING  FOLLOWED  BY  HER  PCP. 7)       MRI  BRAIN   WITH  AND  W/O  CONTRAST   DONE                                AT  [facility] ([place], [place])                                     DATED   5 /15/ 2019  REVIEWED         SHOWED                                  \"   HYDROCEPHALUS   OF   RECENT  TO  SUBACUTE.                                ASSOCIATED   TONSILLAR  HERNIATION  AT  FORAMEN  MAGNUM   SUSPECTED  \"    .                                       RECOMMENDED      EMERGENCY    NEURO  SURGICAL EVALUATION                                          FOR      VENTRICULAR  DRAIN   IN   MAY  2019                        8)       PATIENT  HAD   VENTRICULAR  DRAIN    PLACEMENT  IN   MAY    2019        AND                                  WAS    IN    NURSING  HOME  REHAB   FOR  TWO  MONTHS . PATIENT  RECOVERED   WELL. NO   DIZZINESS. NO  BALANCE  PROBLEMS                                    NO   SEIZURES. NO   HEADACHES . MEMORY    PROBLEMS     ARE  AT  HER  BASELINE                               9)      FOLLOW UP  CT  HEAD   June 2019     SHOWED                                   STABLE   VENTRICULOMEGALY    AND    SHUNT                               10)      HAD  FOLLOW  NEURO  SURGEON   EVALUATIONS                                                      IN   SEPT. 2019                                11)     PATIENT  LIVES   WITH   HER  DAUGHTER     CURRENTLY. PATIENT      DRIVES  LOCALLY       AS  TOLERATED.                                   12)      H/O      MILD   CHRONIC    MEMORY PROBLEMS                                        PATIENT  NOT  CONCERNED. REFUSED   MEDICATION   FOR     DEMENTIA                               13)      PATIENT  DENIES     ANY   FALLS. H/O     MILD   BALANCE  PROBLEMS.                                       H/O     MILD  BRIEF   POSITIONAL  DIZZINESS                                                       SOME  TIMES   BRIEFLY                         14)     HAD   FOLLOW  UP     NEURO  DIAGNOSTIC  IN    FEB. 2023                                    A)   CT    HEAD   SHOWED      NO  ACUTE  PATHOLOGY                                             CHRONIC  CEREBRAL  ISCHEMIA                                        RIGHT   FRONTAL   SHUNT    WITH   SYMMETRIC   VENTRICLES                                      B)      CAROTID   DOPPLER   SHOWED                                                   NO  SIGNIFICANT  ABNORMALITIES                                  RESULTS    REVIEWED    AND  DISCUSSED     WITH  PATIENT                               15)     RECOMMENDED:        TO  CONTINUE                                     A)    FALL  PRECAUTIONS                                   B)    USE  QUAD  CANE                                      C)     ASA   81   MG  PO  DAILY                                   D)    BE   CAREFUL     WITH   HER  ACTIVITIES                                ABOVE    REVIEWED    AND  DISCUSSED     WITH  PATIENT                                  PRECIPITATING  FACTORS: including  fever/infection, exertion/relaxation, position change, stress,                         weather change, medications/alcohol, time of day/darkness/light  Are  Present                                                                MODIFYING  FACTORS:  fever/infection, exertion/relaxation, position change, stress, weather change,                      medications/alcohol, time of day/darkness/light    Are present           Patient   Indicates   The  Presence   And  The  Absence  Of  The  FollowingAssociated  And           Additional  Neurological    Symptoms:                                Balance  And coordination problems  present           Gait problems     present            Headaches      present              Migraines           present           Memory problems absent             Confusion        absent            Paresthesia numbness          absent           SeizuresAnd  Starring  Episodes           absent           Syncope,  Near  syncopal episodes         absent           Speech problems           absent             Swallowing  Problems      absent            Dizziness,  Light headedness           present              Vertigo        present             Generalized   Weakness    absent              focal  Weakness     absent             Tremors         absent              Sleep  Problems     absent             History  Of   RecentHead  Injury     absent             History  Of   Recent  TIA     absent             History  Of   Recent    Stroke     absent             Neck  Pain and  Neck muscle  Spasms  Absent               Radiating  down   And   Weakness           absent            Lower back   Pain  And     Spasms  Absent              Radiating    Down   And   Weakness          absent                H/OFALLS        absent               History  Of   Visual  Symptoms    Absent                  Associated   Diplopia       absent                                             Also   Additional   Symptoms   Present    As  Documented    In   The detailed                  Review  Of  Systems   And    Please   Refer   To    Them for   Additional  Information.                     Any components  That are either  Unobtainable  Or  Limited  In   HPI, ROS  And/or PFSH   Are                Due   To       Patient's  Medical  Problems,  Clinical  Condition and/or lack of                                other    Alternate resources. RECORDS   REVIEWED:    historical medical records     INFORMATION   REVIEWED:       MEDICAL   HISTORY,SURGICAL   HISTORY,   MEDICATIONS   LIST,   ALLERGIES AND  DRUG  INTOLERANCES,     FAMILY   HISTORY,  SOCIAL  HISTORY,    PROBLEM  LIST   FOR  PATIENT  CARE   COORDINATION          Past Medical History:   Diagnosis Date    Chronic obstructive pulmonary disease ([address]     Coronary artery disease     Dyslipidemia     Obesity     Pneumonia          Past Surgical History:   Procedure Laterality Date    CARDIAC CATHETERIZATION      CARDIAC CATHETERIZATION  2012    cath with x1 stent    EYE SURGERY      shar cataract surg x2 y ago    VENTRICULOPERITONEAL SHUNT Right 5/16/2019    VENTRICULAR PERITONEAL SHUNT INSERTION ENDOSCOPIC performed by Kay Tavarez MD at [address] [facility] CAPSULOTOMY Bilateral 01/04/2022    Dr. Linda Mojica.          Current Outpatient Medications   Medication Sig Dispense Refill    carvedilol (COREG) 3.125 MG tablet TAKE 1 TABLET TWICE DAILY WITH MEALS 180 tablet 0    montelukast (SINGULAIR) 10 MG tablet TAKE 1 TABLET EVERY NIGHT 90 tablet 0    albuterol sulfate HFA (PROVENTIL;VENTOLIN;PROAIR) 108 (90 Base) MCG/ACT inhaler INHALE 2 PUFFS INTO THE LUNGS EVERY 4 HOURS AS NEEDED FOR WHEEZING OR SHORTNESS OF BREATH 3 each 0    TRELEGY ELLIPTA 100-62.5-25 MCG/INH AEPB INHALE 1 PUFF INTO THE LUNGS DAILY 1 each 11    Handicap Placard MISC by Does not apply route Expires: 7/26/2024  Dx: R23.89, G93.5 1 each 0    Misc. Devices (ADJUST BATH/SHOWER SEAT/BACK) MISC Use daily with showers 1 each 0    Misc. Devices (SUCTION GRAB BAR) MISC Use in the shower 2 each 0    loratadine (CLARITIN) 10 MG tablet Take 10 mg by mouth daily       No current facility-administered medications for this visit.          Allergies   Allergen Reactions    Vicodin [Hydrocodone-Acetaminophen]          Family History   Problem Relation Age of Onset    No Known Problems Sister     No Known Problems Daughter     Breast Cancer Paternal Aunt     No Known Problems Sister     No Known Problems Sister     No Known Problems Daughter     No Known Problems Maternal Aunt     No Known Problems Maternal Aunt          Social History     Socioeconomic History    Marital status:      Spouse name: Not on file    Number of children: 2    Years of education: 14    Highest education level: Associate degree: occupational, technical, or vocational program   Occupational History    Occupation: factory      Comment: retired   Tobacco Use    Smoking status: Former     Packs/day: 1.50     Years: 50.00     Pack years: 75.00     Types: Cigarettes     Start date: 1/1/1970     Quit date: 5/16/2019     Years since quitting: 3.7    Smokeless tobacco: Never    Tobacco comments:     Yes I am a former smoker and I have been diagnosed with COPD   Vaping Use    Vaping Use: Never used   Substance and Sexual Activity    Alcohol use: No    Drug use: No    Sexual activity: Not Currently     Partners: Male   Other Topics Concern    Not on file   Social History Narrative    No SDOH needs identified. She was given [facility] number for transportation if family unable to transport. Discussed HEAP and PIPP programs. She stated she has used in the past but declines at this time.      Social Determinants of Health     Financial Resource Strain: Unknown    Difficulty of Paying Living Expenses: Patient refused   Food Insecurity: Unknown    Worried About Running Out of Food in the Last Year: Patient refused    Ran Out of Food in the Last Year: Patient refused   Transportation Needs: Not on file   Physical Activity: Inactive    Days of Exercise per Week: 0 days    Minutes of Exercise per Session: 0 min   Stress: Not on file   Social Connections: Not on file   Intimate Partner Violence: Not on file   Housing Stability: Not on file       Vitals:    02/08/23 0957   BP: 132/78   Pulse: 75   Resp: 16   SpO2: 95%         Wt Readings from Last 3 Encounters:   02/08/23 292 lb (132.5 kg)   09/08/22 295 lb (133.8 kg)   08/10/22 295 lb (133.8 kg)         BP Readings from Last 3 Encounters:   02/08/23 132/78   09/08/22 118/78   08/10/22 122/70       Hematology and Coagulation  Lab Results   Component Value Date/Time    WBC 10.3 06/12/2019 07:18 PM    RBC 5.05 06/12/2019 07:18 PM    HGB 15.7 06/12/2019 07:18 PM    HCT 47.2 06/12/2019 07:18 PM    MCV 93.3 06/12/2019 07:18 PM    MCH 31.1 06/12/2019 07:18 PM    MCHC 33.3 06/12/2019 07:18 PM    RDW 15.3 06/12/2019 07:18 PM     06/12/2019 07:18 PM    MPV 10.3 06/12/2019 07:18 PM       Chemistries  Lab Results   Component Value Date/Time     12/03/2021 10:56 AM    K 4.4 12/03/2021 10:56 AM     12/03/2021 10:56 AM    CO2 29 12/03/2021 10:56 AM    BUN 11 12/03/2021 10:56 AM    CREATININE 0.89 12/03/2021 10:56 AM    CALCIUM 9.8 12/03/2021 10:56 AM    PROT 7.3 05/12/2019 05:53 PM    LABALBU 4.3 05/12/2019 05:53 PM    BILITOT 0.65 05/12/2019 05:53 PM    ALKPHOS 77 05/12/2019 05:53 PM    AST 9 05/12/2019 05:53 PM    ALT 9 05/12/2019 05:53 PM     Lab Results   Component Value Date/Time    ALKPHOS 77 05/12/2019 05:53 PM    ALT 9 05/12/2019 05:53 PM    AST 9 05/12/2019 05:53 PM    PROT 7.3 05/12/2019 05:53 PM    BILITOT 0.65 05/12/2019 05:53 PM    BILIDIR 0.12 07/02/2012 03:11 AM    LABALBU 4.3 05/12/2019 05:53 PM     Lab Results   Component Value Date/Time    BUN 11 12/03/2021 10:56 AM    CREATININE 0.89 12/03/2021 10:56 AM     Lab Results   Component Value Date/Time    CALCIUM 9.8 12/03/2021 10:56 AM    MG 2.2 07/02/2012 03:11 AM     Lab Results   Component Value Date/Time    AST 9 05/12/2019 05:53 PM    ALT 9 05/12/2019 05:53 PM       Lab Results   Component Value Date/Time    CKTOTAL 28 07/02/2012 10:25 AM         Review of Systems   Constitutional:  Negative for appetite change, unexpected weight change and weight loss.   HENT:  Negative for dental problem, drooling, ear discharge,  ear pain, facial swelling, hearing loss, mouth sores, nosebleeds, postnasal drip, sinus pressure, tinnitus, trouble swallowing and voice change. Eyes:  Positive for photophobia. Negative for blurred vision, pain, discharge, redness, itching and visual disturbance. Respiratory:  Negative for apnea, choking, chest tightness, shortness of breath and wheezing. Cardiovascular:  Negative for palpitations and leg swelling. Gastrointestinal:  Negative for abdominal distention, blood in stool, bowel incontinence, constipation and diarrhea. Endocrine: Negative for cold intolerance, heat intolerance, polydipsia, polyphagia and polyuria. Genitourinary:  Negative for bladder incontinence. Musculoskeletal:  Positive for gait problem. Negative for back pain and neck stiffness. Skin:  Negative for color change, pallor and wound. Allergic/Immunologic: Negative for environmental allergies, food allergies and immunocompromised state. Neurological:  Positive for dizziness, headaches and loss of balance. Negative for tingling, tremors, seizures, syncope, facial asymmetry, speech difficulty and light-headedness. Hematological:  Negative for adenopathy. Does not bruise/bleed easily. Psychiatric/Behavioral:  Positive for memory loss. Negative for agitation, behavioral problems, confusion, decreased concentration, dysphoric mood, hallucinations, self-injury, sleep disturbance and suicidal ideas. The patient is not nervous/anxious, does not have insomnia and is not hyperactive. OBJECTIVE:    Physical Exam  Constitutional:       Appearance: She is well-developed. HENT:      Head: Normocephalic and atraumatic. No raccoon eyes or Holland's sign. Right Ear: External ear normal.      Left Ear: External ear normal.      Nose: Nose normal.   Eyes:      Conjunctiva/sclera: Conjunctivae normal.   Neck:      Thyroid: No thyroid mass or thyromegaly. Vascular: No carotid bruit.       Trachea: No tracheal deviation. Meningeal: Brudzinski's sign and Kernig's sign absent. Cardiovascular:      Rate and Rhythm: Normal rate and regular rhythm. Pulmonary:      Effort: Pulmonary effort is normal.   Musculoskeletal:         General: No tenderness. Cervical back: Normal range of motion and neck supple. No rigidity. No muscular tenderness. Normal range of motion. Skin:     General: Skin is warm. Coloration: Skin is not pale. Findings: No erythema or rash. Nails: There is no clubbing. Psychiatric:         Attention and Perception: She is attentive. Mood and Affect: Mood is not anxious or depressed. Affect is not labile, blunt or inappropriate. Behavior: Behavior is slowed. Behavior is not agitated, aggressive, withdrawn, hyperactive or combative. Behavior is cooperative. Thought Content: Thought content is not paranoid or delusional. Thought content does not include homicidal or suicidal ideation. Thought content does not include homicidal or suicidal plan. Cognition and Memory: Cognition is impaired. Memory is impaired. Judgment: Judgment is not impulsive or inappropriate. NEUROLOGICALEXAMINATION :      A) MENTAL STATUS:                   Alert and  oriented  To time, place  And  Person. No Aphasia. No  Dysarthria. Able   To  Follow   SIMPLE   commands without   Any  Difficulty. No right  To left confusion. SLOW   Speech  And language function. Insight and  Judgment ,Fund  Of  Knowledge   within normal limits                Recent  And  Remote memory    DECREASED                Attention &Concentration are    DECREASED                                                  B) CRANIAL NERVES :      CN : Visual  Acuity  And  Visual fields  within normal limits               Fundi  Could  Not  Be  Could  Not  Be  Evaluated.            3,4,6 CN : Both  Pupils are Reactive and  Equal.  Movements  Are  Intact. No  Nystagmus. No  RHONDA. No  Afferent  Pupillary  Defect noted. 5 CN :  Normal  Facial sensations and Corneal  Reflexes           7 CN:  Normal  Facial  Symmetry  And  Strength. No facial  Weakness. 8 CN :  Hearing  Appears within normal limits          9, 10 CN: Normal spontaneous, reflex palate movements         11 CN:   Normal  Shouldershrug and  strength         12 CN :   Normal  Tongue movements and  Tongue  In midline                        No tongue   Fasciculations or atrophy       C) MOTOR  EXAM:                 Strength  In upper  AndLower extremities   within normal limits             Rapid alternating  And  repetitions  Movements  within normal limits               Muscle  Tone  In upper  And  LowerExtremities  normal                No rigidity. No  Spasticity. Bradykinesia   absent               No  Asterixis. Sustention  Tremor , Resting  Tremor   absent                    No other  Abnormal  Movements noted           D) SENSORY :               light touch, pinprick, position  And  Vibration  within normal limits        E) REFLEXES:                   Deep  Tendon  Reflexes normal                  No  pathological  Reflexes  Bilaterally.                                   F) COORDINATION  AND  GAIT :                                Station and  Gait    SLOW                             Romberg 's test   POSITIVE                             Ataxia negative          ASSESSMENT:    Patient Active Problem List   Diagnosis    Tobacco abuse    Environmental allergies    Obesity    Dyslipidemia    Coronary artery disease    Chronic obstructive pulmonary disease (HCC)    Occipital headache    Dizziness    Cerebral ventriculomegaly    Acquired cerebral atrophy (HCC)    Chronic cerebral ischemia    Chiari malformation type I (HCC)    Balance problem    VBI (vertebrobasilar insufficiency) 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 Automated exposure control, iterative reconstruction, and/or weight   based adjustment of the mA/kV was utilized to reduce the radiation dose to as   low as reasonably achievable. COMPARISON:   CT brain performed 06/12/2019. HISTORY:   ORDERING SYSTEM PROVIDED HISTORY: Cerebral ventriculomegaly   TECHNOLOGIST PROVIDED HISTORY:   Reason for Exam: Hx shunt. C/o problems with balance       FINDINGS:   BRAIN/VENTRICLES: There is no acute hemorrhage, mass effect, or midline   shift. There is mild chronic microvascular disease. The ventricles are   symmetric and otherwise unremarkable. There is a shunt tube from a right   frontal approach with the tip in the mid left lateral ventricle. The   infratentorial structures are grossly unremarkable. ORBITS: The visualized portion of the orbits demonstrate no acute abnormality. SINUSES: The visualized paranasal sinuses and mastoid air cells demonstrate   no acute abnormality. SOFT TISSUES/SKULL:  No acute abnormality of the visualized skull or soft   tissues. Impression   Mild chronic microvascular disease without acute intracranial abnormality. Symmetric unremarkable ventricles with shunt tube from a right frontal   approach.        CT OF THE HEAD WITHOUT CONTRAST  6/12/2019 7:53 pm       TECHNIQUE:   CT of the head was performed without the administration of intravenous   contrast. Dose modulation, iterative reconstruction, and/or weight based   adjustment of the mA/kV was utilized to reduce the radiation dose to as low   as reasonably achievable. COMPARISON:   June 7, 2019       HISTORY:   ORDERING SYSTEM PROVIDED HISTORY: Memory loss with history of cerebral shunt   placement   TECHNOLOGIST PROVIDED HISTORY:       Ordering Physician Provided Reason for Exam: Dizzy, fall today hit head, pt   has recently placed shunt   Acuity: Acute   Type of Exam: Initial       FINDINGS:   BRAIN/VENTRICLES: There is no acute intracranial hemorrhage, mass effect or   midline shift. No abnormal extra-axial fluid collection. The gray-white   differentiation is maintained without evidence of an acute infarct. There is   prominence of the ventricles and sulci due to global parenchymal volume loss. There are nonspecific areas of hypoattenuation within the periventricular and   subcortical white matter, which likely represent chronic microvascular   ischemic change. ORBITS: Bilateral lens implants. SINUSES: The visualized paranasal sinuses and mastoid air cells demonstrate   no acute abnormality. SOFT TISSUES/SKULL: Right frontal approach ventriculostomy catheter   terminates in the of the left lateral ventricle. Impression   Stable ventriculomegaly and  shunt. No acute disease. CT OF THE HEAD WITHOUT CONTRAST  5/2/2019 2:52 pm       TECHNIQUE:   CT of the head was performed without the administration of intravenous   contrast. Dose modulation, iterative reconstruction, and/or weight based   adjustment of the mA/kV was utilized to reduce the radiation dose to as low   as reasonably achievable. COMPARISON:   None. HISTORY:   ORDERING SYSTEM PROVIDED HISTORY: headache   TECHNOLOGIST PROVIDED HISTORY:       Ordering Physician Provided Reason for Exam: C/o headache/dizziness   Acuity: Acute   Type of Exam: Initial       FINDINGS:   BRAIN/VENTRICLES: There is no acute intracranial hemorrhage, mass effect or   midline shift. No abnormal extra-axial fluid collection.   Ventriculomegaly   is noted and cerebellar tonsillar displacement into the foramen magnum,   extending off the field of view, is noted. There is also mild cerebral and   cerebellar atrophy and scattered white matter changes in the brain. ORBITS: The visualized portion of the orbits demonstrate no acute abnormality. SINUSES: The visualized paranasal sinuses and mastoid air cells demonstrate   no acute abnormality. SOFT TISSUES/SKULL:  No acute abnormality of the visualized skull or soft   tissues. Impression   Partially visualized Chiari malformation. Ventriculomegaly is noted as well   as cerebral and cerebellar cortical atrophy with chronic appearing white   matter changes in the brain. VISITING DIAGNOSIS:      ICD-10-CM    1. Cerebral ventriculomegaly  G93.89       2. Acquired cerebral atrophy ([address]  G31.9       3. S/P  shunt  Z98.2       4. Chiari malformation type I ([address]  G93.5       5. Class 2 severe obesity due to excess calories with serious comorbidity in adult, unspecified BMI (HCC)  E66.01       6. Normal pressure hydrocephalus (HCC)  G91.2       7. Balance problem  R26.89       8. Episodic tension-type headache, not intractable  G44.219       9. Occipital headache  R51.9       10. Chronic cerebral ischemia  I67.82       11. Dizziness  R42       12. VBI (vertebrobasilar insufficiency)  G45.0       13. Abnormal CT of the head  R93.0       14. Tobacco abuse  Z72.0       15. 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 *  PATIENT   HAS  MULTIPLE   MEDICAL, NEUROLOGICAL    PROBLEMS .         PATIENT'S   MANAGEMENT  IS CHALLENGING. PLAN:                    Delayne Iron  Of  The  Diagnoses,  The  Management & Treatment  Options            AND    Care  plan  Were          Reviewed and   Discussed   With  patient. * Goals  And  Expectations  Of  The  Therapy  Discussed   And  Reviewed. *   Benefits   And   Side  Effect  Profile  Of  Medication/s   Were   Discussed                * Need   For  Further   Follow up For  The  Various  Problems Were  discussed. * Results  Of  The  Previous  Diagnostic tests were reviewed and questions answered. patient  understand the same. Medical  Decision  Making  Was  Made  Based on the   Complexity  Of  Above  Mentioned  Diagnoses,        Data reviewed   & diagnostic  Tests  Reviewed,       Risk  Of  Significant   Co morbidities and   complicating   Factors. Medical  Decision  Was   High    Complexity   Due   To  The  Patient's  Multiple  Symptoms,      Advancing   Disease,  Complex  Treatment  Regimen,  Multiple medications       and   Risk  Of   Side  Effects,  Difficulty  In  Medication  Management  And  Diagnostic  Challenges       In  View  Of  The  Associated   Co  Morbid  Conditions   And  Problems. * FALL   PRECAUTIONS. THESE  REVIEWED   AND  DISCUSSED      *    SUPERVISED   CARE        *   BE  CAREFUL  WITH  ACTIVITIES            *   ADEQUATE   FLUIDINTAKE   AND  ELECTROLYTE  BALANCE             * KEEP  DAIRY  OF   THE  NEUROLOGICAL  SYMPTOMS        RECORDING THE    DURATION  AND  FREQUENCY. *  AVOID    CONDITIONS  AND  FACTORS   THAT  MAKE                  NEUROLOGICAL  SYMPTOMS  WORSE.           *TO  MAINTAIN  REGULAR  SLEEP  WAKE  CYCLES.      *   TO  HAVE  ADEQUATE  REST  AND   SLEEP    HOURS.          *    AVOID  ANY USAGE OF                   TOBACCO,EXCESSIVE  ALCOHOL  AND   ILLEGAL   SUBSTANCES        *   Compliance   With  Medications   And  Instructions      * MIGRAINE/ HEADACHE    DAIRY   WITH  MONITORING                       OF  DURATION  ANDFREQUENCY. *     The    Antiplatelet  therapy    As   Recommended  Was   Discussed        *    Prophylactic  Use   Of     Vitamin   B   Complex,  Folic  Acid,    Vitamin  B12    Multivitamin,       Calcium  With  magnesium  And  Vit D    Supplementations   Over  The  Counter  Discussed              *  EVALUATIONS  AND  FOLLOW UP:                    * PHYSICAL  THERAPY                                *CARDIOLOGY              *   OPTHALMOLOGY                                *        S / P     VENTRICULAR  DRAIN    PLACEMENT  IN   MAY    2019                     *         PATIENT  RECOVERED   WELL. NO   SEIZURES. NO   HEADACHES . DIZZINESS    BETTER   AND  STABLE                                        *     RECOMMENDED:                                     A)    FALL  PRECAUTIONS                                   B)    USE  QUAD  CANE                                      C)     ASA   81   MG  PO  DAILY                                   D)   BE  CAREFUL    WITH  HER  ACTIVITIES                  *PATIENT   TO  FOLLOW  UP  WITH   PRIMARY  CARE         OTHER  CONSULTANTS  AS  BEFORE. *  Maintain   Healthy  Life Style    With   Periodic  Monitoring  Of      Any  Medical  Conditions  Including   Elevated  Blood  Pressure,  Lipid  Profile,     Blood  Sugar levels  AndHeart  Disease. *   Period   Screening  For  Cancers  Involving  Breast,  Colon,    lungs  And  Other  Organs  As  Applicable,  In consultation   With  Your  Primary Care Providers. *Second  Neurological  Opinion  And  Evaluations  In  [facility]  Setting  If  Patient  Is  Interested. * Please   Contact   Neurology  Clinic   Early   If   Are  Any  New  Neurological   And  Any neurological  Concerns. *  If  The  Patient remains  Neurologically  Stable   Return   To  [facility] Neurology Department   IN   3 -  6     MONTHS  TIME   FOR  FURTHER              FOLLOW UP.                       *   The  Neurological   Findings,  Possible  Diagnosis,  Differential diagnoses   And  Options                     For    Further   Investigations                       And  management   Are  Discussed  Comprehensively. *  If   There is  Any  Significant  Worsening   Of  Current  Symptoms  And  Or  If patient  Develops                                  Any additional  New  NeurologicalSymptoms                  Or  Significant  Concerns   Should  Call  911 or  Go  To  Emergency  Department                            For  Further  Immediate  Evaluation. The   Above  Were  Reviewed  With  Patient                          questions  Answered  In  Detail. More   Than  50% of face  To face Time   Was  Spent  On  Counseling   And   Coordination  Of  Care                 Of   Patient's  multiple   Neurological  Problems   And   Comorbid  Medical   Conditions. Electronically signed by West Mobley MD.,  [facility]       Board Certified in  Neurology &  In  [address] of Psychiatry and Neurology (ABPN)      DISCLAIMER:   Although every effort was made to ensure the accuracy of this  electronictranscription, some errors in transcription may have occurred. GENERAL PATIENT INSTRUCTIONS:     A Healthy Lifestyle: Care Instructions  Your Care Instructions  A healthy lifestyle can help you feel good, stay at ahealthy weight, and have plenty of energy for both work and play. A healthy lifestyle is something you can share with your whole family. A healthy lifestyle also can lower your risk for serious health problems, such ashigh blood pressure, heart disease, and diabetes.   You can follow a few steps listed below to improve your health and the health of your family. Follow-up careis a key part of your treatment and safety. Be sure to make and go to all appointments, and call your doctor if you are having problems. Its also a good idea to know your test results and keep a list of the medicines you take. How can you care for yourself at home? Do not eat too much sugar, fat, or fast foods. You can still have dessert and treats nowand then. The goal is moderation. Start small to improve your eating habits. Pay attention to portion sizes, drink less juice and soda pop, and eat more fruits and vegetables. Eat a healthy amount of food. A 3-ounce serving of meat, for example, is about the size of a deck of cards. Fill the rest of your plate with vegetables and whole grains. Limit theamount of soda and sports drinks you have every day. Drink more water when you are thirsty. Eat at least 5 servings of fruits and vegetables every day. It may seem like a lot, but it is not hard to reach this goal. Aserving or helping is 1 piece of fruit, 1 cup of vegetables, or 2 cups of leafy, raw vegetables. Have an apple or some carrot sticks as an afternoon snack instead of a candy bar. Try to have fruits and/or vegetables at everymeal.  Make exercise part of your daily routine. You may want to start with simple activities, such as walking, bicycling, or slow swimming. Try akua active 30 to 60 minutes every day. You do not need to do all 30 to 60 minutes all at once. For example, you can exercise 3 times a day for 10 or 20 minutes. Moderate exercise is safe for most people, but it is always agood idea to talk to your doctor before starting an exercise program.  Keep moving. Kori Pal the lawn, work in the garden, or [employer]. Take the stairs instead of the elevator at work. If you smoke, quit. Peoplewho smoke have an increased risk for heart attack, stroke, cancer, and other lung illnesses.  Quitting is hard, but there are ways to boost your chance of quitting tobacco for good. Use nicotine gum, patches, or lozenges. Ask your doctor about stop-smoking programs and medicines. Keep trying. In addition to reducing your risk of diseases in the future, you will notice some benefits soon after you stop using tobacco. If you have shortness of breath or asthma symptoms, they will likely getbetter within a few weeks after you quit. Limit how much alcohol you drink. Moderate amounts of alcohol (up to 2 drinks a day for men, 1drink a day for women) are okay. But drinking too much can lead to liver problems, high blood pressure, and other health problems. health  If you have a family, there are many things you can do together to improve your health. Eat meals together as a family as often as possible. Eat healthy foods. This includes fruits, vegetables, lean meats and dairy, and whole grains. Include your family in your fitness plan. Most peoplethink of activities such as jogging or tennis as the way to fitness, but there are many ways you and your family can be more active. Anything that makes you breathe hard and gets your heart pumping is exercise. Here are sometips:  Walk to do errands or to take your child to school or the bus. Go for a family bike ride after dinner instead of watching TV. Where can you learn more? Go totps://[email or web address].[email or web address]. org and sign in to your [employer] account. Enter J622 in the Search HealthInformation box to learn more about \"A Healthy Lifestyle: Care Instructions. \"     If you do not have anaccount, please click on the \"Sign Up Now\" link. Current as of: July 26, 2016  Content Version: 11.2  © 4683-6255 [employer]. Care instructions adapted under license by [facility] ([facility]).  If you have questions about a medical condition or this instruction, always ask your healthcare professional. [email or web address] disclaims any warranty or liability for your use of this information.

## 2023-03-07 ENCOUNTER — TELEPHONE (OUTPATIENT)
Dept: ONCOLOGY | Age: 73
End: 2023-03-07

## 2023-03-07 NOTE — LETTER
3/7/2023        [address]    Dear Natalia Oates: Your healthcare provider has ordered a low dose CT scan of the chest for lung cancer screening. Enclosed you will find information about CT lung screening and smoking cessation resources. If you are unable to keep you appointment for you CT lung screening, please call our scheduling department at [phone]. Keep in mind that CT lung screening does not take the place of smoking cessation. Please do not hesitate to contact me if you have any questions or concerns.     [address],      [facility] Lung Screening Program  [phone]

## 2023-03-13 ENCOUNTER — OFFICE VISIT (OUTPATIENT)
Dept: FAMILY MEDICINE CLINIC | Age: 73
End: 2023-03-13
Payer: MEDICARE

## 2023-03-13 VITALS
SYSTOLIC BLOOD PRESSURE: 118 MMHG | OXYGEN SATURATION: 94 % | TEMPERATURE: 98.6 F | RESPIRATION RATE: 20 BRPM | WEIGHT: 289 LBS | HEART RATE: 90 BPM | BODY MASS INDEX: 46.45 KG/M2 | DIASTOLIC BLOOD PRESSURE: 76 MMHG | HEIGHT: 66 IN

## 2023-03-13 DIAGNOSIS — Z23 NEED FOR SHINGLES VACCINE: ICD-10-CM

## 2023-03-13 DIAGNOSIS — J44.9 CHRONIC OBSTRUCTIVE PULMONARY DISEASE, UNSPECIFIED COPD TYPE (HCC): Primary | ICD-10-CM

## 2023-03-13 DIAGNOSIS — F33.1 MODERATE EPISODE OF RECURRENT MAJOR DEPRESSIVE DISORDER (HCC): ICD-10-CM

## 2023-03-13 DIAGNOSIS — Z12.31 BREAST CANCER SCREENING BY MAMMOGRAM: ICD-10-CM

## 2023-03-13 DIAGNOSIS — R73.01 IMPAIRED FASTING GLUCOSE: ICD-10-CM

## 2023-03-13 DIAGNOSIS — E78.5 DYSLIPIDEMIA: ICD-10-CM

## 2023-03-13 DIAGNOSIS — G91.2 NORMAL PRESSURE HYDROCEPHALUS (HCC): ICD-10-CM

## 2023-03-13 PROCEDURE — G8400 PT W/DXA NO RESULTS DOC: HCPCS | Performed by: FAMILY MEDICINE

## 2023-03-13 PROCEDURE — 3017F COLORECTAL CA SCREEN DOC REV: CPT | Performed by: FAMILY MEDICINE

## 2023-03-13 PROCEDURE — 1036F TOBACCO NON-USER: CPT | Performed by: FAMILY MEDICINE

## 2023-03-13 PROCEDURE — 99213 OFFICE O/P EST LOW 20 MIN: CPT

## 2023-03-13 PROCEDURE — 3023F SPIROM DOC REV: CPT | Performed by: FAMILY MEDICINE

## 2023-03-13 PROCEDURE — 1090F PRES/ABSN URINE INCON ASSESS: CPT | Performed by: FAMILY MEDICINE

## 2023-03-13 PROCEDURE — G8417 CALC BMI ABV UP PARAM F/U: HCPCS | Performed by: FAMILY MEDICINE

## 2023-03-13 PROCEDURE — 1123F ACP DISCUSS/DSCN MKR DOCD: CPT | Performed by: FAMILY MEDICINE

## 2023-03-13 PROCEDURE — 99214 OFFICE O/P EST MOD 30 MIN: CPT | Performed by: FAMILY MEDICINE

## 2023-03-13 PROCEDURE — G8484 FLU IMMUNIZE NO ADMIN: HCPCS | Performed by: FAMILY MEDICINE

## 2023-03-13 PROCEDURE — G8427 DOCREV CUR MEDS BY ELIG CLIN: HCPCS | Performed by: FAMILY MEDICINE

## 2023-03-13 RX ORDER — ASPIRIN 81 MG/1
81 TABLET ORAL DAILY
COMMUNITY

## 2023-03-13 SDOH — ECONOMIC STABILITY: INCOME INSECURITY: HOW HARD IS IT FOR YOU TO PAY FOR THE VERY BASICS LIKE FOOD, HOUSING, MEDICAL CARE, AND HEATING?: NOT HARD AT ALL

## 2023-03-13 SDOH — ECONOMIC STABILITY: HOUSING INSECURITY
IN THE LAST 12 MONTHS, WAS THERE A TIME WHEN YOU DID NOT HAVE A STEADY PLACE TO SLEEP OR SLEPT IN A SHELTER (INCLUDING NOW)?: NO

## 2023-03-13 SDOH — ECONOMIC STABILITY: FOOD INSECURITY: WITHIN THE PAST 12 MONTHS, THE FOOD YOU BOUGHT JUST DIDN'T LAST AND YOU DIDN'T HAVE MONEY TO GET MORE.: NEVER TRUE

## 2023-03-13 SDOH — ECONOMIC STABILITY: FOOD INSECURITY: WITHIN THE PAST 12 MONTHS, YOU WORRIED THAT YOUR FOOD WOULD RUN OUT BEFORE YOU GOT MONEY TO BUY MORE.: NEVER TRUE

## 2023-03-13 ASSESSMENT — ENCOUNTER SYMPTOMS
SHORTNESS OF BREATH: 0
WHEEZING: 0
COUGH: 0
NAUSEA: 0
CHEST TIGHTNESS: 0
ABDOMINAL PAIN: 0
DIARRHEA: 0
CONSTIPATION: 0

## 2023-03-13 NOTE — PROGRESS NOTES
[address]  [address] [address]  Dept: [phone]  Dept Fax: [phone]  Loc: [phone]    Marissa Mason is a 81 y.o. female who presents today for her medical conditions/complaints as noted below. Marissa Mason is c/o of   Chief Complaint   Patient presents with    COPD     Patient is here for 6 month follow up for COPD       HPI:     HPI Here today for a follow up of her COPD, and depression. COPD: She has been breathing well as long as she stays home and doesn't have to wear a mask. She has some days that she has to use her inhaler more, and it works well when she uses it. She wheezes when she is walking a lot. She is not coughing much. She has continued to avoid smoking. She is not coughing in bed at night. HTN: stable; her blood pressure is well controlled on coreg. She gets some chest pain which improves with belching so it seems to be heartburn. She is not having too many headaches but she worries about her shunt getting plugged. Most of her headaches resolve with tylenol. She gets a little swelling in her right ankle. No lightheadedness or dizziness upon standing. Sometimes if she turns too fast she looses her balance. Depression: stable; she has some times she feels low and struggles to leave the house. She sometimes has to really force herself. She does go to family's houses and she goes to [employer] weekly. She tries to avoid the store. She is not having any panic attacks. Her mom has some dementia so that is a bit of a stressor.      Past Medical History:   Diagnosis Date    Chronic obstructive pulmonary disease ([address]     Coronary artery disease     Dyslipidemia     Obesity     Pneumonia           Social History     Tobacco Use    Smoking status: Former     Packs/day: 1.50     Years: 50.00     Pack years: 75.00     Types: Cigarettes     Start date: 1/1/1970     Quit date: 5/16/2019     Years since quitting: 3.8    Smokeless tobacco: Never    Tobacco comments:     Yes I am a former smoker and I have been diagnosed with COPD   Substance Use Topics    Alcohol use: No     Current Outpatient Medications   Medication Sig Dispense Refill    aspirin 81 MG EC tablet Take 81 mg by mouth daily      zoster recombinant adjuvanted vaccine (SHINGRIX) 50 MCG/0.5ML SUSR injection Inject 0.5 mLs into the muscle once for 1 dose 1 each 1    carvedilol (COREG) 3.125 MG tablet TAKE 1 TABLET TWICE DAILY WITH MEALS 180 tablet 0    montelukast (SINGULAIR) 10 MG tablet TAKE 1 TABLET EVERY NIGHT 90 tablet 0    albuterol sulfate HFA (PROVENTIL;VENTOLIN;PROAIR) 108 (90 Base) MCG/ACT inhaler INHALE 2 PUFFS INTO THE LUNGS EVERY 4 HOURS AS NEEDED FOR WHEEZING OR SHORTNESS OF BREATH 3 each 0    TRELEGY ELLIPTA 100-62.5-25 MCG/INH AEPB INHALE 1 PUFF INTO THE LUNGS DAILY 1 each 11    Handicap Placard MISC by Does not apply route Expires: 7/26/2024  Dx: R23.89, G93.5 1 each 0    Misc. Devices (ADJUST BATH/SHOWER SEAT/BACK) MISC Use daily with showers 1 each 0    Misc. Devices (SUCTION GRAB BAR) MISC Use in the shower 2 each 0    loratadine (CLARITIN) 10 MG tablet Take 10 mg by mouth daily       No current facility-administered medications for this visit. Allergies   Allergen Reactions    Vicodin [Hydrocodone-Acetaminophen]        Subjective:     Review of Systems   Constitutional:  Negative for activity change, appetite change, chills, fatigue and fever. Eyes:  Negative for visual disturbance. Respiratory:  Negative for cough, chest tightness, shortness of breath and wheezing. Cardiovascular:  Negative for chest pain, palpitations and leg swelling. Gastrointestinal:  Negative for abdominal pain, constipation, diarrhea and nausea. Genitourinary:  Negative for difficulty urinating. Skin:  Negative for rash. Neurological:  Positive for headaches.  Negative for dizziness, syncope, weakness and light-headedness. Psychiatric/Behavioral:  Negative for decreased concentration, dysphoric mood and sleep disturbance. The patient is not nervous/anxious. Objective:      Physical Exam  Vitals and nursing note reviewed. Constitutional:       General: She is not in acute distress. Appearance: She is well-developed. Eyes:      Conjunctiva/sclera: Conjunctivae normal.   Neck:      Thyroid: No thyromegaly. Cardiovascular:      Rate and Rhythm: Normal rate and regular rhythm. Heart sounds: Normal heart sounds. No murmur heard. Pulmonary:      Effort: Pulmonary effort is normal. No respiratory distress. Breath sounds: Normal breath sounds. No wheezing. Musculoskeletal:      Cervical back: Normal range of motion and neck supple. Lymphadenopathy:      Cervical: No cervical adenopathy. Skin:     General: Skin is warm and dry. Findings: No erythema or rash. Neurological:      Mental Status: She is alert and oriented to person, place, and time. Psychiatric:         Mood and Affect: Mood normal.         Behavior: Behavior normal.         Thought Content: Thought content normal.         Judgment: Judgment normal.     /76 (Site: Right Upper Arm, Position: Sitting, Cuff Size: Large Adult)   Pulse 90   Temp 98.6 °F (37 °C) (Tympanic)   Resp 20   Ht 5' 6\" (1.676 m)   Wt 289 lb (131.1 kg)   LMP 08/01/2000 (Approximate)   SpO2 94%   BMI 46.65 kg/m²     Assessment:       Diagnosis Orders   1. Chronic obstructive pulmonary disease, unspecified COPD type ([facility]        2. Normal pressure hydrocephalus (HCC)        3. Moderate episode of recurrent major depressive disorder ([facility]        4. Need for shingles vaccine  zoster recombinant adjuvanted vaccine [facility]) 50 MCG/0.5ML SUSR injection      5. Breast cancer screening by mammogram  LEI POWER DIGITAL SCREEN BILATERAL      6. Dyslipidemia  Lipid Panel      7.  Impaired fasting glucose  Basic Metabolic Panel    Hemoglobin A1C Plan:       COPD: stable; she has been doing well on her trelegy with albuterol so I will continue it. NPH: stable; she is not having any issues recently with symptoms of increased fluid. She has occasionally headaches but nothing that lasts very long. Depression: stable; she has been doing very well and not having any recent issues so I will continue to monitor for signs of worsening. She is currently not on medication. Health Maintenance reviewed - mammogram ordered, patient to schedule appointment, labs ordered, script for shingles vaccine given. Return in about 10 months (around 12/28/2023) for [address] Orders Placed This Encounter   Procedures    LEI POWER DIGITAL SCREEN BILATERAL     Standing Status:   Future     Standing Expiration Date:   1/55/1549    Basic Metabolic Panel     Standing Status:   Future     Standing Expiration Date:   3/13/2024    Lipid Panel     Standing Status:   Future     Standing Expiration Date:   3/13/2024    Hemoglobin A1C     Standing Status:   Future     Standing Expiration Date:   3/13/2024       Orders Placed This Encounter   Medications    zoster recombinant adjuvanted vaccine [facility]) 50 MCG/0.5ML SUSR injection     Sig: Inject 0.5 mLs into the muscle once for 1 dose     Dispense:  1 each     Refill:  1         Patientgiven educational materials - see patient instructions. Discussed use, benefit,and side effects of prescribed medications. All patient questions answered. Ptvoiced understanding. Reviewed health maintenance. Instructed to continue currentmedications, diet and exercise. Patient agreed with treatment plan. Follow up asdirected.      Electronically signed by Fredi Figueroa MD on 3/13/2023 at 2:37 PM

## 2023-03-13 NOTE — PATIENT INSTRUCTIONS
Colace is a good stool softener  Miralax if getting constipated, safely moves things along and softens it. It takes 3 days to work.  This is safe to take forever  Omid Zamudio will work quickly if you're very constipated, but this is not safe to take regularly

## 2023-03-15 ENCOUNTER — HOSPITAL ENCOUNTER (OUTPATIENT)
Age: 73
Discharge: HOME | End: 2023-03-15
Payer: MEDICARE

## 2023-03-15 ENCOUNTER — HOSPITAL ENCOUNTER (OUTPATIENT)
Dept: CT IMAGING | Age: 73
Discharge: HOME | End: 2023-03-17
Payer: MEDICARE

## 2023-03-15 DIAGNOSIS — E78.5 DYSLIPIDEMIA: ICD-10-CM

## 2023-03-15 DIAGNOSIS — R73.01 IMPAIRED FASTING GLUCOSE: ICD-10-CM

## 2023-03-15 DIAGNOSIS — Z87.891 PERSONAL HISTORY OF TOBACCO USE: ICD-10-CM

## 2023-03-15 LAB
ANION GAP SERPL CALCULATED.3IONS-SCNC: 10 MMOL/L (ref 9–17)
BUN SERPL-MCNC: 12 MG/DL (ref 8–23)
BUN/CREAT BLD: 13 (ref 9–20)
CALCIUM SERPL-MCNC: 9.8 MG/DL (ref 8.6–10.4)
CHLORIDE SERPL-SCNC: 102 MMOL/L (ref 98–107)
CHOLEST SERPL-MCNC: 166 MG/DL
CHOLESTEROL/HDL RATIO: 3.7
CO2 SERPL-SCNC: 29 MMOL/L (ref 20–31)
CREAT SERPL-MCNC: 0.93 MG/DL (ref 0.5–0.9)
GFR SERPL CREATININE-BSD FRML MDRD: >60 ML/MIN/1.73M2
GLUCOSE SERPL-MCNC: 94 MG/DL (ref 70–99)
HDLC SERPL-MCNC: 45 MG/DL
LDLC SERPL CALC-MCNC: 92 MG/DL (ref 0–130)
POTASSIUM SERPL-SCNC: 4.3 MMOL/L (ref 3.7–5.3)
SODIUM SERPL-SCNC: 141 MMOL/L (ref 135–144)
TRIGL SERPL-MCNC: 143 MG/DL

## 2023-03-15 PROCEDURE — 80061 LIPID PANEL: CPT

## 2023-03-15 PROCEDURE — 83036 HEMOGLOBIN GLYCOSYLATED A1C: CPT

## 2023-03-15 PROCEDURE — 80048 BASIC METABOLIC PNL TOTAL CA: CPT

## 2023-03-15 PROCEDURE — 71271 CT THORAX LUNG CANCER SCR C-: CPT

## 2023-03-15 PROCEDURE — 36415 COLL VENOUS BLD VENIPUNCTURE: CPT

## 2023-03-16 LAB
EST. AVERAGE GLUCOSE BLD GHB EST-MCNC: 114 MG/DL
HBA1C MFR BLD: 5.6 % (ref 4–6)

## 2023-05-10 ENCOUNTER — OFFICE VISIT (OUTPATIENT)
Dept: PULMONOLOGY | Age: 73
End: 2023-05-10
Payer: MEDICARE

## 2023-05-10 VITALS
DIASTOLIC BLOOD PRESSURE: 64 MMHG | HEIGHT: 66 IN | WEIGHT: 283.2 LBS | HEART RATE: 72 BPM | TEMPERATURE: 98.1 F | SYSTOLIC BLOOD PRESSURE: 122 MMHG | BODY MASS INDEX: 45.51 KG/M2 | OXYGEN SATURATION: 93 %

## 2023-05-10 DIAGNOSIS — Z87.891 PERSONAL HISTORY OF TOBACCO USE: ICD-10-CM

## 2023-05-10 DIAGNOSIS — Z87.891 STOPPED SMOKING WITH GREATER THAN 40 PACK YEAR HISTORY: ICD-10-CM

## 2023-05-10 DIAGNOSIS — J44.9 STAGE 2 MODERATE COPD BY GOLD CLASSIFICATION (HCC): ICD-10-CM

## 2023-05-10 DIAGNOSIS — R91.8 MULTIPLE LUNG NODULES ON CT: Primary | ICD-10-CM

## 2023-05-10 DIAGNOSIS — E66.01 CLASS 3 SEVERE OBESITY DUE TO EXCESS CALORIES WITH SERIOUS COMORBIDITY AND BODY MASS INDEX (BMI) OF 45.0 TO 49.9 IN ADULT (HCC): ICD-10-CM

## 2023-05-10 PROCEDURE — G8417 CALC BMI ABV UP PARAM F/U: HCPCS | Performed by: INTERNAL MEDICINE

## 2023-05-10 PROCEDURE — 99213 OFFICE O/P EST LOW 20 MIN: CPT | Performed by: INTERNAL MEDICINE

## 2023-05-10 PROCEDURE — 1036F TOBACCO NON-USER: CPT | Performed by: INTERNAL MEDICINE

## 2023-05-10 PROCEDURE — 1090F PRES/ABSN URINE INCON ASSESS: CPT | Performed by: INTERNAL MEDICINE

## 2023-05-10 PROCEDURE — 1123F ACP DISCUSS/DSCN MKR DOCD: CPT | Performed by: INTERNAL MEDICINE

## 2023-05-10 PROCEDURE — G8400 PT W/DXA NO RESULTS DOC: HCPCS | Performed by: INTERNAL MEDICINE

## 2023-05-10 PROCEDURE — G8427 DOCREV CUR MEDS BY ELIG CLIN: HCPCS | Performed by: INTERNAL MEDICINE

## 2023-05-10 PROCEDURE — G0296 VISIT TO DETERM LDCT ELIG: HCPCS | Performed by: INTERNAL MEDICINE

## 2023-05-10 PROCEDURE — 3017F COLORECTAL CA SCREEN DOC REV: CPT | Performed by: INTERNAL MEDICINE

## 2023-05-10 PROCEDURE — 3023F SPIROM DOC REV: CPT | Performed by: INTERNAL MEDICINE

## 2023-05-10 ASSESSMENT — ENCOUNTER SYMPTOMS
SHORTNESS OF BREATH: 1
GASTROINTESTINAL NEGATIVE: 1
EYES NEGATIVE: 1

## 2023-05-10 NOTE — PROGRESS NOTES
Subjective:      Patient ID: Jyoti Clayton is a 81 y.o. female being seen in my clinic for   Chief Complaint   Patient presents with    Pulmonary Nodule     Annual follow up CT 3/18/23    COPD       HPI  Visit for stage II COPD and bilateral pulmonary nodules. Since her last visit a year ago her shortness of breath is baseline. Short of breath with mild to moderate exertion. Uses Trelegy 100 mcg and albuterol daily. Denies cough or sputum production. Exercise also limited by arthritic condition. Uses a platform cane. CT scan of the chest done last month category 2. Multiple bilateral tiny pulmonary nodules all stable in size, configuration, and distribution when compared to previous study. Radiologist reports some scattered bronchiolitis. I reviewed this study. Not clear whether this represents inflammation or scarring. Background changes of emphysema. Review of Systems   Constitutional: Negative. HENT: Negative. Eyes: Negative. Respiratory:  Positive for shortness of breath. Cardiovascular: Negative. Gastrointestinal: Negative. Musculoskeletal:  Positive for gait problem. All other systems reviewed and are negative.     Objective:     Vitals:    05/10/23 1017   BP: 122/64   Site: Right Upper Arm   Position: Sitting   Cuff Size: Large Adult   Pulse: 72   Temp: 98.1 °F (36.7 °C)   SpO2: 93%   Weight: 283 lb 3.2 oz (128.5 kg)   Height: 5' 6\" (1.676 m)     Current Outpatient Medications   Medication Sig Dispense Refill    TRELEGY ELLIPTA 100-62.5-25 MCG/ACT AEPB inhaler INHALE 1 PUFF INTO THE LUNGS DAILY 1 each 5    aspirin 81 MG EC tablet Take 1 tablet by mouth daily      carvedilol (COREG) 3.125 MG tablet TAKE 1 TABLET TWICE DAILY WITH MEALS 180 tablet 0    montelukast (SINGULAIR) 10 MG tablet TAKE 1 TABLET EVERY NIGHT 90 tablet 0    albuterol sulfate HFA (PROVENTIL;VENTOLIN;PROAIR) 108 (90 Base) MCG/ACT inhaler INHALE 2 PUFFS INTO THE LUNGS EVERY 4 HOURS AS NEEDED FOR WHEEZING

## 2023-05-19 ENCOUNTER — HOSPITAL ENCOUNTER (EMERGENCY)
Age: 73
Discharge: HOME | End: 2023-05-19
Attending: EMERGENCY MEDICINE
Payer: MEDICARE

## 2023-05-19 ENCOUNTER — APPOINTMENT (OUTPATIENT)
Dept: CT IMAGING | Age: 73
End: 2023-05-19
Payer: MEDICARE

## 2023-05-19 VITALS
TEMPERATURE: 100.3 F | BODY MASS INDEX: 44.03 KG/M2 | HEART RATE: 85 BPM | RESPIRATION RATE: 16 BRPM | OXYGEN SATURATION: 93 % | DIASTOLIC BLOOD PRESSURE: 69 MMHG | WEIGHT: 274 LBS | HEIGHT: 66 IN | SYSTOLIC BLOOD PRESSURE: 124 MMHG

## 2023-05-19 DIAGNOSIS — N76.2 CELLULITIS OF LABIA MAJORA: Primary | ICD-10-CM

## 2023-05-19 LAB
ANION GAP SERPL CALCULATED.3IONS-SCNC: 12 MMOL/L (ref 9–17)
BASOPHILS # BLD: 0.05 K/UL (ref 0–0.2)
BASOPHILS NFR BLD: 0 % (ref 0–2)
BUN SERPL-MCNC: 13 MG/DL (ref 8–23)
BUN/CREAT SERPL: 15 (ref 9–20)
CALCIUM SERPL-MCNC: 8.9 MG/DL (ref 8.6–10.4)
CHLORIDE SERPL-SCNC: 98 MMOL/L (ref 98–107)
CO2 SERPL-SCNC: 23 MMOL/L (ref 20–31)
CREAT SERPL-MCNC: 0.84 MG/DL (ref 0.5–0.9)
EOSINOPHIL # BLD: 0.32 K/UL (ref 0–0.44)
EOSINOPHILS RELATIVE PERCENT: 3 % (ref 1–4)
ERYTHROCYTE [DISTWIDTH] IN BLOOD BY AUTOMATED COUNT: 13.6 % (ref 11.8–14.4)
GFR SERPL CREATININE-BSD FRML MDRD: >60 ML/MIN/1.73M2
GLUCOSE SERPL-MCNC: 96 MG/DL (ref 70–99)
HCT VFR BLD AUTO: 43.2 % (ref 36.3–47.1)
HGB BLD-MCNC: 14.2 G/DL (ref 11.9–15.1)
IMM GRANULOCYTES # BLD AUTO: 0.04 K/UL (ref 0–0.3)
IMM GRANULOCYTES NFR BLD: 0 %
LACTATE BLDV-SCNC: 1.3 MMOL/L (ref 0.5–2.2)
LYMPHOCYTES # BLD: 9 % (ref 24–43)
LYMPHOCYTES NFR BLD: 1.01 K/UL (ref 1.1–3.7)
MCH RBC QN AUTO: 29.6 PG (ref 25.2–33.5)
MCHC RBC AUTO-ENTMCNC: 32.9 G/DL (ref 25.2–33.5)
MCV RBC AUTO: 90.2 FL (ref 82.6–102.9)
MONOCYTES NFR BLD: 1.08 K/UL (ref 0.1–1.2)
MONOCYTES NFR BLD: 10 % (ref 3–12)
NEUTROPHILS NFR BLD: 78 % (ref 36–65)
NEUTS SEG NFR BLD: 8.76 K/UL (ref 1.5–8.1)
NRBC AUTOMATED: 0 PER 100 WBC
PLATELET # BLD AUTO: 258 K/UL (ref 138–453)
PMV BLD AUTO: 10.7 FL (ref 8.1–13.5)
POTASSIUM SERPL-SCNC: 4.1 MMOL/L (ref 3.7–5.3)
RBC # BLD AUTO: 4.79 M/UL (ref 3.95–5.11)
SODIUM SERPL-SCNC: 133 MMOL/L (ref 135–144)
WBC OTHER # BLD: 11.3 K/UL (ref 3.5–11.3)

## 2023-05-19 PROCEDURE — 6360000002 HC RX W HCPCS: Performed by: EMERGENCY MEDICINE

## 2023-05-19 PROCEDURE — 96365 THER/PROPH/DIAG IV INF INIT: CPT

## 2023-05-19 PROCEDURE — 2709999900 CT PELVIS W CONTRAST

## 2023-05-19 PROCEDURE — 83605 ASSAY OF LACTIC ACID: CPT

## 2023-05-19 PROCEDURE — 2580000003 HC RX 258: Performed by: EMERGENCY MEDICINE

## 2023-05-19 PROCEDURE — 96376 TX/PRO/DX INJ SAME DRUG ADON: CPT

## 2023-05-19 PROCEDURE — 96375 TX/PRO/DX INJ NEW DRUG ADDON: CPT

## 2023-05-19 PROCEDURE — 99285 EMERGENCY DEPT VISIT HI MDM: CPT

## 2023-05-19 PROCEDURE — 56405 I&D VULVA/PERINEAL ABSCESS: CPT

## 2023-05-19 PROCEDURE — 36415 COLL VENOUS BLD VENIPUNCTURE: CPT

## 2023-05-19 PROCEDURE — 85025 COMPLETE CBC W/AUTO DIFF WBC: CPT

## 2023-05-19 PROCEDURE — 80048 BASIC METABOLIC PNL TOTAL CA: CPT

## 2023-05-19 PROCEDURE — 6360000004 HC RX CONTRAST MEDICATION: Performed by: EMERGENCY MEDICINE

## 2023-05-19 RX ORDER — 0.9 % SODIUM CHLORIDE 0.9 %
500 INTRAVENOUS SOLUTION INTRAVENOUS ONCE
Status: COMPLETED | OUTPATIENT
Start: 2023-05-19 | End: 2023-05-19

## 2023-05-19 RX ORDER — IBUPROFEN 800 MG/1
800 TABLET ORAL EVERY 8 HOURS PRN
Qty: 15 TABLET | Refills: 0 | Status: SHIPPED | OUTPATIENT
Start: 2023-05-19 | End: 2023-05-24

## 2023-05-19 RX ORDER — FENTANYL CITRATE 50 UG/ML
50 INJECTION, SOLUTION INTRAMUSCULAR; INTRAVENOUS ONCE
Status: COMPLETED | OUTPATIENT
Start: 2023-05-19 | End: 2023-05-19

## 2023-05-19 RX ORDER — KETOROLAC TROMETHAMINE 30 MG/ML
15 INJECTION, SOLUTION INTRAMUSCULAR; INTRAVENOUS ONCE
Status: COMPLETED | OUTPATIENT
Start: 2023-05-19 | End: 2023-05-19

## 2023-05-19 RX ORDER — CLINDAMYCIN HYDROCHLORIDE 300 MG/1
300 CAPSULE ORAL 3 TIMES DAILY
Qty: 27 CAPSULE | Refills: 0 | Status: SHIPPED | OUTPATIENT
Start: 2023-05-19 | End: 2023-05-28

## 2023-05-19 RX ORDER — FENTANYL CITRATE 50 UG/ML
25 INJECTION, SOLUTION INTRAMUSCULAR; INTRAVENOUS ONCE
Status: COMPLETED | OUTPATIENT
Start: 2023-05-19 | End: 2023-05-19

## 2023-05-19 RX ADMIN — FENTANYL CITRATE 25 MCG: 50 INJECTION, SOLUTION INTRAMUSCULAR; INTRAVENOUS at 17:26

## 2023-05-19 RX ADMIN — SODIUM CHLORIDE 500 ML: 9 INJECTION, SOLUTION INTRAVENOUS at 16:10

## 2023-05-19 RX ADMIN — VANCOMYCIN HYDROCHLORIDE 1000 MG: 1 INJECTION, POWDER, LYOPHILIZED, FOR SOLUTION INTRAVENOUS at 16:16

## 2023-05-19 RX ADMIN — FENTANYL CITRATE 50 MCG: 50 INJECTION, SOLUTION INTRAMUSCULAR; INTRAVENOUS at 16:14

## 2023-05-19 RX ADMIN — KETOROLAC TROMETHAMINE 15 MG: 30 INJECTION, SOLUTION INTRAMUSCULAR; INTRAVENOUS at 16:13

## 2023-05-19 RX ADMIN — IOPAMIDOL 75 ML: 755 INJECTION, SOLUTION INTRAVENOUS at 16:31

## 2023-05-19 ASSESSMENT — PAIN DESCRIPTION - LOCATION: LOCATION: GROIN

## 2023-05-19 ASSESSMENT — PAIN SCALES - GENERAL
PAINLEVEL_OUTOF10: 8
PAINLEVEL_OUTOF10: 2
PAINLEVEL_OUTOF10: 5
PAINLEVEL_OUTOF10: 8

## 2023-05-19 ASSESSMENT — PAIN DESCRIPTION - PAIN TYPE: TYPE: ACUTE PAIN

## 2023-05-19 ASSESSMENT — PAIN DESCRIPTION - ONSET: ONSET: ON-GOING

## 2023-05-19 ASSESSMENT — LIFESTYLE VARIABLES
HOW OFTEN DO YOU HAVE A DRINK CONTAINING ALCOHOL: NEVER
HOW MANY STANDARD DRINKS CONTAINING ALCOHOL DO YOU HAVE ON A TYPICAL DAY: PATIENT DOES NOT DRINK

## 2023-05-19 ASSESSMENT — PAIN DESCRIPTION - FREQUENCY: FREQUENCY: CONTINUOUS

## 2023-05-19 ASSESSMENT — PAIN DESCRIPTION - DESCRIPTORS: DESCRIPTORS: ACHING

## 2023-05-19 ASSESSMENT — PAIN - FUNCTIONAL ASSESSMENT: PAIN_FUNCTIONAL_ASSESSMENT: 0-10

## 2023-05-19 NOTE — ED PROVIDER NOTES
[facility] ED  [address]  [place] [address]  Phone: [phone] ED  EMERGENCY DEPARTMENT ENCOUNTER      Pt Name: Caitie Rosenberg  MRN: [id number]  [place] 1950  Date of evaluation: 5/19/2023  Provider: Brenda Mendez DO    CHIEF COMPLAINT       Chief Complaint   Patient presents with    Abscess         HISTORY OF PRESENT ILLNESS   (Location/Symptom, Timing/Onset,Context/Setting, Quality, Duration, Modifying Factors, Severity)  Note limiting factors. Caitie Rosenberg is a 81 y.o. female who presents to the emergency department for the evaluation of an abscess in the pelvic region. Patient reports that this has been there for at least a week. She reports that it opened and started draining on Tuesday. She had foul-smelling odor and continuous drainage since that time. Reports some fever/chills at home during the week. No vomiting or diarrhea. She is not diabetic. No history of abscess in this particular place before. Nursing Notes were reviewed. REVIEW OF SYSTEMS    (2-9systems for level 4, 10 or more for level 5)     Review of Systems   Constitutional:  Negative for fever. Genitourinary:         Left labial abscess     Except asnoted above the remainder of the review of systems was reviewed and negative.        PAST MEDICAL HISTORY     Past Medical History:   Diagnosis Date    Chronic obstructive pulmonary disease ([address]     Coronary artery disease     Dyslipidemia     Obesity     Pneumonia          SURGICAL HISTORY       Past Surgical History:   Procedure Laterality Date    CARDIAC CATHETERIZATION      CARDIAC CATHETERIZATION  2012    cath with x1 stent    EYE SURGERY      shar cataract surg x2 y ago    VENTRICULOPERITONEAL SHUNT Right 5/16/2019    VENTRICULAR PERITONEAL SHUNT INSERTION ENDOSCOPIC performed by Leah Du MD at [facility] CAPSULOTOMY Bilateral 01/04/2022    Dr. Paul Santizo.          Evangelina Mendez

## 2023-05-24 ENCOUNTER — OFFICE VISIT (OUTPATIENT)
Dept: FAMILY MEDICINE CLINIC | Age: 73
End: 2023-05-24
Payer: MEDICARE

## 2023-05-24 VITALS
RESPIRATION RATE: 18 BRPM | OXYGEN SATURATION: 95 % | DIASTOLIC BLOOD PRESSURE: 76 MMHG | WEIGHT: 289 LBS | HEIGHT: 66 IN | BODY MASS INDEX: 46.45 KG/M2 | SYSTOLIC BLOOD PRESSURE: 136 MMHG | HEART RATE: 78 BPM

## 2023-05-24 DIAGNOSIS — N76.2 CELLULITIS OF LABIA MAJORA: Primary | ICD-10-CM

## 2023-05-24 PROCEDURE — 99212 OFFICE O/P EST SF 10 MIN: CPT

## 2023-05-24 NOTE — PATIENT INSTRUCTIONS
Continue with clindamycin until gone  Monitor for worsening signs of infection such as redness, drainage or worsening swelling of area.

## 2023-05-24 NOTE — PROGRESS NOTES
Subjective:      Patient ID: Amada Reyna is a 81 y.o. female coming in for   Chief Complaint   Patient presents with    Abscess     ER follow up for an abscess that she has in her pelvic area. Previously was having drainage but does not think she is currently. No trouble urinating. She is taking an ATB      HPI  Presents to office for ER follow up. Pt evaluated and treated at [facility] ER for cellulitis of labial majora. Given IV vancomycin in ER, discharged home on Clindamycin 300mg TID x 9 days. Tolerating it well. Reports swelling to abscessed area significantly improving, drainage has subsided, denies fevers/chills. Denies any issues urinating. Pain has almost subsided. She is taking motrin prn. Review of Systems   Constitutional:  Negative for chills and fever. Objective:/76   Pulse 78   Resp 18   Ht 5' 6\" (1.676 m)   Wt 289 lb (131.1 kg)   LMP 08/01/2000 (Approximate)   SpO2 95%   BMI 46.65 kg/m²      Physical Exam  Vitals and nursing note reviewed. Constitutional:       Appearance: Normal appearance. She is obese. HENT:      Head: Normocephalic. Pulmonary:      Effort: Pulmonary effort is normal.   Genitourinary:     Comments: deferred  Neurological:      General: No focal deficit present. Mental Status: She is alert and oriented to person, place, and time. Assessment:      1. Cellulitis of labia majora           Plan:    -exam deferred today, due to subjective symptoms significantly improving.   -pt to complete clindamycin script  -encouraged probiotic use and or live culture yogurt  -signs and symptoms of worsening abscess reviewed  -f/u as needed. No orders of the defined types were placed in this encounter.      Outpatient Encounter Medications as of 5/24/2023   Medication Sig Dispense Refill    clindamycin (CLEOCIN) 300 MG capsule Take 1 capsule by mouth 3 times daily for 9 days 27 capsule 0    ibuprofen (ADVIL;MOTRIN) 800 MG tablet Take 1 tablet by

## 2023-07-31 DIAGNOSIS — I25.10 CORONARY ARTERY DISEASE INVOLVING NATIVE CORONARY ARTERY OF NATIVE HEART WITHOUT ANGINA PECTORIS: ICD-10-CM

## 2023-07-31 RX ORDER — CARVEDILOL 3.12 MG/1
TABLET ORAL
Qty: 180 TABLET | Refills: 1 | Status: SHIPPED | OUTPATIENT
Start: 2023-07-31

## 2023-07-31 RX ORDER — MONTELUKAST SODIUM 10 MG/1
TABLET ORAL
Qty: 90 TABLET | Refills: 1 | Status: SHIPPED | OUTPATIENT
Start: 2023-07-31

## 2023-07-31 NOTE — TELEPHONE ENCOUNTER
Elease Hanson called requesting a refill of the below medication which has been pended for you:     Requested Prescriptions     Pending Prescriptions Disp Refills    carvedilol (COREG) 3.125 MG tablet [Pharmacy Med Name: CARVEDILOL 3.125 MG Tablet] 180 tablet 1     Sig: TAKE 1 TABLET TWICE DAILY WITH MEALS    montelukast (SINGULAIR) 10 MG tablet [Pharmacy Med Name: MONTELUKAST SODIUM 10 MG Tablet] 90 tablet 1     Sig: TAKE 1 TABLET EVERY NIGHT       Last Appointment Date: 3/13/2023  Next Appointment Date: 12/29/2023    Allergies   Allergen Reactions    Vicodin [Hydrocodone-Acetaminophen]

## 2023-08-09 ENCOUNTER — OFFICE VISIT (OUTPATIENT)
Dept: NEUROLOGY | Age: 73
End: 2023-08-09
Payer: MEDICARE

## 2023-08-09 VITALS
DIASTOLIC BLOOD PRESSURE: 80 MMHG | SYSTOLIC BLOOD PRESSURE: 130 MMHG | HEART RATE: 64 BPM | WEIGHT: 282 LBS | HEIGHT: 67 IN | BODY MASS INDEX: 44.26 KG/M2 | OXYGEN SATURATION: 91 %

## 2023-08-09 DIAGNOSIS — G45.0 VBI (VERTEBROBASILAR INSUFFICIENCY): ICD-10-CM

## 2023-08-09 DIAGNOSIS — G93.5 CHIARI MALFORMATION TYPE I (HCC): ICD-10-CM

## 2023-08-09 DIAGNOSIS — R93.0 ABNORMAL CT OF THE HEAD: ICD-10-CM

## 2023-08-09 DIAGNOSIS — Z98.2 S/P VP SHUNT: ICD-10-CM

## 2023-08-09 DIAGNOSIS — R51.9 OCCIPITAL HEADACHE: ICD-10-CM

## 2023-08-09 DIAGNOSIS — I67.82 CHRONIC CEREBRAL ISCHEMIA: ICD-10-CM

## 2023-08-09 DIAGNOSIS — E66.01 CLASS 2 SEVERE OBESITY DUE TO EXCESS CALORIES WITH SERIOUS COMORBIDITY IN ADULT, UNSPECIFIED BMI (HCC): ICD-10-CM

## 2023-08-09 DIAGNOSIS — G44.219 EPISODIC TENSION-TYPE HEADACHE, NOT INTRACTABLE: ICD-10-CM

## 2023-08-09 DIAGNOSIS — G31.9 ACQUIRED CEREBRAL ATROPHY (HCC): ICD-10-CM

## 2023-08-09 DIAGNOSIS — R26.89 BALANCE PROBLEM: ICD-10-CM

## 2023-08-09 DIAGNOSIS — R42 DIZZINESS: ICD-10-CM

## 2023-08-09 DIAGNOSIS — G91.2 NORMAL PRESSURE HYDROCEPHALUS (HCC): ICD-10-CM

## 2023-08-09 DIAGNOSIS — G93.89 CEREBRAL VENTRICULOMEGALY: Primary | ICD-10-CM

## 2023-08-09 PROCEDURE — 3017F COLORECTAL CA SCREEN DOC REV: CPT | Performed by: PSYCHIATRY & NEUROLOGY

## 2023-08-09 PROCEDURE — G8417 CALC BMI ABV UP PARAM F/U: HCPCS | Performed by: PSYCHIATRY & NEUROLOGY

## 2023-08-09 PROCEDURE — 1123F ACP DISCUSS/DSCN MKR DOCD: CPT | Performed by: PSYCHIATRY & NEUROLOGY

## 2023-08-09 PROCEDURE — 99213 OFFICE O/P EST LOW 20 MIN: CPT | Performed by: PSYCHIATRY & NEUROLOGY

## 2023-08-09 PROCEDURE — G8400 PT W/DXA NO RESULTS DOC: HCPCS | Performed by: PSYCHIATRY & NEUROLOGY

## 2023-08-09 PROCEDURE — 99214 OFFICE O/P EST MOD 30 MIN: CPT | Performed by: PSYCHIATRY & NEUROLOGY

## 2023-08-09 PROCEDURE — G8427 DOCREV CUR MEDS BY ELIG CLIN: HCPCS | Performed by: PSYCHIATRY & NEUROLOGY

## 2023-08-09 PROCEDURE — 1036F TOBACCO NON-USER: CPT | Performed by: PSYCHIATRY & NEUROLOGY

## 2023-08-09 PROCEDURE — 1090F PRES/ABSN URINE INCON ASSESS: CPT | Performed by: PSYCHIATRY & NEUROLOGY

## 2023-08-09 ASSESSMENT — ENCOUNTER SYMPTOMS
APNEA: 0
EYE REDNESS: 0
BOWEL INCONTINENCE: 0
SHORTNESS OF BREATH: 0
VOICE CHANGE: 0
SINUS PRESSURE: 0
COLOR CHANGE: 0
BLOOD IN STOOL: 0
FACIAL SWELLING: 0
PHOTOPHOBIA: 1
WHEEZING: 0
DIARRHEA: 0
ABDOMINAL DISTENTION: 0
EYE DISCHARGE: 0
EYE PAIN: 0
CHOKING: 0
CONSTIPATION: 0
CHEST TIGHTNESS: 0
TROUBLE SWALLOWING: 0
BACK PAIN: 0
SCALP TENDERNESS: 0
BLURRED VISION: 0
EYE ITCHING: 0
EYE WATERING: 0
FACIAL SWEATING: 0

## 2023-08-09 NOTE — PROGRESS NOTES
[facility]  Neurology  [address], [place]  [facility]:[phone]   Fax: [phone]        SUBJECTIVE:       PATIENT ID:  Wes Cerda is a  RIGHT   HANDED 81 y.o. female. Dizziness  This is a recurrent problem. Episode onset: SINCE   EARLY  APRIL 2019. The problem occurs intermittently. The problem has been resolved. Associated symptoms include headaches. The symptoms are aggravated by bending and standing. She has tried rest and sleep (  SHUNT  PLACEMENT) for the symptoms. The treatment provided significant relief. Headache   This is a chronic problem. Episode onset: SINCE  APRIL 2019. The problem occurs intermittently. The problem has been waxing and waning. The pain is located in the Occipital and frontal region. The pain does not radiate. The pain quality is not similar to prior headaches. The quality of the pain is described as aching and throbbing. The pain is at a severity of 3/10. The pain is mild. Associated symptoms include dizziness, a loss of balance, phonophobia and photophobia. Pertinent negatives include no abnormal behavior, back pain, blurred vision, drainage, ear pain, eye pain, eye redness, eye watering, facial sweating, hearing loss, insomnia, muscle aches, scalp tenderness, seizures, sinus pressure, tingling, tinnitus or weight loss. The symptoms are aggravated by coughing, bright light and noise. She has tried acetaminophen for the symptoms. The treatment provided significant relief. Her past medical history is significant for obesity and sinus disease. There is no history of cancer, cluster headaches, hypertension, immunosuppression, migraine headaches, migraines in the family, pseudotumor cerebri, recent head traumas or TMJ. Neurologic Problem  The patient's primary symptoms include clumsiness, a loss of balance and memory loss. The patient's pertinent negatives include no syncope. This is a chronic problem.  The neurological

## 2023-09-11 RX ORDER — FLUTICASONE FUROATE, UMECLIDINIUM BROMIDE AND VILANTEROL TRIFENATATE 100; 62.5; 25 UG/1; UG/1; UG/1
POWDER RESPIRATORY (INHALATION)
Qty: 3 EACH | Refills: 3 | Status: SHIPPED | OUTPATIENT
Start: 2023-09-11

## 2024-02-16 ENCOUNTER — OFFICE VISIT (OUTPATIENT)
Dept: FAMILY MEDICINE CLINIC | Age: 74
End: 2024-02-16
Payer: MEDICARE

## 2024-02-16 ENCOUNTER — HOSPITAL ENCOUNTER (OUTPATIENT)
Age: 74
Discharge: HOME | End: 2024-02-16
Payer: MEDICARE

## 2024-02-16 VITALS
HEART RATE: 75 BPM | OXYGEN SATURATION: 95 % | SYSTOLIC BLOOD PRESSURE: 124 MMHG | DIASTOLIC BLOOD PRESSURE: 80 MMHG | BODY MASS INDEX: 45.42 KG/M2 | HEIGHT: 67 IN | WEIGHT: 289.4 LBS

## 2024-02-16 DIAGNOSIS — R42 DIZZINESS: ICD-10-CM

## 2024-02-16 DIAGNOSIS — J44.9 CHRONIC OBSTRUCTIVE PULMONARY DISEASE, UNSPECIFIED COPD TYPE (HCC): ICD-10-CM

## 2024-02-16 DIAGNOSIS — Z12.11 SCREENING FOR COLON CANCER: ICD-10-CM

## 2024-02-16 DIAGNOSIS — G91.2 NORMAL PRESSURE HYDROCEPHALUS (HCC): ICD-10-CM

## 2024-02-16 DIAGNOSIS — F33.1 MODERATE EPISODE OF RECURRENT MAJOR DEPRESSIVE DISORDER (HCC): ICD-10-CM

## 2024-02-16 DIAGNOSIS — Z00.00 MEDICARE ANNUAL WELLNESS VISIT, SUBSEQUENT: Primary | ICD-10-CM

## 2024-02-16 LAB
ALBUMIN SERPL-MCNC: 3.8 G/DL (ref 3.5–5.2)
ALBUMIN/GLOB SERPL: 1 {RATIO} (ref 1–2.5)
ALP SERPL-CCNC: 98 U/L (ref 35–104)
ALT SERPL-CCNC: 9 U/L (ref 5–33)
ANION GAP SERPL CALCULATED.3IONS-SCNC: 7 MMOL/L (ref 9–17)
AST SERPL-CCNC: 13 U/L
BASOPHILS # BLD: 0.06 K/UL (ref 0–0.2)
BASOPHILS NFR BLD: 1 % (ref 0–2)
BILIRUB SERPL-MCNC: 0.6 MG/DL (ref 0.3–1.2)
BUN SERPL-MCNC: 16 MG/DL (ref 8–23)
BUN/CREAT SERPL: 18 (ref 9–20)
CALCIUM SERPL-MCNC: 9.1 MG/DL (ref 8.6–10.4)
CHLORIDE SERPL-SCNC: 101 MMOL/L (ref 98–107)
CO2 SERPL-SCNC: 28 MMOL/L (ref 20–31)
CREAT SERPL-MCNC: 0.9 MG/DL (ref 0.5–0.9)
EOSINOPHIL # BLD: 0.49 K/UL (ref 0–0.44)
EOSINOPHILS RELATIVE PERCENT: 6 % (ref 1–4)
ERYTHROCYTE [DISTWIDTH] IN BLOOD BY AUTOMATED COUNT: 12.9 % (ref 11.8–14.4)
GFR SERPL CREATININE-BSD FRML MDRD: >60 ML/MIN/1.73M2
GLUCOSE SERPL-MCNC: 93 MG/DL (ref 70–99)
HCT VFR BLD AUTO: 46.2 % (ref 36.3–47.1)
HGB BLD-MCNC: 14.9 G/DL (ref 11.9–15.1)
IMM GRANULOCYTES # BLD AUTO: <0.03 K/UL (ref 0–0.3)
IMM GRANULOCYTES NFR BLD: 0 %
LYMPHOCYTES NFR BLD: 1.28 K/UL (ref 1.1–3.7)
LYMPHOCYTES RELATIVE PERCENT: 15 % (ref 24–43)
MCH RBC QN AUTO: 30.2 PG (ref 25.2–33.5)
MCHC RBC AUTO-ENTMCNC: 32.3 G/DL (ref 25.2–33.5)
MCV RBC AUTO: 93.7 FL (ref 82.6–102.9)
MONOCYTES NFR BLD: 0.91 K/UL (ref 0.1–1.2)
MONOCYTES NFR BLD: 11 % (ref 3–12)
NEUTROPHILS NFR BLD: 67 % (ref 36–65)
NEUTS SEG NFR BLD: 5.61 K/UL (ref 1.5–8.1)
NRBC BLD-RTO: 0 PER 100 WBC
PLATELET # BLD AUTO: 201 K/UL (ref 138–453)
PMV BLD AUTO: 11.8 FL (ref 8.1–13.5)
POTASSIUM SERPL-SCNC: 4.5 MMOL/L (ref 3.7–5.3)
PROT SERPL-MCNC: 7.6 G/DL (ref 6.4–8.3)
RBC # BLD AUTO: 4.93 M/UL (ref 3.95–5.11)
SODIUM SERPL-SCNC: 136 MMOL/L (ref 135–144)
WBC OTHER # BLD: 8.4 K/UL (ref 3.5–11.3)

## 2024-02-16 PROCEDURE — G0439 PPPS, SUBSEQ VISIT: HCPCS | Performed by: FAMILY MEDICINE

## 2024-02-16 PROCEDURE — 36415 COLL VENOUS BLD VENIPUNCTURE: CPT

## 2024-02-16 PROCEDURE — 3017F COLORECTAL CA SCREEN DOC REV: CPT | Performed by: FAMILY MEDICINE

## 2024-02-16 PROCEDURE — 1090F PRES/ABSN URINE INCON ASSESS: CPT | Performed by: FAMILY MEDICINE

## 2024-02-16 PROCEDURE — 80053 COMPREHEN METABOLIC PANEL: CPT

## 2024-02-16 PROCEDURE — G8484 FLU IMMUNIZE NO ADMIN: HCPCS | Performed by: FAMILY MEDICINE

## 2024-02-16 PROCEDURE — G8400 PT W/DXA NO RESULTS DOC: HCPCS | Performed by: FAMILY MEDICINE

## 2024-02-16 PROCEDURE — 85025 COMPLETE CBC W/AUTO DIFF WBC: CPT

## 2024-02-16 PROCEDURE — G8427 DOCREV CUR MEDS BY ELIG CLIN: HCPCS | Performed by: FAMILY MEDICINE

## 2024-02-16 PROCEDURE — G8417 CALC BMI ABV UP PARAM F/U: HCPCS | Performed by: FAMILY MEDICINE

## 2024-02-16 PROCEDURE — 3023F SPIROM DOC REV: CPT | Performed by: FAMILY MEDICINE

## 2024-02-16 PROCEDURE — 1036F TOBACCO NON-USER: CPT | Performed by: FAMILY MEDICINE

## 2024-02-16 PROCEDURE — 1123F ACP DISCUSS/DSCN MKR DOCD: CPT | Performed by: FAMILY MEDICINE

## 2024-02-16 PROCEDURE — 99214 OFFICE O/P EST MOD 30 MIN: CPT | Performed by: FAMILY MEDICINE

## 2024-02-16 NOTE — PATIENT INSTRUCTIONS
in fiber can reduce your cholesterol and provide important vitamins and minerals. High-fiber foods include whole-grain cereals and breads, oatmeal, beans, brown rice, citrus fruits, and apples.     Eat lean proteins. Heart-healthy proteins include seafood, lean meats and poultry, eggs, beans, peas, nuts, seeds, and soy products.     Limit drinks and foods with added sugar. These include candy, desserts, and soda pop.   Lifestyle changes    If your doctor recommends it, get more exercise. Walking is a good choice. Bit by bit, increase the amount you walk every day. Try for at least 30 minutes on most days of the week. You also may want to swim, bike, or do other activities.     Do not smoke. If you need help quitting, talk to your doctor about stop-smoking programs and medicines. These can increase your chances of quitting for good. Quitting smoking may be the most important step you can take to protect your heart. It is never too late to quit.     Limit alcohol to 2 drinks a day for men and 1 drink a day for women. Too much alcohol can cause health problems.     Manage other health problems such as diabetes, high blood pressure, and high cholesterol. If you think you may have a problem with alcohol or drug use, talk to your doctor.   Medicines    Take your medicines exactly as prescribed. Call your doctor if you think you are having a problem with your medicine.     If your doctor recommends aspirin, take the amount directed each day. Make sure you take aspirin and not another kind of pain reliever, such as acetaminophen (Tylenol).   When should you call for help?   Call 911 if you have symptoms of a heart attack. These may include:    Chest pain or pressure, or a strange feeling in the chest.     Sweating.     Shortness of breath.     Pain, pressure, or a strange feeling in the back, neck, jaw, or upper belly or in one or both shoulders or arms.     Lightheadedness or sudden weakness.     A fast or irregular

## 2024-02-16 NOTE — PROGRESS NOTES
Medicare Annual Wellness Visit    Rosi Davis is here for Medicare AWV    Assessment & Plan   Medicare annual wellness visit, subsequent  Chronic obstructive pulmonary disease, unspecified COPD type (HCC)  -     Handicap Placard MISC; Starting Fri 2/16/2024, Disp-1 each, R-0, PrintExp 2/1/2029  Moderate episode of recurrent major depressive disorder (HCC)  Dizziness  -     Comprehensive Metabolic Panel; Future  -     CBC with Auto Differential; Future  Body mass index (BMI) 45.0-49.9, adult (HCC)  Normal pressure hydrocephalus (HCC)  -     Handicap Placard MISC; Starting Fri 2/16/2024, Disp-1 each, R-0, PrintExp 2/1/2029  Screening for colon cancer  -     Fecal DNA Colorectal cancer screening (Cologuard)    Recommendations for Preventive Services Due: see orders and patient instructions/AVS.  Recommended screening schedule for the next 5-10 years is provided to the patient in written form: see Patient Instructions/AVS.     Return in about 6 months (around 8/16/2024) for COPD follow up, Depression follow up.     Subjective   The following acute and/or chronic problems were also addressed today:  Copd, depression, dizziness    Patient's complete Health Risk Assessment and screening values have been reviewed and are found in Flowsheets. The following problems were reviewed today and where indicated follow up appointments were made and/or referrals ordered.    Positive Risk Factor Screenings with Interventions:    Fall Risk:  Do you feel unsteady or are you worried about falling? : (!) yes (Patient states cane helps)  2 or more falls in past year?: no  Fall with injury in past year?: no     Interventions:    Reviewed medications, home hazards, visual acuity, and co-morbidities that can increase risk for falls  I recommended PT, she is thinking about it             Activity, Diet, and Weight:  On average, how many days per week do you engage in moderate to strenuous exercise (like a brisk walk)?: 0 days  On average, 
dysphoric mood.      Objective:      Physical Exam  Vitals and nursing note reviewed.   Constitutional:       General: She is not in acute distress.     Appearance: Normal appearance. She is well-developed.   HENT:      Head: Normocephalic and atraumatic.      Right Ear: Tympanic membrane, ear canal and external ear normal.      Left Ear: Tympanic membrane, ear canal and external ear normal.      Nose: Nose normal.      Mouth/Throat:      Mouth: Mucous membranes are moist.      Pharynx: Oropharynx is clear.   Eyes:      Conjunctiva/sclera: Conjunctivae normal.      Pupils: Pupils are equal, round, and reactive to light.   Neck:      Thyroid: No thyromegaly.   Cardiovascular:      Rate and Rhythm: Normal rate and regular rhythm.      Heart sounds: Normal heart sounds. No murmur heard.  Pulmonary:      Effort: Pulmonary effort is normal. No respiratory distress.      Breath sounds: Normal breath sounds. No wheezing or rales.   Abdominal:      General: Abdomen is flat. Bowel sounds are normal. There is no distension.      Palpations: Abdomen is soft.      Tenderness: There is no abdominal tenderness. There is no guarding.   Musculoskeletal:         General: Normal range of motion.      Cervical back: Normal range of motion and neck supple.   Lymphadenopathy:      Cervical: No cervical adenopathy.   Skin:     General: Skin is warm and dry.      Findings: No rash.   Neurological:      Mental Status: She is alert and oriented to person, place, and time.   Psychiatric:         Behavior: Behavior normal.         Judgment: Judgment normal.       /80 (Site: Left Upper Arm, Position: Sitting, Cuff Size: Large Adult)   Pulse 75   Ht 1.689 m (5' 6.5\")   Wt 131.3 kg (289 lb 6.4 oz)   LMP 08/01/2000 (Approximate)   SpO2 95%   BMI 46.01 kg/m²     Assessment:       Diagnosis Orders   1. Medicare annual wellness visit, subsequent        2. Chronic obstructive pulmonary disease, unspecified COPD type (HCC)  Handicap

## 2024-03-06 DIAGNOSIS — I25.10 CORONARY ARTERY DISEASE INVOLVING NATIVE CORONARY ARTERY OF NATIVE HEART WITHOUT ANGINA PECTORIS: ICD-10-CM

## 2024-03-06 RX ORDER — MONTELUKAST SODIUM 10 MG/1
TABLET ORAL
Qty: 90 TABLET | Refills: 3 | Status: SHIPPED | OUTPATIENT
Start: 2024-03-06

## 2024-03-06 RX ORDER — CARVEDILOL 3.12 MG/1
TABLET ORAL
Qty: 180 TABLET | Refills: 3 | Status: SHIPPED | OUTPATIENT
Start: 2024-03-06

## 2024-03-06 NOTE — TELEPHONE ENCOUNTER
Rosi called requesting a refill of the below medication which has been pended for you:     Requested Prescriptions     Pending Prescriptions Disp Refills    carvedilol (COREG) 3.125 MG tablet [Pharmacy Med Name: CARVEDILOL 3.125 MG Tablet] 180 tablet 3     Sig: TAKE 1 TABLET TWICE DAILY WITH MEALS    montelukast (SINGULAIR) 10 MG tablet [Pharmacy Med Name: MONTELUKAST SODIUM 10 MG Tablet] 90 tablet 3     Sig: TAKE 1 TABLET EVERY NIGHT       Last Appointment Date: 2/16/2024  Next Appointment Date: 8/16/2024    Allergies   Allergen Reactions    Vicodin [Hydrocodone-Acetaminophen]

## 2024-03-12 ENCOUNTER — TELEPHONE (OUTPATIENT)
Dept: ONCOLOGY | Age: 74
End: 2024-03-12

## 2024-03-27 ENCOUNTER — HOSPITAL ENCOUNTER (OUTPATIENT)
Dept: CT IMAGING | Age: 74
Discharge: HOME | End: 2024-03-29
Attending: INTERNAL MEDICINE
Payer: MEDICARE

## 2024-03-27 DIAGNOSIS — Z87.891 PERSONAL HISTORY OF TOBACCO USE: ICD-10-CM

## 2024-03-27 PROCEDURE — 71271 CT THORAX LUNG CANCER SCR C-: CPT

## 2024-04-12 LAB — NONINV COLON CA DNA+OCC BLD SCRN STL QL: POSITIVE

## 2024-04-16 ENCOUNTER — TELEPHONE (OUTPATIENT)
Dept: SURGERY | Age: 74
End: 2024-04-16

## 2024-04-16 ENCOUNTER — TELEPHONE (OUTPATIENT)
Dept: FAMILY MEDICINE CLINIC | Age: 74
End: 2024-04-16

## 2024-04-16 DIAGNOSIS — Z12.11 SCREENING FOR COLON CANCER: Primary | ICD-10-CM

## 2024-04-16 NOTE — TELEPHONE ENCOUNTER
----- Message from Leeann Kumar sent at 4/16/2024  1:04 PM EDT -----  Subject: Referral Request    Reason for referral request? pt needs a referral for colonoscopy.   Provider patient wants to be referred to(if known):     Provider Phone Number(if known):    Additional Information for Provider?   ---------------------------------------------------------------------------  --------------  CALL BACK INFO    [phone]; OK to leave message on voicemail  ---------------------------------------------------------------------------  --------------

## 2024-04-24 ENCOUNTER — TELEPHONE (OUTPATIENT)
Dept: SURGERY | Age: 74
End: 2024-04-24

## 2024-04-24 NOTE — TELEPHONE ENCOUNTER
Patient called the office with questions regarding her colonoscopy paperwork. Please advise and call her back [phone]

## 2024-05-10 ENCOUNTER — TELEPHONE (OUTPATIENT)
Dept: SURGERY | Age: 74
End: 2024-05-10

## 2024-05-10 NOTE — TELEPHONE ENCOUNTER
Received paperwork in the mail for patient to schedule colonoscopy. Phone call placed to patient. Explanation of procedure given. Questions answered. Colonoscopy scheduled at [facility] on 7/2/24. Will mail instructions to patient.

## 2024-05-10 NOTE — TELEPHONE ENCOUNTER
H &P Colonoscopy  Patient:Rosi Davis                 :1950  (unknown) patient has seen Dr. Dill prior to procedure  PCP: Dr. Ramirez    CC:Positive cologuard        HPI:        Colonoscopy  Abd pain: no  Anemia: no  Bloating:no  Diarrhea: no  Constipation: no  Melena: no  Hematochezia:no  Rectal Bleeding:no  Rectal/Anal Pain:no  Pruritus: no  Family history colon Cancer: no  Previous colon cancer: no  Previous Colon Polyp: no  Change in bowels: no  Decrease caliber of stool: no  Change in color of stool: no    Previous work up date: no previous colonoscopy       Family History   Problem Relation Age of Onset    No Known Problems Sister     No Known Problems Daughter     Breast Cancer Paternal Aunt     No Known Problems Sister     No Known Problems Sister     No Known Problems Daughter     No Known Problems Maternal Aunt     No Known Problems Maternal Aunt      Social History     Socioeconomic History    Marital status:      Spouse name: Not on file    Number of children: 2    Years of education: 14    Highest education level: Associate degree: occupational, technical, or vocational program   Occupational History    Occupation: factory      Comment: retired   Tobacco Use    Smoking status: Former     Current packs/day: 0.00     Average packs/day: 1.5 packs/day for 50.0 years (75.0 ttl pk-yrs)     Types: Cigarettes     Start date: 1970     Quit date: 2019     Years since quittin.9    Smokeless tobacco: Never    Tobacco comments:     Yes I am a former smoker and I have been diagnosed with COPD   Vaping Use    Vaping Use: Never used   Substance and Sexual Activity    Alcohol use: No    Drug use: No    Sexual activity: Not Currently     Partners: Male   Other Topics Concern    Not on file   Social History Narrative    No SDOH needs identified.     She was given [facility] number for transportation if family unable to transport.     Discussed HEAP and PIPP programs. She stated

## 2024-05-13 ENCOUNTER — TELEPHONE (OUTPATIENT)
Dept: SURGERY | Age: 74
End: 2024-05-13

## 2024-05-13 NOTE — TELEPHONE ENCOUNTER
[facility]         Patient:Rosi Davis           :1950           Surgical/Procedure Planned: Colonoscopy    Date & Location: 24       Outpatient   Planned Length of OR: 30 minutes    Sedation: general        Estimated Cardiac Risk for Non-Cardiac Surgery/Procedure     Low______ Moderate______ High______    Medication Instructions - Clarification needed by this date:       ASA 81 mg daily Hold ___ Days    Resume medications:     Lovenox indicated: _____Yes _____NO    Provider:Dr Dill      Signature of Provider Giving Orders for Medication holds:    _____________________________________________

## 2024-05-13 NOTE — TELEPHONE ENCOUNTER
Placed this information on pre op instructions. Mailed out bowel prep and pre op instructions to patient.

## 2024-05-13 NOTE — TELEPHONE ENCOUNTER
Patient is low cardiac risk. Hold asa for 7 days. Resume day after procedure. Lovenox is not indicated

## 2024-06-05 ENCOUNTER — OFFICE VISIT (OUTPATIENT)
Dept: PULMONOLOGY | Age: 74
End: 2024-06-05
Payer: MEDICARE

## 2024-06-05 VITALS
SYSTOLIC BLOOD PRESSURE: 118 MMHG | BODY MASS INDEX: 46.61 KG/M2 | HEIGHT: 66 IN | OXYGEN SATURATION: 93 % | DIASTOLIC BLOOD PRESSURE: 82 MMHG | HEART RATE: 88 BPM | TEMPERATURE: 97 F | WEIGHT: 290 LBS | RESPIRATION RATE: 14 BRPM

## 2024-06-05 DIAGNOSIS — E66.01 CLASS 3 SEVERE OBESITY DUE TO EXCESS CALORIES WITH SERIOUS COMORBIDITY AND BODY MASS INDEX (BMI) OF 45.0 TO 49.9 IN ADULT (HCC): ICD-10-CM

## 2024-06-05 DIAGNOSIS — Z87.891 STOPPED SMOKING WITH GREATER THAN 40 PACK YEAR HISTORY: ICD-10-CM

## 2024-06-05 DIAGNOSIS — Z87.891 PERSONAL HISTORY OF TOBACCO USE: Primary | ICD-10-CM

## 2024-06-05 DIAGNOSIS — R91.8 MULTIPLE LUNG NODULES ON CT: ICD-10-CM

## 2024-06-05 PROCEDURE — 1090F PRES/ABSN URINE INCON ASSESS: CPT | Performed by: NURSE PRACTITIONER

## 2024-06-05 PROCEDURE — G8427 DOCREV CUR MEDS BY ELIG CLIN: HCPCS | Performed by: NURSE PRACTITIONER

## 2024-06-05 PROCEDURE — G0296 VISIT TO DETERM LDCT ELIG: HCPCS | Performed by: NURSE PRACTITIONER

## 2024-06-05 PROCEDURE — G8400 PT W/DXA NO RESULTS DOC: HCPCS | Performed by: NURSE PRACTITIONER

## 2024-06-05 PROCEDURE — 1036F TOBACCO NON-USER: CPT | Performed by: NURSE PRACTITIONER

## 2024-06-05 PROCEDURE — G8417 CALC BMI ABV UP PARAM F/U: HCPCS | Performed by: NURSE PRACTITIONER

## 2024-06-05 PROCEDURE — 1123F ACP DISCUSS/DSCN MKR DOCD: CPT | Performed by: NURSE PRACTITIONER

## 2024-06-05 PROCEDURE — 3017F COLORECTAL CA SCREEN DOC REV: CPT | Performed by: NURSE PRACTITIONER

## 2024-06-05 PROCEDURE — 99213 OFFICE O/P EST LOW 20 MIN: CPT | Performed by: NURSE PRACTITIONER

## 2024-06-05 PROCEDURE — 99214 OFFICE O/P EST MOD 30 MIN: CPT | Performed by: NURSE PRACTITIONER

## 2024-06-05 RX ORDER — ALBUTEROL SULFATE 90 UG/1
2 AEROSOL, METERED RESPIRATORY (INHALATION) EVERY 4 HOURS PRN
Qty: 3 EACH | Refills: 0 | Status: SHIPPED | OUTPATIENT
Start: 2024-06-05

## 2024-06-05 ASSESSMENT — ENCOUNTER SYMPTOMS
GASTROINTESTINAL NEGATIVE: 1
ALLERGIC/IMMUNOLOGIC NEGATIVE: 1
EYES NEGATIVE: 1

## 2024-06-28 NOTE — H&P
H &P Colonoscopy  Patient:Rosi Davis                 :1950  (unknown) patient has seen Dr. Dill prior to procedure  PCP: Dr. Ramirez     CC:Positive cologuard           HPI:           Colonoscopy  Abd pain: no  Anemia: no  Bloating:no  Diarrhea: no  Constipation: no  Melena: no  Hematochezia:no  Rectal Bleeding:no  Rectal/Anal Pain:no  Pruritus: no  Family history colon Cancer: no  Previous colon cancer: no  Previous Colon Polyp: no  Change in bowels: no  Decrease caliber of stool: no  Change in color of stool: no     Previous work up date: no previous colonoscopy         Family History         Family History   Problem Relation Age of Onset    No Known Problems Sister      No Known Problems Daughter      Breast Cancer Paternal Aunt      No Known Problems Sister      No Known Problems Sister      No Known Problems Daughter      No Known Problems Maternal Aunt      No Known Problems Maternal Aunt           Social History               Socioeconomic History    Marital status:        Spouse name: Not on file    Number of children: 2    Years of education: 14    Highest education level: Associate degree: occupational, technical, or vocational program   Occupational History    Occupation: factory        Comment: retired   Tobacco Use    Smoking status: Former       Current packs/day: 0.00       Average packs/day: 1.5 packs/day for 50.0 years (75.0 ttl pk-yrs)       Types: Cigarettes       Start date: 1970       Quit date: 2019       Years since quittin.9    Smokeless tobacco: Never    Tobacco comments:       Yes I am a former smoker and I have been diagnosed with COPD   Vaping Use    Vaping Use: Never used   Substance and Sexual Activity    Alcohol use: No    Drug use: No    Sexual activity: Not Currently       Partners: Male   Other Topics Concern    Not on file   Social History Narrative     No SDOH needs identified.      She was given [employer] number for transportation if

## 2024-07-02 ENCOUNTER — ANESTHESIA EVENT (OUTPATIENT)
Dept: OPERATING ROOM | Age: 74
End: 2024-07-02
Payer: MEDICARE

## 2024-07-02 ENCOUNTER — ANESTHESIA (OUTPATIENT)
Dept: OPERATING ROOM | Age: 74
End: 2024-07-02
Payer: MEDICARE

## 2024-07-02 ENCOUNTER — HOSPITAL ENCOUNTER (OUTPATIENT)
Age: 74
Setting detail: OUTPATIENT SURGERY
Discharge: HOME | End: 2024-07-02
Attending: SURGERY | Admitting: SURGERY
Payer: MEDICARE

## 2024-07-02 VITALS
BODY MASS INDEX: 45.48 KG/M2 | RESPIRATION RATE: 18 BRPM | WEIGHT: 283 LBS | DIASTOLIC BLOOD PRESSURE: 62 MMHG | SYSTOLIC BLOOD PRESSURE: 112 MMHG | HEART RATE: 75 BPM | TEMPERATURE: 97 F | HEIGHT: 66 IN | OXYGEN SATURATION: 96 %

## 2024-07-02 DIAGNOSIS — R19.5 POSITIVE COLORECTAL CANCER SCREENING USING COLOGUARD TEST: ICD-10-CM

## 2024-07-02 PROCEDURE — 2709999900 HC NON-CHARGEABLE SUPPLY: Performed by: SURGERY

## 2024-07-02 PROCEDURE — 3609010400 HC COLONOSCOPY POLYPECTOMY HOT BIOPSY: Performed by: SURGERY

## 2024-07-02 PROCEDURE — 88305 TISSUE EXAM BY PATHOLOGIST: CPT

## 2024-07-02 PROCEDURE — 3700000000 HC ANESTHESIA ATTENDED CARE: Performed by: SURGERY

## 2024-07-02 PROCEDURE — 2580000003 HC RX 258: Performed by: SURGERY

## 2024-07-02 PROCEDURE — 45384 COLONOSCOPY W/LESION REMOVAL: CPT | Performed by: SURGERY

## 2024-07-02 PROCEDURE — 7100000010 HC PHASE II RECOVERY - FIRST 15 MIN: Performed by: SURGERY

## 2024-07-02 PROCEDURE — 3700000001 HC ADD 15 MINUTES (ANESTHESIA): Performed by: SURGERY

## 2024-07-02 PROCEDURE — 7100000011 HC PHASE II RECOVERY - ADDTL 15 MIN: Performed by: SURGERY

## 2024-07-02 PROCEDURE — 6360000002 HC RX W HCPCS: Performed by: NURSE ANESTHETIST, CERTIFIED REGISTERED

## 2024-07-02 RX ORDER — PROPOFOL 10 MG/ML
INJECTION, EMULSION INTRAVENOUS PRN
Status: DISCONTINUED | OUTPATIENT
Start: 2024-07-02 | End: 2024-07-02 | Stop reason: SDUPTHER

## 2024-07-02 RX ORDER — SODIUM CHLORIDE, SODIUM LACTATE, POTASSIUM CHLORIDE, CALCIUM CHLORIDE 600; 310; 30; 20 MG/100ML; MG/100ML; MG/100ML; MG/100ML
INJECTION, SOLUTION INTRAVENOUS CONTINUOUS
Status: DISCONTINUED | OUTPATIENT
Start: 2024-07-02 | End: 2024-07-02 | Stop reason: HOSPADM

## 2024-07-02 RX ADMIN — SODIUM CHLORIDE, POTASSIUM CHLORIDE, SODIUM LACTATE AND CALCIUM CHLORIDE: 600; 310; 30; 20 INJECTION, SOLUTION INTRAVENOUS at 09:40

## 2024-07-02 RX ADMIN — PROPOFOL 80 MG: 10 INJECTION, EMULSION INTRAVENOUS at 10:17

## 2024-07-02 RX ADMIN — PROPOFOL 160 MCG/KG/MIN: 10 INJECTION, EMULSION INTRAVENOUS at 10:19

## 2024-07-02 ASSESSMENT — COPD QUESTIONNAIRES: CAT_SEVERITY: MODERATE

## 2024-07-02 ASSESSMENT — PAIN - FUNCTIONAL ASSESSMENT
PAIN_FUNCTIONAL_ASSESSMENT: 0-10

## 2024-07-02 NOTE — OP NOTE
COLONOSCOPY PROCEDURE NOTE:      Pre op diagnosis:     1. Positive cologuard  2. AGE 73  3. No previous colonoscopy performed      Operative Procedure:    1.  Colonoscopy with hot forceps      Surgeon:    Dr. Amilcar Dill     Anesthesia:    IV sedation per CRNA    EBL:  minimal    Procedure:    Patient was taken to the endoscopy suite and placed in a left lateral decubitus position.  They were given IV sedation as mentioned above.  A digital rectal exam was performed.  There were no masses and anal tone was normal.  The colonoscope was inserted into the rectum and carefully manipulated up through the sigmoid colon, transverse colon, right colon and into the cecum.  The cecum was identified by the ileal cecal valve and transabdominal illumination.  Then the scope was slowly withdrawn.  The scope was retroflexed in the rectum and the dentate line was examined.  The scope was removed.  The patient tolerated the procedure well.  The following findings were noted.        Final Diagnosis:    Moderate sigmoid diverticulosis  8 mm polyps at 60, 80 cm and right colon, all removed with hot forceps    Plan:    Await bx  Repeat CS in 2 years due to + cologuard and 3 polyps

## 2024-07-02 NOTE — DISCHARGE INSTRUCTIONS
Await bx  Repeat CS in 2 years due to + cologuard and 3 polyps    DISCHARGE INSTRUCTIONS FOLLOWING COLONOSCOPY    Activity  You have received sedation.  Your judgement and coordination may be impaired.  Do not drive or operate any machinery today.  Do not make personal, medical, legal or business decisions today.  Do not consume alcohol, tranquilizers or sleeping medications today unless otherwise instructed by your physician.  Rest today.  You may resume normal activity tomorrow.    Because air is put into your colon during this procedure, it is normal to pass large amounts of air from your rectum.  Feelings of bloating, gas or cramping may persist for 24 hours.  You may not have a bowel movement for 1-3 days after this procedure.    Diet  Begin with sips of clear liquids and if no nausea, you may progress to your normal diet.    Medications  You may resume your usual medications, unless otherwise instructed.    Follow-Up  As instructed.    CALL YOUR PHYSICIAN if you experience any of the following:  Bleeding from your rectum (a teaspoonful or more)      - If you had a biopsy taken today, a small amount of bleeding may occur.  Severe abdominal pain/cramping and/or distention that is not relieved by passing gas.  Persistent nausea or vomiting.  Signs of infection including fever, chills, redness or swelling @ the IV site.      If you have questions or problems call [phone] ([facility]) or after business hours call [phone] ([facility])DISCHARGE INSTRUCTIONS FOLLOWING COLONOSCOPY    Activity  You have received sedation.  Your judgement and coordination may be impaired.  Do not drive or operate any machinery today.  Do not make personal, medical, legal or business decisions today.  Do not consume alcohol, tranquilizers or sleeping medications today unless otherwise instructed by your physician.  Rest today.  You may resume normal activity tomorrow.    Because air is put into your colon

## 2024-07-02 NOTE — ANESTHESIA POSTPROCEDURE EVALUATION
Department of Anesthesiology  Postprocedure Note    Patient: Rosi Davis  MRN: [id number]  YOB: 1950  Date of evaluation: 7/2/2024    Procedure Summary       Date: 07/02/24 Room / Location: [facility]    Anesthesia Start: 1014 Anesthesia Stop: 1035    Procedure: COLONOSCOPY POLYPECTOMY HOT BIOPSY Diagnosis:       Positive colorectal cancer screening using Cologuard test      (Positive colorectal cancer screening using Cologuard test [R19.5])    Surgeons: Amilcar Dill MD Responsible Provider: Devang Temple APRN - CRNA    Anesthesia Type: General, TIVA ASA Status: 3            Anesthesia Type: General, TIVA    Brooke Phase I: Brooke Score: 10    Brooke Phase II:      Anesthesia Post Evaluation    Patient location during evaluation: bedside  Level of consciousness: sleepy but conscious  Airway patency: patent  Nausea & Vomiting: no nausea and no vomiting  Cardiovascular status: hemodynamically stable  Respiratory status: spontaneous ventilation  Hydration status: stable  Pain management: satisfactory to patient    No notable events documented.

## 2024-07-02 NOTE — ANESTHESIA PRE PROCEDURE
Department of Anesthesiology  Preprocedure Note       Name:  Rosi Davis   Age:  73 y.o.  :  1950                                          MRN:  [id number]         Date:  2024      Surgeon: Surgeon(s):  Amilcar Dill MD    Procedure: Procedure(s):  Colonoscopy    Medications prior to admission:   Prior to Admission medications    Medication Sig Start Date End Date Taking? Authorizing Provider   albuterol sulfate HFA (PROVENTIL;VENTOLIN;PROAIR) 108 (90 Base) MCG/ACT inhaler Inhale 2 puffs into the lungs every 4 hours as needed for Wheezing or Shortness of Breath 24   Pantera Alejandro, APRN - CNP   carvedilol (COREG) 3.125 MG tablet TAKE 1 TABLET TWICE DAILY WITH MEALS 3/6/24   Tanika Ramirez MD   montelukast (SINGULAIR) 10 MG tablet TAKE 1 TABLET EVERY NIGHT 3/6/24   Tanika Ramirez MD   Handicap Placard MISC by Does not apply route Exp 2029   Tanika Ramirez MD   TRELEGY ELLIPTA 100-62.5-25 MCG/ACT AEPB inhaler INHALE 1 PUFF INTO THE LUNGS DAILY 23   Bassam Tobar DO   aspirin 81 MG EC tablet Take 1 tablet by mouth daily    Provider, Historical, MD   Misc. Devices (ADJUST BATH/SHOWER SEAT/BACK) MISC Use daily with showers 7/10/19   Tanika Ramirez MD Misc. Devices (SUCTION GRAB BAR) MISC Use in the shower 7/10/19   Tanika Ramirez MD   loratadine (CLARITIN) 10 MG tablet Take 1 tablet by mouth daily    Provider, MD Bruno       Current medications:    No current facility-administered medications for this encounter.       Allergies:    Allergies   Allergen Reactions   • Vicodin [Hydrocodone-Acetaminophen]        Problem List:    Patient Active Problem List   Diagnosis Code   • Tobacco abuse Z72.0   • Environmental allergies Z91.09   • Obesity E66.9   • Dyslipidemia E78.5   • Coronary artery disease I25.10   • Chronic obstructive pulmonary disease (HCC) J44.9   • Occipital headache R51.9   • Dizziness R42   • Cerebral ventriculomegaly G93.89   • Acquired cerebral atrophy

## 2024-07-03 LAB — SURGICAL PATHOLOGY REPORT: NORMAL

## 2024-07-23 RX ORDER — ALBUTEROL SULFATE 90 UG/1
2 AEROSOL, METERED RESPIRATORY (INHALATION) EVERY 4 HOURS PRN
Qty: 3 EACH | Refills: 3 | Status: SHIPPED | OUTPATIENT
Start: 2024-07-23

## 2024-07-28 NOTE — PROGRESS NOTES
Subjective:      Patient ID: Rosi Davis is a 73 y.o. female.     Patient must see physician at next appointment    HPI  Patient here for follow-up for COPD and lung nodules.. Last seen in office per Dr. Tobar in May 2023.    Patient continues on Singulair, Trelegy Ellipta and albuterol HFA.  Refills were sent into her pharmacy today.  Patient denies any worsening shortness of breath or cough.  Denies any purulent secretions or hemoptysis.  Denies any unintentional weight loss.  Due for repeat imaging of her chest in March 2025.      Medications:   Singulair  Trelegy Ellipta 100-62.5-25  Albuterol HFA      Smoking status:  Former smoker quit in 2019 with a 50-pack-year smoking history        PRIOR WORKUP:  Prior work-up:  PFT 1/6/2020: FEV1 50% of predicted with a 17% bronchodilator change, FVC 64% with a 28% bronchodilator change.  FEV1/FVC ratio 60.  Lung volume by body box notes total lung capacity 138% of predicted, RV of 227% of predicted consistent with air trapping and hyperventilation.  Diffusion capacity is 52% of predicted uncorrected.  Final impression: Stage II COPD with significant bronchodilator response, hyperinflation, air trapping and moderately decreased diffusion capacity due to emphysema versus anemia versus interstitial lung disease.     Medications:   Trelegy  Albuterol HFA:        PRIOR WORKUP:  Prior work-up:  PFT 1/6/2020: FEV1 50% of predicted with a 17% bronchodilator change, FVC 64% with a 28% bronchodilator change.  FEV1/FVC ratio 60.  Lung volume by body box notes total lung capacity 138% of predicted, RV of 227% of predicted consistent with air trapping and hyperventilation.  Diffusion capacity is 52% of predicted uncorrected.  Final impression: Stage II COPD with significant bronchodilator response, hyperinflation, air trapping and moderately decreased diffusion capacity due to emphysema versus anemia versus interstitial lung disease.     CT lung screening 3/18/2023: Coronary 
141

## 2024-07-30 ENCOUNTER — TELEPHONE (OUTPATIENT)
Dept: SURGERY | Age: 74
End: 2024-07-30

## 2024-07-30 NOTE — TELEPHONE ENCOUNTER
Letter created and mailed to patient with results from recent CS at [facility] with Dr. Dill on 7/2/2024. Updated history, health maintenance, and recall. Forwarded results to PCP.

## 2024-08-13 SDOH — ECONOMIC STABILITY: INCOME INSECURITY: HOW HARD IS IT FOR YOU TO PAY FOR THE VERY BASICS LIKE FOOD, HOUSING, MEDICAL CARE, AND HEATING?: NOT HARD AT ALL

## 2024-08-13 SDOH — ECONOMIC STABILITY: FOOD INSECURITY: WITHIN THE PAST 12 MONTHS, YOU WORRIED THAT YOUR FOOD WOULD RUN OUT BEFORE YOU GOT MONEY TO BUY MORE.: SOMETIMES TRUE

## 2024-08-13 SDOH — ECONOMIC STABILITY: FOOD INSECURITY: WITHIN THE PAST 12 MONTHS, THE FOOD YOU BOUGHT JUST DIDN'T LAST AND YOU DIDN'T HAVE MONEY TO GET MORE.: NEVER TRUE

## 2024-08-13 SDOH — ECONOMIC STABILITY: TRANSPORTATION INSECURITY
IN THE PAST 12 MONTHS, HAS LACK OF TRANSPORTATION KEPT YOU FROM MEETINGS, WORK, OR FROM GETTING THINGS NEEDED FOR DAILY LIVING?: YES

## 2024-08-16 ENCOUNTER — OFFICE VISIT (OUTPATIENT)
Dept: FAMILY MEDICINE CLINIC | Age: 74
End: 2024-08-16
Payer: MEDICARE

## 2024-08-16 VITALS
HEIGHT: 66 IN | OXYGEN SATURATION: 95 % | SYSTOLIC BLOOD PRESSURE: 122 MMHG | HEART RATE: 72 BPM | WEIGHT: 283 LBS | DIASTOLIC BLOOD PRESSURE: 84 MMHG | BODY MASS INDEX: 45.48 KG/M2

## 2024-08-16 DIAGNOSIS — M70.62 TROCHANTERIC BURSITIS OF LEFT HIP: Primary | ICD-10-CM

## 2024-08-16 DIAGNOSIS — J44.9 CHRONIC OBSTRUCTIVE PULMONARY DISEASE, UNSPECIFIED COPD TYPE (HCC): ICD-10-CM

## 2024-08-16 DIAGNOSIS — M25.552 PAIN OF LEFT HIP: ICD-10-CM

## 2024-08-16 PROCEDURE — 20605 DRAIN/INJ JOINT/BURSA W/O US: CPT | Performed by: FAMILY MEDICINE

## 2024-08-16 RX ORDER — LIDOCAINE HYDROCHLORIDE 20 MG/ML
2 INJECTION, SOLUTION EPIDURAL; INFILTRATION; INTRACAUDAL; PERINEURAL ONCE
Status: COMPLETED | OUTPATIENT
Start: 2024-08-16 | End: 2024-08-16

## 2024-08-16 RX ORDER — TRIAMCINOLONE ACETONIDE 40 MG/ML
40 INJECTION, SUSPENSION INTRA-ARTICULAR; INTRAMUSCULAR ONCE
Status: COMPLETED | OUTPATIENT
Start: 2024-08-16 | End: 2024-08-16

## 2024-08-16 RX ADMIN — TRIAMCINOLONE ACETONIDE 40 MG: 40 INJECTION, SUSPENSION INTRA-ARTICULAR; INTRAMUSCULAR at 09:36

## 2024-08-16 RX ADMIN — LIDOCAINE HYDROCHLORIDE 2 ML: 20 INJECTION, SOLUTION EPIDURAL; INFILTRATION; INTRACAUDAL; PERINEURAL at 09:35

## 2024-08-16 ASSESSMENT — ENCOUNTER SYMPTOMS
WHEEZING: 0
SHORTNESS OF BREATH: 0
CHEST TIGHTNESS: 0
COLOR CHANGE: 0
COUGH: 0

## 2024-08-16 NOTE — PROGRESS NOTES
[facility]  [address] [facility] [address]  Dept: [phone]  Dept Fax: [phone]  Loc: [phone]    Rosi Davis is a 73 y.o. female who presents today for her medical conditions/complaints as noted below.  Rosi Davis is c/o of   Chief Complaint   Patient presents with    Depression     6 months    COPD     6 months    Hip Pain     Left; x3 weeks, patient stated no known injury       HPI:     Here today for a follow up of her depression and copd and she has hip pain.     Hip Pain   The incident occurred more than 1 week ago (3 weeks). There was no injury mechanism. The pain is present in the left hip. The quality of the pain is described as aching and shooting. The pain is at a severity of 5/10. The pain is moderate. The pain has been Intermittent since onset. Associated symptoms include a loss of motion (harder to pivot). Pertinent negatives include no inability to bear weight, muscle weakness, numbness or tingling. She reports no foreign bodies present. The symptoms are aggravated by movement and weight bearing. She has tried acetaminophen for the symptoms. The treatment provided mild relief.     COPD: stable; she has been doing well this summer. She is not getting too short of breath.     HTN: stable; she has not had any issues with chest pain. She is not getting lightheaded or dizzy upon standing. She is not having any leg swelling on a regular basis. Her right leg swells when its hot.       Past Medical History:   Diagnosis Date    Chronic obstructive pulmonary disease (HCC)     Coronary artery disease     Dyslipidemia     Obesity     Pneumonia           Social History     Tobacco Use    Smoking status: Former     Current packs/day: 0.00     Average packs/day: 1.5 packs/day for 50.0 years (75.0 ttl pk-yrs)     Types: Cigarettes     Start date: 1/1/1970     Quit date: 5/16/2019     Years since

## 2024-08-23 ENCOUNTER — OFFICE VISIT (OUTPATIENT)
Dept: NEUROLOGY | Age: 74
End: 2024-08-23
Payer: MEDICARE

## 2024-08-23 ENCOUNTER — PATIENT MESSAGE (OUTPATIENT)
Dept: FAMILY MEDICINE CLINIC | Age: 74
End: 2024-08-23

## 2024-08-23 ENCOUNTER — HOSPITAL ENCOUNTER (OUTPATIENT)
Dept: GENERAL RADIOLOGY | Age: 74
End: 2024-08-23
Payer: MEDICARE

## 2024-08-23 VITALS
BODY MASS INDEX: 44.81 KG/M2 | HEIGHT: 66 IN | DIASTOLIC BLOOD PRESSURE: 62 MMHG | SYSTOLIC BLOOD PRESSURE: 102 MMHG | HEART RATE: 70 BPM | WEIGHT: 278.8 LBS | OXYGEN SATURATION: 95 %

## 2024-08-23 DIAGNOSIS — R51.9 OCCIPITAL HEADACHE: ICD-10-CM

## 2024-08-23 DIAGNOSIS — I67.82 CHRONIC CEREBRAL ISCHEMIA: ICD-10-CM

## 2024-08-23 DIAGNOSIS — M25.552 PAIN OF LEFT HIP: ICD-10-CM

## 2024-08-23 DIAGNOSIS — G45.0 VBI (VERTEBROBASILAR INSUFFICIENCY): ICD-10-CM

## 2024-08-23 DIAGNOSIS — R42 DIZZINESS: ICD-10-CM

## 2024-08-23 DIAGNOSIS — G93.89 CEREBRAL VENTRICULOMEGALY: Primary | ICD-10-CM

## 2024-08-23 DIAGNOSIS — Z98.2 S/P VP SHUNT: ICD-10-CM

## 2024-08-23 DIAGNOSIS — R93.0 ABNORMAL CT OF THE HEAD: ICD-10-CM

## 2024-08-23 DIAGNOSIS — G91.2 NORMAL PRESSURE HYDROCEPHALUS (HCC): ICD-10-CM

## 2024-08-23 DIAGNOSIS — Z87.891 EX-SMOKER: ICD-10-CM

## 2024-08-23 DIAGNOSIS — E66.01 CLASS 2 SEVERE OBESITY DUE TO EXCESS CALORIES WITH SERIOUS COMORBIDITY IN ADULT, UNSPECIFIED BMI (HCC): ICD-10-CM

## 2024-08-23 DIAGNOSIS — G44.219 EPISODIC TENSION-TYPE HEADACHE, NOT INTRACTABLE: ICD-10-CM

## 2024-08-23 DIAGNOSIS — R26.89 BALANCE PROBLEM: ICD-10-CM

## 2024-08-23 DIAGNOSIS — G31.9 ACQUIRED CEREBRAL ATROPHY (HCC): ICD-10-CM

## 2024-08-23 DIAGNOSIS — G93.5 CHIARI MALFORMATION TYPE I (HCC): ICD-10-CM

## 2024-08-23 PROCEDURE — 73502 X-RAY EXAM HIP UNI 2-3 VIEWS: CPT

## 2024-08-23 PROCEDURE — 99213 OFFICE O/P EST LOW 20 MIN: CPT | Performed by: PSYCHIATRY & NEUROLOGY

## 2024-08-23 ASSESSMENT — ENCOUNTER SYMPTOMS
CONSTIPATION: 0
EYE DISCHARGE: 0
CHEST TIGHTNESS: 0
ABDOMINAL DISTENTION: 0
WHEEZING: 0
BOWEL INCONTINENCE: 0
TROUBLE SWALLOWING: 0
BLOOD IN STOOL: 0
CHOKING: 0
VOICE CHANGE: 0
DIARRHEA: 0
SHORTNESS OF BREATH: 0
EYE ITCHING: 0
FACIAL SWELLING: 0
APNEA: 0
COLOR CHANGE: 0

## 2024-08-23 NOTE — PROGRESS NOTES
[facility]  Neurology  [address] [id number]  Phone:[phone]   Fax: [phone]        SUBJECTIVE:       PATIENT ID:  Rosi Davis is a  RIGHT   HANDED 73 y.o. female.      Dizziness  This is a recurrent problem. Episode onset: SINCE   EARLY  APRIL 2019. The problem occurs intermittently. The problem has been resolved. The symptoms are aggravated by bending and standing. She has tried rest and sleep (  SHUNT  PLACEMENT) for the symptoms. The treatment provided significant relief.   Neurologic Problem  The patient's primary symptoms include clumsiness and memory loss. This is a chronic problem. The neurological problem developed insidiously. The problem is unchanged. There was no focality noted. Pertinent negatives include no auditory change, aura, bladder incontinence, bowel incontinence, palpitations or shortness of breath. Past treatments include bed rest, medication and sleep. The treatment provided moderate relief. Her past medical history is significant for dementia. There is no history of a bleeding disorder, a clotting disorder, a CVA, liver disease or seizures.               History obtained from  The patient       and other  available medical records were  Also  reviewed.            The  Duration,  Quality,  Severity,  Location,  Timing,  Context,  Modifying  Factors   Of   The   Chief   Complaint       AndPresent  Illness   Was   Reviewed   In   Chronological   Manner.                                               PATIENT'S  MAIN  CONCERNS INCLUDE :                       1)     PREVIOUS   H/O   CHRONIC      DIZZINESS     INTERMITTENT                                        SINCE      April 2019                                         -     IMPROVED    SIGNIFICANTLY                                         -   ON   ASA     DAILY    PRO PHYLACTICALLY                              2)       PREVIOUS    H/O    GLOBAL  HEADACHES    IN     2019

## 2024-10-11 RX ORDER — FLUTICASONE FUROATE, UMECLIDINIUM BROMIDE AND VILANTEROL TRIFENATATE 100; 62.5; 25 UG/1; UG/1; UG/1
POWDER RESPIRATORY (INHALATION)
Qty: 3 EACH | Refills: 3 | Status: SHIPPED | OUTPATIENT
Start: 2024-10-11

## 2024-10-29 ENCOUNTER — OFFICE VISIT (OUTPATIENT)
Dept: PRIMARY CARE CLINIC | Age: 74
End: 2024-10-29
Payer: MEDICARE

## 2024-10-29 VITALS
DIASTOLIC BLOOD PRESSURE: 78 MMHG | SYSTOLIC BLOOD PRESSURE: 124 MMHG | HEART RATE: 86 BPM | BODY MASS INDEX: 43.58 KG/M2 | WEIGHT: 270 LBS | TEMPERATURE: 98.3 F | OXYGEN SATURATION: 95 %

## 2024-10-29 DIAGNOSIS — R05.1 ACUTE COUGH: ICD-10-CM

## 2024-10-29 DIAGNOSIS — H65.91 RIGHT NON-SUPPURATIVE OTITIS MEDIA: Primary | ICD-10-CM

## 2024-10-29 DIAGNOSIS — J04.0 LARYNGITIS: ICD-10-CM

## 2024-10-29 PROCEDURE — 99212 OFFICE O/P EST SF 10 MIN: CPT

## 2024-10-29 RX ORDER — PREDNISONE 20 MG/1
40 TABLET ORAL DAILY
Qty: 10 TABLET | Refills: 0 | Status: SHIPPED | OUTPATIENT
Start: 2024-10-29 | End: 2024-11-03

## 2024-10-29 RX ORDER — AMOXICILLIN 875 MG/1
875 TABLET, COATED ORAL 2 TIMES DAILY
Qty: 20 TABLET | Refills: 0 | Status: SHIPPED | OUTPATIENT
Start: 2024-10-29 | End: 2024-11-08

## 2024-10-29 RX ORDER — BENZONATATE 100 MG/1
100 CAPSULE ORAL 3 TIMES DAILY PRN
Qty: 30 CAPSULE | Refills: 0 | Status: SHIPPED | OUTPATIENT
Start: 2024-10-29 | End: 2024-11-08

## 2024-10-29 ASSESSMENT — ENCOUNTER SYMPTOMS
SINUS PRESSURE: 0
ABDOMINAL PAIN: 0
VOMITING: 0
SORE THROAT: 1
VOICE CHANGE: 1
SHORTNESS OF BREATH: 0
RHINORRHEA: 0
COUGH: 1
DIARRHEA: 0
SINUS PAIN: 0
NAUSEA: 0

## 2024-10-29 NOTE — PATIENT INSTRUCTIONS
Steroid x 5 days  Discussed taking medication as prescribed in AM with food  Avoid NSAIDs while taking prescription steroid  Start antibiotics as prescribed  Complete whole course of antibiotics  Benzonatate as needed for cough  May use tylenol for pain/ fever  If symptoms worsen follow up with PCP or return to walk in clinic  Patient verbalized understanding and agrees with plan of care

## 2024-10-29 NOTE — PROGRESS NOTES
Cardiovascular:  Negative for chest pain.   Gastrointestinal:  Negative for abdominal pain, diarrhea, nausea and vomiting.   Musculoskeletal:  Negative for myalgias.   Skin:  Negative for rash.   Neurological:  Negative for headaches.       Objective:     Vitals:    10/29/24 0904   BP: 124/78   Pulse: 86   Temp: 98.3 °F (36.8 °C)   TempSrc: Tympanic   SpO2: 95%   Weight: 122.5 kg (270 lb)     Body mass index is 43.58 kg/m².    Physical Exam  Vitals and nursing note reviewed.   Constitutional:       Appearance: Normal appearance. She is not ill-appearing.      Comments: Hoarse voice, continuous dry cough   HENT:      Head: Normocephalic and atraumatic.      Right Ear: Ear canal and external ear normal. Tympanic membrane is erythematous and bulging.      Left Ear: Tympanic membrane, ear canal and external ear normal.      Nose: Nose normal.      Right Sinus: No maxillary sinus tenderness or frontal sinus tenderness.      Left Sinus: No maxillary sinus tenderness or frontal sinus tenderness.      Mouth/Throat:      Mouth: Mucous membranes are moist.      Pharynx: Posterior oropharyngeal erythema present. No oropharyngeal exudate.   Eyes:      Conjunctiva/sclera: Conjunctivae normal.   Cardiovascular:      Rate and Rhythm: Normal rate and regular rhythm.      Heart sounds: Normal heart sounds. No murmur heard.     No friction rub. No gallop.   Pulmonary:      Effort: Pulmonary effort is normal.      Breath sounds: Normal breath sounds. No wheezing or rhonchi.   Musculoskeletal:      Cervical back: Neck supple. No tenderness.   Lymphadenopathy:      Cervical: No cervical adenopathy.   Skin:     General: Skin is warm.      Findings: No rash.   Neurological:      General: No focal deficit present.      Mental Status: She is alert.   Psychiatric:         Mood and Affect: Mood normal.         Behavior: Behavior normal.         Thought Content: Thought content normal.           Assessment and Plan        Diagnosis Orders

## 2025-01-20 RX ORDER — ALBUTEROL SULFATE 90 UG/1
2 INHALANT RESPIRATORY (INHALATION) EVERY 4 HOURS PRN
Qty: 3 EACH | Refills: 3 | Status: SHIPPED | OUTPATIENT
Start: 2025-01-20

## 2025-02-12 SDOH — HEALTH STABILITY: PHYSICAL HEALTH: ON AVERAGE, HOW MANY DAYS PER WEEK DO YOU ENGAGE IN MODERATE TO STRENUOUS EXERCISE (LIKE A BRISK WALK)?: 2 DAYS

## 2025-02-12 SDOH — HEALTH STABILITY: PHYSICAL HEALTH: ON AVERAGE, HOW MANY MINUTES DO YOU ENGAGE IN EXERCISE AT THIS LEVEL?: 30 MIN

## 2025-02-12 ASSESSMENT — PATIENT HEALTH QUESTIONNAIRE - PHQ9
5. POOR APPETITE OR OVEREATING: NOT AT ALL
10. IF YOU CHECKED OFF ANY PROBLEMS, HOW DIFFICULT HAVE THESE PROBLEMS MADE IT FOR YOU TO DO YOUR WORK, TAKE CARE OF THINGS AT HOME, OR GET ALONG WITH OTHER PEOPLE: NOT DIFFICULT AT ALL
SUM OF ALL RESPONSES TO PHQ9 QUESTIONS 1 & 2: 0
2. FEELING DOWN, DEPRESSED OR HOPELESS: NOT AT ALL
3. TROUBLE FALLING OR STAYING ASLEEP: NOT AT ALL
SUM OF ALL RESPONSES TO PHQ QUESTIONS 1-9: 0
9. THOUGHTS THAT YOU WOULD BE BETTER OFF DEAD, OR OF HURTING YOURSELF: NOT AT ALL
4. FEELING TIRED OR HAVING LITTLE ENERGY: NOT AT ALL
7. TROUBLE CONCENTRATING ON THINGS, SUCH AS READING THE NEWSPAPER OR WATCHING TELEVISION: NOT AT ALL
8. MOVING OR SPEAKING SO SLOWLY THAT OTHER PEOPLE COULD HAVE NOTICED. OR THE OPPOSITE, BEING SO FIGETY OR RESTLESS THAT YOU HAVE BEEN MOVING AROUND A LOT MORE THAN USUAL: NOT AT ALL
6. FEELING BAD ABOUT YOURSELF - OR THAT YOU ARE A FAILURE OR HAVE LET YOURSELF OR YOUR FAMILY DOWN: NOT AT ALL
SUM OF ALL RESPONSES TO PHQ QUESTIONS 1-9: 0
SUM OF ALL RESPONSES TO PHQ QUESTIONS 1-9: 0
1. LITTLE INTEREST OR PLEASURE IN DOING THINGS: NOT AT ALL
SUM OF ALL RESPONSES TO PHQ QUESTIONS 1-9: 0

## 2025-02-12 ASSESSMENT — LIFESTYLE VARIABLES
HOW OFTEN DO YOU HAVE SIX OR MORE DRINKS ON ONE OCCASION: 1
HOW MANY STANDARD DRINKS CONTAINING ALCOHOL DO YOU HAVE ON A TYPICAL DAY: 0
HOW MANY STANDARD DRINKS CONTAINING ALCOHOL DO YOU HAVE ON A TYPICAL DAY: PATIENT DOES NOT DRINK
HOW OFTEN DO YOU HAVE A DRINK CONTAINING ALCOHOL: 1
HOW OFTEN DO YOU HAVE A DRINK CONTAINING ALCOHOL: NEVER

## 2025-02-17 ENCOUNTER — OFFICE VISIT (OUTPATIENT)
Dept: FAMILY MEDICINE CLINIC | Age: 75
End: 2025-02-17

## 2025-02-17 ENCOUNTER — HOSPITAL ENCOUNTER (OUTPATIENT)
Age: 75
Discharge: HOME | End: 2025-02-17
Payer: MEDICARE

## 2025-02-17 VITALS
BODY MASS INDEX: 43.07 KG/M2 | WEIGHT: 268 LBS | HEIGHT: 66 IN | SYSTOLIC BLOOD PRESSURE: 122 MMHG | OXYGEN SATURATION: 96 % | DIASTOLIC BLOOD PRESSURE: 80 MMHG | HEART RATE: 66 BPM

## 2025-02-17 DIAGNOSIS — G91.2 NORMAL PRESSURE HYDROCEPHALUS (HCC): ICD-10-CM

## 2025-02-17 DIAGNOSIS — E78.5 DYSLIPIDEMIA: ICD-10-CM

## 2025-02-17 DIAGNOSIS — F33.1 MODERATE EPISODE OF RECURRENT MAJOR DEPRESSIVE DISORDER (HCC): ICD-10-CM

## 2025-02-17 DIAGNOSIS — R73.01 IMPAIRED FASTING GLUCOSE: ICD-10-CM

## 2025-02-17 DIAGNOSIS — Z00.00 MEDICARE ANNUAL WELLNESS VISIT, SUBSEQUENT: Primary | ICD-10-CM

## 2025-02-17 DIAGNOSIS — J44.9 CHRONIC OBSTRUCTIVE PULMONARY DISEASE, UNSPECIFIED COPD TYPE (HCC): ICD-10-CM

## 2025-02-17 DIAGNOSIS — Z12.31 ENCOUNTER FOR SCREENING MAMMOGRAM FOR MALIGNANT NEOPLASM OF BREAST: ICD-10-CM

## 2025-02-17 LAB
ALBUMIN SERPL-MCNC: 3.7 G/DL (ref 3.5–5.2)
ALBUMIN/GLOB SERPL: 1.1 {RATIO} (ref 1–2.5)
ALP SERPL-CCNC: 102 U/L (ref 35–104)
ALT SERPL-CCNC: 10 U/L (ref 5–33)
ANION GAP SERPL CALCULATED.3IONS-SCNC: 9 MMOL/L (ref 9–17)
AST SERPL-CCNC: 14 U/L
BILIRUB SERPL-MCNC: 0.6 MG/DL (ref 0.3–1.2)
BUN SERPL-MCNC: 17 MG/DL (ref 8–23)
BUN/CREAT SERPL: 21 (ref 9–20)
CALCIUM SERPL-MCNC: 8.8 MG/DL (ref 8.6–10.4)
CHLORIDE SERPL-SCNC: 105 MMOL/L (ref 98–107)
CHOLEST SERPL-MCNC: 165 MG/DL (ref 0–199)
CHOLESTEROL/HDL RATIO: 3.2
CO2 SERPL-SCNC: 27 MMOL/L (ref 20–31)
CREAT SERPL-MCNC: 0.8 MG/DL (ref 0.5–0.9)
EST. AVERAGE GLUCOSE BLD GHB EST-MCNC: 105 MG/DL
GFR, ESTIMATED: 78 ML/MIN/1.73M2
GLUCOSE SERPL-MCNC: 89 MG/DL (ref 70–99)
HBA1C MFR BLD: 5.3 % (ref 4–6)
HDLC SERPL-MCNC: 51 MG/DL
LDLC SERPL CALC-MCNC: 96 MG/DL (ref 0–100)
POTASSIUM SERPL-SCNC: 4.3 MMOL/L (ref 3.7–5.3)
PROT SERPL-MCNC: 7 G/DL (ref 6.4–8.3)
SODIUM SERPL-SCNC: 141 MMOL/L (ref 135–144)
TRIGL SERPL-MCNC: 91 MG/DL
VLDLC SERPL CALC-MCNC: 18 MG/DL (ref 1–30)

## 2025-02-17 PROCEDURE — 36415 COLL VENOUS BLD VENIPUNCTURE: CPT

## 2025-02-17 PROCEDURE — 80061 LIPID PANEL: CPT

## 2025-02-17 PROCEDURE — 83036 HEMOGLOBIN GLYCOSYLATED A1C: CPT

## 2025-02-17 PROCEDURE — 80053 COMPREHEN METABOLIC PANEL: CPT

## 2025-02-17 SDOH — ECONOMIC STABILITY: FOOD INSECURITY: WITHIN THE PAST 12 MONTHS, THE FOOD YOU BOUGHT JUST DIDN'T LAST AND YOU DIDN'T HAVE MONEY TO GET MORE.: NEVER TRUE

## 2025-02-17 SDOH — ECONOMIC STABILITY: FOOD INSECURITY: WITHIN THE PAST 12 MONTHS, YOU WORRIED THAT YOUR FOOD WOULD RUN OUT BEFORE YOU GOT MONEY TO BUY MORE.: NEVER TRUE

## 2025-02-17 ASSESSMENT — ENCOUNTER SYMPTOMS
CONSTIPATION: 0
WHEEZING: 0
BLOOD IN STOOL: 0
COUGH: 0
SHORTNESS OF BREATH: 0
ABDOMINAL PAIN: 0
CHEST TIGHTNESS: 0
NAUSEA: 0
DIARRHEA: 0

## 2025-02-17 NOTE — PROGRESS NOTES
Medicare Annual Wellness Visit    Rosi Davis is here for Medicare AWV    Assessment & Plan   Medicare annual wellness visit, subsequent  Chronic obstructive pulmonary disease, unspecified COPD type (HCC)  Moderate episode of recurrent major depressive disorder (HCC)  Normal pressure hydrocephalus (HCC)  Dyslipidemia  -     Comprehensive Metabolic Panel; Future  -     Lipid Panel; Future  Impaired fasting glucose  -     Hemoglobin A1C; Future  Encounter for screening mammogram for malignant neoplasm of breast  -     LEI POWER DIGITAL SCREEN BILATERAL; Future       Return in about 1 year (around 2/17/2026) for MWV.     Subjective   The following acute and/or chronic problems were also addressed today:  COPD, depression, NPH    Patient's complete Health Risk Assessment and screening values have been reviewed and are found in Flowsheets. The following problems were reviewed today and where indicated follow up appointments were made and/or referrals ordered.    Positive Risk Factor Screenings with Interventions:    Fall Risk:  Do you feel unsteady or are you worried about falling? : (!) yes  2 or more falls in past year?: no  Fall with injury in past year?: no     Interventions:    Reviewed medications, home hazards, visual acuity, and co-morbidities that can increase risk for falls             Inactivity:  On average, how many days per week do you engage in moderate to strenuous exercise (like a brisk walk)?: 2 days (!) Abnormal  On average, how many minutes do you engage in exercise at this level?: 30 min  Interventions:  Patient declined any further interventions or treatment    Poor Eating Habits/Diet:  Do you eat balanced/healthy meals regularly?: (!) No  Interventions:  low carbohydrate diet    Abnormal BMI (obese):  Body mass index is 43.26 kg/m². (!) Abnormal  Interventions:  low carbohydrate diet      Dentist Screen:  Have you seen the dentist within the past year?: (!) No    Intervention:  Advised to 
patient were advised that Artificial Intelligence will be utilized during this visit to record, process the conversation to generate a clinical note, and support improvement of the AI technology. The patient (or guardian, if applicable) and other individuals in attendance at the appointment consented to the use of AI, including the recording.        Electronically signed by Tanika Ramirez MD on 2/17/2025 at 1:44 PM

## 2025-02-18 ENCOUNTER — PATIENT MESSAGE (OUTPATIENT)
Dept: FAMILY MEDICINE CLINIC | Age: 75
End: 2025-02-18

## 2025-03-07 ENCOUNTER — HOSPITAL ENCOUNTER (OUTPATIENT)
Dept: MAMMOGRAPHY | Age: 75
Discharge: HOME | End: 2025-03-09
Attending: FAMILY MEDICINE
Payer: MEDICARE

## 2025-03-07 DIAGNOSIS — Z12.31 ENCOUNTER FOR SCREENING MAMMOGRAM FOR MALIGNANT NEOPLASM OF BREAST: ICD-10-CM

## 2025-03-07 PROCEDURE — 77063 BREAST TOMOSYNTHESIS BI: CPT

## 2025-03-08 DIAGNOSIS — I25.10 CORONARY ARTERY DISEASE INVOLVING NATIVE CORONARY ARTERY OF NATIVE HEART WITHOUT ANGINA PECTORIS: ICD-10-CM

## 2025-03-10 ENCOUNTER — RESULTS FOLLOW-UP (OUTPATIENT)
Dept: MAMMOGRAPHY | Age: 75
End: 2025-03-10

## 2025-03-10 ENCOUNTER — PATIENT MESSAGE (OUTPATIENT)
Dept: FAMILY MEDICINE CLINIC | Age: 75
End: 2025-03-10

## 2025-03-10 RX ORDER — CARVEDILOL 3.12 MG/1
3.12 TABLET ORAL 2 TIMES DAILY WITH MEALS
Qty: 180 TABLET | Refills: 3 | Status: SHIPPED | OUTPATIENT
Start: 2025-03-10

## 2025-03-10 RX ORDER — MONTELUKAST SODIUM 10 MG/1
10 TABLET ORAL NIGHTLY
Qty: 90 TABLET | Refills: 3 | Status: SHIPPED | OUTPATIENT
Start: 2025-03-10

## 2025-03-10 NOTE — TELEPHONE ENCOUNTER
Rosi called requesting a refill of the below medication which has been pended for you:     Requested Prescriptions     Pending Prescriptions Disp Refills    carvedilol (COREG) 3.125 MG tablet [Pharmacy Med Name: Carvedilol Oral Tablet 3.125 MG] 180 tablet 3     Sig: TAKE 1 TABLET TWICE DAILY WITH MEALS    montelukast (SINGULAIR) 10 MG tablet [Pharmacy Med Name: Montelukast Sodium Oral Tablet 10 MG] 90 tablet 3     Sig: TAKE 1 TABLET EVERY NIGHT       Last Appointment Date: 2/17/2025  Next Appointment Date: 2/19/2026    Allergies   Allergen Reactions    Vicodin [Hydrocodone-Acetaminophen]

## 2025-03-28 ENCOUNTER — HOSPITAL ENCOUNTER (OUTPATIENT)
Dept: CT IMAGING | Age: 75
Discharge: HOME | End: 2025-03-30
Payer: MEDICARE

## 2025-03-28 DIAGNOSIS — Z87.891 PERSONAL HISTORY OF TOBACCO USE: ICD-10-CM

## 2025-03-28 PROCEDURE — 71271 CT THORAX LUNG CANCER SCR C-: CPT

## 2025-06-19 ENCOUNTER — OFFICE VISIT (OUTPATIENT)
Dept: PULMONOLOGY | Age: 75
End: 2025-06-19
Payer: MEDICARE

## 2025-06-19 VITALS
SYSTOLIC BLOOD PRESSURE: 118 MMHG | HEART RATE: 87 BPM | DIASTOLIC BLOOD PRESSURE: 70 MMHG | WEIGHT: 263 LBS | TEMPERATURE: 99 F | HEIGHT: 66 IN | BODY MASS INDEX: 42.27 KG/M2 | OXYGEN SATURATION: 94 % | RESPIRATION RATE: 20 BRPM

## 2025-06-19 DIAGNOSIS — E66.813 CLASS 3 SEVERE OBESITY DUE TO EXCESS CALORIES WITH SERIOUS COMORBIDITY AND BODY MASS INDEX (BMI) OF 40.0 TO 44.9 IN ADULT (HCC): ICD-10-CM

## 2025-06-19 DIAGNOSIS — R91.1 LUNG NODULE: ICD-10-CM

## 2025-06-19 DIAGNOSIS — J44.9 STAGE 2 MODERATE COPD BY GOLD CLASSIFICATION (HCC): Primary | ICD-10-CM

## 2025-06-19 DIAGNOSIS — Z87.891 PERSONAL HISTORY OF TOBACCO USE: ICD-10-CM

## 2025-06-19 DIAGNOSIS — J45.40 MODERATE PERSISTENT ASTHMA WITHOUT COMPLICATION: ICD-10-CM

## 2025-06-19 PROCEDURE — 99214 OFFICE O/P EST MOD 30 MIN: CPT | Performed by: STUDENT IN AN ORGANIZED HEALTH CARE EDUCATION/TRAINING PROGRAM

## 2025-06-19 PROCEDURE — 1036F TOBACCO NON-USER: CPT | Performed by: STUDENT IN AN ORGANIZED HEALTH CARE EDUCATION/TRAINING PROGRAM

## 2025-06-19 PROCEDURE — G0296 VISIT TO DETERM LDCT ELIG: HCPCS | Performed by: STUDENT IN AN ORGANIZED HEALTH CARE EDUCATION/TRAINING PROGRAM

## 2025-06-19 PROCEDURE — 3023F SPIROM DOC REV: CPT | Performed by: STUDENT IN AN ORGANIZED HEALTH CARE EDUCATION/TRAINING PROGRAM

## 2025-06-19 PROCEDURE — 1159F MED LIST DOCD IN RCRD: CPT | Performed by: STUDENT IN AN ORGANIZED HEALTH CARE EDUCATION/TRAINING PROGRAM

## 2025-06-19 PROCEDURE — G8400 PT W/DXA NO RESULTS DOC: HCPCS | Performed by: STUDENT IN AN ORGANIZED HEALTH CARE EDUCATION/TRAINING PROGRAM

## 2025-06-19 PROCEDURE — 3017F COLORECTAL CA SCREEN DOC REV: CPT | Performed by: STUDENT IN AN ORGANIZED HEALTH CARE EDUCATION/TRAINING PROGRAM

## 2025-06-19 PROCEDURE — G8427 DOCREV CUR MEDS BY ELIG CLIN: HCPCS | Performed by: STUDENT IN AN ORGANIZED HEALTH CARE EDUCATION/TRAINING PROGRAM

## 2025-06-19 PROCEDURE — 1090F PRES/ABSN URINE INCON ASSESS: CPT | Performed by: STUDENT IN AN ORGANIZED HEALTH CARE EDUCATION/TRAINING PROGRAM

## 2025-06-19 PROCEDURE — 1123F ACP DISCUSS/DSCN MKR DOCD: CPT | Performed by: STUDENT IN AN ORGANIZED HEALTH CARE EDUCATION/TRAINING PROGRAM

## 2025-06-19 PROCEDURE — G8417 CALC BMI ABV UP PARAM F/U: HCPCS | Performed by: STUDENT IN AN ORGANIZED HEALTH CARE EDUCATION/TRAINING PROGRAM

## 2025-06-19 RX ORDER — FLUTICASONE FUROATE, UMECLIDINIUM BROMIDE AND VILANTEROL TRIFENATATE 100; 62.5; 25 UG/1; UG/1; UG/1
1 POWDER RESPIRATORY (INHALATION) DAILY
Qty: 3 EACH | Refills: 3 | Status: SHIPPED | OUTPATIENT
Start: 2025-06-19

## 2025-06-19 RX ORDER — ALBUTEROL SULFATE 90 UG/1
2 INHALANT RESPIRATORY (INHALATION) EVERY 4 HOURS PRN
Qty: 3 EACH | Refills: 3 | Status: SHIPPED | OUTPATIENT
Start: 2025-06-19

## 2025-06-19 NOTE — PROGRESS NOTES
Trelegy  Albuterol HFA:        PRIOR WORKUP:  Prior work-up:  PFT 1/6/2020: FEV1 50% of predicted with a 17% bronchodilator change, FVC 64% with a 28% bronchodilator change.  FEV1/FVC ratio 60.  Lung volume by body box notes total lung capacity 138% of predicted, RV of 227% of predicted consistent with air trapping and hyperventilation.  Diffusion capacity is 52% of predicted uncorrected.  Final impression: Stage II COPD with significant bronchodilator response, hyperinflation, air trapping and moderately decreased diffusion capacity due to emphysema versus anemia versus interstitial lung disease.       LDCT from 3/28/2025 showed stable emphysematous changes bilaterally, multiple stable lung nodules the largest in the right lower lobe about 9 mm, no new lung nodules.      CT lung screening 3/18/2023: Coronary artery calcifications noted.  Small hiatal hernia.  Multiple scattered stable bilateral pulmonary nodules largest right lower lobe 9 mm and largest left base 5 mm.  Additional scattered nodules in the upper lobes.  Calcified granuloma in the right upper lobe with moderate to severe emphysema.  Scattered additional nodular or postinflammatory changes are similar/stable.  Bibasilar atelectatic changes slightly increased in the right base.  Mild tree-in-bud formation in the right medial right middle lobe with slight groundglass and slight groundglass opacities in the posterior left upper lobe.  Mild additional interstitial abnormalities and scattered calcifications with no consolidation pleural effusion or pneumothorax                 ROS:     Constitutional:no fever, no chills  HEENT: no runny nose, no sore throat  Respiratory: intermittent dyspnea, BAI, intermittent cough, intermittent wheeze, no sputum production  CVS: no chest pain, no palpitations, no syncope  Gi: no abdominal pain, no nausea, no vomiting, no diarrhea.  MSK: no myalgia, no joint pain.  Skin: no rash  Neurological: no weakness      OBJECTIVE

## 2025-06-24 RX ORDER — AZITHROMYCIN 250 MG/1
TABLET, FILM COATED ORAL
Qty: 6 TABLET | Refills: 0 | Status: SHIPPED | OUTPATIENT
Start: 2025-06-24 | End: 2025-07-04

## 2025-08-22 ENCOUNTER — OFFICE VISIT (OUTPATIENT)
Dept: NEUROLOGY | Age: 75
End: 2025-08-22
Payer: MEDICARE

## 2025-08-22 VITALS
BODY MASS INDEX: 43.9 KG/M2 | HEART RATE: 70 BPM | RESPIRATION RATE: 16 BRPM | OXYGEN SATURATION: 95 % | DIASTOLIC BLOOD PRESSURE: 80 MMHG | SYSTOLIC BLOOD PRESSURE: 130 MMHG | WEIGHT: 272 LBS

## 2025-08-22 DIAGNOSIS — R93.0 ABNORMAL CT OF THE HEAD: ICD-10-CM

## 2025-08-22 DIAGNOSIS — G91.2 NORMAL PRESSURE HYDROCEPHALUS (HCC): ICD-10-CM

## 2025-08-22 DIAGNOSIS — Z98.2 S/P VP SHUNT: Primary | ICD-10-CM

## 2025-08-22 DIAGNOSIS — G31.9 ACQUIRED CEREBRAL ATROPHY: ICD-10-CM

## 2025-08-22 DIAGNOSIS — R26.89 BALANCE PROBLEM: ICD-10-CM

## 2025-08-22 DIAGNOSIS — E66.812 CLASS 2 SEVERE OBESITY DUE TO EXCESS CALORIES WITH SERIOUS COMORBIDITY IN ADULT, UNSPECIFIED BMI (HCC): ICD-10-CM

## 2025-08-22 DIAGNOSIS — R42 DIZZINESS: ICD-10-CM

## 2025-08-22 DIAGNOSIS — G45.0 VBI (VERTEBROBASILAR INSUFFICIENCY): ICD-10-CM

## 2025-08-22 DIAGNOSIS — Z87.891 EX-SMOKER: ICD-10-CM

## 2025-08-22 DIAGNOSIS — G44.219 EPISODIC TENSION-TYPE HEADACHE, NOT INTRACTABLE: ICD-10-CM

## 2025-08-22 DIAGNOSIS — E66.01 CLASS 2 SEVERE OBESITY DUE TO EXCESS CALORIES WITH SERIOUS COMORBIDITY IN ADULT, UNSPECIFIED BMI (HCC): ICD-10-CM

## 2025-08-22 DIAGNOSIS — G93.5 CHIARI MALFORMATION TYPE I (HCC): ICD-10-CM

## 2025-08-22 DIAGNOSIS — G93.89 CEREBRAL VENTRICULOMEGALY: ICD-10-CM

## 2025-08-22 DIAGNOSIS — R53.83 OTHER FATIGUE: ICD-10-CM

## 2025-08-22 DIAGNOSIS — I67.82 CHRONIC CEREBRAL ISCHEMIA: ICD-10-CM

## 2025-08-22 PROCEDURE — G8400 PT W/DXA NO RESULTS DOC: HCPCS | Performed by: PSYCHIATRY & NEUROLOGY

## 2025-08-22 PROCEDURE — 1125F AMNT PAIN NOTED PAIN PRSNT: CPT | Performed by: PSYCHIATRY & NEUROLOGY

## 2025-08-22 PROCEDURE — 3017F COLORECTAL CA SCREEN DOC REV: CPT | Performed by: PSYCHIATRY & NEUROLOGY

## 2025-08-22 PROCEDURE — G8417 CALC BMI ABV UP PARAM F/U: HCPCS | Performed by: PSYCHIATRY & NEUROLOGY

## 2025-08-22 PROCEDURE — 1160F RVW MEDS BY RX/DR IN RCRD: CPT | Performed by: PSYCHIATRY & NEUROLOGY

## 2025-08-22 PROCEDURE — 1036F TOBACCO NON-USER: CPT | Performed by: PSYCHIATRY & NEUROLOGY

## 2025-08-22 PROCEDURE — 1090F PRES/ABSN URINE INCON ASSESS: CPT | Performed by: PSYCHIATRY & NEUROLOGY

## 2025-08-22 PROCEDURE — 99214 OFFICE O/P EST MOD 30 MIN: CPT | Performed by: PSYCHIATRY & NEUROLOGY

## 2025-08-22 PROCEDURE — G8427 DOCREV CUR MEDS BY ELIG CLIN: HCPCS | Performed by: PSYCHIATRY & NEUROLOGY

## 2025-08-22 PROCEDURE — 99215 OFFICE O/P EST HI 40 MIN: CPT | Performed by: PSYCHIATRY & NEUROLOGY

## 2025-08-22 PROCEDURE — 1123F ACP DISCUSS/DSCN MKR DOCD: CPT | Performed by: PSYCHIATRY & NEUROLOGY

## 2025-08-22 PROCEDURE — 1159F MED LIST DOCD IN RCRD: CPT | Performed by: PSYCHIATRY & NEUROLOGY

## 2025-08-22 RX ORDER — ASPIRIN 81 MG/1
81 TABLET ORAL DAILY
Qty: 90 TABLET | Refills: 1 | Status: SHIPPED | OUTPATIENT
Start: 2025-08-22

## 2025-08-22 ASSESSMENT — ENCOUNTER SYMPTOMS
APNEA: 0
CONSTIPATION: 0
COLOR CHANGE: 0
SHORTNESS OF BREATH: 0
CHEST TIGHTNESS: 0
VOICE CHANGE: 0
WHEEZING: 0
FACIAL SWELLING: 0
BOWEL INCONTINENCE: 0
EYE ITCHING: 0
TROUBLE SWALLOWING: 0
DIARRHEA: 0
ABDOMINAL DISTENTION: 0
EYE DISCHARGE: 0
BLOOD IN STOOL: 0
CHOKING: 0

## 2025-08-22 ASSESSMENT — PATIENT HEALTH QUESTIONNAIRE - PHQ9
SUM OF ALL RESPONSES TO PHQ QUESTIONS 1-9: 0
SUM OF ALL RESPONSES TO PHQ QUESTIONS 1-9: 0
1. LITTLE INTEREST OR PLEASURE IN DOING THINGS: NOT AT ALL
2. FEELING DOWN, DEPRESSED OR HOPELESS: NOT AT ALL
SUM OF ALL RESPONSES TO PHQ QUESTIONS 1-9: 0
SUM OF ALL RESPONSES TO PHQ QUESTIONS 1-9: 0

## 2025-08-29 ENCOUNTER — HOSPITAL ENCOUNTER (OUTPATIENT)
Dept: LAB | Age: 75
Discharge: HOME | End: 2025-08-29
Payer: MEDICARE

## 2025-08-29 DIAGNOSIS — I67.82 CHRONIC CEREBRAL ISCHEMIA: ICD-10-CM

## 2025-08-29 DIAGNOSIS — G31.9 ACQUIRED CEREBRAL ATROPHY: ICD-10-CM

## 2025-08-29 DIAGNOSIS — G93.89 CEREBRAL VENTRICULOMEGALY: ICD-10-CM

## 2025-08-29 DIAGNOSIS — Z87.891 EX-SMOKER: ICD-10-CM

## 2025-08-29 DIAGNOSIS — R93.0 ABNORMAL CT OF THE HEAD: ICD-10-CM

## 2025-08-29 DIAGNOSIS — G93.5 CHIARI MALFORMATION TYPE I (HCC): ICD-10-CM

## 2025-08-29 DIAGNOSIS — R53.83 OTHER FATIGUE: ICD-10-CM

## 2025-08-29 DIAGNOSIS — R42 DIZZINESS: ICD-10-CM

## 2025-08-29 DIAGNOSIS — E66.812 CLASS 2 SEVERE OBESITY DUE TO EXCESS CALORIES WITH SERIOUS COMORBIDITY IN ADULT, UNSPECIFIED BMI (HCC): ICD-10-CM

## 2025-08-29 DIAGNOSIS — G91.2 NORMAL PRESSURE HYDROCEPHALUS (HCC): ICD-10-CM

## 2025-08-29 DIAGNOSIS — E66.01 CLASS 2 SEVERE OBESITY DUE TO EXCESS CALORIES WITH SERIOUS COMORBIDITY IN ADULT, UNSPECIFIED BMI (HCC): ICD-10-CM

## 2025-08-29 DIAGNOSIS — R26.89 BALANCE PROBLEM: ICD-10-CM

## 2025-08-29 DIAGNOSIS — G44.219 EPISODIC TENSION-TYPE HEADACHE, NOT INTRACTABLE: ICD-10-CM

## 2025-08-29 DIAGNOSIS — G45.0 VBI (VERTEBROBASILAR INSUFFICIENCY): ICD-10-CM

## 2025-08-29 DIAGNOSIS — Z98.2 S/P VP SHUNT: ICD-10-CM

## 2025-08-29 LAB
FOLATE SERPL-MCNC: 12 NG/ML (ref 4.8–24.2)
TSH SERPL DL<=0.05 MIU/L-ACNC: 2.92 UIU/ML (ref 0.27–4.2)
VIT B12 SERPL-MCNC: 236 PG/ML (ref 232–1245)

## 2025-08-29 PROCEDURE — 82746 ASSAY OF FOLIC ACID SERUM: CPT

## 2025-08-29 PROCEDURE — 82607 VITAMIN B-12: CPT

## 2025-08-29 PROCEDURE — 36415 COLL VENOUS BLD VENIPUNCTURE: CPT

## 2025-08-29 PROCEDURE — 84443 ASSAY THYROID STIM HORMONE: CPT

## (undated) DEVICE — CODMAN® SURGICAL PATTIES 1/2" X 1/2" (1.27CM X 1.27CM): Brand: CODMAN®

## (undated) DEVICE — SUTURE PERMA-HAND 1 L30IN NONABSORBABLE BLK SILK BRAID W/O SA87G

## (undated) DEVICE — SPONGE,PEANUT,XRAY,ST,SM,3/8",5/CARD: Brand: MEDLINE INDUSTRIES, INC.

## (undated) DEVICE — TUNNELING INSTRUMENT 600MM: Brand: AESCULAP

## (undated) DEVICE — CODMAN® SURGICAL PATTIES 1" X 1" (2.54CM X 2.54CM): Brand: CODMAN®

## (undated) DEVICE — FORCEPS BX L240CM JAW DIA2.2MM RAD JAW 4 HOT DISP

## (undated) DEVICE — SUTURE VCRL SZ 3-0 L18IN ABSRB UD L26MM SH 1/2 CIR J864D

## (undated) DEVICE — PREP SOL PVP IODINE 4%  4 OZ/BTL

## (undated) DEVICE — LARGE BLUNT, DUAL PACK: Brand: LINA SKIN HOOK™

## (undated) DEVICE — SVMMC SHUNT PACK: Brand: MEDLINE INDUSTRIES, INC.

## (undated) DEVICE — GAUZE,SPONGE,4"X4",16PLY,XRAY,STRL,LF: Brand: MEDLINE

## (undated) DEVICE — GARMENT,MEDLINE,DVT,INT,CALF,MED, GEN2: Brand: MEDLINE

## (undated) DEVICE — SPONGE,NEURO,1"X3",XR,STRL,LF,10/PK: Brand: MEDLINE

## (undated) DEVICE — DRAPE,EENT,SPLIT,STERILE: Brand: MEDLINE

## (undated) DEVICE — RADIOPAQUE LINE, SAFE ENTERAL CONNECTIONS: Brand: KANGAROO

## (undated) DEVICE — DISPOSABLE BIPOLAR CABLE, 12FT (3.6M): Brand: KIRWAN

## (undated) DEVICE — DRESSING BORDERED ADH GZ UNIV GEN USE 5IN 4IN AND 2 1/2IN

## (undated) DEVICE — ELECTRODE PT RET AD L9FT HI MOIST COND ADH HYDRGEL CORDED

## (undated) DEVICE — SUTURE PERMAHAND SZ 2-0 L30IN NONABSORBABLE BLK SILK W/O A305H

## (undated) DEVICE — TUBING, SUCTION, 9/32" X 20', STRAIGHT: Brand: MEDLINE INDUSTRIES, INC.

## (undated) DEVICE — 4-PORT MANIFOLD: Brand: NEPTUNE 2

## (undated) DEVICE — BLADE CLP TAPR HD WET DRY CAPABILITY GTT IN CHARGING USE

## (undated) DEVICE — SUTURE PROL SZ 3-0 L30IN NONABSORBABLE BLU L26MM SH 1/2 CIR 8832H

## (undated) DEVICE — SOLUTION SURG PREP POV IOD 7.5% 4 OZ

## (undated) DEVICE — Device

## (undated) DEVICE — 3M™ IOBAN™ 2 ANTIMICROBIAL INCISE DRAPE 6651EZ: Brand: IOBAN™ 2

## (undated) DEVICE — E-Z CLEAN, NON-STICK, PTFE COATED, ELECTROSURGICAL BLADE ELECTRODE, MODIFIED EXTENDED INSULATION, 2.5 INCH (6.35 CM): Brand: MEGADYNE

## (undated) DEVICE — SUTURE VCRL SZ 2-0 L27IN ABSRB UD L36MM CT-1 1/2 CIR JJ42G

## (undated) DEVICE — GAUZE,SPONGE,FLUFF,6"X6.75",STRL,5/TRAY: Brand: MEDLINE

## (undated) DEVICE — CODMAN® SURGICAL PATTIES 1/2" X 3" (1.27CM X 7.62CM): Brand: CODMAN®

## (undated) DEVICE — MARKER,SKIN,WI/RULER AND LABELS: Brand: MEDLINE

## (undated) DEVICE — E-Z CLEAN, NON-STICK, PTFE COATED, ELECTROSURGICAL BLADE ELECTRODE, 2.75 INCH (7 CM): Brand: MEGADYNE

## (undated) DEVICE — BOOT SUT STD RED IN BLU SMOOTH RADPQ

## (undated) DEVICE — ZYPHR DISPOSABLE CRANIAL PERFORATOR, LARGE 14/11MM: Brand: ZYPHR

## (undated) DEVICE — Z DISCONTINUED BY MEDLINE USE 2711682 TRAY SKIN PREP DRY W/ PREM GLV

## (undated) DEVICE — SPONGE LAP W18XL18IN WHT COT 4 PLY FLD STRUNG RADPQ DISP ST

## (undated) DEVICE — CO2 CANNULA,SSOFT,ADLT,7O2,4CO2,FEMALE: Brand: MEDLINE

## (undated) DEVICE — GOWN,AURORA,NONREINFORCED,LARGE: Brand: MEDLINE

## (undated) DEVICE — GLOVE ORANGE PI 7 1/2   MSG9075

## (undated) DEVICE — HEADREST NEURO DONUT 7 IN

## (undated) DEVICE — SHEET, ORTHO, SPLIT, STERILE: Brand: MEDLINE

## (undated) DEVICE — DRAPE C ARM UNIV W41XL74IN CLR PLAS XR VELC CLSR POLY STRP

## (undated) DEVICE — GLOVE ORANGE PI 7   MSG9070

## (undated) DEVICE — MERCY DEFIANCE ENDO KIT: Brand: MEDLINE INDUSTRIES, INC.